# Patient Record
Sex: FEMALE | Race: BLACK OR AFRICAN AMERICAN | Employment: OTHER | URBAN - METROPOLITAN AREA
[De-identification: names, ages, dates, MRNs, and addresses within clinical notes are randomized per-mention and may not be internally consistent; named-entity substitution may affect disease eponyms.]

---

## 2018-04-17 ENCOUNTER — HOSPITAL ENCOUNTER (OUTPATIENT)
Dept: ULTRASOUND IMAGING | Age: 58
Discharge: HOME | End: 2018-04-17
Payer: MEDICAID

## 2018-04-17 DIAGNOSIS — R22.1 NECK MASS: ICD-10-CM

## 2018-04-17 PROCEDURE — 76536 US EXAM OF HEAD AND NECK: CPT

## 2018-07-25 ENCOUNTER — HOSPITAL ENCOUNTER (EMERGENCY)
Age: 58
Discharge: HOME | End: 2018-07-25
Attending: EMERGENCY MEDICINE
Payer: MEDICARE

## 2018-07-25 ENCOUNTER — APPOINTMENT (OUTPATIENT)
Dept: GENERAL RADIOLOGY | Age: 58
End: 2018-07-25
Payer: MEDICARE

## 2018-07-25 VITALS
SYSTOLIC BLOOD PRESSURE: 147 MMHG | BODY MASS INDEX: 25 KG/M2 | WEIGHT: 128 LBS | RESPIRATION RATE: 20 BRPM | DIASTOLIC BLOOD PRESSURE: 86 MMHG | TEMPERATURE: 98.4 F | OXYGEN SATURATION: 98 % | HEART RATE: 90 BPM

## 2018-07-25 DIAGNOSIS — M79.674 PAIN OF TOE OF RIGHT FOOT: Primary | ICD-10-CM

## 2018-07-25 PROCEDURE — 99283 EMERGENCY DEPT VISIT LOW MDM: CPT

## 2018-07-25 PROCEDURE — G0382 LEV 3 HOSP TYPE B ED VISIT: HCPCS

## 2018-07-25 PROCEDURE — 73630 X-RAY EXAM OF FOOT: CPT

## 2018-07-25 RX ORDER — CLOPIDOGREL BISULFATE 75 MG/1
75 TABLET ORAL DAILY
COMMUNITY

## 2018-07-25 ASSESSMENT — PAIN DESCRIPTION - PAIN TYPE: TYPE: ACUTE PAIN

## 2018-07-25 ASSESSMENT — PAIN SCALES - GENERAL: PAINLEVEL_OUTOF10: 5

## 2018-07-25 ASSESSMENT — PAIN DESCRIPTION - PROGRESSION: CLINICAL_PROGRESSION: NOT CHANGED

## 2018-07-25 ASSESSMENT — PAIN DESCRIPTION - ORIENTATION: ORIENTATION: RIGHT

## 2018-07-25 ASSESSMENT — PAIN DESCRIPTION - LOCATION: LOCATION: TOE (COMMENT WHICH ONE)

## 2018-07-25 ASSESSMENT — PAIN DESCRIPTION - DESCRIPTORS: DESCRIPTORS: ACHING

## 2018-07-25 ASSESSMENT — PAIN DESCRIPTION - ONSET: ONSET: GRADUAL

## 2018-07-25 ASSESSMENT — PAIN DESCRIPTION - FREQUENCY: FREQUENCY: CONTINUOUS

## 2018-07-25 NOTE — ED PROVIDER NOTES
kg)   SpO2 98%   BMI 25.00 kg/m²   Oxygen Saturation Interpretation: Normal      ---------------------------------------------------PHYSICAL EXAM--------------------------------------      Constitutional/General: Alert and oriented x3, well appearing, non toxic in NAD  Head: NC/AT  Eyes: PERRL, EOMI  Mouth: Oropharynx clear, handling secretions, no trismus  Neck: Supple, full ROM, no meningeal signs  Pulmonary: Lungs clear to auscultation bilaterally, no wheezes, rales, or rhonchi. Not in respiratory distress  Cardiovascular:  Regular rate and rhythm, no murmurs, gallops, or rubs. 2+ distal pulses  Abdomen: Soft, non tender, non distended,   Extremities: Moves all extremities x 4. Warm and well perfused  Skin: warm and dry without rash  Neurologic: GCS 15, cranial nerves II through 12 grossly intact  Psych: Normal Affect      ------------------------------ ED COURSE/MEDICAL DECISION MAKING----------------------  Medications - No data to display      Medical Decision Makin-year-old female who presents to the emergency department with a toe injury that she sustained 4 days ago. X-ray imaging of the toe shows no acute abnormalities. Patient will follow up with her primary care provider within the next 3-5 days. She declined pain medication. Patient was in agreement to this plan and she was discharged in stable condition. Counseling: The emergency provider has spoken with the patient and discussed todays results, in addition to providing specific details for the plan of care and counseling regarding the diagnosis and prognosis. Questions are answered at this time and they are agreeable with the plan.      --------------------------------- IMPRESSION AND DISPOSITION ---------------------------------    IMPRESSION  1.  Pain of toe of right foot        DISPOSITION  Disposition: Discharge to home  Patient condition is good                  Julee Dunbar DO  [id number]

## 2018-10-31 ENCOUNTER — HOSPITAL ENCOUNTER (EMERGENCY)
Age: 58
Discharge: HOME | End: 2018-10-31
Payer: MEDICARE

## 2018-10-31 ENCOUNTER — APPOINTMENT (OUTPATIENT)
Dept: GENERAL RADIOLOGY | Age: 58
End: 2018-10-31
Payer: MEDICARE

## 2018-10-31 VITALS
HEART RATE: 82 BPM | RESPIRATION RATE: 20 BRPM | WEIGHT: 130 LBS | DIASTOLIC BLOOD PRESSURE: 88 MMHG | SYSTOLIC BLOOD PRESSURE: 191 MMHG | HEIGHT: 60 IN | TEMPERATURE: 98.3 F | BODY MASS INDEX: 25.52 KG/M2 | OXYGEN SATURATION: 98 %
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
## 2018-10-31 DIAGNOSIS — S80.01XA CONTUSION OF RIGHT KNEE, INITIAL ENCOUNTER: ICD-10-CM

## 2018-10-31 PROCEDURE — 73562 X-RAY EXAM OF KNEE 3: CPT

## 2018-10-31 PROCEDURE — 99283 EMERGENCY DEPT VISIT LOW MDM: CPT

## 2018-10-31 PROCEDURE — 73590 X-RAY EXAM OF LOWER LEG: CPT

## 2018-10-31 ASSESSMENT — PAIN DESCRIPTION - PAIN TYPE: TYPE: ACUTE PAIN

## 2018-10-31 ASSESSMENT — PAIN SCALES - GENERAL: PAINLEVEL_OUTOF10: 5

## 2018-10-31 ASSESSMENT — PAIN DESCRIPTION - ORIENTATION: ORIENTATION: RIGHT;LEFT

## 2018-10-31 ASSESSMENT — PAIN DESCRIPTION - LOCATION: LOCATION: KNEE;LEG

## 2019-01-28 ENCOUNTER — HOSPITAL ENCOUNTER (OUTPATIENT)
Dept: GENERAL RADIOLOGY | Age: 59
Discharge: HOME | End: 2019-01-30
Payer: COMMERCIAL

## 2019-01-28 DIAGNOSIS — R13.14 DYSPHAGIA, PHARYNGOESOPHAGEAL: ICD-10-CM

## 2019-01-28 PROCEDURE — 2500000003 HC RX 250 WO HCPCS: Performed by: INTERNAL MEDICINE

## 2019-01-28 PROCEDURE — 74220 X-RAY XM ESOPHAGUS 1CNTRST: CPT

## 2019-01-28 RX ADMIN — BARIUM SULFATE 340 G: 980 POWDER, FOR SUSPENSION ORAL at 08:42

## 2019-03-13 ENCOUNTER — HOSPITAL ENCOUNTER (EMERGENCY)
Age: 59
Discharge: HOME | End: 2019-03-14
Attending: EMERGENCY MEDICINE
Payer: MEDICARE

## 2019-03-13 ENCOUNTER — APPOINTMENT (OUTPATIENT)
Dept: GENERAL RADIOLOGY | Age: 59
End: 2019-03-13
Payer: MEDICARE

## 2019-03-13 ENCOUNTER — APPOINTMENT (OUTPATIENT)
Dept: CT IMAGING | Age: 59
End: 2019-03-13
Payer: MEDICARE

## 2019-03-13 DIAGNOSIS — E16.2 HYPOGLYCEMIA: Primary | ICD-10-CM

## 2019-03-13 DIAGNOSIS — I10 UNCONTROLLED HYPERTENSION: ICD-10-CM

## 2019-03-13 LAB
ALBUMIN SERPL-MCNC: 3.7 G/DL (ref 3.5–5.2)
ALP BLD-CCNC: 106 U/L (ref 35–104)
ALT SERPL-CCNC: 8 U/L (ref 0–32)
AMMONIA: 27 UMOL/L (ref 11–51)
AMPHETAMINE SCREEN, URINE: NOT DETECTED
ANION GAP SERPL CALCULATED.3IONS-SCNC: 12 MMOL/L (ref 7–16)
APTT: 33 SEC (ref 24.5–35.1)
AST SERPL-CCNC: 13 U/L (ref 0–31)
BACTERIA: ABNORMAL /HPF
BARBITURATE SCREEN URINE: NOT DETECTED
BASOPHILS ABSOLUTE: 0.01 E9/L (ref 0–0.2)
BASOPHILS RELATIVE PERCENT: 0.2 % (ref 0–2)
BENZODIAZEPINE SCREEN, URINE: NOT DETECTED
BILIRUB SERPL-MCNC: <0.2 MG/DL (ref 0–1.2)
BILIRUBIN URINE: NEGATIVE
BLOOD, URINE: ABNORMAL
BUN BLDV-MCNC: 17 MG/DL (ref 8–23)
CALCIUM SERPL-MCNC: 10 MG/DL (ref 8.6–10.2)
CANNABINOID SCREEN URINE: NOT DETECTED
CHLORIDE BLD-SCNC: 100 MMOL/L (ref 98–107)
CHP ED QC CHECK: YES
CHP ED QC CHECK: YES
CLARITY: CLEAR
CO2: 27 MMOL/L (ref 22–29)
COCAINE METABOLITE SCREEN URINE: NOT DETECTED
COLOR: YELLOW
CREAT SERPL-MCNC: 1 MG/DL (ref 0.5–1)
EOSINOPHILS ABSOLUTE: 0.01 E9/L (ref 0.05–0.5)
EOSINOPHILS RELATIVE PERCENT: 0.2 % (ref 0–6)
GFR AFRICAN AMERICAN: >60
GFR NON-AFRICAN AMERICAN: >60 ML/MIN/1.73
GLUCOSE BLD-MCNC: 130 MG/DL
GLUCOSE BLD-MCNC: 35 MG/DL (ref 74–99)
GLUCOSE BLD-MCNC: NORMAL MG/DL
GLUCOSE URINE: 100 MG/DL
HCT VFR BLD CALC: 38.6 % (ref 34–48)
HEMOGLOBIN: 12.3 G/DL (ref 11.5–15.5)
IMMATURE GRANULOCYTES #: 0.01 E9/L
IMMATURE GRANULOCYTES %: 0.2 % (ref 0–5)
INR BLD: 1
KETONES, URINE: NEGATIVE MG/DL
LEUKOCYTE ESTERASE, URINE: NEGATIVE
LYMPHOCYTES ABSOLUTE: 1.07 E9/L (ref 1.5–4)
LYMPHOCYTES RELATIVE PERCENT: 20.6 % (ref 20–42)
MCH RBC QN AUTO: 26.6 PG (ref 26–35)
MCHC RBC AUTO-ENTMCNC: 31.9 % (ref 32–34.5)
MCV RBC AUTO: 83.4 FL (ref 80–99.9)
METER GLUCOSE: 130 MG/DL (ref 74–99)
METER GLUCOSE: <40 MG/DL (ref 74–99)
METHADONE SCREEN, URINE: NOT DETECTED
MONOCYTES ABSOLUTE: 0.43 E9/L (ref 0.1–0.95)
MONOCYTES RELATIVE PERCENT: 8.3 % (ref 2–12)
NEUTROPHILS ABSOLUTE: 3.66 E9/L (ref 1.8–7.3)
NEUTROPHILS RELATIVE PERCENT: 70.5 % (ref 43–80)
NITRITE, URINE: NEGATIVE
OPIATE SCREEN URINE: NOT DETECTED
PDW BLD-RTO: 12.8 FL (ref 11.5–15)
PH UA: 7 (ref 5–9)
PHENCYCLIDINE SCREEN URINE: NOT DETECTED
PLATELET # BLD: 348 E9/L (ref 130–450)
PMV BLD AUTO: 8.8 FL (ref 7–12)
POTASSIUM SERPL-SCNC: 3.3 MMOL/L (ref 3.5–5)
PROPOXYPHENE SCREEN: NOT DETECTED
PROTEIN UA: 100 MG/DL
PROTHROMBIN TIME: 11 SEC (ref 9.3–12.4)
RBC # BLD: 4.63 E12/L (ref 3.5–5.5)
RBC UA: ABNORMAL /HPF (ref 0–2)
SODIUM BLD-SCNC: 139 MMOL/L (ref 132–146)
SPECIFIC GRAVITY UA: 1.01 (ref 1–1.03)
TOTAL PROTEIN: 7.5 G/DL (ref 6.4–8.3)
TROPONIN: <0.01 NG/ML (ref 0–0.03)
TSH SERPL DL<=0.05 MIU/L-ACNC: 1.07 UIU/ML (ref 0.27–4.2)
UROBILINOGEN, URINE: 0.2 E.U./DL
WBC # BLD: 5.2 E9/L (ref 4.5–11.5)
WBC UA: ABNORMAL /HPF (ref 0–5)

## 2019-03-13 PROCEDURE — 85730 THROMBOPLASTIN TIME PARTIAL: CPT

## 2019-03-13 PROCEDURE — 99285 EMERGENCY DEPT VISIT HI MDM: CPT

## 2019-03-13 PROCEDURE — 96374 THER/PROPH/DIAG INJ IV PUSH: CPT

## 2019-03-13 PROCEDURE — 80307 DRUG TEST PRSMV CHEM ANLYZR: CPT

## 2019-03-13 PROCEDURE — 70450 CT HEAD/BRAIN W/O DYE: CPT

## 2019-03-13 PROCEDURE — 80053 COMPREHEN METABOLIC PANEL: CPT

## 2019-03-13 PROCEDURE — 85025 COMPLETE CBC W/AUTO DIFF WBC: CPT

## 2019-03-13 PROCEDURE — 82140 ASSAY OF AMMONIA: CPT

## 2019-03-13 PROCEDURE — G0480 DRUG TEST DEF 1-7 CLASSES: HCPCS

## 2019-03-13 PROCEDURE — 84443 ASSAY THYROID STIM HORMONE: CPT

## 2019-03-13 PROCEDURE — 81001 URINALYSIS AUTO W/SCOPE: CPT

## 2019-03-13 PROCEDURE — 85610 PROTHROMBIN TIME: CPT

## 2019-03-13 PROCEDURE — 36415 COLL VENOUS BLD VENIPUNCTURE: CPT

## 2019-03-13 PROCEDURE — 2580000003 HC RX 258: Performed by: EMERGENCY MEDICINE

## 2019-03-13 PROCEDURE — 71045 X-RAY EXAM CHEST 1 VIEW: CPT

## 2019-03-13 PROCEDURE — 84484 ASSAY OF TROPONIN QUANT: CPT

## 2019-03-13 PROCEDURE — 93005 ELECTROCARDIOGRAM TRACING: CPT | Performed by: EMERGENCY MEDICINE

## 2019-03-13 PROCEDURE — 82962 GLUCOSE BLOOD TEST: CPT

## 2019-03-13 RX ORDER — LABETALOL HYDROCHLORIDE 5 MG/ML
10 INJECTION, SOLUTION INTRAVENOUS ONCE
Status: COMPLETED | OUTPATIENT
Start: 2019-03-13 | End: 2019-03-14

## 2019-03-13 RX ORDER — DEXTROMETHORPHAN HYDROBROMIDE AND PROMETHAZINE HYDROCHLORIDE 15; 6.25 MG/5ML; MG/5ML
5 SYRUP ORAL 4 TIMES DAILY PRN
COMMUNITY
End: 2019-04-18

## 2019-03-13 RX ORDER — DEXTROSE MONOHYDRATE 25 G/50ML
25 INJECTION, SOLUTION INTRAVENOUS ONCE
Status: COMPLETED | OUTPATIENT
Start: 2019-03-13 | End: 2019-03-13

## 2019-03-13 RX ORDER — GABAPENTIN 300 MG/1
300 CAPSULE ORAL NIGHTLY
COMMUNITY

## 2019-03-13 RX ADMIN — DEXTROSE MONOHYDRATE 25 G: 25 INJECTION, SOLUTION INTRAVENOUS at 21:23

## 2019-03-14 VITALS
HEART RATE: 73 BPM | OXYGEN SATURATION: 97 % | SYSTOLIC BLOOD PRESSURE: 179 MMHG | TEMPERATURE: 98.4 F | BODY MASS INDEX: 26.9 KG/M2 | HEIGHT: 60 IN | WEIGHT: 137 LBS | DIASTOLIC BLOOD PRESSURE: 83 MMHG | RESPIRATION RATE: 16 BRPM

## 2019-03-14 LAB
ACETAMINOPHEN LEVEL: <5 MCG/ML (ref 10–30)
CHP ED QC CHECK: NORMAL
EKG ATRIAL RATE: 86 BPM
EKG P-R INTERVAL: 220 MS
EKG Q-T INTERVAL: 366 MS
EKG QRS DURATION: 92 MS
EKG QTC CALCULATION (BAZETT): 437 MS
EKG R AXIS: -4 DEGREES
EKG T AXIS: 154 DEGREES
EKG VENTRICULAR RATE: 86 BPM
ETHANOL: <10 MG/DL (ref 0–0.08)
GLUCOSE BLD-MCNC: 208 MG/DL
METER GLUCOSE: 208 MG/DL (ref 74–99)
SALICYLATE, SERUM: <0.3 MG/DL (ref 0–30)
TRICYCLIC ANTIDEPRESSANTS SCREEN SERUM: NEGATIVE NG/ML

## 2019-03-14 PROCEDURE — 96375 TX/PRO/DX INJ NEW DRUG ADDON: CPT

## 2019-03-14 PROCEDURE — 2500000003 HC RX 250 WO HCPCS: Performed by: EMERGENCY MEDICINE

## 2019-03-14 PROCEDURE — 82962 GLUCOSE BLOOD TEST: CPT

## 2019-03-14 RX ADMIN — LABETALOL 20 MG/4 ML (5 MG/ML) INTRAVENOUS SYRINGE 10 MG: at 00:37

## 2019-04-18 ENCOUNTER — HOSPITAL ENCOUNTER (EMERGENCY)
Age: 59
Discharge: HOME | End: 2019-04-18
Attending: EMERGENCY MEDICINE
Payer: MEDICARE

## 2019-04-18 ENCOUNTER — APPOINTMENT (OUTPATIENT)
Dept: GENERAL RADIOLOGY | Age: 59
End: 2019-04-18
Payer: MEDICARE

## 2019-04-18 VITALS
WEIGHT: 135 LBS | TEMPERATURE: 98.1 F | SYSTOLIC BLOOD PRESSURE: 172 MMHG | OXYGEN SATURATION: 97 % | HEART RATE: 78 BPM | RESPIRATION RATE: 16 BRPM | DIASTOLIC BLOOD PRESSURE: 100 MMHG | BODY MASS INDEX: 26.37 KG/M2

## 2019-04-18 DIAGNOSIS — S90.121A CONTUSION OF RIGHT FOOT INCLUDING TOES, INITIAL ENCOUNTER: Primary | ICD-10-CM

## 2019-04-18 DIAGNOSIS — S90.31XA CONTUSION OF RIGHT FOOT INCLUDING TOES, INITIAL ENCOUNTER: Primary | ICD-10-CM

## 2019-04-18 PROCEDURE — 99283 EMERGENCY DEPT VISIT LOW MDM: CPT

## 2019-04-18 PROCEDURE — G0382 LEV 3 HOSP TYPE B ED VISIT: HCPCS

## 2019-04-18 PROCEDURE — 73630 X-RAY EXAM OF FOOT: CPT

## 2019-04-18 ASSESSMENT — PAIN DESCRIPTION - PROGRESSION
CLINICAL_PROGRESSION: NOT CHANGED
CLINICAL_PROGRESSION: NOT CHANGED

## 2019-04-18 ASSESSMENT — PAIN DESCRIPTION - PAIN TYPE: TYPE: ACUTE PAIN

## 2019-04-18 ASSESSMENT — PAIN DESCRIPTION - FREQUENCY: FREQUENCY: CONTINUOUS

## 2019-04-18 ASSESSMENT — PAIN DESCRIPTION - ORIENTATION: ORIENTATION: RIGHT

## 2019-04-18 ASSESSMENT — PAIN DESCRIPTION - LOCATION: LOCATION: FOOT

## 2019-04-18 ASSESSMENT — PAIN DESCRIPTION - DESCRIPTORS: DESCRIPTORS: SORE

## 2019-04-18 ASSESSMENT — PAIN SCALES - GENERAL: PAINLEVEL_OUTOF10: 5
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
## 2019-07-26 ENCOUNTER — HOSPITAL ENCOUNTER (OUTPATIENT)
Dept: ULTRASOUND IMAGING | Age: 59
Discharge: HOME | End: 2019-07-26
Payer: MEDICARE

## 2019-07-26 DIAGNOSIS — R13.10 DYSPHAGIA, UNSPECIFIED TYPE: ICD-10-CM

## 2019-07-26 PROCEDURE — 76536 US EXAM OF HEAD AND NECK: CPT

## 2019-09-17 ENCOUNTER — HOSPITAL ENCOUNTER (OUTPATIENT)
Dept: ULTRASOUND IMAGING | Age: 59
Discharge: HOME | End: 2019-09-17
Payer: MEDICARE

## 2019-09-17 ENCOUNTER — HOSPITAL ENCOUNTER (OUTPATIENT)
Age: 59
Discharge: HOME | End: 2019-09-19
Payer: MEDICARE

## 2019-09-17 ENCOUNTER — HOSPITAL ENCOUNTER (OUTPATIENT)
Dept: GENERAL RADIOLOGY | Age: 59
Discharge: HOME | End: 2019-09-19
Payer: MEDICARE

## 2019-09-17 DIAGNOSIS — N18.2 CHRONIC KIDNEY DISEASE, STAGE II (MILD): ICD-10-CM

## 2019-09-17 DIAGNOSIS — G89.29 CHRONIC LEFT SHOULDER PAIN: ICD-10-CM

## 2019-09-17 DIAGNOSIS — M25.512 CHRONIC LEFT SHOULDER PAIN: ICD-10-CM

## 2019-09-17 PROCEDURE — 76775 US EXAM ABDO BACK WALL LIM: CPT

## 2019-09-17 PROCEDURE — 73030 X-RAY EXAM OF SHOULDER: CPT

## 2019-11-24 ENCOUNTER — HOSPITAL ENCOUNTER (EMERGENCY)
Age: 59
Discharge: HOME | End: 2019-11-24
Attending: EMERGENCY MEDICINE
Payer: MEDICARE

## 2019-11-24 VITALS
HEIGHT: 60 IN | RESPIRATION RATE: 16 BRPM | WEIGHT: 130 LBS | TEMPERATURE: 97.6 F | HEART RATE: 78 BPM | BODY MASS INDEX: 25.52 KG/M2 | OXYGEN SATURATION: 97 % | SYSTOLIC BLOOD PRESSURE: 155 MMHG | DIASTOLIC BLOOD PRESSURE: 84 MMHG

## 2019-11-24 DIAGNOSIS — E16.2 HYPOGLYCEMIA: Primary | ICD-10-CM

## 2019-11-24 LAB
ANION GAP SERPL CALCULATED.3IONS-SCNC: 12 MMOL/L (ref 7–16)
BASOPHILS ABSOLUTE: 0.02 E9/L (ref 0–0.2)
BASOPHILS RELATIVE PERCENT: 0.3 % (ref 0–2)
BUN BLDV-MCNC: 24 MG/DL (ref 8–23)
CALCIUM SERPL-MCNC: 9.9 MG/DL (ref 8.6–10.2)
CHLORIDE BLD-SCNC: 105 MMOL/L (ref 98–107)
CHP ED QC CHECK: NORMAL
CHP ED QC CHECK: NORMAL
CO2: 27 MMOL/L (ref 22–29)
CREAT SERPL-MCNC: 1.6 MG/DL (ref 0.5–1)
EOSINOPHILS ABSOLUTE: 0.11 E9/L (ref 0.05–0.5)
EOSINOPHILS RELATIVE PERCENT: 1.4 % (ref 0–6)
GFR AFRICAN AMERICAN: 40
GFR NON-AFRICAN AMERICAN: 40 ML/MIN/1.73
GLUCOSE BLD-MCNC: 162 MG/DL
GLUCOSE BLD-MCNC: 168 MG/DL (ref 74–99)
GLUCOSE BLD-MCNC: 83 MG/DL
HCT VFR BLD CALC: 35 % (ref 34–48)
HEMOGLOBIN: 10.9 G/DL (ref 11.5–15.5)
IMMATURE GRANULOCYTES #: 0.02 E9/L
IMMATURE GRANULOCYTES %: 0.3 % (ref 0–5)
LYMPHOCYTES ABSOLUTE: 2.46 E9/L (ref 1.5–4)
LYMPHOCYTES RELATIVE PERCENT: 32 % (ref 20–42)
MCH RBC QN AUTO: 26.3 PG (ref 26–35)
MCHC RBC AUTO-ENTMCNC: 31.1 % (ref 32–34.5)
MCV RBC AUTO: 84.5 FL (ref 80–99.9)
METER GLUCOSE: 162 MG/DL (ref 74–99)
METER GLUCOSE: 173 MG/DL (ref 74–99)
METER GLUCOSE: 83 MG/DL (ref 74–99)
METER GLUCOSE: 84 MG/DL (ref 74–99)
MONOCYTES ABSOLUTE: 0.38 E9/L (ref 0.1–0.95)
MONOCYTES RELATIVE PERCENT: 4.9 % (ref 2–12)
NEUTROPHILS ABSOLUTE: 4.69 E9/L (ref 1.8–7.3)
NEUTROPHILS RELATIVE PERCENT: 61.1 % (ref 43–80)
PDW BLD-RTO: 13.8 FL (ref 11.5–15)
PLATELET # BLD: 405 E9/L (ref 130–450)
PMV BLD AUTO: 8.7 FL (ref 7–12)
POTASSIUM SERPL-SCNC: 4.3 MMOL/L (ref 3.5–5)
RBC # BLD: 4.14 E12/L (ref 3.5–5.5)
SODIUM BLD-SCNC: 144 MMOL/L (ref 132–146)
TROPONIN: <0.01 NG/ML (ref 0–0.03)
WBC # BLD: 7.7 E9/L (ref 4.5–11.5)

## 2019-11-24 PROCEDURE — 80048 BASIC METABOLIC PNL TOTAL CA: CPT

## 2019-11-24 PROCEDURE — 82962 GLUCOSE BLOOD TEST: CPT

## 2019-11-24 PROCEDURE — 36415 COLL VENOUS BLD VENIPUNCTURE: CPT

## 2019-11-24 PROCEDURE — 99285 EMERGENCY DEPT VISIT HI MDM: CPT

## 2019-11-24 PROCEDURE — 84484 ASSAY OF TROPONIN QUANT: CPT

## 2019-11-24 PROCEDURE — 85025 COMPLETE CBC W/AUTO DIFF WBC: CPT

## 2019-11-24 PROCEDURE — 93005 ELECTROCARDIOGRAM TRACING: CPT | Performed by: EMERGENCY MEDICINE

## 2019-11-24 PROCEDURE — 96374 THER/PROPH/DIAG INJ IV PUSH: CPT

## 2019-11-24 PROCEDURE — 2500000003 HC RX 250 WO HCPCS: Performed by: EMERGENCY MEDICINE

## 2019-11-24 RX ORDER — LABETALOL 20 MG/4 ML (5 MG/ML) INTRAVENOUS SYRINGE
10 ONCE
Status: COMPLETED | OUTPATIENT
Start: 2019-11-24 | End: 2019-11-24

## 2019-11-24 RX ADMIN — LABETALOL 20 MG/4 ML (5 MG/ML) INTRAVENOUS SYRINGE 10 MG: at 21:01

## 2019-11-24 ASSESSMENT — ENCOUNTER SYMPTOMS
COUGH: 0
SORE THROAT: 0
ABDOMINAL DISTENTION: 0
ABDOMINAL PAIN: 0
SHORTNESS OF BREATH: 0
BACK PAIN: 0
WHEEZING: 0
CHEST TIGHTNESS: 0
DIARRHEA: 0
SINUS PRESSURE: 0
NAUSEA: 0
VOMITING: 0

## 2019-11-25 LAB
EKG ATRIAL RATE: 94 BPM
EKG P AXIS: 69 DEGREES
EKG P-R INTERVAL: 228 MS
EKG Q-T INTERVAL: 358 MS
EKG QRS DURATION: 92 MS
EKG QTC CALCULATION (BAZETT): 447 MS
EKG R AXIS: 47 DEGREES
EKG T AXIS: 83 DEGREES
EKG VENTRICULAR RATE: 94 BPM

## 2020-01-01 ENCOUNTER — VIRTUAL VISIT (OUTPATIENT)
Dept: ENDOCRINOLOGY | Age: 60
End: 2020-01-01
Payer: MEDICARE

## 2020-01-01 ENCOUNTER — TELEPHONE (OUTPATIENT)
Dept: ENDOCRINOLOGY | Age: 60
End: 2020-01-01

## 2020-01-01 ENCOUNTER — HOSPITAL ENCOUNTER (OUTPATIENT)
Dept: DIABETES SERVICES | Age: 60
Setting detail: THERAPIES SERIES
Discharge: HOME | End: 2020-05-26
Payer: MEDICARE

## 2020-01-01 ENCOUNTER — OFFICE VISIT (OUTPATIENT)
Dept: ENDOCRINOLOGY | Age: 60
End: 2020-01-01
Payer: MEDICARE

## 2020-01-01 ENCOUNTER — HOSPITAL ENCOUNTER (OUTPATIENT)
Dept: NON INVASIVE DIAGNOSTICS | Age: 60
Discharge: HOME | End: 2020-09-22
Payer: MEDICARE

## 2020-01-01 ENCOUNTER — FOLLOWUP TELEPHONE ENCOUNTER (OUTPATIENT)
Dept: DIABETES SERVICES | Age: 60
End: 2020-01-01

## 2020-01-01 ENCOUNTER — HOSPITAL ENCOUNTER (OUTPATIENT)
Age: 60
Discharge: HOME | End: 2020-08-07
Payer: MEDICARE

## 2020-01-01 VITALS — DIASTOLIC BLOOD PRESSURE: 70 MMHG | HEART RATE: 82 BPM | TEMPERATURE: 97.5 F | SYSTOLIC BLOOD PRESSURE: 128 MMHG

## 2020-01-01 VITALS — HEIGHT: 60 IN | BODY MASS INDEX: 25.52 KG/M2 | WEIGHT: 130 LBS

## 2020-01-01 LAB
ALBUMIN SERPL-MCNC: 3.8 G/DL (ref 3.5–5.2)
ANION GAP SERPL CALCULATED.3IONS-SCNC: 15 MMOL/L (ref 7–16)
BASOPHILS ABSOLUTE: 0.02 E9/L (ref 0–0.2)
BASOPHILS RELATIVE PERCENT: 0.3 % (ref 0–2)
BUN BLDV-MCNC: 31 MG/DL (ref 8–23)
CALCIUM SERPL-MCNC: 10 MG/DL (ref 8.6–10.2)
CHLORIDE BLD-SCNC: 106 MMOL/L (ref 98–107)
CHOLESTEROL, TOTAL: 168 MG/DL (ref 0–199)
CO2: 20 MMOL/L (ref 22–29)
CREAT SERPL-MCNC: 2.1 MG/DL (ref 0.5–1)
EOSINOPHILS ABSOLUTE: 0.16 E9/L (ref 0.05–0.5)
EOSINOPHILS RELATIVE PERCENT: 2.7 % (ref 0–6)
GFR AFRICAN AMERICAN: 29
GFR NON-AFRICAN AMERICAN: 29 ML/MIN/1.73
GLUCOSE BLD-MCNC: 259 MG/DL (ref 74–99)
HBA1C MFR BLD: 8.2 %
HCT VFR BLD CALC: 29.6 % (ref 34–48)
HDLC SERPL-MCNC: 45 MG/DL
HEMOGLOBIN: 9.3 G/DL (ref 11.5–15.5)
IMMATURE GRANULOCYTES #: 0.01 E9/L
IMMATURE GRANULOCYTES %: 0.2 % (ref 0–5)
LDL CHOLESTEROL CALCULATED: 87 MG/DL (ref 0–99)
LV EF: 75 %
LVEF MODALITY: NORMAL
LYMPHOCYTES ABSOLUTE: 2.31 E9/L (ref 1.5–4)
LYMPHOCYTES RELATIVE PERCENT: 38.7 % (ref 20–42)
MCH RBC QN AUTO: 27.3 PG (ref 26–35)
MCHC RBC AUTO-ENTMCNC: 31.4 % (ref 32–34.5)
MCV RBC AUTO: 86.8 FL (ref 80–99.9)
MONOCYTES ABSOLUTE: 0.43 E9/L (ref 0.1–0.95)
MONOCYTES RELATIVE PERCENT: 7.2 % (ref 2–12)
NEUTROPHILS ABSOLUTE: 3.04 E9/L (ref 1.8–7.3)
NEUTROPHILS RELATIVE PERCENT: 50.9 % (ref 43–80)
PARATHYROID HORMONE INTACT: 42 PG/ML (ref 15–65)
PDW BLD-RTO: 13.4 FL (ref 11.5–15)
PHOSPHORUS: 3.8 MG/DL (ref 2.5–4.5)
PLATELET # BLD: 355 E9/L (ref 130–450)
PMV BLD AUTO: 9.5 FL (ref 7–12)
POTASSIUM SERPL-SCNC: 4.6 MMOL/L (ref 3.5–5)
RBC # BLD: 3.41 E12/L (ref 3.5–5.5)
SODIUM BLD-SCNC: 141 MMOL/L (ref 132–146)
TRIGL SERPL-MCNC: 182 MG/DL (ref 0–149)
VITAMIN D 25-HYDROXY: 32 NG/ML (ref 30–100)
VLDLC SERPL CALC-MCNC: 36 MG/DL
WBC # BLD: 6 E9/L (ref 4.5–11.5)

## 2020-01-01 PROCEDURE — 99214 OFFICE O/P EST MOD 30 MIN: CPT | Performed by: INTERNAL MEDICINE

## 2020-01-01 PROCEDURE — 99205 OFFICE O/P NEW HI 60 MIN: CPT | Performed by: INTERNAL MEDICINE

## 2020-01-01 PROCEDURE — 83036 HEMOGLOBIN GLYCOSYLATED A1C: CPT | Performed by: INTERNAL MEDICINE

## 2020-01-01 PROCEDURE — G8419 CALC BMI OUT NRM PARAM NOF/U: HCPCS | Performed by: INTERNAL MEDICINE

## 2020-01-01 PROCEDURE — 1036F TOBACCO NON-USER: CPT | Performed by: INTERNAL MEDICINE

## 2020-01-01 PROCEDURE — 2022F DILAT RTA XM EVC RTNOPTHY: CPT | Performed by: INTERNAL MEDICINE

## 2020-01-01 PROCEDURE — 3052F HG A1C>EQUAL 8.0%<EQUAL 9.0%: CPT | Performed by: INTERNAL MEDICINE

## 2020-01-01 PROCEDURE — 80069 RENAL FUNCTION PANEL: CPT

## 2020-01-01 PROCEDURE — 85025 COMPLETE CBC W/AUTO DIFF WBC: CPT

## 2020-01-01 PROCEDURE — 3017F COLORECTAL CA SCREEN DOC REV: CPT | Performed by: INTERNAL MEDICINE

## 2020-01-01 PROCEDURE — G0108 DIAB MANAGE TRN  PER INDIV: HCPCS

## 2020-01-01 PROCEDURE — 93306 TTE W/DOPPLER COMPLETE: CPT

## 2020-01-01 PROCEDURE — G8484 FLU IMMUNIZE NO ADMIN: HCPCS | Performed by: INTERNAL MEDICINE

## 2020-01-01 PROCEDURE — 3046F HEMOGLOBIN A1C LEVEL >9.0%: CPT | Performed by: INTERNAL MEDICINE

## 2020-01-01 PROCEDURE — G8427 DOCREV CUR MEDS BY ELIG CLIN: HCPCS | Performed by: INTERNAL MEDICINE

## 2020-01-01 PROCEDURE — 80061 LIPID PANEL: CPT

## 2020-01-01 PROCEDURE — 82306 VITAMIN D 25 HYDROXY: CPT

## 2020-01-01 PROCEDURE — 83970 ASSAY OF PARATHORMONE: CPT

## 2020-01-01 PROCEDURE — 36415 COLL VENOUS BLD VENIPUNCTURE: CPT

## 2020-01-01 RX ORDER — INSULIN GLARGINE 100 [IU]/ML
18 INJECTION, SOLUTION SUBCUTANEOUS EVERY MORNING
Qty: 5 PEN | Refills: 3
Start: 2020-01-01

## 2020-01-01 RX ORDER — INSULIN LISPRO 100 [IU]/ML
5 INJECTION, SOLUTION SUBCUTANEOUS 3 TIMES DAILY
COMMUNITY
End: 2020-01-01

## 2020-01-01 RX ORDER — INDAPAMIDE 2.5 MG/1
2.5 TABLET, FILM COATED ORAL EVERY MORNING
Status: ON HOLD | COMMUNITY
End: 2021-01-01 | Stop reason: HOSPADM

## 2020-01-01 RX ORDER — BLOOD-GLUCOSE TRANSMITTER
EACH MISCELLANEOUS
Qty: 1 EACH | Refills: 3 | Status: SHIPPED | OUTPATIENT
Start: 2020-01-01 | End: 2020-01-01 | Stop reason: SDUPTHER

## 2020-01-01 RX ORDER — LEVOTHYROXINE SODIUM 0.05 MG/1
50 TABLET ORAL DAILY
Qty: 90 TABLET | Refills: 2 | Status: SHIPPED
Start: 2020-01-01 | End: 2021-01-01 | Stop reason: DRUGHIGH

## 2020-01-01 RX ORDER — INSULIN LISPRO 100 [IU]/ML
6 INJECTION, SOLUTION INTRAVENOUS; SUBCUTANEOUS
Qty: 5 PEN | Refills: 5
Start: 2020-01-01 | End: 2021-01-01

## 2020-01-01 RX ORDER — INSULIN LISPRO 100 [IU]/ML
6 INJECTION, SOLUTION INTRAVENOUS; SUBCUTANEOUS
Qty: 5 PEN | Refills: 5 | Status: SHIPPED
Start: 2020-01-01 | End: 2020-01-01 | Stop reason: SDUPTHER

## 2020-01-01 RX ORDER — BLOOD-GLUCOSE SENSOR
EACH MISCELLANEOUS
Qty: 9 EACH | Refills: 3 | Status: SHIPPED | OUTPATIENT
Start: 2020-01-01 | End: 2020-01-01 | Stop reason: SDUPTHER

## 2020-01-01 RX ORDER — INSULIN GLARGINE 100 [IU]/ML
20 INJECTION, SOLUTION SUBCUTANEOUS EVERY MORNING
COMMUNITY
End: 2020-01-01 | Stop reason: SDUPTHER

## 2020-01-01 RX ORDER — HYDRALAZINE HYDROCHLORIDE 50 MG/1
50 TABLET, FILM COATED ORAL 2 TIMES DAILY
Status: ON HOLD | COMMUNITY
End: 2021-01-01 | Stop reason: HOSPADM

## 2020-01-01 RX ORDER — BLOOD SUGAR DIAGNOSTIC
1 STRIP MISCELLANEOUS
Qty: 150 EACH | Refills: 5 | Status: ON HOLD
Start: 2020-01-01 | End: 2021-01-01 | Stop reason: HOSPADM

## 2020-01-01 RX ORDER — BLOOD-GLUCOSE SENSOR
EACH MISCELLANEOUS
Qty: 9 EACH | Refills: 3 | Status: ON HOLD
Start: 2020-01-01 | End: 2021-01-01 | Stop reason: HOSPADM

## 2020-01-01 RX ORDER — RANITIDINE 150 MG/1
150 TABLET ORAL DAILY
COMMUNITY
End: 2021-01-01 | Stop reason: ALTCHOICE

## 2020-01-01 RX ORDER — BLOOD-GLUCOSE TRANSMITTER
EACH MISCELLANEOUS
Qty: 1 EACH | Refills: 3 | Status: ON HOLD
Start: 2020-01-01 | End: 2021-01-01 | Stop reason: HOSPADM

## 2020-01-01 RX ORDER — METFORMIN HYDROCHLORIDE 500 MG/1
500 TABLET, EXTENDED RELEASE ORAL
Qty: 90 TABLET | Refills: 2 | Status: SHIPPED
Start: 2020-01-01 | End: 2020-01-01

## 2020-01-01 RX ORDER — ATORVASTATIN CALCIUM 80 MG/1
80 TABLET, FILM COATED ORAL DAILY
COMMUNITY

## 2020-01-01 ASSESSMENT — PATIENT HEALTH QUESTIONNAIRE - PHQ9
SUM OF ALL RESPONSES TO PHQ QUESTIONS 1-9: 0
SUM OF ALL RESPONSES TO PHQ QUESTIONS 1-9: 0

## 2020-05-13 NOTE — PATIENT INSTRUCTIONS
Recommendations for today's visit  · Continue Lantus 20 units daily in the morning    · Change Metfomrin to extended release 500 mg daily   Start Trulicity 2.24 mg subcutaneous once every 7 days. May experience, nausea, should get better over the weeks. if you vomit several times, you should stop the drug and call our office. If you develop acute abdominal pain and vomiting, stop the medication completely and contact our office   · Check Blood sugar 3 times/day before meals and at bedtime and send us sugar log in a week or two. You can fax, mail or email your sugar log     These are your blood sugar, blood pressure, cholesterol and A1c goals:  · Blood sugar fastin mg/dl to 130 mg/dl  · Blood sugar before meals: <150 mg/dl  · Peak blood sugar lower than 180 mg/dl  · Bad cholesterol (LDL cholesterol): less than 100 mg/dl  · Blood pressure: less than 140/80 mmHg\  · A1c: between 6.5 - 7.5%    I you have any questions please call Dr. Sarah Wood office       Orjolie Perez MD  Endocrinologist, [facility])   [address] [place] [address], [address] [place] [id number]   Phone: [phone]  Fax: [phone]  Email: [email or web address]

## 2020-05-13 NOTE — PROGRESS NOTES
Component Value Date/Time    WBC 7.7 11/24/2019 08:50 PM    RBC 4.14 11/24/2019 08:50 PM    HGB 10.9 (L) 11/24/2019 08:50 PM    HCT 35.0 11/24/2019 08:50 PM    MCV 84.5 11/24/2019 08:50 PM    MCH 26.3 11/24/2019 08:50 PM    MCHC 31.1 (L) 11/24/2019 08:50 PM    RDW 13.8 11/24/2019 08:50 PM     11/24/2019 08:50 PM    MPV 8.7 11/24/2019 08:50 PM      Lab Results   Component Value Date/Time     11/24/2019 08:50 PM    K 4.3 11/24/2019 08:50 PM    CO2 27 11/24/2019 08:50 PM    BUN 24 (H) 11/24/2019 08:50 PM    CREATININE 1.6 (H) 11/24/2019 08:50 PM    CALCIUM 9.9 11/24/2019 08:50 PM    LABGLOM 40 11/24/2019 08:50 PM    GFRAA 40 11/24/2019 08:50 PM      Lab Results   Component Value Date/Time    TSH 1.070 03/13/2019 09:26 PM    T4FREE 1.22 05/15/2013 09:15 AM    F3PMGLE 7.8 12/23/2012 03:05 PM     Lab Results   Component Value Date    LABA1C 17.1 04/04/2015    GLUCOSE 162 11/24/2019    GLUCOSE 419 03/21/2011    MALBCR - 04/04/2015    LABMICR <12.0 04/04/2015    LABCREA 48 04/04/2015     Lab Results   Component Value Date    LABA1C 17.1 04/04/2015    LABA1C 12.6 06/20/2013    LABA1C 14.7 02/27/2013     Lab Results   Component Value Date    TRIG 182 04/04/2015    HDL 57 04/04/2015    LDLCALC 168 04/04/2015    CHOL 261 04/04/2015     No results found for: VITD25    Medical Records/Labs/Images review:   I personally reviewed and summarized previous records   All labs and imaging studies were independently reviewed     [facility], a 63 y.o.-old female seen in for the following issues     Diabetes Mellitus Type     · Patient's diabetes is uncontrol   · Will change DM regimen to lantus 20 units daily in the morning, Metformin er 500 mg daily  · Start Trulicity 0.95 mg weekly  · I discussed side effect profile of GLP-1 agonists with patient.  Patient denies history of pancreatitis, medullary thyroid cancer or family history of MEN2  · The patient was advised to check blood sugars 4

## 2020-05-13 NOTE — LETTER
[address] [place] [address] Department of Endocrinology Diabetes and Metabolism   [address] [facility] [address]   Phone: [phone]  Fax: [phone]      Provider: Katelyn Reyes MD  Primary Care Physician: Goran Martin MD   Referring Provider: INDRA Joshi*    Patient: Susanne Chowdhury  YOB: 1960  Date of Visit: 5/13/2020      Dear Dr. Goran Martin MD   I had the pleasure of seeing your patient Susanne Chowdhury today at endocrine clinic for consultation visit and I enclosed a copy of the office visit completed today. Thank you very much for asking us to participate in the care of this very pleasant patient. Please don't hesitate to call if there are any further questions or concerns. Sincerely   Katelyn Reyes MD  Endocrinologist, [address] [address]   Phone: [phone]  Fax: [phone]      [address] [place] [address] Department of Endocrinology Diabetes and Metabolism   [address] [place] [address]   Phone: [phone]  Fax: [phone]    Date of Service: 5/13/2020    Primary Care Physician: Goran Martin MD  Referring physician: INDRA Joshi*  Provider: Katelyn Reyes MD     Reason for the visit:  Poorly controlled DM     History of Present Illness: The history is provided by the patient. No  was used. Accuracy of the patient data is excellent. Susanne Chowdhury is a very pleasant 63 y.o. female seen today for diabetes management     Susanne Chowdhury was diagnosed with diabetes > 15 years ago   The patient is currently on Lantus 20 units daily morning, Metformin  500 mg daily   Off novolog stopped because of hypoglycemia   The patient has been checking blood sugar 3 times a day .   Am usually 200, pm usually 200   A1c consistently >10%  Patient has had no hypoglycemic episodes   The patient hasn't been mindful of what has been eating and wasn't following diabetes diet as encouraged   I reviewed current medications and the patient has no issues with diabetes medications  Jo Redd is up to date with eye exam and denied any history of diabetic retinopathy   The patient seeing podiatrist every few months   Microvascular complications:  No Retinopathy, + Nephropathy no Neuropathy   Macrovascular complications: no CAD, PVD, or Stroke  The patient receives Flushot every year and up to date with the Pneumonia vaccine     Hypothyroidism   Dx long time ago. Currently on levothyroxine 50 mcg daily. Patient takes levothyroxine in the morning at empty stomach, wait one hour before eating , avoid multivitamins containing calcium  or iron with it. Lab Results   Component Value Date/Time    TSH 1.070 03/13/2019 09:26 PM    T4FREE 1.22 05/15/2013 09:15 AM    O9SNFWD 7.8 12/23/2012 03:05 PM     Pt reported feeling good on current dose of levothyroxine    Thyroid US  7/26/2019, I have reviewed images myself  --> no nodules     PAST MEDICAL HISTORY   Past Medical History:   Diagnosis Date    Acid reflux     Hyperlipidemia     Hypertension     Hypothyroidism     S/P appendectomy     Type II or unspecified type diabetes mellitus without mention of complication, not stated as uncontrolled        PAST SURGICAL HISTORY   Past Surgical History:   Procedure Laterality Date    APPENDECTOMY      HYSTERECTOMY         SOCIAL HISTORY   Tobacco:   reports that she has never smoked. She has never used smokeless tobacco.  Alcohol:   reports no history of alcohol use. Drugs:   reports no history of drug use.     FAMILY HISTORY   Family History   Problem Relation Age of Onset    Diabetes Mother     High Blood Pressure Mother     Diabetes Brother     Diabetes Sister        ALLERGIES AND DRUG REACTIONS   Allergies   Allergen Reactions    Demerol     Toradol [Ketorolac Tromethamine] Nausea And Vomiting       CURRENT MEDICATIONS   Current Outpatient Medications Medication Sig Dispense Refill    insulin glargine (LANTUS SOLOSTAR) 100 UNIT/ML injection pen Inject 20 Units into the skin every morning      atorvastatin (LIPITOR) 80 MG tablet Take 80 mg by mouth daily      hydrALAZINE (APRESOLINE) 50 MG tablet Take 50 mg by mouth 2 times daily      indapamide (LOZOL) 2.5 MG tablet Take 2.5 mg by mouth every morning      raNITIdine (ZANTAC) 150 MG tablet Take 150 mg by mouth daily      levothyroxine (SYNTHROID) 50 MCG tablet Take 1 tablet by mouth daily 90 tablet 2    metFORMIN (GLUCOPHAGE-XR) 500 MG extended release tablet Take 1 tablet by mouth daily (with breakfast) 90 tablet 2    gabapentin (NEURONTIN) 300 MG capsule Take 300 mg by mouth nightly.  clopidogrel (PLAVIX) 75 MG tablet Take 75 mg by mouth daily      aspirin 81 MG tablet Take 81 mg by mouth daily.  lisinopril-hydrochlorothiazide (PRINZIDE) 20-12.5 MG per tablet Take 1 tablet by mouth daily. 90 tablet 0    Lancets Misc. MISC by Does not apply route. 90 each 11     No current facility-administered medications for this visit. Review of Systems  Constitutional: No fever, no chills, no diaphoresis, no generalized weakness. HEENT: No blurred vision, No sore throat, no ear pain, no hair loss  Neck: denied any neck swelling, difficulty swallowing,   Cardio-pulmonary: No CP, SOB or palpitation, No orthopnea or PND. No cough or wheezing. GI: No N/V/D, no constipation, No abdominal pain, no melena or hematochezia   : Denied any dysuria, hematuria, flank pain, discharge, or incontinence. Skin: denied any rash, ulcer, Hirsute, or hyperpigmentation. MSK: denied any joint deformity, joint pain/swelling, muscle pain, or back pain. Neuro: no numbness, no tingling, no weakness, _    OBJECTIVE    There were no vitals taken for this visit.   BP Readings from Last 4 Encounters:   11/24/19 (!) 155/84   04/18/19 (!) 172/100   03/14/19 (!) 179/83   10/31/18 (!) 191/88

## 2020-06-08 NOTE — TELEPHONE ENCOUNTER
The pt started on Trulicity 9.77CA three weeks ago. She has been off of it for a week now. She had to stop it d/t extreme nausea and diarrhea. She is going to fax her blood sugars today or tomorrow. They did get better on Trulicity. She was wondering if you can switch her to something else.

## 2020-07-07 NOTE — PROGRESS NOTES
[address] Department of Endocrinology Diabetes and Metabolism   [address] [facility] [address]   Phone: [phone]  Fax: [phone]    Date of Service: 7/7/2020    Primary Care Physician: Kati Joseph MD  Provider: Americo Hay MD     Reason for the visit:  Poorly controlled DM     History of Present Illness: The history is provided by the patient. No  was used. Accuracy of the patient data is excellent. Ken Rivas is a very pleasant 63 y.o. female seen today for follow up visit     Ken Rivas was diagnosed with diabetes > 15 years ago   The patient is currently on Lantus 20 units daily morning, Humalog 5 units with meals Metformin  500 mg daily ,  wasn't able to tolerate Trulicity (nausea and vomiting)   The patient has been checking blood sugar 4 times a day with meals and at bedtime. Readings are better but still slightly above goal   Patient has had no hypoglycemic episodes   Since last visit pt started following DM diet   I reviewed current medications and the patient has no issues with diabetes medications  Ken Rivas is up to date with eye exam and denied any history of diabetic retinopathy   The patient seeing podiatrist every few months   Microvascular complications:  No Retinopathy, + Nephropathy no Neuropathy   Macrovascular complications: no CAD, PVD, or Stroke  The patient receives Flushot every year and up to date with the Pneumonia vaccine     Primary Hypothyroidism   Currently on levothyroxine 50 mcg daily. Patient takes levothyroxine in the morning at empty stomach, wait one hour before eating , avoid multivitamins containing calcium  or iron with it.   Due for TFT     Pt reported generally feeling good     Thyroid US  7/26/2019, I have reviewed images myself  --> no nodules     PAST MEDICAL HISTORY   Past Medical History:   Diagnosis Date    Acid reflux     Hyperlipidemia     Hypertension     Hypothyroidism     facility-administered medications for this visit. Review of Systems  Constitutional: No fever, no chills, no diaphoresis, no generalized weakness. HEENT: No blurred vision, No sore throat, no ear pain, no hair loss  Neck: denied any neck swelling, difficulty swallowing,   Cardio-pulmonary: No CP, SOB or palpitation, No orthopnea or PND. No cough or wheezing. GI: No N/V/D, no constipation, No abdominal pain, no melena or hematochezia   : Denied any dysuria, hematuria, flank pain, discharge, or incontinence. Skin: denied any rash, ulcer, Hirsute, or hyperpigmentation. MSK: denied any joint deformity, joint pain/swelling, muscle pain, or back pain. Neuro: no numbness, no tingling, no weakness, _    OBJECTIVE    There were no vitals taken for this visit. BP Readings from Last 4 Encounters:   11/24/19 (!) 155/84   04/18/19 (!) 172/100   03/14/19 (!) 179/83   10/31/18 (!) 191/88     Wt Readings from Last 6 Encounters:   05/26/20 130 lb (59 kg)   11/24/19 130 lb (59 kg)   04/18/19 135 lb (61.2 kg)   03/13/19 137 lb (62.1 kg)   10/31/18 130 lb (59 kg)   07/25/18 128 lb (58.1 kg)     Physical examination:  Due to this being a TeleHealth encounter, evaluation of the following organ systems is limited: EENT/Resp/CV/GI//MS/Neuro/Skin/Heme-Lymph-Imm. General: awake alert, oriented x3, no abnormal position or movements.   Pulm: move with respiration   Skin: no rash  Psych: normal mood, and affect    Review of Laboratory Data:  I personally reviewed the following lab:  Lab Results   Component Value Date/Time    WBC 7.7 11/24/2019 08:50 PM    RBC 4.14 11/24/2019 08:50 PM    HGB 10.9 (L) 11/24/2019 08:50 PM    HCT 35.0 11/24/2019 08:50 PM    MCV 84.5 11/24/2019 08:50 PM    MCH 26.3 11/24/2019 08:50 PM    MCHC 31.1 (L) 11/24/2019 08:50 PM    RDW 13.8 11/24/2019 08:50 PM     11/24/2019 08:50 PM    MPV 8.7 11/24/2019 08:50 PM      Lab Results   Component Value Date/Time     11/24/2019 08:50 PM    K 4.3 11/24/2019 08:50 PM    CO2 27 11/24/2019 08:50 PM    BUN 24 (H) 11/24/2019 08:50 PM    CREATININE 1.6 (H) 11/24/2019 08:50 PM    CALCIUM 9.9 11/24/2019 08:50 PM    LABGLOM 40 11/24/2019 08:50 PM    GFRAA 40 11/24/2019 08:50 PM      Lab Results   Component Value Date/Time    TSH 1.070 03/13/2019 09:26 PM    T4FREE 1.22 05/15/2013 09:15 AM    E5EQVPA 7.8 12/23/2012 03:05 PM     Lab Results   Component Value Date    LABA1C 17.1 04/04/2015    GLUCOSE 162 11/24/2019    GLUCOSE 419 03/21/2011    MALBCR - 04/04/2015    LABMICR <12.0 04/04/2015    LABCREA 48 04/04/2015     Lab Results   Component Value Date    LABA1C 17.1 04/04/2015    LABA1C 12.6 06/20/2013    LABA1C 14.7 02/27/2013     Lab Results   Component Value Date    TRIG 182 04/04/2015    HDL 57 04/04/2015    LDLCALC 168 04/04/2015    CHOL 261 04/04/2015     No results found for: [address] [place] [address] [address] Records/Labs/Images review:   I personally reviewed and summarized previous records   All labs and imaging studies were independently reviewed     [facility], a 63 y.o.-old female seen in for the following issues     Diabetes Mellitus Type 2     · Patient's diabetes is uncontrol   · Will change DM regimen to lantus 20 units daily, Humalog 6 units with meals + ss 1:50>150, Metformin er 500 mg daily  · Wasn't able to tolerate GLP-1 agonist   · The patient was advised to continue check blood sugars 4 times a day before meals and at bedtime and send BS readings to our office in a week. · Discussed with patient A1c and blood sugar goals   · Patient will need routine diabetes maintenance and prevention  · Diabetes labs before next visit     Dietary noncompliance   Improving since last visit. Discussed with patient the importance of eating consistent carbohydrate meals, avoiding high glycemic index food.  Also, discussed with patient the risk and negative consequences of dietary noncompliance on blood glucose control, blood pressure and weight    Hypothyroidism   · Continue levothyroxine 50 mcg daily  · Thyroid US  7/26/2019, I have reviewed images myself  --> no nodules     HLD  · Continue Zocor 40 mg daily     Return in about 4 months (around 11/7/2020) for DM rtype 2 on insulin, hypothyroidism . The above issues were reviewed with the patient who understood and agreed with the plan. Thank you for allowing us to participate in the care of this patient. Please do not hesitate to contact us with any additional questions. Diagnosis Orders   1. IDDM (insulin dependent diabetes mellitus) (HCC)  Basic Metabolic Panel    Hemoglobin A1C    Microalbumin / Creatinine Urine Ratio    Lipid Panel   2. Vitamin D deficiency  Vitamin D 25 Hydroxy   3. Hypothyroidism, unspecified type  TSH without Reflex    T4, Free   4. Hyperlipidemia, unspecified hyperlipidemia type  Lipid Panel       Alethea Manuel MD  Endocrinologist, [facility] and Endocrinology   [address] [place] [address]   Phone: [phone]  Fax: [phone]  --------------------------------------------  An electronic signature was used to authenticate this note. Derrek Pinedo MD on 7/7/2020 at 12:54 PM  Services were provided through a video synchronous discussion virtually to substitute for in-person clinic visit. Pursuant to the emergency declaration under the [facility][note 3][facility], [facility] waiver authority and the [employer] Act, this Virtual  Visit was conducted, with patient's consent, to reduce the patient's risk of exposure to COVID-19 and provide continuity of care.

## 2020-08-27 NOTE — PROGRESS NOTES
Diabetes Self-Management Education Record    Participant Name: Rocky Dorsey  Referring Provider: Jose Alfredo Wright MD  Assessment/Evaluation Ratings:  1=Needs Instruction   4=Demonstrates Understanding/Competency  2=Needs Review   NC=Not Covered    3=Comprehends Key Points  N/A=Not Applicable  Topics/Learning Objectives Pre-session Assess Date:  5/26/2020 Neela Blanchard 852. Date Follow-up Date Post- session Eval Comments   Diabetes disease process & Treatment process:   -Define diabetes & prediabetes and ABCs of     diabetes   -Identify own type of diabetes  -Identify lifestyle changes/treatment options 2 5/26/2020 [address]  3 Type 2 diabetic since 2005. Pt has attended diabetic education classes over 10 years ago. Unsure where classes were held. Developing strategies for Healthy coping/psychosocial issues:    -Describe feelings about living with diabetes  -Identify coping strategies;   -Identify support needed & support network available 2          PHQ-2 Depression Screen Score:   PHQ-9 Score: 0  []Physician notified of suicidal ideations   Prevention, detection & treatment of Chronic complications:    -Identify the prevention, detection and treatment for complications including immunizations, preventive eye, foot, dental and renal exams as indicated per the participant's duration of diabetes and health status.  -Define the natural course of diabetes and the relationship of blood glucose levels to long term complications of diabetes.   3       Prevention, detection & treatment of acute complications:    -List symptoms of hyper & hypoglycemia, and prevention & treatment strategies.   -Describe sick day guidelines  DKA /indications for ketone testing &  when to call physician  3       Identify severe weather/situation crisis  & diabetes supplies management        Using medications safely:   -State effects of diabetes medicines on blood glucose levels;  -List diabetes medication taken, action & side effects;  3    Metformin consuming complex carbohydrates and the benefits of fiber on blood glucose control. Per dietary recall, pt portion sizes were larger than recommended for those carbohydrate foods therefore recommended pt to measure out carbohydrates for 1 week. Developing strategies for problem solving to promote health/change behavior. -Identify 7 self-care behaviors; Personal health risk factors; Benefits, challenges & strategies for behavioral change and set an individualized goal selection. 1 5/26/2020 PAVAN  3 Goal: Follow Meal Plan     Identified Barriers to learning/adherence to self management plan:    None  []  other    Instruction Method:  Lecture/Discussion and Handouts    Supporting Education Materials/Equipment Provided: Meal Plan and Nutritional Packet-email   []Bolivian materials       [] services     []Other:      Encounter Type Date Attended Start Time End Time Comments No Show Dates   Assessment 5/26/2020 [address] [facility] [address] [id number] telehealth    Session 1, 2, 3 5/26/2020 [address] [facility] [address] 1045 36  telehealth                     In person Follow-up         Gestational Diabetes         Individual MNT        Meter Instrx        Insulin Instrx           Additional Comments: Consent completed verbally for visit and copy mailed to patient. Visit provided via telehealth due to no group classes available for 2 months due to Covid-19 social distancing measures. Date:  8/27           DSMES Follow-up plan based on patients DSMES goal:  I will follow my meal plan and measure my carbohydrate foods. Goal met % of the time.     Notified by [x] EMR []Fax        []HgbA1C   []Weight   []Hypertension  []Follow-up with Physician  []Medication compliance   []Plate method/meal plan compliance   []Self-Foot Exam Frequency   []Monitoring Frequency   []Exercise Routine   []Goal Attainment       []Patient lost to follow-up   Notified by []EMR []Fax     Personal Support Plan:      [x] Keep all scheduled doctor appointments   [] Make and keep appointments with specialists (foot, eye, dentist) as recommended   [] Consult my pharmacist about all new medications or to ask any medication questions   [] Get tested for sleep apnea   [] Seek help for:   [] Make an appointment with:   [] Attend smoking cessation classes or call 1-800-QUIT-NOW  [] Attend Diabetes Support Group   [] Use diabetes magazines, books, or credible web-sites like the ADA for more information  [] Increase exercise at home or join an exercise program:   [] Other:

## 2020-08-27 NOTE — LETTER
[facility] Diabetes Education    DSMES FOLLOW-UP    Name: Maegan Arizmendi  :   1960    Follow-up plan/Date:   2020               Lizbeth Yanez     Dear Dr. Che Hathaway:     Thank you for referring  Maegan Arizmendi  to [facility] Diabetes Education Services. Maegan Arizmendi  has completed their personalized comprehensive education plan. The education plan included the following topics: Diabetes Disease Process, Nutrition, Exercise, Glucose Monitoring, Acute and Chronic Complication, Behavioral and Lifestyle Change, Healthy Coping and goal setting. We contact participants 3 months after attending our services to review their progress on their chosen goal.      The following area was chosen: [x] Healthy Eating   [] Being Active  [] Monitoring [] Problem Solving [] Taking Medication [] Healthy Coping  []Reducing Risks    Selected goal:  I will follow my meal plan and measure my carbohydrates foods. Outcome Post Education: Met _75-100__% of time. Thank you for referring this patient to our program. Please do not hesitate to call if you have any questions.   Yandel Tran [place] [id number] or [facility]: [facility] [address]: [phone],    [] Santos Goodrich RDN LD CDE  [] Aimee Cornelius MS RDN LD  [] Patrick Quiñones RN BSN CDE  [] Faustino Jacobo RDN LD CDE  [x] Pratima Davis RN BSN CDE  [] Ronnell Wylie RDN LD

## 2020-08-28 NOTE — TELEPHONE ENCOUNTER
Please ask pt to stop Metformin completely and send us sugar log a week or two after stopping metformin to adjust DM regimen

## 2020-08-28 NOTE — TELEPHONE ENCOUNTER
Pt called states \" my kidney Dr wants to know if it is possible to take me off the metformin because of my kidney's\" Please advise

## 2020-08-31 NOTE — TELEPHONE ENCOUNTER
Spoke with pt and read directions RE: metformin per Dr Que Goodwin. Pt agrees and understands and also requests a dexcom be ordered.  Please advise

## 2020-09-22 NOTE — TELEPHONE ENCOUNTER
Pharmacy called stating that they haven't been able to reach anyone stating that patient needs meds or she will need to go to ED. I told her that Humalog filled - she checked her fax and said that she does have it now.

## 2020-11-17 NOTE — PROGRESS NOTES
[address] [place] [address] Department of Endocrinology Diabetes and Metabolism   [address] [facility] [address]   Phone: [phone]  Fax: [phone]    Date of Service: 11/17/2020    Primary Care Physician: Johanna Basilio MD  Provider: Kati Najera MD     Reason for the visit:  Poorly controlled DM     History of Present Illness: The history is provided by the patient. No  was used. Accuracy of the patient data is excellent. Eusebia Crwes is a very pleasant 63 y.o. female seen today for follow up visit     Eusebia Crews was diagnosed with diabetes > 15 years ago   The patient is currently on Lantus 20 units daily morning, Humalog 6 units with meals + ss 1:50>150  wasn't able to tolerate Trulicity (nausea and vomiting)   Metformin was dc fir low renal function   The patient has been checking blood sugar 4 times a day with meals and at bedtime. Readings are better but still slightly above goal   Lab Results   Component Value Date    LABA1C 8.2 11/17/2020    LABA1C 17.1 04/04/2015    LABA1C 12.6 06/20/2013    LABA1C 14.7 02/27/2013     Patient has had no hypoglycemic episodes   I reviewed current medications and the patient has no issues with diabetes medications  Eusebia Crews is up to date with eye exam and denied any history of diabetic retinopathy   The patient seeing podiatrist every few months   Microvascular complications:  No Retinopathy, + Nephropathy no Neuropathy   Macrovascular complications: no CAD, PVD, or Stroke  The patient receives Flushot every year and up to date with the Pneumonia vaccine     Primary Hypothyroidism   Currently on levothyroxine 50 mcg daily. Patient takes levothyroxine in the morning at empty stomach, wait one hour before eating , avoid multivitamins containing calcium  or iron with it.   Due for TFT     Pt reported generally feeling good     Thyroid US  7/26/2019, I have reviewed images myself  --> no nodules     PAST MEDICAL HISTORY   Past Medical History:   Diagnosis Date    Acid reflux     Hyperlipidemia     Hypertension     Hypothyroidism     S/P appendectomy     Type II or unspecified type diabetes mellitus without mention of complication, not stated as uncontrolled        PAST SURGICAL HISTORY   Past Surgical History:   Procedure Laterality Date    APPENDECTOMY      HYSTERECTOMY         SOCIAL HISTORY   Tobacco:   reports that she has never smoked. She has never used smokeless tobacco.  Alcohol:   reports no history of alcohol use. Drugs:   reports no history of drug use. FAMILY HISTORY   Family History   Problem Relation Age of Onset    Diabetes Mother     High Blood Pressure Mother     Diabetes Brother     Diabetes Sister        ALLERGIES AND DRUG REACTIONS   Allergies   Allergen Reactions    Demerol     Toradol [Ketorolac Tromethamine] Nausea And Vomiting       CURRENT MEDICATIONS   Current Outpatient Medications   Medication Sig Dispense Refill    blood glucose test strips (ONETOUCH ULTRA) strip 1 each by In Vitro route 5 times daily As needed.  150 each 5    insulin lispro, 1 Unit Dial, (HUMALOG KWIKPEN) 100 UNIT/ML SOPN Inject 6 Units into the skin 3 times daily (before meals) Plus Sliding Scale, Max 50 units per day 5 pen 5    Continuous Blood Gluc Sensor (DEXCOM G6 SENSOR) MISC Change every 10 days 9 each 3    Continuous Blood Gluc Transmit (DEXCOM G6 TRANSMITTER) MISC To use with sensors 1 each 3    insulin glargine (LANTUS SOLOSTAR) 100 UNIT/ML injection pen Inject 20 Units into the skin every morning      atorvastatin (LIPITOR) 80 MG tablet Take 80 mg by mouth daily      hydrALAZINE (APRESOLINE) 50 MG tablet Take 50 mg by mouth 2 times daily      indapamide (LOZOL) 2.5 MG tablet Take 2.5 mg by mouth every morning      raNITIdine (ZANTAC) 150 MG tablet Take 150 mg by mouth daily      levothyroxine (SYNTHROID) 50 MCG tablet Take 1 tablet by mouth daily 90 tablet 2    gabapentin (NEURONTIN) 300 MG capsule Take 300 mg by mouth nightly.  clopidogrel (PLAVIX) 75 MG tablet Take 75 mg by mouth daily      aspirin 81 MG tablet Take 81 mg by mouth daily.  lisinopril-hydrochlorothiazide (PRINZIDE) 20-12.5 MG per tablet Take 1 tablet by mouth daily. 90 tablet 0    Lancets Misc. MISC by Does not apply route. 90 each 11     No current facility-administered medications for this visit. Review of Systems  Constitutional: No fever, no chills, no diaphoresis, no generalized weakness. HEENT: No blurred vision, No sore throat, no ear pain, no hair loss  Neck: denied any neck swelling, difficulty swallowing,   Cardio-pulmonary: No CP, SOB or palpitation, No orthopnea or PND. No cough or wheezing. GI: No N/V/D, no constipation, No abdominal pain, no melena or hematochezia   : Denied any dysuria, hematuria, flank pain, discharge, or incontinence. Skin: denied any rash, ulcer, Hirsute, or hyperpigmentation. MSK: denied any joint deformity, joint pain/swelling, muscle pain, or back pain. Neuro: no numbness, no tingling, no weakness, _    OBJECTIVE    /70 (Site: Left Upper Arm, Position: Sitting, Cuff Size: Medium Adult)   Pulse 82   Temp 97.5 °F (36.4 °C) (Temporal)   BP Readings from Last 4 Encounters:   11/17/20 128/70   11/24/19 (!) 155/84   04/18/19 (!) 172/100   03/14/19 (!) 179/83     Wt Readings from Last 6 Encounters:   05/26/20 130 lb (59 kg)   11/24/19 130 lb (59 kg)   04/18/19 135 lb (61.2 kg)   03/13/19 137 lb (62.1 kg)   10/31/18 130 lb (59 kg)   07/25/18 128 lb (58.1 kg)     Physical examination:  General: awake alert, oriented x3, no abnormal position or movements. HEENT: normocephalic non traumatic, no exophthalmos   Neck: supple, no LN enlargement, no thyromegaly, no thyroid tenderness, no JVD. Pulm: Clear equal air entry no added sounds, no wheezing or rhonchi    CVS: S1 + S2, no murmur, no heave.  Dorsalis pedis pulse palpable   Abd: soft lax, no tenderness, no organomegaly, audible bowel sounds. Skin: warm, no lesions, no rash.  no Ulcers, No acanthosis nigricans   Neuro: CN intact, sensation decreased bilateral , muscle power normal  Psych: normal mood, and affect    Review of Laboratory Data:  I personally reviewed the following lab:  Lab Results   Component Value Date/Time    WBC 6.0 08/05/2020 08:49 AM    RBC 3.41 (L) 08/05/2020 08:49 AM    HGB 9.3 (L) 08/05/2020 08:49 AM    HCT 29.6 (L) 08/05/2020 08:49 AM    MCV 86.8 08/05/2020 08:49 AM    MCH 27.3 08/05/2020 08:49 AM    MCHC 31.4 (L) 08/05/2020 08:49 AM    RDW 13.4 08/05/2020 08:49 AM     08/05/2020 08:49 AM    MPV 9.5 08/05/2020 08:49 AM      Lab Results   Component Value Date/Time     08/05/2020 08:49 AM    K 4.6 08/05/2020 08:49 AM    CO2 20 (L) 08/05/2020 08:49 AM    BUN 31 (H) 08/05/2020 08:49 AM    CREATININE 2.1 (H) 08/05/2020 08:49 AM    CALCIUM 10.0 08/05/2020 08:49 AM    LABGLOM 29 08/05/2020 08:49 AM    GFRAA 29 08/05/2020 08:49 AM      Lab Results   Component Value Date/Time    TSH 1.070 03/13/2019 09:26 PM    T4FREE 1.22 05/15/2013 09:15 AM    N5INFXT 7.8 12/23/2012 03:05 PM     Lab Results   Component Value Date    LABA1C 8.2 11/17/2020    GLUCOSE 259 08/05/2020    GLUCOSE 419 03/21/2011    MALBCR - 04/04/2015    LABMICR <12.0 04/04/2015    LABCREA 48 04/04/2015     Lab Results   Component Value Date    LABA1C 8.2 11/17/2020    LABA1C 17.1 04/04/2015    LABA1C 12.6 06/20/2013     Lab Results   Component Value Date    TRIG 182 08/05/2020    HDL 45 08/05/2020    LDLCALC 87 08/05/2020    CHOL 168 08/05/2020     Lab Results   Component Value Date    VITD25 32 08/05/2020       Medical Records/Labs/Images review:   I personally reviewed and summarized previous records   All labs and imaging studies were independently reviewed     [address] [facility], a 63 y.o.-old female seen in for the following issues     Diabetes Mellitus Type 2     · iproved control but still above goal. A1c 8.2%  · Will change DM regimen to lantus 18 units daily, Humalog 6/6/7 units with meals + ss 1:50>150  · Wasn't able to tolerate GLP-1 agonist   · The patient was advised to continue check blood sugars 4 times a day before meals and at bedtime and send BS readings to our office in a week. · Discussed with patient A1c and blood sugar goals   · Patient will need routine diabetes maintenance and prevention  · Diabetes labs before next visit     Dietary noncompliance   Improving since last visit. Discussed with patient the importance of eating consistent carbohydrate meals, avoiding high glycemic index food. Also, discussed with patient the risk and negative consequences of dietary noncompliance on blood glucose control, blood pressure and weight    Hypothyroidism   · Continue levothyroxine 50 mcg daily  · Thyroid US  7/26/2019, I have reviewed images myself  --> no nodules     HLD  · Continue lipitor 80 mg daily     Return in about 4 months (around 3/17/2021) for DM type 2 on insulin . The above issues were reviewed with the patient who understood and agreed with the plan. Thank you for allowing us to participate in the care of this patient. Please do not hesitate to contact us with any additional questions. Diagnosis Orders   1. Diabetes mellitus type 2, insulin dependent (HCC)  POCT glycosylated hemoglobin (Hb A1C)   2. Type 2 diabetes mellitus with other specified complication, with long-term current use of insulin (HCC)  insulin lispro, 1 Unit Dial, (HUMALOG KWIKPEN) 100 UNIT/ML MELITA Najera MD  Endocrinologist, [place] [address] [facility] Care and Endocrinology   [address] [place] [address], [address] [place] [address],[facility], [place] [address]   Phone: [phone]  Fax: [phone]  --------------------------------------------  An electronic signature was used to authenticate this note.  Fausto Jones MD on 11/17/2020 at 3:31 PM

## 2020-11-17 NOTE — PATIENT INSTRUCTIONS
Recommendations for today's visit  · Change Lantus 18 units daily   · Take Humalog 6/6/7 units + sliding scale   If blood sugars are 150-200 -add 1 units of Humalog   If blood sugars are 201-250 - add 2 units of Humalog   If blood sugars are 251-300 - add 3 units of Humalog   If blood sugars are 301-350 - add 4 units of Humalog   If blood sugars are above 350 - add 5 units of Humalog    · Continue using DEXCOM CGM     These are your blood sugar, blood pressure, cholesterol and A1c goals:  · Blood sugar fastin mg/dl to 130 mg/dl  · Blood sugar before meals: <150 mg/dl  · Peak blood sugar lower than 180 mg/dl  · A1c: between 6.5 - 7.5%    I you have any questions please call Dr. Lisbet Aquino office     Kati Najera MD  Endocrinologist, [facility])   [address] [facility], [address] [place] [address],[facility] [address]   Phone: [phone]  Fax: [phone]  Email: [email or web address]

## 2021-01-01 ENCOUNTER — APPOINTMENT (OUTPATIENT)
Dept: GENERAL RADIOLOGY | Age: 61
DRG: 177 | End: 2021-01-01
Payer: MEDICARE

## 2021-01-01 ENCOUNTER — APPOINTMENT (OUTPATIENT)
Dept: GENERAL RADIOLOGY | Age: 61
DRG: 207 | End: 2021-01-01
Payer: MEDICARE

## 2021-01-01 ENCOUNTER — APPOINTMENT (OUTPATIENT)
Dept: NEUROLOGY | Age: 61
DRG: 207 | End: 2021-01-01
Payer: MEDICARE

## 2021-01-01 ENCOUNTER — HOSPITAL ENCOUNTER (INPATIENT)
Age: 61
LOS: 18 days | DRG: 207 | End: 2021-04-08
Attending: EMERGENCY MEDICINE | Admitting: INTERNAL MEDICINE
Payer: MEDICARE

## 2021-01-01 ENCOUNTER — HOSPITAL ENCOUNTER (OUTPATIENT)
Age: 61
Discharge: HOME | DRG: 177 | End: 2021-03-06
Payer: MEDICARE

## 2021-01-01 ENCOUNTER — HOSPITAL ENCOUNTER (EMERGENCY)
Age: 61
Discharge: HOME | End: 2021-01-10
Attending: EMERGENCY MEDICINE
Payer: MEDICARE

## 2021-01-01 ENCOUNTER — APPOINTMENT (OUTPATIENT)
Dept: GENERAL RADIOLOGY | Age: 61
End: 2021-01-01
Payer: MEDICARE

## 2021-01-01 ENCOUNTER — APPOINTMENT (OUTPATIENT)
Dept: ULTRASOUND IMAGING | Age: 61
DRG: 177 | End: 2021-01-01
Payer: MEDICARE

## 2021-01-01 ENCOUNTER — CARE COORDINATION (OUTPATIENT)
Dept: CASE MANAGEMENT | Age: 61
End: 2021-01-01

## 2021-01-01 ENCOUNTER — APPOINTMENT (OUTPATIENT)
Dept: MRI IMAGING | Age: 61
DRG: 207 | End: 2021-01-01
Payer: MEDICARE

## 2021-01-01 ENCOUNTER — APPOINTMENT (OUTPATIENT)
Dept: NUCLEAR MEDICINE | Age: 61
DRG: 207 | End: 2021-01-01
Payer: MEDICARE

## 2021-01-01 ENCOUNTER — APPOINTMENT (OUTPATIENT)
Dept: CT IMAGING | Age: 61
DRG: 207 | End: 2021-01-01
Payer: MEDICARE

## 2021-01-01 ENCOUNTER — HOSPITAL ENCOUNTER (INPATIENT)
Age: 61
LOS: 11 days | Discharge: HOME HEALTH | DRG: 177 | End: 2021-03-19
Attending: EMERGENCY MEDICINE | Admitting: INTERNAL MEDICINE
Payer: MEDICARE

## 2021-01-01 ENCOUNTER — APPOINTMENT (OUTPATIENT)
Dept: ULTRASOUND IMAGING | Age: 61
DRG: 207 | End: 2021-01-01
Payer: MEDICARE

## 2021-01-01 VITALS
RESPIRATION RATE: 36 BRPM | BODY MASS INDEX: 28.83 KG/M2 | WEIGHT: 146.83 LBS | TEMPERATURE: 99.2 F | DIASTOLIC BLOOD PRESSURE: 58 MMHG | OXYGEN SATURATION: 91 % | HEIGHT: 60 IN | SYSTOLIC BLOOD PRESSURE: 158 MMHG

## 2021-01-01 VITALS
DIASTOLIC BLOOD PRESSURE: 85 MMHG | RESPIRATION RATE: 16 BRPM | WEIGHT: 130 LBS | HEART RATE: 81 BPM | HEIGHT: 60 IN | BODY MASS INDEX: 25.52 KG/M2 | TEMPERATURE: 97.1 F | OXYGEN SATURATION: 99 % | SYSTOLIC BLOOD PRESSURE: 159 MMHG

## 2021-01-01 VITALS
HEART RATE: 68 BPM | WEIGHT: 117 LBS | DIASTOLIC BLOOD PRESSURE: 66 MMHG | BODY MASS INDEX: 22.97 KG/M2 | RESPIRATION RATE: 18 BRPM | TEMPERATURE: 98.4 F | SYSTOLIC BLOOD PRESSURE: 131 MMHG | HEIGHT: 60 IN | OXYGEN SATURATION: 91 %

## 2021-01-01 DIAGNOSIS — N18.9 CHRONIC KIDNEY DISEASE, UNSPECIFIED CKD STAGE: ICD-10-CM

## 2021-01-01 DIAGNOSIS — J12.82 PNEUMONIA DUE TO COVID-19 VIRUS: ICD-10-CM

## 2021-01-01 DIAGNOSIS — S90.32XA CONTUSION OF LEFT FOOT, INITIAL ENCOUNTER: Primary | ICD-10-CM

## 2021-01-01 DIAGNOSIS — U07.1 PNEUMONIA DUE TO COVID-19 VIRUS: ICD-10-CM

## 2021-01-01 DIAGNOSIS — I16.0 HYPERTENSIVE URGENCY: Primary | ICD-10-CM

## 2021-01-01 DIAGNOSIS — N28.9 ACUTE RENAL INSUFFICIENCY: ICD-10-CM

## 2021-01-01 DIAGNOSIS — J96.01 ACUTE RESPIRATORY FAILURE WITH HYPOXIA (HCC): Primary | ICD-10-CM

## 2021-01-01 DIAGNOSIS — Z79.4 TYPE 2 DIABETES MELLITUS WITH OTHER SPECIFIED COMPLICATION, WITH LONG-TERM CURRENT USE OF INSULIN (HCC): ICD-10-CM

## 2021-01-01 DIAGNOSIS — E11.69 TYPE 2 DIABETES MELLITUS WITH OTHER SPECIFIED COMPLICATION, WITH LONG-TERM CURRENT USE OF INSULIN (HCC): ICD-10-CM

## 2021-01-01 DIAGNOSIS — I21.4 NSTEMI (NON-ST ELEVATED MYOCARDIAL INFARCTION) (HCC): ICD-10-CM

## 2021-01-01 LAB
(1,3)-BETA-D-GLUCAN (FUNGITELL) INTERPRETATION: NEGATIVE
(1,3)-BETA-D-GLUCAN (FUNGITELL): <31 PG/ML
ABO/RH: NORMAL
ACANTHOCYTES: ABNORMAL
ADDENDUM ELECTROPHORESIS URINE RANDOM: NORMAL
ADENOVIRUS BY PCR: NOT DETECTED
ALBUMIN SERPL-MCNC: 2.2 G/DL (ref 3.5–5.2)
ALBUMIN SERPL-MCNC: 2.4 G/DL (ref 3.5–4.7)
ALBUMIN SERPL-MCNC: 2.4 G/DL (ref 3.5–5.2)
ALBUMIN SERPL-MCNC: 2.5 G/DL (ref 3.5–5.2)
ALBUMIN SERPL-MCNC: 2.9 G/DL (ref 3.5–5.2)
ALBUMIN SERPL-MCNC: 3.3 G/DL (ref 3.5–5.2)
ALP BLD-CCNC: 182 U/L (ref 35–104)
ALP BLD-CCNC: 66 U/L (ref 35–104)
ALP BLD-CCNC: 68 U/L (ref 35–104)
ALP BLD-CCNC: 76 U/L (ref 35–104)
ALP BLD-CCNC: 84 U/L (ref 35–104)
ALP BLD-CCNC: 89 U/L (ref 35–104)
ALPHA-1-GLOBULIN: 0.3 G/DL (ref 0.2–0.4)
ALPHA-2-GLOBULIN: 0.8 G/FL (ref 0.5–1)
ALT SERPL-CCNC: 12 U/L (ref 0–32)
ALT SERPL-CCNC: 24 U/L (ref 0–32)
ALT SERPL-CCNC: 29 U/L (ref 0–32)
ALT SERPL-CCNC: 30 U/L (ref 0–32)
ALT SERPL-CCNC: 32 U/L (ref 0–32)
ALT SERPL-CCNC: 71 U/L (ref 0–32)
AMORPHOUS: ABNORMAL
AMYLASE: 40 U/L (ref 20–100)
ANCA IFA: NORMAL
ANION GAP SERPL CALCULATED.3IONS-SCNC: 10 MMOL/L (ref 7–16)
ANION GAP SERPL CALCULATED.3IONS-SCNC: 11 MMOL/L (ref 7–16)
ANION GAP SERPL CALCULATED.3IONS-SCNC: 12 MMOL/L (ref 7–16)
ANION GAP SERPL CALCULATED.3IONS-SCNC: 13 MMOL/L (ref 7–16)
ANION GAP SERPL CALCULATED.3IONS-SCNC: 14 MMOL/L (ref 7–16)
ANION GAP SERPL CALCULATED.3IONS-SCNC: 15 MMOL/L (ref 7–16)
ANION GAP SERPL CALCULATED.3IONS-SCNC: 16 MMOL/L (ref 7–16)
ANION GAP SERPL CALCULATED.3IONS-SCNC: 17 MMOL/L (ref 7–16)
ANION GAP SERPL CALCULATED.3IONS-SCNC: 9 MMOL/L (ref 7–16)
ANTI-NUCLEAR ANTIBODY (ANA): NEGATIVE
ANTIBODY SCREEN: NORMAL
APTT: 22.9 SEC (ref 24.5–35.1)
APTT: 23.1 SEC (ref 24.5–35.1)
APTT: 24.3 SEC (ref 24.5–35.1)
APTT: 24.9 SEC (ref 24.5–35.1)
APTT: 25.3 SEC (ref 24.5–35.1)
APTT: 25.4 SEC (ref 24.5–35.1)
APTT: 26.1 SEC (ref 24.5–35.1)
APTT: 26.4 SEC (ref 24.5–35.1)
APTT: 26.6 SEC (ref 24.5–35.1)
APTT: 27 SEC (ref 24.5–35.1)
APTT: 27.3 SEC (ref 24.5–35.1)
APTT: 27.9 SEC (ref 24.5–35.1)
APTT: 30.6 SEC (ref 24.5–35.1)
APTT: 32.2 SEC (ref 24.5–35.1)
APTT: 41.7 SEC (ref 24.5–35.1)
APTT: <20 SEC (ref 24.5–35.1)
AST SERPL-CCNC: 18 U/L (ref 0–31)
AST SERPL-CCNC: 42 U/L (ref 0–31)
AST SERPL-CCNC: 47 U/L (ref 0–31)
AST SERPL-CCNC: 48 U/L (ref 0–31)
AST SERPL-CCNC: 50 U/L (ref 0–31)
AST SERPL-CCNC: 55 U/L (ref 0–31)
B.E.: -0.2 MMOL/L (ref -3–3)
B.E.: -0.3 MMOL/L (ref -3–3)
B.E.: -0.4 MMOL/L (ref -3–3)
B.E.: -0.5 MMOL/L (ref -3–3)
B.E.: -0.8 MMOL/L (ref -3–3)
B.E.: -1.8 MMOL/L (ref -3–3)
B.E.: -10.6 MMOL/L (ref -3–3)
B.E.: -2.4 MMOL/L (ref -3–3)
B.E.: -3.3 MMOL/L (ref -3–3)
B.E.: -3.6 MMOL/L (ref -3–3)
B.E.: -4.6 MMOL/L (ref -3–3)
B.E.: -4.7 MMOL/L (ref -3–3)
B.E.: -4.8 MMOL/L (ref -3–3)
B.E.: -5.4 MMOL/L (ref -3–3)
B.E.: -8.7 MMOL/L (ref -3–3)
B.E.: 0 MMOL/L (ref -3–3)
B.E.: 0.2 MMOL/L (ref -3–3)
B.E.: 1.5 MMOL/L (ref -3–3)
B.E.: 2.6 MMOL/L (ref -3–3)
B.E.: 3.1 MMOL/L (ref -3–3)
B.E.: 3.4 MMOL/L (ref -3–3)
BACTERIA: ABNORMAL /HPF
BACTERIA: ABNORMAL /HPF
BASOPHILS ABSOLUTE: 0 E9/L (ref 0–0.2)
BASOPHILS ABSOLUTE: 0.01 E9/L (ref 0–0.2)
BASOPHILS ABSOLUTE: 0.02 E9/L (ref 0–0.2)
BASOPHILS RELATIVE PERCENT: 0 % (ref 0–2)
BASOPHILS RELATIVE PERCENT: 0 % (ref 0–2)
BASOPHILS RELATIVE PERCENT: 0.1 % (ref 0–2)
BASOPHILS RELATIVE PERCENT: 0.3 % (ref 0–2)
BASOPHILS RELATIVE PERCENT: 0.4 % (ref 0–2)
BETA GLOBULIN: 0.8 G/DL (ref 0.8–1.3)
BILIRUB SERPL-MCNC: 0.2 MG/DL (ref 0–1.2)
BILIRUB SERPL-MCNC: 0.3 MG/DL (ref 0–1.2)
BILIRUB SERPL-MCNC: <0.2 MG/DL (ref 0–1.2)
BILIRUBIN URINE: NEGATIVE
BILIRUBIN URINE: NEGATIVE
BLOOD BANK DISPENSE STATUS: NORMAL
BLOOD BANK PRODUCT CODE: NORMAL
BLOOD CULTURE, ROUTINE: NORMAL
BLOOD CULTURE, ROUTINE: NORMAL
BLOOD, URINE: ABNORMAL
BLOOD, URINE: ABNORMAL
BORDETELLA PARAPERTUSSIS BY PCR: NOT DETECTED
BORDETELLA PERTUSSIS BY PCR: NOT DETECTED
BPU ID: NORMAL
BUN BLDV-MCNC: 34 MG/DL (ref 8–23)
BUN BLDV-MCNC: 40 MG/DL (ref 8–23)
BUN BLDV-MCNC: 41 MG/DL (ref 8–23)
BUN BLDV-MCNC: 42 MG/DL (ref 8–23)
BUN BLDV-MCNC: 44 MG/DL (ref 8–23)
BUN BLDV-MCNC: 45 MG/DL (ref 8–23)
BUN BLDV-MCNC: 46 MG/DL (ref 8–23)
BUN BLDV-MCNC: 47 MG/DL (ref 8–23)
BUN BLDV-MCNC: 49 MG/DL (ref 8–23)
BUN BLDV-MCNC: 50 MG/DL (ref 8–23)
BUN BLDV-MCNC: 50 MG/DL (ref 8–23)
BUN BLDV-MCNC: 52 MG/DL (ref 8–23)
BUN BLDV-MCNC: 58 MG/DL (ref 8–23)
BUN BLDV-MCNC: 60 MG/DL (ref 8–23)
BUN BLDV-MCNC: 63 MG/DL (ref 8–23)
BUN BLDV-MCNC: 64 MG/DL (ref 8–23)
BUN BLDV-MCNC: 71 MG/DL (ref 8–23)
BUN BLDV-MCNC: 74 MG/DL (ref 8–23)
BUN BLDV-MCNC: 78 MG/DL (ref 8–23)
BUN BLDV-MCNC: 80 MG/DL (ref 8–23)
BUN BLDV-MCNC: 82 MG/DL (ref 8–23)
BUN BLDV-MCNC: 85 MG/DL (ref 8–23)
BUN BLDV-MCNC: 86 MG/DL (ref 8–23)
BUN BLDV-MCNC: 86 MG/DL (ref 8–23)
BUN BLDV-MCNC: 88 MG/DL (ref 8–23)
BUN BLDV-MCNC: 90 MG/DL (ref 8–23)
BUN BLDV-MCNC: 91 MG/DL (ref 8–23)
BUN BLDV-MCNC: 94 MG/DL (ref 8–23)
BUN BLDV-MCNC: 94 MG/DL (ref 8–23)
BUN BLDV-MCNC: 95 MG/DL (ref 8–23)
BURR CELLS: ABNORMAL
C-REACTIVE PROTEIN: 0.9 MG/DL (ref 0–0.4)
C-REACTIVE PROTEIN: 10.8 MG/DL (ref 0–0.4)
CALCIUM SERPL-MCNC: 6.9 MG/DL (ref 8.6–10.2)
CALCIUM SERPL-MCNC: 7.2 MG/DL (ref 8.6–10.2)
CALCIUM SERPL-MCNC: 7.4 MG/DL (ref 8.6–10.2)
CALCIUM SERPL-MCNC: 7.5 MG/DL (ref 8.6–10.2)
CALCIUM SERPL-MCNC: 7.6 MG/DL (ref 8.6–10.2)
CALCIUM SERPL-MCNC: 7.7 MG/DL (ref 8.6–10.2)
CALCIUM SERPL-MCNC: 7.8 MG/DL (ref 8.6–10.2)
CALCIUM SERPL-MCNC: 7.9 MG/DL (ref 8.6–10.2)
CALCIUM SERPL-MCNC: 8 MG/DL (ref 8.6–10.2)
CALCIUM SERPL-MCNC: 8.1 MG/DL (ref 8.6–10.2)
CALCIUM SERPL-MCNC: 8.2 MG/DL (ref 8.6–10.2)
CALCIUM SERPL-MCNC: 8.3 MG/DL (ref 8.6–10.2)
CALCIUM SERPL-MCNC: 8.4 MG/DL (ref 8.6–10.2)
CALCIUM SERPL-MCNC: 8.6 MG/DL (ref 8.6–10.2)
CALCIUM SERPL-MCNC: 8.8 MG/DL (ref 8.6–10.2)
CALCIUM SERPL-MCNC: 9.1 MG/DL (ref 8.6–10.2)
CALCIUM SERPL-MCNC: 9.2 MG/DL (ref 8.6–10.2)
CHLAMYDOPHILIA PNEUMONIAE BY PCR: NOT DETECTED
CHLORIDE BLD-SCNC: 101 MMOL/L (ref 98–107)
CHLORIDE BLD-SCNC: 101 MMOL/L (ref 98–107)
CHLORIDE BLD-SCNC: 102 MMOL/L (ref 98–107)
CHLORIDE BLD-SCNC: 104 MMOL/L (ref 98–107)
CHLORIDE BLD-SCNC: 105 MMOL/L (ref 98–107)
CHLORIDE BLD-SCNC: 105 MMOL/L (ref 98–107)
CHLORIDE BLD-SCNC: 106 MMOL/L (ref 98–107)
CHLORIDE BLD-SCNC: 107 MMOL/L (ref 98–107)
CHLORIDE BLD-SCNC: 107 MMOL/L (ref 98–107)
CHLORIDE BLD-SCNC: 109 MMOL/L (ref 98–107)
CHLORIDE BLD-SCNC: 110 MMOL/L (ref 98–107)
CHLORIDE BLD-SCNC: 111 MMOL/L (ref 98–107)
CHLORIDE BLD-SCNC: 112 MMOL/L (ref 98–107)
CHLORIDE BLD-SCNC: 116 MMOL/L (ref 98–107)
CHLORIDE BLD-SCNC: 96 MMOL/L (ref 98–107)
CHLORIDE BLD-SCNC: 97 MMOL/L (ref 98–107)
CHLORIDE BLD-SCNC: 98 MMOL/L (ref 98–107)
CHLORIDE BLD-SCNC: 98 MMOL/L (ref 98–107)
CHLORIDE BLD-SCNC: 99 MMOL/L (ref 98–107)
CHLORIDE URINE RANDOM: <20 MMOL/L
CLARITY: ABNORMAL
CLARITY: CLEAR
CO2: 15 MMOL/L (ref 22–29)
CO2: 16 MMOL/L (ref 22–29)
CO2: 17 MMOL/L (ref 22–29)
CO2: 18 MMOL/L (ref 22–29)
CO2: 18 MMOL/L (ref 22–29)
CO2: 19 MMOL/L (ref 22–29)
CO2: 20 MMOL/L (ref 22–29)
CO2: 21 MMOL/L (ref 22–29)
CO2: 21 MMOL/L (ref 22–29)
CO2: 22 MMOL/L (ref 22–29)
CO2: 23 MMOL/L (ref 22–29)
CO2: 24 MMOL/L (ref 22–29)
CO2: 25 MMOL/L (ref 22–29)
CO2: 26 MMOL/L (ref 22–29)
CO2: 26 MMOL/L (ref 22–29)
CO2: 27 MMOL/L (ref 22–29)
CO2: 28 MMOL/L (ref 22–29)
CO2: 29 MMOL/L (ref 22–29)
CO2: 29 MMOL/L (ref 22–29)
COHB: 0 % (ref 0–1.5)
COHB: 0.2 % (ref 0–1.5)
COHB: 0.3 % (ref 0–1.5)
COLOR: ABNORMAL
COLOR: YELLOW
COMMENT: ABNORMAL
CORONAVIRUS 229E BY PCR: NOT DETECTED
CORONAVIRUS HKU1 BY PCR: NOT DETECTED
CORONAVIRUS NL63 BY PCR: NOT DETECTED
CORONAVIRUS OC43 BY PCR: NOT DETECTED
CREAT SERPL-MCNC: 1.6 MG/DL (ref 0.5–1)
CREAT SERPL-MCNC: 2 MG/DL (ref 0.5–1)
CREAT SERPL-MCNC: 2 MG/DL (ref 0.5–1)
CREAT SERPL-MCNC: 2.2 MG/DL (ref 0.5–1)
CREAT SERPL-MCNC: 2.3 MG/DL (ref 0.5–1)
CREAT SERPL-MCNC: 2.4 MG/DL (ref 0.5–1)
CREAT SERPL-MCNC: 2.7 MG/DL (ref 0.5–1)
CREAT SERPL-MCNC: 2.7 MG/DL (ref 0.5–1)
CREAT SERPL-MCNC: 2.8 MG/DL (ref 0.5–1)
CREAT SERPL-MCNC: 3.1 MG/DL (ref 0.5–1)
CREAT SERPL-MCNC: 3.2 MG/DL (ref 0.5–1)
CREAT SERPL-MCNC: 3.4 MG/DL (ref 0.5–1)
CREAT SERPL-MCNC: 3.5 MG/DL (ref 0.5–1)
CREAT SERPL-MCNC: 3.5 MG/DL (ref 0.5–1)
CREAT SERPL-MCNC: 3.6 MG/DL (ref 0.5–1)
CREAT SERPL-MCNC: 3.6 MG/DL (ref 0.5–1)
CREAT SERPL-MCNC: 3.7 MG/DL (ref 0.5–1)
CREAT SERPL-MCNC: 3.8 MG/DL (ref 0.5–1)
CREAT SERPL-MCNC: 3.9 MG/DL (ref 0.5–1)
CREAT SERPL-MCNC: 3.9 MG/DL (ref 0.5–1)
CREAT SERPL-MCNC: 4.2 MG/DL (ref 0.5–1)
CREAT SERPL-MCNC: 4.3 MG/DL (ref 0.5–1)
CREAT SERPL-MCNC: 4.4 MG/DL (ref 0.5–1)
CREAT SERPL-MCNC: 4.4 MG/DL (ref 0.5–1)
CREAT SERPL-MCNC: 4.5 MG/DL (ref 0.5–1)
CREAT SERPL-MCNC: 4.5 MG/DL (ref 0.5–1)
CREAT SERPL-MCNC: 4.6 MG/DL (ref 0.5–1)
CREATININE URINE: 78 MG/DL (ref 29–226)
CREATININE URINE: 97 MG/DL (ref 29–226)
CRITICAL: ABNORMAL
CULTURE, BLOOD 2: NORMAL
CULTURE, BLOOD 2: NORMAL
D DIMER: 1849 NG/ML DDU
D DIMER: 2336 NG/ML DDU
D DIMER: 571 NG/ML DDU
D DIMER: 669 NG/ML DDU
D DIMER: 729 NG/ML DDU
D DIMER: 850 NG/ML DDU
DATE ANALYZED: ABNORMAL
DATE OF COLLECTION: ABNORMAL
DELIVERY SYSTEMS: ABNORMAL
DESCRIPTION BLOOD BANK: NORMAL
DEVICE: ABNORMAL
EKG ATRIAL RATE: 81 BPM
EKG ATRIAL RATE: 97 BPM
EKG P AXIS: 68 DEGREES
EKG P AXIS: 74 DEGREES
EKG P-R INTERVAL: 214 MS
EKG P-R INTERVAL: 216 MS
EKG Q-T INTERVAL: 366 MS
EKG Q-T INTERVAL: 368 MS
EKG QRS DURATION: 86 MS
EKG QRS DURATION: 88 MS
EKG QTC CALCULATION (BAZETT): 427 MS
EKG QTC CALCULATION (BAZETT): 464 MS
EKG R AXIS: 14 DEGREES
EKG R AXIS: 35 DEGREES
EKG T AXIS: 64 DEGREES
EKG T AXIS: 67 DEGREES
EKG VENTRICULAR RATE: 81 BPM
EKG VENTRICULAR RATE: 97 BPM
ELECTROPHORESIS: ABNORMAL
EOSINOPHILS ABSOLUTE: 0 E9/L (ref 0.05–0.5)
EOSINOPHILS ABSOLUTE: 0.06 E9/L (ref 0.05–0.5)
EOSINOPHILS ABSOLUTE: 0.16 E9/L (ref 0.05–0.5)
EOSINOPHILS RELATIVE PERCENT: 0 % (ref 0–6)
EOSINOPHILS RELATIVE PERCENT: 1.2 % (ref 0–6)
EOSINOPHILS RELATIVE PERCENT: 1.3 % (ref 0–6)
EPITHELIAL CELLS, UA: ABNORMAL /HPF
FERRITIN: 613 NG/ML
FERRITIN: 646 NG/ML
FIBRINOGEN: 349 MG/DL (ref 225–540)
FIBRINOGEN: 569 MG/DL (ref 225–540)
FIO2 ARTERIAL: 100
FIO2: 100 %
FIO2: 65 %
FIO2: 95 %
GAMMA GLOBULIN: 1 G/DL (ref 0.7–1.6)
GFR AFRICAN AMERICAN: 12
GFR AFRICAN AMERICAN: 13
GFR AFRICAN AMERICAN: 14
GFR AFRICAN AMERICAN: 14
GFR AFRICAN AMERICAN: 15
GFR AFRICAN AMERICAN: 16
GFR AFRICAN AMERICAN: 16
GFR AFRICAN AMERICAN: 17
GFR AFRICAN AMERICAN: 18
GFR AFRICAN AMERICAN: 21
GFR AFRICAN AMERICAN: 22
GFR AFRICAN AMERICAN: 22
GFR AFRICAN AMERICAN: 25
GFR AFRICAN AMERICAN: 26
GFR AFRICAN AMERICAN: 27
GFR AFRICAN AMERICAN: 31
GFR AFRICAN AMERICAN: 31
GFR AFRICAN AMERICAN: 39
GFR NON-AFRICAN AMERICAN: 12 ML/MIN/1.73
GFR NON-AFRICAN AMERICAN: 13 ML/MIN/1.73
GFR NON-AFRICAN AMERICAN: 14 ML/MIN/1.73
GFR NON-AFRICAN AMERICAN: 14 ML/MIN/1.73
GFR NON-AFRICAN AMERICAN: 15 ML/MIN/1.73
GFR NON-AFRICAN AMERICAN: 16 ML/MIN/1.73
GFR NON-AFRICAN AMERICAN: 16 ML/MIN/1.73
GFR NON-AFRICAN AMERICAN: 17 ML/MIN/1.73
GFR NON-AFRICAN AMERICAN: 18 ML/MIN/1.73
GFR NON-AFRICAN AMERICAN: 21 ML/MIN/1.73
GFR NON-AFRICAN AMERICAN: 22 ML/MIN/1.73
GFR NON-AFRICAN AMERICAN: 22 ML/MIN/1.73
GFR NON-AFRICAN AMERICAN: 25 ML/MIN/1.73
GFR NON-AFRICAN AMERICAN: 26 ML/MIN/1.73
GFR NON-AFRICAN AMERICAN: 27 ML/MIN/1.73
GFR NON-AFRICAN AMERICAN: 31 ML/MIN/1.73
GFR NON-AFRICAN AMERICAN: 31 ML/MIN/1.73
GFR NON-AFRICAN AMERICAN: 39 ML/MIN/1.73
GLUCOSE BLD-MCNC: 106 MG/DL (ref 74–99)
GLUCOSE BLD-MCNC: 111 MG/DL (ref 74–99)
GLUCOSE BLD-MCNC: 116 MG/DL (ref 74–99)
GLUCOSE BLD-MCNC: 120 MG/DL (ref 74–99)
GLUCOSE BLD-MCNC: 122 MG/DL (ref 74–99)
GLUCOSE BLD-MCNC: 130 MG/DL (ref 74–99)
GLUCOSE BLD-MCNC: 144 MG/DL (ref 74–99)
GLUCOSE BLD-MCNC: 157 MG/DL (ref 74–99)
GLUCOSE BLD-MCNC: 157 MG/DL (ref 74–99)
GLUCOSE BLD-MCNC: 163 MG/DL (ref 74–99)
GLUCOSE BLD-MCNC: 166 MG/DL (ref 74–99)
GLUCOSE BLD-MCNC: 170 MG/DL (ref 74–99)
GLUCOSE BLD-MCNC: 175 MG/DL (ref 74–99)
GLUCOSE BLD-MCNC: 181 MG/DL (ref 74–99)
GLUCOSE BLD-MCNC: 183 MG/DL (ref 74–99)
GLUCOSE BLD-MCNC: 186 MG/DL (ref 74–99)
GLUCOSE BLD-MCNC: 200 MG/DL (ref 74–99)
GLUCOSE BLD-MCNC: 207 MG/DL (ref 74–99)
GLUCOSE BLD-MCNC: 216 MG/DL (ref 74–99)
GLUCOSE BLD-MCNC: 219 MG/DL (ref 74–99)
GLUCOSE BLD-MCNC: 219 MG/DL (ref 74–99)
GLUCOSE BLD-MCNC: 220 MG/DL (ref 74–99)
GLUCOSE BLD-MCNC: 226 MG/DL (ref 74–99)
GLUCOSE BLD-MCNC: 230 MG/DL (ref 74–99)
GLUCOSE BLD-MCNC: 238 MG/DL (ref 74–99)
GLUCOSE BLD-MCNC: 239 MG/DL (ref 74–99)
GLUCOSE BLD-MCNC: 252 MG/DL (ref 74–99)
GLUCOSE BLD-MCNC: 258 MG/DL (ref 74–99)
GLUCOSE BLD-MCNC: 274 MG/DL (ref 74–99)
GLUCOSE BLD-MCNC: 363 MG/DL (ref 74–99)
GLUCOSE BLD-MCNC: 55 MG/DL (ref 74–99)
GLUCOSE BLD-MCNC: 77 MG/DL (ref 74–99)
GLUCOSE BLD-MCNC: 94 MG/DL (ref 74–99)
GLUCOSE URINE: 250 MG/DL
GLUCOSE URINE: NEGATIVE MG/DL
HAV IGM SER IA-ACNC: NORMAL
HBA1C MFR BLD: 8.7 % (ref 4–5.6)
HBV SURFACE AB TITR SER: NORMAL {TITER}
HCO3 ARTERIAL: 22.6 MMOL/L (ref 22–26)
HCO3: 16.2 MMOL/L (ref 22–26)
HCO3: 18.3 MMOL/L (ref 22–26)
HCO3: 18.3 MMOL/L (ref 22–26)
HCO3: 21.5 MMOL/L (ref 22–26)
HCO3: 21.8 MMOL/L (ref 22–26)
HCO3: 21.9 MMOL/L (ref 22–26)
HCO3: 22.4 MMOL/L (ref 22–26)
HCO3: 23.2 MMOL/L (ref 22–26)
HCO3: 23.4 MMOL/L (ref 22–26)
HCO3: 24.1 MMOL/L (ref 22–26)
HCO3: 24.9 MMOL/L (ref 22–26)
HCO3: 25.3 MMOL/L (ref 22–26)
HCO3: 27 MMOL/L (ref 22–26)
HCO3: 27.1 MMOL/L (ref 22–26)
HCO3: 27.2 MMOL/L (ref 22–26)
HCO3: 27.4 MMOL/L (ref 22–26)
HCO3: 27.6 MMOL/L (ref 22–26)
HCO3: 28 MMOL/L (ref 22–26)
HCO3: 28 MMOL/L (ref 22–26)
HCO3: 28.2 MMOL/L (ref 22–26)
HCT VFR BLD CALC: 19.3 % (ref 34–48)
HCT VFR BLD CALC: 20.8 % (ref 34–48)
HCT VFR BLD CALC: 21.9 % (ref 34–48)
HCT VFR BLD CALC: 22.4 % (ref 34–48)
HCT VFR BLD CALC: 22.5 % (ref 34–48)
HCT VFR BLD CALC: 22.5 % (ref 34–48)
HCT VFR BLD CALC: 23.6 % (ref 34–48)
HCT VFR BLD CALC: 24.4 % (ref 34–48)
HCT VFR BLD CALC: 24.8 % (ref 34–48)
HCT VFR BLD CALC: 25.1 % (ref 34–48)
HCT VFR BLD CALC: 25.4 % (ref 34–48)
HCT VFR BLD CALC: 25.5 % (ref 34–48)
HCT VFR BLD CALC: 26.1 % (ref 34–48)
HCT VFR BLD CALC: 26.2 % (ref 34–48)
HCT VFR BLD CALC: 26.2 % (ref 34–48)
HCT VFR BLD CALC: 27 % (ref 34–48)
HCT VFR BLD CALC: 27.6 % (ref 34–48)
HCT VFR BLD CALC: 28.7 % (ref 34–48)
HCT VFR BLD CALC: 29 % (ref 34–48)
HCT VFR BLD CALC: 29.2 % (ref 34–48)
HCT VFR BLD CALC: 29.3 % (ref 34–48)
HCT VFR BLD CALC: 29.4 % (ref 34–48)
HCT VFR BLD CALC: 30.3 % (ref 34–48)
HCT VFR BLD CALC: 30.9 % (ref 34–48)
HEMOGLOBIN: 10 G/DL (ref 11.5–15.5)
HEMOGLOBIN: 10.1 G/DL (ref 11.5–15.5)
HEMOGLOBIN: 6.4 G/DL (ref 11.5–15.5)
HEMOGLOBIN: 6.8 G/DL (ref 11.5–15.5)
HEMOGLOBIN: 7.1 G/DL (ref 11.5–15.5)
HEMOGLOBIN: 7.3 G/DL (ref 11.5–15.5)
HEMOGLOBIN: 7.4 G/DL (ref 11.5–15.5)
HEMOGLOBIN: 7.4 G/DL (ref 11.5–15.5)
HEMOGLOBIN: 7.6 G/DL (ref 11.5–15.5)
HEMOGLOBIN: 7.7 G/DL (ref 11.5–15.5)
HEMOGLOBIN: 8 G/DL (ref 11.5–15.5)
HEMOGLOBIN: 8.1 G/DL (ref 11.5–15.5)
HEMOGLOBIN: 8.2 G/DL (ref 11.5–15.5)
HEMOGLOBIN: 8.3 G/DL (ref 11.5–15.5)
HEMOGLOBIN: 8.3 G/DL (ref 11.5–15.5)
HEMOGLOBIN: 8.4 G/DL (ref 11.5–15.5)
HEMOGLOBIN: 8.4 G/DL (ref 11.5–15.5)
HEMOGLOBIN: 8.7 G/DL (ref 11.5–15.5)
HEMOGLOBIN: 8.8 G/DL (ref 11.5–15.5)
HEMOGLOBIN: 8.9 G/DL (ref 11.5–15.5)
HEMOGLOBIN: 9.2 G/DL (ref 11.5–15.5)
HEMOGLOBIN: 9.2 G/DL (ref 11.5–15.5)
HEMOGLOBIN: 9.3 G/DL (ref 11.5–15.5)
HEMOGLOBIN: 9.5 G/DL (ref 11.5–15.5)
HEMOGLOBIN: 9.8 G/DL (ref 11.5–15.5)
HEMOGLOBIN: 9.9 G/DL (ref 11.5–15.5)
HEPATITIS B CORE IGM ANTIBODY: NORMAL
HEPATITIS B SURFACE ANTIGEN INTERPRETATION: NORMAL
HEPATITIS B SURFACE ANTIGEN INTERPRETATION: NORMAL
HEPATITIS C ANTIBODY INTERPRETATION: NORMAL
HEPATITIS C ANTIBODY INTERPRETATION: NORMAL
HHB: 10 % (ref 0–5)
HHB: 10.5 % (ref 0–5)
HHB: 11.2 % (ref 0–5)
HHB: 12.2 % (ref 0–5)
HHB: 12.5 % (ref 0–5)
HHB: 14.1 % (ref 0–5)
HHB: 17.4 % (ref 0–5)
HHB: 2.4 % (ref 0–5)
HHB: 2.6 % (ref 0–5)
HHB: 23 % (ref 0–5)
HHB: 3.1 % (ref 0–5)
HHB: 4.1 % (ref 0–5)
HHB: 5.2 % (ref 0–5)
HHB: 5.5 % (ref 0–5)
HHB: 5.6 % (ref 0–5)
HHB: 5.9 % (ref 0–5)
HHB: 6.7 % (ref 0–5)
HHB: 7.9 % (ref 0–5)
HHB: 9.6 % (ref 0–5)
HHB: 9.8 % (ref 0–5)
HIV-1 AND HIV-2 ANTIBODIES: NORMAL
HUMAN METAPNEUMOVIRUS BY PCR: NOT DETECTED
HUMAN RHINOVIRUS/ENTEROVIRUS BY PCR: NOT DETECTED
IMMATURE GRANULOCYTES #: 0.01 E9/L
IMMATURE GRANULOCYTES #: 0.01 E9/L
IMMATURE GRANULOCYTES #: 0.05 E9/L
IMMATURE GRANULOCYTES #: 0.16 E9/L
IMMATURE GRANULOCYTES #: 0.46 E9/L
IMMATURE GRANULOCYTES #: 0.46 E9/L
IMMATURE GRANULOCYTES %: 0.2 % (ref 0–5)
IMMATURE GRANULOCYTES %: 0.3 % (ref 0–5)
IMMATURE GRANULOCYTES %: 0.5 % (ref 0–5)
IMMATURE GRANULOCYTES %: 1.2 % (ref 0–5)
IMMATURE GRANULOCYTES %: 2.9 % (ref 0–5)
IMMATURE GRANULOCYTES %: 3.2 % (ref 0–5)
IMMUNOFIXATION RESULT, SERUM: NORMAL
IMMUNOFIXATION URINE: NORMAL
INFLUENZA A BY PCR: NOT DETECTED
INFLUENZA B BY PCR: NOT DETECTED
INR BLD: 0.9
INR BLD: 1
IRON SATURATION: 34 % (ref 15–50)
IRON: 32 MCG/DL (ref 37–145)
KETONES, URINE: NEGATIVE MG/DL
KETONES, URINE: NEGATIVE MG/DL
L. PNEUMOPHILA SEROGP 1 UR AG: NORMAL
LAB: ABNORMAL
LACTATE DEHYDROGENASE: 447 U/L (ref 135–214)
LACTATE DEHYDROGENASE: 530 U/L (ref 135–214)
LACTIC ACID: 0.9 MMOL/L (ref 0.5–2.2)
LACTIC ACID: 1.1 MMOL/L (ref 0.5–2.2)
LACTIC ACID: 1.2 MMOL/L (ref 0.5–2.2)
LACTIC ACID: 1.4 MMOL/L (ref 0.5–2.2)
LEUKOCYTE ESTERASE, URINE: NEGATIVE
LEUKOCYTE ESTERASE, URINE: NEGATIVE
LIPASE: 11 U/L (ref 13–60)
LV EF: 77 %
LVEF MODALITY: NORMAL
LYMPHOCYTES ABSOLUTE: 0.19 E9/L (ref 1.5–4)
LYMPHOCYTES ABSOLUTE: 0.36 E9/L (ref 1.5–4)
LYMPHOCYTES ABSOLUTE: 0.56 E9/L (ref 1.5–4)
LYMPHOCYTES ABSOLUTE: 0.81 E9/L (ref 1.5–4)
LYMPHOCYTES ABSOLUTE: 0.85 E9/L (ref 1.5–4)
LYMPHOCYTES ABSOLUTE: 0.9 E9/L (ref 1.5–4)
LYMPHOCYTES ABSOLUTE: 1.19 E9/L (ref 1.5–4)
LYMPHOCYTES ABSOLUTE: 1.2 E9/L (ref 1.5–4)
LYMPHOCYTES ABSOLUTE: 1.21 E9/L (ref 1.5–4)
LYMPHOCYTES ABSOLUTE: 1.38 E9/L (ref 1.5–4)
LYMPHOCYTES RELATIVE PERCENT: 1 % (ref 20–42)
LYMPHOCYTES RELATIVE PERCENT: 15.1 % (ref 20–42)
LYMPHOCYTES RELATIVE PERCENT: 2 % (ref 20–42)
LYMPHOCYTES RELATIVE PERCENT: 24 % (ref 20–42)
LYMPHOCYTES RELATIVE PERCENT: 25.7 % (ref 20–42)
LYMPHOCYTES RELATIVE PERCENT: 4.3 % (ref 20–42)
LYMPHOCYTES RELATIVE PERCENT: 5.1 % (ref 20–42)
LYMPHOCYTES RELATIVE PERCENT: 6.2 % (ref 20–42)
LYMPHOCYTES RELATIVE PERCENT: 7.8 % (ref 20–42)
LYMPHOCYTES RELATIVE PERCENT: 8.6 % (ref 20–42)
Lab: ABNORMAL
Lab: NORMAL
Lab: NORMAL
MAGNESIUM: 1.8 MG/DL (ref 1.6–2.6)
MAGNESIUM: 2.2 MG/DL (ref 1.6–2.6)
MAGNESIUM: 2.8 MG/DL (ref 1.6–2.6)
MCH RBC QN AUTO: 27 PG (ref 26–35)
MCH RBC QN AUTO: 27.1 PG (ref 26–35)
MCH RBC QN AUTO: 27.1 PG (ref 26–35)
MCH RBC QN AUTO: 27.3 PG (ref 26–35)
MCH RBC QN AUTO: 27.4 PG (ref 26–35)
MCH RBC QN AUTO: 27.4 PG (ref 26–35)
MCH RBC QN AUTO: 27.6 PG (ref 26–35)
MCH RBC QN AUTO: 27.7 PG (ref 26–35)
MCH RBC QN AUTO: 27.8 PG (ref 26–35)
MCH RBC QN AUTO: 28 PG (ref 26–35)
MCH RBC QN AUTO: 28 PG (ref 26–35)
MCH RBC QN AUTO: 28.1 PG (ref 26–35)
MCH RBC QN AUTO: 28.1 PG (ref 26–35)
MCH RBC QN AUTO: 28.3 PG (ref 26–35)
MCH RBC QN AUTO: 28.4 PG (ref 26–35)
MCH RBC QN AUTO: 28.5 PG (ref 26–35)
MCH RBC QN AUTO: 28.6 PG (ref 26–35)
MCH RBC QN AUTO: 28.7 PG (ref 26–35)
MCH RBC QN AUTO: 28.7 PG (ref 26–35)
MCH RBC QN AUTO: 28.8 PG (ref 26–35)
MCH RBC QN AUTO: 29 PG (ref 26–35)
MCHC RBC AUTO-ENTMCNC: 31.3 % (ref 32–34.5)
MCHC RBC AUTO-ENTMCNC: 31.4 % (ref 32–34.5)
MCHC RBC AUTO-ENTMCNC: 31.5 % (ref 32–34.5)
MCHC RBC AUTO-ENTMCNC: 31.5 % (ref 32–34.5)
MCHC RBC AUTO-ENTMCNC: 31.6 % (ref 32–34.5)
MCHC RBC AUTO-ENTMCNC: 31.6 % (ref 32–34.5)
MCHC RBC AUTO-ENTMCNC: 31.7 % (ref 32–34.5)
MCHC RBC AUTO-ENTMCNC: 31.7 % (ref 32–34.5)
MCHC RBC AUTO-ENTMCNC: 31.9 % (ref 32–34.5)
MCHC RBC AUTO-ENTMCNC: 32.1 % (ref 32–34.5)
MCHC RBC AUTO-ENTMCNC: 32.2 % (ref 32–34.5)
MCHC RBC AUTO-ENTMCNC: 32.3 % (ref 32–34.5)
MCHC RBC AUTO-ENTMCNC: 32.3 % (ref 32–34.5)
MCHC RBC AUTO-ENTMCNC: 32.5 % (ref 32–34.5)
MCHC RBC AUTO-ENTMCNC: 32.7 % (ref 32–34.5)
MCHC RBC AUTO-ENTMCNC: 32.9 % (ref 32–34.5)
MCHC RBC AUTO-ENTMCNC: 33 % (ref 32–34.5)
MCHC RBC AUTO-ENTMCNC: 33.1 % (ref 32–34.5)
MCHC RBC AUTO-ENTMCNC: 33.1 % (ref 32–34.5)
MCHC RBC AUTO-ENTMCNC: 33.2 % (ref 32–34.5)
MCHC RBC AUTO-ENTMCNC: 33.3 % (ref 32–34.5)
MCHC RBC AUTO-ENTMCNC: 33.7 % (ref 32–34.5)
MCHC RBC AUTO-ENTMCNC: 34.1 % (ref 32–34.5)
MCV RBC AUTO: 83.3 FL (ref 80–99.9)
MCV RBC AUTO: 83.6 FL (ref 80–99.9)
MCV RBC AUTO: 83.9 FL (ref 80–99.9)
MCV RBC AUTO: 84.5 FL (ref 80–99.9)
MCV RBC AUTO: 84.6 FL (ref 80–99.9)
MCV RBC AUTO: 84.7 FL (ref 80–99.9)
MCV RBC AUTO: 84.9 FL (ref 80–99.9)
MCV RBC AUTO: 85.8 FL (ref 80–99.9)
MCV RBC AUTO: 85.9 FL (ref 80–99.9)
MCV RBC AUTO: 86.1 FL (ref 80–99.9)
MCV RBC AUTO: 86.2 FL (ref 80–99.9)
MCV RBC AUTO: 86.7 FL (ref 80–99.9)
MCV RBC AUTO: 86.9 FL (ref 80–99.9)
MCV RBC AUTO: 87 FL (ref 80–99.9)
MCV RBC AUTO: 87 FL (ref 80–99.9)
MCV RBC AUTO: 87.1 FL (ref 80–99.9)
MCV RBC AUTO: 87.3 FL (ref 80–99.9)
MCV RBC AUTO: 87.5 FL (ref 80–99.9)
MCV RBC AUTO: 87.6 FL (ref 80–99.9)
MCV RBC AUTO: 87.9 FL (ref 80–99.9)
MCV RBC AUTO: 87.9 FL (ref 80–99.9)
MCV RBC AUTO: 88.1 FL (ref 80–99.9)
MCV RBC AUTO: 88.2 FL (ref 80–99.9)
MCV RBC AUTO: 88.5 FL (ref 80–99.9)
MCV RBC AUTO: 88.8 FL (ref 80–99.9)
MCV RBC AUTO: 90 FL (ref 80–99.9)
MCV RBC AUTO: 90.7 FL (ref 80–99.9)
METAMYELOCYTES RELATIVE PERCENT: 12 % (ref 0–1)
METER GLUCOSE: 100 MG/DL (ref 74–99)
METER GLUCOSE: 101 MG/DL (ref 74–99)
METER GLUCOSE: 109 MG/DL (ref 74–99)
METER GLUCOSE: 111 MG/DL (ref 74–99)
METER GLUCOSE: 111 MG/DL (ref 74–99)
METER GLUCOSE: 114 MG/DL (ref 74–99)
METER GLUCOSE: 115 MG/DL (ref 74–99)
METER GLUCOSE: 116 MG/DL (ref 74–99)
METER GLUCOSE: 117 MG/DL (ref 74–99)
METER GLUCOSE: 117 MG/DL (ref 74–99)
METER GLUCOSE: 120 MG/DL (ref 74–99)
METER GLUCOSE: 122 MG/DL (ref 74–99)
METER GLUCOSE: 123 MG/DL (ref 74–99)
METER GLUCOSE: 125 MG/DL (ref 74–99)
METER GLUCOSE: 129 MG/DL (ref 74–99)
METER GLUCOSE: 130 MG/DL (ref 74–99)
METER GLUCOSE: 133 MG/DL (ref 74–99)
METER GLUCOSE: 133 MG/DL (ref 74–99)
METER GLUCOSE: 135 MG/DL (ref 74–99)
METER GLUCOSE: 136 MG/DL (ref 74–99)
METER GLUCOSE: 138 MG/DL (ref 74–99)
METER GLUCOSE: 139 MG/DL (ref 74–99)
METER GLUCOSE: 139 MG/DL (ref 74–99)
METER GLUCOSE: 142 MG/DL (ref 74–99)
METER GLUCOSE: 143 MG/DL (ref 74–99)
METER GLUCOSE: 143 MG/DL (ref 74–99)
METER GLUCOSE: 148 MG/DL (ref 74–99)
METER GLUCOSE: 148 MG/DL (ref 74–99)
METER GLUCOSE: 151 MG/DL (ref 74–99)
METER GLUCOSE: 151 MG/DL (ref 74–99)
METER GLUCOSE: 153 MG/DL (ref 74–99)
METER GLUCOSE: 155 MG/DL (ref 74–99)
METER GLUCOSE: 157 MG/DL (ref 74–99)
METER GLUCOSE: 158 MG/DL (ref 74–99)
METER GLUCOSE: 160 MG/DL (ref 74–99)
METER GLUCOSE: 161 MG/DL (ref 74–99)
METER GLUCOSE: 164 MG/DL (ref 74–99)
METER GLUCOSE: 164 MG/DL (ref 74–99)
METER GLUCOSE: 166 MG/DL (ref 74–99)
METER GLUCOSE: 167 MG/DL (ref 74–99)
METER GLUCOSE: 169 MG/DL (ref 74–99)
METER GLUCOSE: 170 MG/DL (ref 74–99)
METER GLUCOSE: 171 MG/DL (ref 74–99)
METER GLUCOSE: 171 MG/DL (ref 74–99)
METER GLUCOSE: 174 MG/DL (ref 74–99)
METER GLUCOSE: 175 MG/DL (ref 74–99)
METER GLUCOSE: 176 MG/DL (ref 74–99)
METER GLUCOSE: 177 MG/DL (ref 74–99)
METER GLUCOSE: 177 MG/DL (ref 74–99)
METER GLUCOSE: 180 MG/DL (ref 74–99)
METER GLUCOSE: 180 MG/DL (ref 74–99)
METER GLUCOSE: 182 MG/DL (ref 74–99)
METER GLUCOSE: 190 MG/DL (ref 74–99)
METER GLUCOSE: 194 MG/DL (ref 74–99)
METER GLUCOSE: 202 MG/DL (ref 74–99)
METER GLUCOSE: 203 MG/DL (ref 74–99)
METER GLUCOSE: 203 MG/DL (ref 74–99)
METER GLUCOSE: 208 MG/DL (ref 74–99)
METER GLUCOSE: 215 MG/DL (ref 74–99)
METER GLUCOSE: 218 MG/DL (ref 74–99)
METER GLUCOSE: 220 MG/DL (ref 74–99)
METER GLUCOSE: 221 MG/DL (ref 74–99)
METER GLUCOSE: 222 MG/DL (ref 74–99)
METER GLUCOSE: 223 MG/DL (ref 74–99)
METER GLUCOSE: 223 MG/DL (ref 74–99)
METER GLUCOSE: 224 MG/DL (ref 74–99)
METER GLUCOSE: 225 MG/DL (ref 74–99)
METER GLUCOSE: 227 MG/DL (ref 74–99)
METER GLUCOSE: 230 MG/DL (ref 74–99)
METER GLUCOSE: 233 MG/DL (ref 74–99)
METER GLUCOSE: 236 MG/DL (ref 74–99)
METER GLUCOSE: 237 MG/DL (ref 74–99)
METER GLUCOSE: 238 MG/DL (ref 74–99)
METER GLUCOSE: 246 MG/DL (ref 74–99)
METER GLUCOSE: 247 MG/DL (ref 74–99)
METER GLUCOSE: 248 MG/DL (ref 74–99)
METER GLUCOSE: 252 MG/DL (ref 74–99)
METER GLUCOSE: 253 MG/DL (ref 74–99)
METER GLUCOSE: 254 MG/DL (ref 74–99)
METER GLUCOSE: 254 MG/DL (ref 74–99)
METER GLUCOSE: 259 MG/DL (ref 74–99)
METER GLUCOSE: 262 MG/DL (ref 74–99)
METER GLUCOSE: 268 MG/DL (ref 74–99)
METER GLUCOSE: 268 MG/DL (ref 74–99)
METER GLUCOSE: 270 MG/DL (ref 74–99)
METER GLUCOSE: 276 MG/DL (ref 74–99)
METER GLUCOSE: 279 MG/DL (ref 74–99)
METER GLUCOSE: 283 MG/DL (ref 74–99)
METER GLUCOSE: 284 MG/DL (ref 74–99)
METER GLUCOSE: 286 MG/DL (ref 74–99)
METER GLUCOSE: 313 MG/DL (ref 74–99)
METER GLUCOSE: 326 MG/DL (ref 74–99)
METER GLUCOSE: 327 MG/DL (ref 74–99)
METER GLUCOSE: 332 MG/DL (ref 74–99)
METER GLUCOSE: 333 MG/DL (ref 74–99)
METER GLUCOSE: 400 MG/DL (ref 74–99)
METER GLUCOSE: 450 MG/DL (ref 74–99)
METER GLUCOSE: 48 MG/DL (ref 74–99)
METER GLUCOSE: 51 MG/DL (ref 74–99)
METER GLUCOSE: 53 MG/DL (ref 74–99)
METER GLUCOSE: 60 MG/DL (ref 74–99)
METER GLUCOSE: 63 MG/DL (ref 74–99)
METER GLUCOSE: 69 MG/DL (ref 74–99)
METER GLUCOSE: 70 MG/DL (ref 74–99)
METER GLUCOSE: 76 MG/DL (ref 74–99)
METER GLUCOSE: 77 MG/DL (ref 74–99)
METER GLUCOSE: 80 MG/DL (ref 74–99)
METER GLUCOSE: 80 MG/DL (ref 74–99)
METER GLUCOSE: 81 MG/DL (ref 74–99)
METER GLUCOSE: 81 MG/DL (ref 74–99)
METER GLUCOSE: 83 MG/DL (ref 74–99)
METER GLUCOSE: 88 MG/DL (ref 74–99)
METER GLUCOSE: 90 MG/DL (ref 74–99)
METER GLUCOSE: 91 MG/DL (ref 74–99)
METER GLUCOSE: 98 MG/DL (ref 74–99)
METER GLUCOSE: 98 MG/DL (ref 74–99)
METHB: 0.3 % (ref 0–1.5)
METHB: 0.3 % (ref 0–1.5)
METHB: 0.4 % (ref 0–1.5)
METHB: 0.5 % (ref 0–1.5)
METHB: 0.6 % (ref 0–1.5)
METHB: 0.7 % (ref 0–1.5)
METHB: 0.7 % (ref 0–1.5)
METHB: 0.8 % (ref 0–1.5)
MICROALBUMIN UR-MCNC: >4400 MG/L
MICROALBUMIN/CREAT UR-RTO: 5641 (ref 0–30)
MODE: ABNORMAL
MODE: AC
MONOCYTES ABSOLUTE: 0 E9/L (ref 0.1–0.95)
MONOCYTES ABSOLUTE: 0.18 E9/L (ref 0.1–0.95)
MONOCYTES ABSOLUTE: 0.37 E9/L (ref 0.1–0.95)
MONOCYTES ABSOLUTE: 0.45 E9/L (ref 0.1–0.95)
MONOCYTES ABSOLUTE: 0.52 E9/L (ref 0.1–0.95)
MONOCYTES ABSOLUTE: 0.56 E9/L (ref 0.1–0.95)
MONOCYTES ABSOLUTE: 0.67 E9/L (ref 0.1–0.95)
MONOCYTES ABSOLUTE: 0.75 E9/L (ref 0.1–0.95)
MONOCYTES ABSOLUTE: 0.78 E9/L (ref 0.1–0.95)
MONOCYTES ABSOLUTE: 0.79 E9/L (ref 0.1–0.95)
MONOCYTES RELATIVE PERCENT: 1 % (ref 2–12)
MONOCYTES RELATIVE PERCENT: 1.4 % (ref 2–12)
MONOCYTES RELATIVE PERCENT: 14.3 % (ref 2–12)
MONOCYTES RELATIVE PERCENT: 14.7 % (ref 2–12)
MONOCYTES RELATIVE PERCENT: 2.6 % (ref 2–12)
MONOCYTES RELATIVE PERCENT: 3 % (ref 2–12)
MONOCYTES RELATIVE PERCENT: 4 % (ref 2–12)
MONOCYTES RELATIVE PERCENT: 4.9 % (ref 2–12)
MONOCYTES RELATIVE PERCENT: 5.2 % (ref 2–12)
MONOCYTES RELATIVE PERCENT: 5.7 % (ref 2–12)
MYCOPLASMA PNEUMONIAE BY PCR: NOT DETECTED
MYCOPLASMA PNEUMONIAE IGM: NORMAL
MYCOPLASMA PNEUMONIAE IGM: NORMAL
MYELOCYTE PERCENT: 2 % (ref 0–0)
NEUTROPHILS ABSOLUTE: 11.58 E9/L (ref 1.8–7.3)
NEUTROPHILS ABSOLUTE: 12.43 E9/L (ref 1.8–7.3)
NEUTROPHILS ABSOLUTE: 13.41 E9/L (ref 1.8–7.3)
NEUTROPHILS ABSOLUTE: 13.44 E9/L (ref 1.8–7.3)
NEUTROPHILS ABSOLUTE: 13.83 E9/L (ref 1.8–7.3)
NEUTROPHILS ABSOLUTE: 17.36 E9/L (ref 1.8–7.3)
NEUTROPHILS ABSOLUTE: 17.76 E9/L (ref 1.8–7.3)
NEUTROPHILS ABSOLUTE: 2.15 E9/L (ref 1.8–7.3)
NEUTROPHILS ABSOLUTE: 2.73 E9/L (ref 1.8–7.3)
NEUTROPHILS ABSOLUTE: 7.34 E9/L (ref 1.8–7.3)
NEUTROPHILS RELATIVE PERCENT: 58.1 % (ref 43–80)
NEUTROPHILS RELATIVE PERCENT: 60.7 % (ref 43–80)
NEUTROPHILS RELATIVE PERCENT: 80.3 % (ref 43–80)
NEUTROPHILS RELATIVE PERCENT: 83 % (ref 43–80)
NEUTROPHILS RELATIVE PERCENT: 83.2 % (ref 43–80)
NEUTROPHILS RELATIVE PERCENT: 85.3 % (ref 43–80)
NEUTROPHILS RELATIVE PERCENT: 87 % (ref 43–80)
NEUTROPHILS RELATIVE PERCENT: 89.6 % (ref 43–80)
NEUTROPHILS RELATIVE PERCENT: 95.7 % (ref 43–80)
NEUTROPHILS RELATIVE PERCENT: 96 % (ref 43–80)
NITRITE, URINE: NEGATIVE
NITRITE, URINE: NEGATIVE
NUCLEATED RED BLOOD CELLS: 0.9 /100 WBC
O2 CONTENT: 10.5 ML/DL
O2 CONTENT: 10.7 ML/DL
O2 CONTENT: 10.8 ML/DL
O2 CONTENT: 11 ML/DL
O2 CONTENT: 11.3 ML/DL
O2 CONTENT: 11.5 ML/DL
O2 CONTENT: 11.9 ML/DL
O2 CONTENT: 12 ML/DL
O2 CONTENT: 12.1 ML/DL
O2 CONTENT: 12.2 ML/DL
O2 CONTENT: 12.3 ML/DL
O2 CONTENT: 12.4 ML/DL
O2 CONTENT: 12.6 ML/DL
O2 CONTENT: 12.6 ML/DL
O2 CONTENT: 12.8 ML/DL
O2 CONTENT: 13.1 ML/DL
O2 CONTENT: 13.3 ML/DL
O2 CONTENT: 13.3 ML/DL
O2 CONTENT: 14 ML/DL
O2 CONTENT: 16.2 ML/DL
O2 SATURATION: 76.9 % (ref 92–98.5)
O2 SATURATION: 82.5 % (ref 92–98.5)
O2 SATURATION: 85.8 % (ref 92–98.5)
O2 SATURATION: 87.4 % (ref 92–98.5)
O2 SATURATION: 87.7 % (ref 92–98.5)
O2 SATURATION: 88.7 % (ref 92–98.5)
O2 SATURATION: 89.4 % (ref 92–98.5)
O2 SATURATION: 89.9 % (ref 92–98.5)
O2 SATURATION: 90.1 % (ref 92–98.5)
O2 SATURATION: 90.3 % (ref 92–98.5)
O2 SATURATION: 92 % (ref 92–98.5)
O2 SATURATION: 92.8 % (ref 92–98.5)
O2 SATURATION: 93.2 % (ref 92–98.5)
O2 SATURATION: 94.1 % (ref 92–98.5)
O2 SATURATION: 94.3 % (ref 92–98.5)
O2 SATURATION: 94.4 % (ref 92–98.5)
O2 SATURATION: 94.8 % (ref 92–98.5)
O2 SATURATION: 95.9 % (ref 92–98.5)
O2 SATURATION: 96.9 % (ref 92–98.5)
O2 SATURATION: 97.4 % (ref 92–98.5)
O2 SATURATION: 97.6 % (ref 92–98.5)
O2HB: 76.5 % (ref 94–97)
O2HB: 81.8 % (ref 94–97)
O2HB: 85.1 % (ref 94–97)
O2HB: 86.8 % (ref 94–97)
O2HB: 86.8 % (ref 94–97)
O2HB: 88 % (ref 94–97)
O2HB: 88.8 % (ref 94–97)
O2HB: 89.4 % (ref 94–97)
O2HB: 89.5 % (ref 94–97)
O2HB: 89.5 % (ref 94–97)
O2HB: 91.3 % (ref 94–97)
O2HB: 92.4 % (ref 94–97)
O2HB: 93.5 % (ref 94–97)
O2HB: 93.5 % (ref 94–97)
O2HB: 93.6 % (ref 94–97)
O2HB: 94 % (ref 94–97)
O2HB: 94.8 % (ref 94–97)
O2HB: 96.1 % (ref 94–97)
O2HB: 96.7 % (ref 94–97)
O2HB: 96.8 % (ref 94–97)
OPERATOR ID: 274
OPERATOR ID: 3
OPERATOR ID: 30
OPERATOR ID: 40
OPERATOR ID: 4001
OPERATOR ID: 4001
OPERATOR ID: 514
OPERATOR ID: 797
OPERATOR ID: 9689
OPERATOR ID: ABNORMAL
OSMOLALITY URINE: 357 MOSM/KG (ref 300–900)
OVALOCYTES: ABNORMAL
PARAINFLUENZA VIRUS 1 BY PCR: NOT DETECTED
PARAINFLUENZA VIRUS 2 BY PCR: NOT DETECTED
PARAINFLUENZA VIRUS 3 BY PCR: NOT DETECTED
PARAINFLUENZA VIRUS 4 BY PCR: NOT DETECTED
PATIENT TEMP: 37
PATIENT TEMP: 37 C
PCO2 ARTERIAL: 43.8 MMHG (ref 35–45)
PCO2: 27.5 MMHG (ref 35–45)
PCO2: 33.4 MMHG (ref 35–45)
PCO2: 35.5 MMHG (ref 35–45)
PCO2: 38.8 MMHG (ref 35–45)
PCO2: 39 MMHG (ref 35–45)
PCO2: 41.4 MMHG (ref 35–45)
PCO2: 41.9 MMHG (ref 35–45)
PCO2: 44.7 MMHG (ref 35–45)
PCO2: 45.1 MMHG (ref 35–45)
PCO2: 46.9 MMHG (ref 35–45)
PCO2: 46.9 MMHG (ref 35–45)
PCO2: 47.7 MMHG (ref 35–45)
PCO2: 47.9 MMHG (ref 35–45)
PCO2: 48.4 MMHG (ref 35–45)
PCO2: 51.2 MMHG (ref 35–45)
PCO2: 54.4 MMHG (ref 35–45)
PCO2: 59.5 MMHG (ref 35–45)
PCO2: 59.7 MMHG (ref 35–45)
PCO2: 64.5 MMHG (ref 35–45)
PCO2: 68.5 MMHG (ref 35–45)
PDW BLD-RTO: 13.4 FL (ref 11.5–15)
PDW BLD-RTO: 13.7 FL (ref 11.5–15)
PDW BLD-RTO: 13.7 FL (ref 11.5–15)
PDW BLD-RTO: 13.9 FL (ref 11.5–15)
PDW BLD-RTO: 13.9 FL (ref 11.5–15)
PDW BLD-RTO: 14 FL (ref 11.5–15)
PDW BLD-RTO: 14.1 FL (ref 11.5–15)
PDW BLD-RTO: 14.1 FL (ref 11.5–15)
PDW BLD-RTO: 14.2 FL (ref 11.5–15)
PDW BLD-RTO: 14.5 FL (ref 11.5–15)
PDW BLD-RTO: 14.6 FL (ref 11.5–15)
PDW BLD-RTO: 14.6 FL (ref 11.5–15)
PDW BLD-RTO: 14.7 FL (ref 11.5–15)
PDW BLD-RTO: 14.8 FL (ref 11.5–15)
PDW BLD-RTO: 15 FL (ref 11.5–15)
PDW BLD-RTO: 15 FL (ref 11.5–15)
PDW BLD-RTO: 15.4 FL (ref 11.5–15)
PDW BLD-RTO: 15.8 FL (ref 11.5–15)
PDW BLD-RTO: 15.9 FL (ref 11.5–15)
PDW BLD-RTO: 16.3 FL (ref 11.5–15)
PDW BLD-RTO: 16.3 FL (ref 11.5–15)
PDW BLD-RTO: 16.4 FL (ref 11.5–15)
PDW BLD-RTO: 16.5 FL (ref 11.5–15)
PEEP/CPAP: 10 CMH2O
PEEP/CPAP: 12 CMH2O
PEEP/CPAP: 25 CMH2O
PEEP/CPAP: 5 CMH2O
PEEP/CPAP: 6 CMH2O
PEEP/CPAP: 7 CMH2O
PFO2: 0.52 MMHG/%
PFO2: 0.53 MMHG/%
PFO2: 0.54 MMHG/%
PFO2: 0.54 MMHG/%
PFO2: 0.65 MMHG/%
PFO2: 0.65 MMHG/%
PFO2: 0.68 MMHG/%
PFO2: 0.7 MMHG/%
PFO2: 0.71 MMHG/%
PFO2: 0.78 MMHG/%
PFO2: 0.78 MMHG/%
PFO2: 0.81 MMHG/%
PFO2: 0.89 MMHG/%
PFO2: 0.92 MMHG/%
PFO2: 0.93 MMHG/%
PFO2: 1.05 MMHG/%
PFO2: 1.16 MMHG/%
PFO2: 1.22 MMHG/%
PH BLOOD GAS: 7.21 (ref 7.35–7.45)
PH BLOOD GAS: 7.23 (ref 7.35–7.45)
PH BLOOD GAS: 7.23 (ref 7.35–7.45)
PH BLOOD GAS: 7.24 (ref 7.35–7.45)
PH BLOOD GAS: 7.25 (ref 7.35–7.45)
PH BLOOD GAS: 7.26 (ref 7.35–7.45)
PH BLOOD GAS: 7.28 (ref 7.35–7.45)
PH BLOOD GAS: 7.28 (ref 7.35–7.45)
PH BLOOD GAS: 7.31 (ref 7.35–7.45)
PH BLOOD GAS: 7.32 (ref 7.35–7.45)
PH BLOOD GAS: 7.32 (ref 7.35–7.45)
PH BLOOD GAS: 7.33 (ref 7.35–7.45)
PH BLOOD GAS: 7.35 (ref 7.35–7.45)
PH BLOOD GAS: 7.37 (ref 7.35–7.45)
PH BLOOD GAS: 7.39 (ref 7.35–7.45)
PH BLOOD GAS: 7.4 (ref 7.35–7.45)
PH BLOOD GAS: 7.43 (ref 7.35–7.45)
PH BLOOD GAS: 7.43 (ref 7.35–7.45)
PH BLOOD GAS: 7.44 (ref 7.35–7.45)
PH BLOOD GAS: 7.44 (ref 7.35–7.45)
PH BLOOD GAS: 7.47 (ref 7.35–7.45)
PH UA: 5 (ref 5–9)
PH UA: 5.5 (ref 5–9)
PHOSPHORUS: 4.5 MG/DL (ref 2.5–4.5)
PHOSPHORUS: 6.3 MG/DL (ref 2.5–4.5)
PHOSPHORUS: 6.7 MG/DL (ref 2.5–4.5)
PHOSPHORUS: 7.9 MG/DL (ref 2.5–4.5)
PIP: 12 CMH2O
PIP: 25 CMH2O
PIP: 30 CMH2O
PLATELET # BLD: 102 E9/L (ref 130–450)
PLATELET # BLD: 105 E9/L (ref 130–450)
PLATELET # BLD: 120 E9/L (ref 130–450)
PLATELET # BLD: 124 E9/L (ref 130–450)
PLATELET # BLD: 125 E9/L (ref 130–450)
PLATELET # BLD: 147 E9/L (ref 130–450)
PLATELET # BLD: 194 E9/L (ref 130–450)
PLATELET # BLD: 219 E9/L (ref 130–450)
PLATELET # BLD: 242 E9/L (ref 130–450)
PLATELET # BLD: 250 E9/L (ref 130–450)
PLATELET # BLD: 258 E9/L (ref 130–450)
PLATELET # BLD: 259 E9/L (ref 130–450)
PLATELET # BLD: 269 E9/L (ref 130–450)
PLATELET # BLD: 291 E9/L (ref 130–450)
PLATELET # BLD: 323 E9/L (ref 130–450)
PLATELET # BLD: 325 E9/L (ref 130–450)
PLATELET # BLD: 336 E9/L (ref 130–450)
PLATELET # BLD: 344 E9/L (ref 130–450)
PLATELET # BLD: 348 E9/L (ref 130–450)
PLATELET # BLD: 366 E9/L (ref 130–450)
PLATELET # BLD: 394 E9/L (ref 130–450)
PLATELET # BLD: 411 E9/L (ref 130–450)
PLATELET # BLD: 444 E9/L (ref 130–450)
PLATELET # BLD: 478 E9/L (ref 130–450)
PLATELET # BLD: 517 E9/L (ref 130–450)
PLATELET # BLD: 540 E9/L (ref 130–450)
PLATELET # BLD: 549 E9/L (ref 130–450)
PMV BLD AUTO: 10 FL (ref 7–12)
PMV BLD AUTO: 10.2 FL (ref 7–12)
PMV BLD AUTO: 10.2 FL (ref 7–12)
PMV BLD AUTO: 10.3 FL (ref 7–12)
PMV BLD AUTO: 10.4 FL (ref 7–12)
PMV BLD AUTO: 10.5 FL (ref 7–12)
PMV BLD AUTO: 10.8 FL (ref 7–12)
PMV BLD AUTO: 10.9 FL (ref 7–12)
PMV BLD AUTO: 10.9 FL (ref 7–12)
PMV BLD AUTO: 11 FL (ref 7–12)
PMV BLD AUTO: 11.4 FL (ref 7–12)
PMV BLD AUTO: 11.5 FL (ref 7–12)
PMV BLD AUTO: 11.7 FL (ref 7–12)
PMV BLD AUTO: 11.8 FL (ref 7–12)
PMV BLD AUTO: 11.9 FL (ref 7–12)
PMV BLD AUTO: 11.9 FL (ref 7–12)
PMV BLD AUTO: 9 FL (ref 7–12)
PMV BLD AUTO: 9.2 FL (ref 7–12)
PMV BLD AUTO: 9.4 FL (ref 7–12)
PMV BLD AUTO: 9.4 FL (ref 7–12)
PMV BLD AUTO: 9.5 FL (ref 7–12)
PMV BLD AUTO: 9.7 FL (ref 7–12)
PMV BLD AUTO: 9.8 FL (ref 7–12)
PO2 ARTERIAL: 71.9 MMHG (ref 60–80)
PO2: 116.4 MMHG (ref 75–100)
PO2: 122.2 MMHG (ref 75–100)
PO2: 44.9 MMHG (ref 75–100)
PO2: 52.1 MMHG (ref 75–100)
PO2: 53 MMHG (ref 75–100)
PO2: 53.5 MMHG (ref 75–100)
PO2: 53.8 MMHG (ref 75–100)
PO2: 53.9 MMHG (ref 75–100)
PO2: 60.5 MMHG (ref 75–100)
PO2: 64.6 MMHG (ref 75–100)
PO2: 65 MMHG (ref 75–100)
PO2: 67.9 MMHG (ref 75–100)
PO2: 69.7 MMHG (ref 75–100)
PO2: 70.8 MMHG (ref 75–100)
PO2: 77.7 MMHG (ref 75–100)
PO2: 78.4 MMHG (ref 75–100)
PO2: 80.8 MMHG (ref 75–100)
PO2: 89.5 MMHG (ref 75–100)
PO2: 92.3 MMHG (ref 75–100)
PO2: 99.5 MMHG (ref 75–100)
POIKILOCYTES: ABNORMAL
POLYCHROMASIA: ABNORMAL
POLYCHROMASIA: ABNORMAL
POSITIVE END EXP PRESS: 10 CMH2O
POTASSIUM REFLEX MAGNESIUM: 4.2 MMOL/L (ref 3.5–5)
POTASSIUM REFLEX MAGNESIUM: 4.2 MMOL/L (ref 3.5–5)
POTASSIUM SERPL-SCNC: 3.3 MMOL/L (ref 3.5–5)
POTASSIUM SERPL-SCNC: 3.4 MMOL/L (ref 3.5–5)
POTASSIUM SERPL-SCNC: 3.5 MMOL/L (ref 3.5–5)
POTASSIUM SERPL-SCNC: 3.5 MMOL/L (ref 3.5–5)
POTASSIUM SERPL-SCNC: 3.6 MMOL/L (ref 3.5–5)
POTASSIUM SERPL-SCNC: 3.6 MMOL/L (ref 3.5–5)
POTASSIUM SERPL-SCNC: 3.7 MMOL/L (ref 3.5–5)
POTASSIUM SERPL-SCNC: 3.8 MMOL/L (ref 3.5–5)
POTASSIUM SERPL-SCNC: 3.9 MMOL/L (ref 3.5–5)
POTASSIUM SERPL-SCNC: 4 MMOL/L (ref 3.5–5)
POTASSIUM SERPL-SCNC: 4.1 MMOL/L (ref 3.5–5)
POTASSIUM SERPL-SCNC: 4.2 MMOL/L (ref 3.5–5)
POTASSIUM SERPL-SCNC: 4.3 MMOL/L (ref 3.5–5)
POTASSIUM SERPL-SCNC: 4.4 MMOL/L (ref 3.5–5)
POTASSIUM SERPL-SCNC: 4.5 MMOL/L (ref 3.5–5)
POTASSIUM SERPL-SCNC: 4.5 MMOL/L (ref 3.5–5)
POTASSIUM SERPL-SCNC: 4.9 MMOL/L (ref 3.5–5)
PRO-BNP: 1968 PG/ML (ref 0–125)
PRO-BNP: 5318 PG/ML (ref 0–125)
PRO-BNP: 6498 PG/ML (ref 0–125)
PRO-BNP: 7404 PG/ML (ref 0–125)
PROCALCITONIN: 1.01 NG/ML (ref 0–0.08)
PROCALCITONIN: 1.05 NG/ML (ref 0–0.08)
PROCALCITONIN: 5.43 NG/ML (ref 0–0.08)
PROCALCITONIN: 50.1 NG/ML (ref 0–0.08)
PROCALCITONIN: 57.61 NG/ML (ref 0–0.08)
PROTEIN PROTEIN: >600 MG/DL (ref 0–12)
PROTEIN UA: >=300 MG/DL
PROTEIN UA: >=300 MG/DL
PROTEIN/CREAT RATIO: 7.7
PROTEIN/CREAT RATIO: 7.7 (ref 0–0.2)
PROTHROMBIN TIME: 10.6 SEC (ref 9.3–12.4)
PROTHROMBIN TIME: 11.9 SEC (ref 9.3–12.4)
PS: 30 CMH20
PS: 6 CMH20
RBC # BLD: 2.28 E12/L (ref 3.5–5.5)
RBC # BLD: 2.4 E12/L (ref 3.5–5.5)
RBC # BLD: 2.48 E12/L (ref 3.5–5.5)
RBC # BLD: 2.52 E12/L (ref 3.5–5.5)
RBC # BLD: 2.67 E12/L (ref 3.5–5.5)
RBC # BLD: 2.7 E12/L (ref 3.5–5.5)
RBC # BLD: 2.71 E12/L (ref 3.5–5.5)
RBC # BLD: 2.78 E12/L (ref 3.5–5.5)
RBC # BLD: 2.82 E12/L (ref 3.5–5.5)
RBC # BLD: 2.85 E12/L (ref 3.5–5.5)
RBC # BLD: 2.86 E12/L (ref 3.5–5.5)
RBC # BLD: 2.89 E12/L (ref 3.5–5.5)
RBC # BLD: 2.92 E12/L (ref 3.5–5.5)
RBC # BLD: 2.96 E12/L (ref 3.5–5.5)
RBC # BLD: 2.99 E12/L (ref 3.5–5.5)
RBC # BLD: 2.99 E12/L (ref 3.5–5.5)
RBC # BLD: 3.01 E12/L (ref 3.5–5.5)
RBC # BLD: 3.05 E12/L (ref 3.5–5.5)
RBC # BLD: 3.1 E12/L (ref 3.5–5.5)
RBC # BLD: 3.14 E12/L (ref 3.5–5.5)
RBC # BLD: 3.23 E12/L (ref 3.5–5.5)
RBC # BLD: 3.34 E12/L (ref 3.5–5.5)
RBC # BLD: 3.4 E12/L (ref 3.5–5.5)
RBC # BLD: 3.43 E12/L (ref 3.5–5.5)
RBC # BLD: 3.47 E12/L (ref 3.5–5.5)
RBC # BLD: 3.52 E12/L (ref 3.5–5.5)
RBC # BLD: 3.53 E12/L (ref 3.5–5.5)
RBC UA: ABNORMAL /HPF (ref 0–2)
RBC UA: ABNORMAL /HPF (ref 0–2)
REASON FOR REJECTION: NORMAL
REASON FOR REJECTION: NORMAL
REJECTED TEST: NORMAL
REJECTED TEST: NORMAL
REPORT: NORMAL
REPORT: NORMAL
RESPIRATORY RATE: 16 B/MIN
RESPIRATORY SYNCYTIAL VIRUS BY PCR: NOT DETECTED
RI(T): 11.11
RI(T): 11.2
RI(T): 11.22
RI(T): 4.23
RI(T): 4.51
RI(T): 5.23
RI(T): 5.58
RI(T): 5.92
RI(T): 5.94
RI(T): 6.93
RI(T): 7.09
RI(T): 7.21
RI(T): 8.13
RI(T): 8.18
RI(T): 8.18
RI(T): 8.73
RI(T): 8.75
RR MECHANICAL: 14 B/MIN
RR MECHANICAL: 16 B/MIN
RR MECHANICAL: 16 B/MIN
RR MECHANICAL: 25 B/MIN
RR MECHANICAL: 28 B/MIN
RR MECHANICAL: 33 B/MIN
RR MECHANICAL: 35 B/MIN
SARS-COV-2 ANTIBODY, TOTAL: NORMAL
SARS-COV-2, NAAT: DETECTED
SARS-COV-2, PCR: DETECTED
SCHISTOCYTES: ABNORMAL
SCHISTOCYTES: ABNORMAL
SEDIMENTATION RATE, ERYTHROCYTE: 40 MM/HR (ref 0–20)
SEDIMENTATION RATE, ERYTHROCYTE: 44 MM/HR (ref 0–20)
SODIUM BLD-SCNC: 133 MMOL/L (ref 132–146)
SODIUM BLD-SCNC: 135 MMOL/L (ref 132–146)
SODIUM BLD-SCNC: 135 MMOL/L (ref 132–146)
SODIUM BLD-SCNC: 137 MMOL/L (ref 132–146)
SODIUM BLD-SCNC: 138 MMOL/L (ref 132–146)
SODIUM BLD-SCNC: 139 MMOL/L (ref 132–146)
SODIUM BLD-SCNC: 140 MMOL/L (ref 132–146)
SODIUM BLD-SCNC: 141 MMOL/L (ref 132–146)
SODIUM BLD-SCNC: 142 MMOL/L (ref 132–146)
SODIUM BLD-SCNC: 142 MMOL/L (ref 132–146)
SODIUM BLD-SCNC: 143 MMOL/L (ref 132–146)
SODIUM BLD-SCNC: 143 MMOL/L (ref 132–146)
SODIUM URINE: 23 MMOL/L
SOURCE, BLOOD GAS: ABNORMAL
SPECIFIC GRAVITY UA: 1.02 (ref 1–1.03)
SPECIFIC GRAVITY UA: 1.02 (ref 1–1.03)
STREP PNEUMONIAE ANTIGEN, URINE: NORMAL
T4 TOTAL: 3.1 MCG/DL (ref 4.5–11.7)
T4 TOTAL: 6 MCG/DL (ref 4.5–11.7)
THB: 10 G/DL (ref 11.5–16.5)
THB: 10 G/DL (ref 11.5–16.5)
THB: 10.1 G/DL (ref 11.5–16.5)
THB: 10.3 G/DL (ref 11.5–16.5)
THB: 10.5 G/DL (ref 11.5–16.5)
THB: 10.9 G/DL (ref 11.5–16.5)
THB: 11.7 G/DL (ref 11.5–16.5)
THB: 12.9 G/DL (ref 11.5–16.5)
THB: 8.1 G/DL (ref 11.5–16.5)
THB: 8.1 G/DL (ref 11.5–16.5)
THB: 8.4 G/DL (ref 11.5–16.5)
THB: 8.5 G/DL (ref 11.5–16.5)
THB: 8.7 G/DL (ref 11.5–16.5)
THB: 8.9 G/DL (ref 11.5–16.5)
THB: 9 G/DL (ref 11.5–16.5)
THB: 9 G/DL (ref 11.5–16.5)
THB: 9.3 G/DL (ref 11.5–16.5)
THB: 9.5 G/DL (ref 11.5–16.5)
THB: 9.8 G/DL (ref 11.5–16.5)
THB: 9.8 G/DL (ref 11.5–16.5)
THIS TEST SENT TO: NORMAL
THIS TEST SENT TO: NORMAL
TIDAL VOLUME: 400 ML
TIME ANALYZED: 1002
TIME ANALYZED: 1005
TIME ANALYZED: 1059
TIME ANALYZED: 108
TIME ANALYZED: 1138
TIME ANALYZED: 1230
TIME ANALYZED: 1326
TIME ANALYZED: 1446
TIME ANALYZED: 1508
TIME ANALYZED: 1601
TIME ANALYZED: 1615
TIME ANALYZED: 1658
TIME ANALYZED: 1828
TIME ANALYZED: 2041
TIME ANALYZED: 449
TIME ANALYZED: 507
TIME ANALYZED: 512
TIME ANALYZED: 536
TIME ANALYZED: 9
TIME ANALYZED: 930
TOTAL CK: 1329 U/L (ref 20–180)
TOTAL CK: 296 U/L (ref 20–180)
TOTAL CK: 379 U/L (ref 20–180)
TOTAL IRON BINDING CAPACITY: 95 MCG/DL (ref 250–450)
TOTAL PROTEIN: 4.8 G/DL (ref 6.4–8.3)
TOTAL PROTEIN: 4.9 G/DL (ref 6.4–8.3)
TOTAL PROTEIN: 5 G/DL (ref 6.4–8.3)
TOTAL PROTEIN: 5.1 G/DL (ref 6.4–8.3)
TOTAL PROTEIN: 5.3 G/DL (ref 6.4–8.3)
TOTAL PROTEIN: 5.3 G/DL (ref 6.4–8.3)
TOTAL PROTEIN: 5.7 G/DL (ref 6.4–8.3)
TRIGL SERPL-MCNC: 220 MG/DL (ref 0–149)
TROPONIN: 0.05 NG/ML (ref 0–0.03)
TROPONIN: 0.1 NG/ML (ref 0–0.03)
TROPONIN: 0.11 NG/ML (ref 0–0.03)
TROPONIN: 0.12 NG/ML (ref 0–0.03)
TROPONIN: 0.12 NG/ML (ref 0–0.03)
TSH SERPL DL<=0.05 MIU/L-ACNC: 1.06 UIU/ML (ref 0.27–4.2)
TSH SERPL DL<=0.05 MIU/L-ACNC: 11.49 UIU/ML (ref 0.27–4.2)
URINE CULTURE, ROUTINE: NORMAL
UROBILINOGEN, URINE: 0.2 E.U./DL
UROBILINOGEN, URINE: 0.2 E.U./DL
VANCOMYCIN RANDOM: 11.5 MCG/ML (ref 5–40)
VANCOMYCIN RANDOM: 13.5 MCG/ML (ref 5–40)
VANCOMYCIN RANDOM: 15.3 MCG/ML (ref 5–40)
VANCOMYCIN RANDOM: 15.3 MCG/ML (ref 5–40)
VANCOMYCIN RANDOM: 16.9 MCG/ML (ref 5–40)
VANCOMYCIN RANDOM: 17.1 MCG/ML (ref 5–40)
VANCOMYCIN RANDOM: 17.8 MCG/ML (ref 5–40)
VANCOMYCIN RANDOM: 18.4 MCG/ML (ref 5–40)
VANCOMYCIN RANDOM: 18.4 MCG/ML (ref 5–40)
VANCOMYCIN RANDOM: 19.6 MCG/ML (ref 5–40)
VANCOMYCIN RANDOM: 19.7 MCG/ML (ref 5–40)
VANCOMYCIN RANDOM: 19.8 MCG/ML (ref 5–40)
VANCOMYCIN RANDOM: 22.1 MCG/ML (ref 5–40)
VITAMIN D 25-HYDROXY: 27 NG/ML (ref 30–100)
VT MECHANICAL: 380 ML
VT MECHANICAL: 400 ML
VT MECHANICAL: 450 ML
WBC # BLD: 11.8 E9/L (ref 4.5–11.5)
WBC # BLD: 12.3 E9/L (ref 4.5–11.5)
WBC # BLD: 12.4 E9/L (ref 4.5–11.5)
WBC # BLD: 13.1 E9/L (ref 4.5–11.5)
WBC # BLD: 13.2 E9/L (ref 4.5–11.5)
WBC # BLD: 13.9 E9/L (ref 4.5–11.5)
WBC # BLD: 14 E9/L (ref 4.5–11.5)
WBC # BLD: 14.6 E9/L (ref 4.5–11.5)
WBC # BLD: 14.9 E9/L (ref 4.5–11.5)
WBC # BLD: 15.4 E9/L (ref 4.5–11.5)
WBC # BLD: 15.8 E9/L (ref 4.5–11.5)
WBC # BLD: 15.9 E9/L (ref 4.5–11.5)
WBC # BLD: 17.5 E9/L (ref 4.5–11.5)
WBC # BLD: 17.9 E9/L (ref 4.5–11.5)
WBC # BLD: 18.5 E9/L (ref 4.5–11.5)
WBC # BLD: 2.2 E9/L (ref 4.5–11.5)
WBC # BLD: 3.5 E9/L (ref 4.5–11.5)
WBC # BLD: 4.7 E9/L (ref 4.5–11.5)
WBC # BLD: 5.2 E9/L (ref 4.5–11.5)
WBC # BLD: 6 E9/L (ref 4.5–11.5)
WBC # BLD: 6.1 E9/L (ref 4.5–11.5)
WBC # BLD: 6.9 E9/L (ref 4.5–11.5)
WBC # BLD: 7.3 E9/L (ref 4.5–11.5)
WBC # BLD: 8.6 E9/L (ref 4.5–11.5)
WBC # BLD: 8.8 E9/L (ref 4.5–11.5)
WBC # BLD: 9.2 E9/L (ref 4.5–11.5)
WBC # BLD: 9.5 E9/L (ref 4.5–11.5)
WBC UA: ABNORMAL /HPF (ref 0–5)
WBC UA: ABNORMAL /HPF (ref 0–5)

## 2021-01-01 PROCEDURE — 6370000000 HC RX 637 (ALT 250 FOR IP): Performed by: INTERNAL MEDICINE

## 2021-01-01 PROCEDURE — 6360000002 HC RX W HCPCS: Performed by: INTERNAL MEDICINE

## 2021-01-01 PROCEDURE — 87040 BLOOD CULTURE FOR BACTERIA: CPT

## 2021-01-01 PROCEDURE — 99285 EMERGENCY DEPT VISIT HI MDM: CPT

## 2021-01-01 PROCEDURE — 2580000003 HC RX 258: Performed by: INTERNAL MEDICINE

## 2021-01-01 PROCEDURE — 71045 X-RAY EXAM CHEST 1 VIEW: CPT

## 2021-01-01 PROCEDURE — 99232 SBSQ HOSP IP/OBS MODERATE 35: CPT | Performed by: INTERNAL MEDICINE

## 2021-01-01 PROCEDURE — 80053 COMPREHEN METABOLIC PANEL: CPT

## 2021-01-01 PROCEDURE — 36415 COLL VENOUS BLD VENIPUNCTURE: CPT

## 2021-01-01 PROCEDURE — 78580 LUNG PERFUSION IMAGING: CPT

## 2021-01-01 PROCEDURE — 85027 COMPLETE CBC AUTOMATED: CPT

## 2021-01-01 PROCEDURE — 80074 ACUTE HEPATITIS PANEL: CPT

## 2021-01-01 PROCEDURE — 94640 AIRWAY INHALATION TREATMENT: CPT

## 2021-01-01 PROCEDURE — P9047 ALBUMIN (HUMAN), 25%, 50ML: HCPCS

## 2021-01-01 PROCEDURE — 82962 GLUCOSE BLOOD TEST: CPT

## 2021-01-01 PROCEDURE — 2700000000 HC OXYGEN THERAPY PER DAY

## 2021-01-01 PROCEDURE — 80202 ASSAY OF VANCOMYCIN: CPT

## 2021-01-01 PROCEDURE — 51702 INSERT TEMP BLADDER CATH: CPT

## 2021-01-01 PROCEDURE — 6360000002 HC RX W HCPCS: Performed by: SPECIALIST

## 2021-01-01 PROCEDURE — 94003 VENT MGMT INPAT SUBQ DAY: CPT

## 2021-01-01 PROCEDURE — 36592 COLLECT BLOOD FROM PICC: CPT

## 2021-01-01 PROCEDURE — 93010 ELECTROCARDIOGRAM REPORT: CPT | Performed by: INTERNAL MEDICINE

## 2021-01-01 PROCEDURE — 82805 BLOOD GASES W/O2 SATURATION: CPT

## 2021-01-01 PROCEDURE — 80048 BASIC METABOLIC PNL TOTAL CA: CPT

## 2021-01-01 PROCEDURE — 84166 PROTEIN E-PHORESIS/URINE/CSF: CPT

## 2021-01-01 PROCEDURE — 36430 TRANSFUSION BLD/BLD COMPNT: CPT

## 2021-01-01 PROCEDURE — 83690 ASSAY OF LIPASE: CPT

## 2021-01-01 PROCEDURE — P9016 RBC LEUKOCYTES REDUCED: HCPCS

## 2021-01-01 PROCEDURE — 83605 ASSAY OF LACTIC ACID: CPT

## 2021-01-01 PROCEDURE — 84145 PROCALCITONIN (PCT): CPT

## 2021-01-01 PROCEDURE — 86901 BLOOD TYPING SEROLOGIC RH(D): CPT

## 2021-01-01 PROCEDURE — 86850 RBC ANTIBODY SCREEN: CPT

## 2021-01-01 PROCEDURE — 70470 CT HEAD/BRAIN W/O & W/DYE: CPT

## 2021-01-01 PROCEDURE — 83735 ASSAY OF MAGNESIUM: CPT

## 2021-01-01 PROCEDURE — 70544 MR ANGIOGRAPHY HEAD W/O DYE: CPT

## 2021-01-01 PROCEDURE — 90935 HEMODIALYSIS ONE EVALUATION: CPT

## 2021-01-01 PROCEDURE — 6360000002 HC RX W HCPCS

## 2021-01-01 PROCEDURE — 90935 HEMODIALYSIS ONE EVALUATION: CPT | Performed by: INTERNAL MEDICINE

## 2021-01-01 PROCEDURE — 85384 FIBRINOGEN ACTIVITY: CPT

## 2021-01-01 PROCEDURE — 2000000000 HC ICU R&B

## 2021-01-01 PROCEDURE — 83615 LACTATE (LD) (LDH) ENZYME: CPT

## 2021-01-01 PROCEDURE — 86900 BLOOD TYPING SEROLOGIC ABO: CPT

## 2021-01-01 PROCEDURE — 85730 THROMBOPLASTIN TIME PARTIAL: CPT

## 2021-01-01 PROCEDURE — 86255 FLUORESCENT ANTIBODY SCREEN: CPT

## 2021-01-01 PROCEDURE — G0382 LEV 3 HOSP TYPE B ED VISIT: HCPCS

## 2021-01-01 PROCEDURE — 2500000003 HC RX 250 WO HCPCS: Performed by: INTERNAL MEDICINE

## 2021-01-01 PROCEDURE — 99238 HOSP IP/OBS DSCHRG MGMT 30/<: CPT | Performed by: INTERNAL MEDICINE

## 2021-01-01 PROCEDURE — 83880 ASSAY OF NATRIURETIC PEPTIDE: CPT

## 2021-01-01 PROCEDURE — 99221 1ST HOSP IP/OBS SF/LOW 40: CPT | Performed by: FAMILY MEDICINE

## 2021-01-01 PROCEDURE — 0202U NFCT DS 22 TRGT SARS-COV-2: CPT

## 2021-01-01 PROCEDURE — 2580000003 HC RX 258: Performed by: SPECIALIST

## 2021-01-01 PROCEDURE — 37799 UNLISTED PX VASCULAR SURGERY: CPT

## 2021-01-01 PROCEDURE — 86769 SARS-COV-2 COVID-19 ANTIBODY: CPT

## 2021-01-01 PROCEDURE — 84443 ASSAY THYROID STIM HORMONE: CPT

## 2021-01-01 PROCEDURE — 85378 FIBRIN DEGRADE SEMIQUANT: CPT

## 2021-01-01 PROCEDURE — 2060000000 HC ICU INTERMEDIATE R&B

## 2021-01-01 PROCEDURE — 86803 HEPATITIS C AB TEST: CPT

## 2021-01-01 PROCEDURE — 94660 CPAP INITIATION&MGMT: CPT

## 2021-01-01 PROCEDURE — 86738 MYCOPLASMA ANTIBODY: CPT

## 2021-01-01 PROCEDURE — 93308 TTE F-UP OR LMTD: CPT

## 2021-01-01 PROCEDURE — 97165 OT EVAL LOW COMPLEX 30 MIN: CPT

## 2021-01-01 PROCEDURE — 93970 EXTREMITY STUDY: CPT

## 2021-01-01 PROCEDURE — 84436 ASSAY OF TOTAL THYROXINE: CPT

## 2021-01-01 PROCEDURE — 85651 RBC SED RATE NONAUTOMATED: CPT

## 2021-01-01 PROCEDURE — P9047 ALBUMIN (HUMAN), 25%, 50ML: HCPCS | Performed by: INTERNAL MEDICINE

## 2021-01-01 PROCEDURE — 82550 ASSAY OF CK (CPK): CPT

## 2021-01-01 PROCEDURE — 90945 DIALYSIS ONE EVALUATION: CPT

## 2021-01-01 PROCEDURE — 86923 COMPATIBILITY TEST ELECTRIC: CPT

## 2021-01-01 PROCEDURE — 83036 HEMOGLOBIN GLYCOSYLATED A1C: CPT

## 2021-01-01 PROCEDURE — 83935 ASSAY OF URINE OSMOLALITY: CPT

## 2021-01-01 PROCEDURE — 82570 ASSAY OF URINE CREATININE: CPT

## 2021-01-01 PROCEDURE — 86703 HIV-1/HIV-2 1 RESULT ANTBDY: CPT

## 2021-01-01 PROCEDURE — 51798 US URINE CAPACITY MEASURE: CPT

## 2021-01-01 PROCEDURE — 84100 ASSAY OF PHOSPHORUS: CPT

## 2021-01-01 PROCEDURE — 02HV33Z INSERTION OF INFUSION DEVICE INTO SUPERIOR VENA CAVA, PERCUTANEOUS APPROACH: ICD-10-PCS | Performed by: INTERNAL MEDICINE

## 2021-01-01 PROCEDURE — 80069 RENAL FUNCTION PANEL: CPT

## 2021-01-01 PROCEDURE — 99223 1ST HOSP IP/OBS HIGH 75: CPT | Performed by: INTERNAL MEDICINE

## 2021-01-01 PROCEDURE — 2580000003 HC RX 258

## 2021-01-01 PROCEDURE — 97530 THERAPEUTIC ACTIVITIES: CPT | Performed by: PHYSICAL THERAPIST

## 2021-01-01 PROCEDURE — 83550 IRON BINDING TEST: CPT

## 2021-01-01 PROCEDURE — 5A1D70Z PERFORMANCE OF URINARY FILTRATION, INTERMITTENT, LESS THAN 6 HOURS PER DAY: ICD-10-PCS | Performed by: INTERNAL MEDICINE

## 2021-01-01 PROCEDURE — 86140 C-REACTIVE PROTEIN: CPT

## 2021-01-01 PROCEDURE — 99291 CRITICAL CARE FIRST HOUR: CPT | Performed by: INTERNAL MEDICINE

## 2021-01-01 PROCEDURE — 85025 COMPLETE CBC W/AUTO DIFF WBC: CPT

## 2021-01-01 PROCEDURE — 70450 CT HEAD/BRAIN W/O DYE: CPT

## 2021-01-01 PROCEDURE — 81001 URINALYSIS AUTO W/SCOPE: CPT

## 2021-01-01 PROCEDURE — 97161 PT EVAL LOW COMPLEX 20 MIN: CPT | Performed by: PHYSICAL THERAPIST

## 2021-01-01 PROCEDURE — 84478 ASSAY OF TRIGLYCERIDES: CPT

## 2021-01-01 PROCEDURE — 85018 HEMOGLOBIN: CPT

## 2021-01-01 PROCEDURE — 97530 THERAPEUTIC ACTIVITIES: CPT

## 2021-01-01 PROCEDURE — 84165 PROTEIN E-PHORESIS SERUM: CPT

## 2021-01-01 PROCEDURE — 86038 ANTINUCLEAR ANTIBODIES: CPT

## 2021-01-01 PROCEDURE — 5A1955Z RESPIRATORY VENTILATION, GREATER THAN 96 CONSECUTIVE HOURS: ICD-10-PCS | Performed by: INTERNAL MEDICINE

## 2021-01-01 PROCEDURE — 3430000000 HC RX DIAGNOSTIC RADIOPHARMACEUTICAL: Performed by: RADIOLOGY

## 2021-01-01 PROCEDURE — 83520 IMMUNOASSAY QUANT NOS NONAB: CPT

## 2021-01-01 PROCEDURE — 85610 PROTHROMBIN TIME: CPT

## 2021-01-01 PROCEDURE — 2500000003 HC RX 250 WO HCPCS

## 2021-01-01 PROCEDURE — 99231 SBSQ HOSP IP/OBS SF/LOW 25: CPT | Performed by: FAMILY MEDICINE

## 2021-01-01 PROCEDURE — 85014 HEMATOCRIT: CPT

## 2021-01-01 PROCEDURE — 84300 ASSAY OF URINE SODIUM: CPT

## 2021-01-01 PROCEDURE — 82306 VITAMIN D 25 HYDROXY: CPT

## 2021-01-01 PROCEDURE — 99239 HOSP IP/OBS DSCHRG MGMT >30: CPT | Performed by: INTERNAL MEDICINE

## 2021-01-01 PROCEDURE — 84156 ASSAY OF PROTEIN URINE: CPT

## 2021-01-01 PROCEDURE — 93005 ELECTROCARDIOGRAM TRACING: CPT | Performed by: EMERGENCY MEDICINE

## 2021-01-01 PROCEDURE — 87449 NOS EACH ORGANISM AG IA: CPT

## 2021-01-01 PROCEDURE — XW033E5 INTRODUCTION OF REMDESIVIR ANTI-INFECTIVE INTO PERIPHERAL VEIN, PERCUTANEOUS APPROACH, NEW TECHNOLOGY GROUP 5: ICD-10-PCS | Performed by: INTERNAL MEDICINE

## 2021-01-01 PROCEDURE — U0003 INFECTIOUS AGENT DETECTION BY NUCLEIC ACID (DNA OR RNA); SEVERE ACUTE RESPIRATORY SYNDROME CORONAVIRUS 2 (SARS-COV-2) (CORONAVIRUS DISEASE [COVID-19]), AMPLIFIED PROBE TECHNIQUE, MAKING USE OF HIGH THROUGHPUT TECHNOLOGIES AS DESCRIBED BY CMS-2020-01-R: HCPCS

## 2021-01-01 PROCEDURE — 36600 WITHDRAWAL OF ARTERIAL BLOOD: CPT

## 2021-01-01 PROCEDURE — 86334 IMMUNOFIX E-PHORESIS SERUM: CPT

## 2021-01-01 PROCEDURE — 82150 ASSAY OF AMYLASE: CPT

## 2021-01-01 PROCEDURE — 71250 CT THORAX DX C-: CPT

## 2021-01-01 PROCEDURE — 82803 BLOOD GASES ANY COMBINATION: CPT

## 2021-01-01 PROCEDURE — 83540 ASSAY OF IRON: CPT

## 2021-01-01 PROCEDURE — 6360000002 HC RX W HCPCS: Performed by: EMERGENCY MEDICINE

## 2021-01-01 PROCEDURE — 94664 DEMO&/EVAL PT USE INHALER: CPT

## 2021-01-01 PROCEDURE — 76775 US EXAM ABDO BACK WALL LIM: CPT

## 2021-01-01 PROCEDURE — 0BH17EZ INSERTION OF ENDOTRACHEAL AIRWAY INTO TRACHEA, VIA NATURAL OR ARTIFICIAL OPENING: ICD-10-PCS | Performed by: INTERNAL MEDICINE

## 2021-01-01 PROCEDURE — 6370000000 HC RX 637 (ALT 250 FOR IP): Performed by: SPECIALIST

## 2021-01-01 PROCEDURE — 87635 SARS-COV-2 COVID-19 AMP PRB: CPT

## 2021-01-01 PROCEDURE — 1200000000 HC SEMI PRIVATE

## 2021-01-01 PROCEDURE — 95822 EEG COMA OR SLEEP ONLY: CPT

## 2021-01-01 PROCEDURE — 84484 ASSAY OF TROPONIN QUANT: CPT

## 2021-01-01 PROCEDURE — 82728 ASSAY OF FERRITIN: CPT

## 2021-01-01 PROCEDURE — 31500 INSERT EMERGENCY AIRWAY: CPT | Performed by: INTERNAL MEDICINE

## 2021-01-01 PROCEDURE — 97110 THERAPEUTIC EXERCISES: CPT

## 2021-01-01 PROCEDURE — A9540 TC99M MAA: HCPCS | Performed by: RADIOLOGY

## 2021-01-01 PROCEDURE — 2580000003 HC RX 258: Performed by: EMERGENCY MEDICINE

## 2021-01-01 PROCEDURE — 94002 VENT MGMT INPAT INIT DAY: CPT

## 2021-01-01 PROCEDURE — 87088 URINE BACTERIA CULTURE: CPT

## 2021-01-01 PROCEDURE — 96374 THER/PROPH/DIAG INJ IV PUSH: CPT

## 2021-01-01 PROCEDURE — 97535 SELF CARE MNGMENT TRAINING: CPT

## 2021-01-01 PROCEDURE — 82044 UR ALBUMIN SEMIQUANTITATIVE: CPT

## 2021-01-01 PROCEDURE — 73630 X-RAY EXAM OF FOOT: CPT

## 2021-01-01 PROCEDURE — 6360000004 HC RX CONTRAST MEDICATION: Performed by: RADIOLOGY

## 2021-01-01 PROCEDURE — 99222 1ST HOSP IP/OBS MODERATE 55: CPT | Performed by: SURGERY

## 2021-01-01 PROCEDURE — 87340 HEPATITIS B SURFACE AG IA: CPT

## 2021-01-01 PROCEDURE — 86706 HEP B SURFACE ANTIBODY: CPT

## 2021-01-01 PROCEDURE — XW13325 TRANSFUSION OF CONVALESCENT PLASMA (NONAUTOLOGOUS) INTO PERIPHERAL VEIN, PERCUTANEOUS APPROACH, NEW TECHNOLOGY GROUP 5: ICD-10-PCS | Performed by: SPECIALIST

## 2021-01-01 PROCEDURE — 6370000000 HC RX 637 (ALT 250 FOR IP): Performed by: EMERGENCY MEDICINE

## 2021-01-01 PROCEDURE — 70551 MRI BRAIN STEM W/O DYE: CPT

## 2021-01-01 PROCEDURE — 31500 INSERT EMERGENCY AIRWAY: CPT

## 2021-01-01 PROCEDURE — 99232 SBSQ HOSP IP/OBS MODERATE 35: CPT | Performed by: FAMILY MEDICINE

## 2021-01-01 PROCEDURE — 82436 ASSAY OF URINE CHLORIDE: CPT

## 2021-01-01 RX ORDER — AMLODIPINE BESYLATE 5 MG/1
5 TABLET ORAL DAILY
Status: DISCONTINUED | OUTPATIENT
Start: 2021-01-01 | End: 2021-01-01

## 2021-01-01 RX ORDER — 0.9 % SODIUM CHLORIDE 0.9 %
1000 INTRAVENOUS SOLUTION INTRAVENOUS ONCE
Status: COMPLETED | OUTPATIENT
Start: 2021-01-01 | End: 2021-01-01

## 2021-01-01 RX ORDER — HEPARIN SODIUM 5000 [USP'U]/ML
5000 INJECTION, SOLUTION INTRAVENOUS; SUBCUTANEOUS EVERY 8 HOURS SCHEDULED
Status: DISCONTINUED | OUTPATIENT
Start: 2021-01-01 | End: 2021-01-01

## 2021-01-01 RX ORDER — ACETAMINOPHEN 325 MG/1
650 TABLET ORAL EVERY 6 HOURS PRN
Status: DISCONTINUED | OUTPATIENT
Start: 2021-01-01 | End: 2021-01-01

## 2021-01-01 RX ORDER — DEXAMETHASONE SODIUM PHOSPHATE 10 MG/ML
6 INJECTION, SOLUTION INTRAMUSCULAR; INTRAVENOUS EVERY 24 HOURS
Status: DISCONTINUED | OUTPATIENT
Start: 2021-01-01 | End: 2021-01-01

## 2021-01-01 RX ORDER — POLYETHYLENE GLYCOL 3350 17 G/17G
17 POWDER, FOR SOLUTION ORAL DAILY PRN
Status: DISCONTINUED | OUTPATIENT
Start: 2021-01-01 | End: 2021-01-01 | Stop reason: HOSPADM

## 2021-01-01 RX ORDER — CLOPIDOGREL BISULFATE 75 MG/1
75 TABLET ORAL DAILY
Status: DISCONTINUED | OUTPATIENT
Start: 2021-01-01 | End: 2021-01-01 | Stop reason: HOSPADM

## 2021-01-01 RX ORDER — SODIUM CHLORIDE 9 MG/ML
250 INJECTION, SOLUTION INTRAVENOUS PRN
Status: DISCONTINUED | OUTPATIENT
Start: 2021-01-01 | End: 2021-01-01

## 2021-01-01 RX ORDER — CARVEDILOL 3.12 MG/1
3.12 TABLET ORAL 2 TIMES DAILY WITH MEALS
Status: DISCONTINUED | OUTPATIENT
Start: 2021-01-01 | End: 2021-01-01 | Stop reason: HOSPADM

## 2021-01-01 RX ORDER — ALBUMIN (HUMAN) 12.5 G/50ML
SOLUTION INTRAVENOUS
Status: DISPENSED
Start: 2021-01-01 | End: 2021-01-01

## 2021-01-01 RX ORDER — FAMOTIDINE 20 MG/1
10 TABLET, FILM COATED ORAL DAILY
Status: DISCONTINUED | OUTPATIENT
Start: 2021-01-01 | End: 2021-01-01 | Stop reason: HOSPADM

## 2021-01-01 RX ORDER — ACETAMINOPHEN 500 MG
500 TABLET ORAL 4 TIMES DAILY PRN
Qty: 40 TABLET | Refills: 0 | Status: SHIPPED | OUTPATIENT
Start: 2021-01-01

## 2021-01-01 RX ORDER — SODIUM CHLORIDE 9 MG/ML
INJECTION, SOLUTION INTRAVENOUS CONTINUOUS
Status: DISCONTINUED | OUTPATIENT
Start: 2021-01-01 | End: 2021-01-01 | Stop reason: HOSPADM

## 2021-01-01 RX ORDER — IPRATROPIUM BROMIDE AND ALBUTEROL SULFATE 2.5; .5 MG/3ML; MG/3ML
1 SOLUTION RESPIRATORY (INHALATION)
Status: DISCONTINUED | OUTPATIENT
Start: 2021-01-01 | End: 2021-01-01

## 2021-01-01 RX ORDER — SODIUM CHLORIDE 0.9 % (FLUSH) 0.9 %
10 SYRINGE (ML) INJECTION PRN
Status: DISCONTINUED | OUTPATIENT
Start: 2021-01-01 | End: 2021-01-01 | Stop reason: HOSPADM

## 2021-01-01 RX ORDER — FAMOTIDINE 20 MG/1
20 TABLET, FILM COATED ORAL 2 TIMES DAILY
COMMUNITY

## 2021-01-01 RX ORDER — HEPARIN SODIUM 10000 [USP'U]/100ML
5-30 INJECTION, SOLUTION INTRAVENOUS CONTINUOUS
Status: DISCONTINUED | OUTPATIENT
Start: 2021-01-01 | End: 2021-01-01 | Stop reason: SDUPTHER

## 2021-01-01 RX ORDER — CARVEDILOL 25 MG/1
25 TABLET ORAL 2 TIMES DAILY WITH MEALS
Status: DISCONTINUED | OUTPATIENT
Start: 2021-01-01 | End: 2021-01-01 | Stop reason: CLARIF

## 2021-01-01 RX ORDER — INSULIN GLARGINE 100 [IU]/ML
18 INJECTION, SOLUTION SUBCUTANEOUS EVERY MORNING
Status: DISCONTINUED | OUTPATIENT
Start: 2021-01-01 | End: 2021-01-01

## 2021-01-01 RX ORDER — LEVOTHYROXINE SODIUM 0.05 MG/1
50 TABLET ORAL DAILY
Status: DISCONTINUED | OUTPATIENT
Start: 2021-01-01 | End: 2021-01-01 | Stop reason: HOSPADM

## 2021-01-01 RX ORDER — INSULIN LISPRO 100 [IU]/ML
INJECTION, SOLUTION INTRAVENOUS; SUBCUTANEOUS
Qty: 5 PEN | Refills: 5 | Status: SHIPPED
Start: 2021-01-01 | End: 2021-01-01 | Stop reason: DRUGHIGH

## 2021-01-01 RX ORDER — ATROPINE SULFATE 0.4 MG/ML
0.4 AMPUL (ML) INJECTION ONCE
Status: DISCONTINUED | OUTPATIENT
Start: 2021-01-01 | End: 2021-01-01

## 2021-01-01 RX ORDER — DEXAMETHASONE 0.5 MG/1
4 TABLET ORAL
Qty: 2 TABLET | Refills: 0 | Status: SHIPPED | OUTPATIENT
Start: 2021-01-01 | End: 2021-01-01 | Stop reason: HOSPADM

## 2021-01-01 RX ORDER — DEXTROSE MONOHYDRATE 50 MG/ML
100 INJECTION, SOLUTION INTRAVENOUS PRN
Status: DISCONTINUED | OUTPATIENT
Start: 2021-01-01 | End: 2021-01-01 | Stop reason: HOSPADM

## 2021-01-01 RX ORDER — SODIUM CHLORIDE 9 MG/ML
INJECTION, SOLUTION INTRAVENOUS CONTINUOUS
Status: DISCONTINUED | OUTPATIENT
Start: 2021-01-01 | End: 2021-01-01

## 2021-01-01 RX ORDER — HYDRALAZINE HYDROCHLORIDE 20 MG/ML
10 INJECTION INTRAMUSCULAR; INTRAVENOUS EVERY 6 HOURS PRN
Status: DISCONTINUED | OUTPATIENT
Start: 2021-01-01 | End: 2021-01-01 | Stop reason: HOSPADM

## 2021-01-01 RX ORDER — HYDRALAZINE HYDROCHLORIDE 20 MG/ML
10 INJECTION INTRAMUSCULAR; INTRAVENOUS ONCE
Status: COMPLETED | OUTPATIENT
Start: 2021-01-01 | End: 2021-01-01

## 2021-01-01 RX ORDER — CARVEDILOL 25 MG/1
25 TABLET ORAL 2 TIMES DAILY WITH MEALS
Status: DISCONTINUED | OUTPATIENT
Start: 2021-01-01 | End: 2021-01-01

## 2021-01-01 RX ORDER — DEXAMETHASONE SODIUM PHOSPHATE 4 MG/ML
4 INJECTION, SOLUTION INTRA-ARTICULAR; INTRALESIONAL; INTRAMUSCULAR; INTRAVENOUS; SOFT TISSUE EVERY 8 HOURS
Status: DISCONTINUED | OUTPATIENT
Start: 2021-01-01 | End: 2021-01-01 | Stop reason: HOSPADM

## 2021-01-01 RX ORDER — AMLODIPINE BESYLATE 5 MG/1
5 TABLET ORAL 2 TIMES DAILY
Qty: 30 TABLET | Refills: 3 | Status: SHIPPED | OUTPATIENT
Start: 2021-01-01

## 2021-01-01 RX ORDER — IPRATROPIUM BROMIDE AND ALBUTEROL SULFATE 2.5; .5 MG/3ML; MG/3ML
1 SOLUTION RESPIRATORY (INHALATION) 4 TIMES DAILY
Status: DISCONTINUED | OUTPATIENT
Start: 2021-01-01 | End: 2021-01-01 | Stop reason: HOSPADM

## 2021-01-01 RX ORDER — FLUCONAZOLE 2 MG/ML
200 INJECTION, SOLUTION INTRAVENOUS EVERY 24 HOURS
Status: DISCONTINUED | OUTPATIENT
Start: 2021-01-01 | End: 2021-01-01

## 2021-01-01 RX ORDER — ALBUMIN (HUMAN) 12.5 G/50ML
25 SOLUTION INTRAVENOUS PRN
Status: DISCONTINUED | OUTPATIENT
Start: 2021-01-01 | End: 2021-01-01 | Stop reason: HOSPADM

## 2021-01-01 RX ORDER — PROMETHAZINE HYDROCHLORIDE 25 MG/1
12.5 TABLET ORAL EVERY 6 HOURS PRN
Status: DISCONTINUED | OUTPATIENT
Start: 2021-01-01 | End: 2021-01-01 | Stop reason: HOSPADM

## 2021-01-01 RX ORDER — DIPHENHYDRAMINE HYDROCHLORIDE 50 MG/ML
25 INJECTION INTRAMUSCULAR; INTRAVENOUS ONCE
Status: COMPLETED | OUTPATIENT
Start: 2021-01-01 | End: 2021-01-01

## 2021-01-01 RX ORDER — ACETAMINOPHEN 500 MG
1000 TABLET ORAL ONCE
Status: COMPLETED | OUTPATIENT
Start: 2021-01-01 | End: 2021-01-01

## 2021-01-01 RX ORDER — EPINEPHRINE 0.1 MG/ML
1 SYRINGE (ML) INJECTION ONCE
Status: COMPLETED | OUTPATIENT
Start: 2021-01-01 | End: 2021-01-01

## 2021-01-01 RX ORDER — ONDANSETRON 2 MG/ML
4 INJECTION INTRAMUSCULAR; INTRAVENOUS EVERY 6 HOURS PRN
Status: DISCONTINUED | OUTPATIENT
Start: 2021-01-01 | End: 2021-01-01 | Stop reason: HOSPADM

## 2021-01-01 RX ORDER — DEXTROSE MONOHYDRATE 25 G/50ML
12.5 INJECTION, SOLUTION INTRAVENOUS PRN
Status: DISCONTINUED | OUTPATIENT
Start: 2021-01-01 | End: 2021-01-01 | Stop reason: HOSPADM

## 2021-01-01 RX ORDER — ACETAMINOPHEN 325 MG/1
650 TABLET ORAL EVERY 6 HOURS PRN
Status: DISCONTINUED | OUTPATIENT
Start: 2021-01-01 | End: 2021-01-01 | Stop reason: SDUPTHER

## 2021-01-01 RX ORDER — GABAPENTIN 250 MG/5ML
300 SOLUTION ORAL NIGHTLY
Status: DISCONTINUED | OUTPATIENT
Start: 2021-01-01 | End: 2021-01-01

## 2021-01-01 RX ORDER — NICOTINE POLACRILEX 4 MG
15 LOZENGE BUCCAL PRN
Status: DISCONTINUED | OUTPATIENT
Start: 2021-01-01 | End: 2021-01-01 | Stop reason: HOSPADM

## 2021-01-01 RX ORDER — SODIUM CHLORIDE 9 MG/ML
INJECTION, SOLUTION INTRAVENOUS PRN
Status: COMPLETED | OUTPATIENT
Start: 2021-01-01 | End: 2021-01-01

## 2021-01-01 RX ORDER — VECURONIUM BROMIDE 20 MG/20ML
10 INJECTION, POWDER, LYOPHILIZED, FOR SOLUTION INTRAVENOUS ONCE
Status: COMPLETED | OUTPATIENT
Start: 2021-01-01 | End: 2021-01-01

## 2021-01-01 RX ORDER — SODIUM CHLORIDE 0.9 % (FLUSH) 0.9 %
10 SYRINGE (ML) INJECTION EVERY 12 HOURS SCHEDULED
Status: DISCONTINUED | OUTPATIENT
Start: 2021-01-01 | End: 2021-01-01 | Stop reason: HOSPADM

## 2021-01-01 RX ORDER — SODIUM BICARBONATE 650 MG/1
1300 TABLET ORAL 2 TIMES DAILY
Status: DISCONTINUED | OUTPATIENT
Start: 2021-01-01 | End: 2021-01-01

## 2021-01-01 RX ORDER — PROPOFOL 10 MG/ML
5-50 INJECTION, EMULSION INTRAVENOUS
Status: DISCONTINUED | OUTPATIENT
Start: 2021-01-01 | End: 2021-01-01

## 2021-01-01 RX ORDER — ATROPINE SULFATE 0.1 MG/ML
INJECTION INTRAVENOUS
Status: COMPLETED
Start: 2021-01-01 | End: 2021-01-01

## 2021-01-01 RX ORDER — ACETAMINOPHEN 500 MG
1000 TABLET ORAL EVERY 6 HOURS PRN
Status: DISCONTINUED | OUTPATIENT
Start: 2021-01-01 | End: 2021-01-01 | Stop reason: HOSPADM

## 2021-01-01 RX ORDER — CARVEDILOL 25 MG/1
25 TABLET ORAL 2 TIMES DAILY WITH MEALS
Status: DISCONTINUED | OUTPATIENT
Start: 2021-01-01 | End: 2021-01-01 | Stop reason: HOSPADM

## 2021-01-01 RX ORDER — ACETAMINOPHEN 325 MG/1
650 TABLET ORAL ONCE
Status: COMPLETED | OUTPATIENT
Start: 2021-01-01 | End: 2021-01-01

## 2021-01-01 RX ORDER — ACETAMINOPHEN 500 MG
500 TABLET ORAL 4 TIMES DAILY PRN
Status: DISCONTINUED | OUTPATIENT
Start: 2021-01-01 | End: 2021-01-01 | Stop reason: HOSPADM

## 2021-01-01 RX ORDER — HYDROXYZINE PAMOATE 25 MG/1
25 CAPSULE ORAL 4 TIMES DAILY PRN
Status: DISCONTINUED | OUTPATIENT
Start: 2021-01-01 | End: 2021-01-01 | Stop reason: HOSPADM

## 2021-01-01 RX ORDER — DEXAMETHASONE SODIUM PHOSPHATE 4 MG/ML
4 INJECTION, SOLUTION INTRA-ARTICULAR; INTRALESIONAL; INTRAMUSCULAR; INTRAVENOUS; SOFT TISSUE EVERY 8 HOURS
Status: DISCONTINUED | OUTPATIENT
Start: 2021-01-01 | End: 2021-01-01

## 2021-01-01 RX ORDER — LEVOTHYROXINE SODIUM 88 UG/1
88 TABLET ORAL DAILY
COMMUNITY

## 2021-01-01 RX ORDER — DOCUSATE SODIUM 100 MG/1
100 CAPSULE, LIQUID FILLED ORAL NIGHTLY
COMMUNITY

## 2021-01-01 RX ORDER — MIDODRINE HYDROCHLORIDE 2.5 MG/1
5 TABLET ORAL 3 TIMES DAILY
Status: DISCONTINUED | OUTPATIENT
Start: 2021-01-01 | End: 2021-01-01

## 2021-01-01 RX ORDER — ALBUMIN (HUMAN) 12.5 G/50ML
SOLUTION INTRAVENOUS
Status: COMPLETED
Start: 2021-01-01 | End: 2021-01-01

## 2021-01-01 RX ORDER — HYDROXYZINE PAMOATE 25 MG/1
25 CAPSULE ORAL 3 TIMES DAILY PRN
Status: DISCONTINUED | OUTPATIENT
Start: 2021-01-01 | End: 2021-01-01

## 2021-01-01 RX ORDER — HEPARIN SODIUM (PORCINE) LOCK FLUSH IV SOLN 100 UNIT/ML 100 UNIT/ML
SOLUTION INTRAVENOUS
Status: DISPENSED
Start: 2021-01-01 | End: 2021-01-01

## 2021-01-01 RX ORDER — ATORVASTATIN CALCIUM 40 MG/1
80 TABLET, FILM COATED ORAL DAILY
Status: DISCONTINUED | OUTPATIENT
Start: 2021-01-01 | End: 2021-01-01 | Stop reason: HOSPADM

## 2021-01-01 RX ORDER — ACETAMINOPHEN 650 MG/1
650 SUPPOSITORY RECTAL EVERY 6 HOURS PRN
Status: DISCONTINUED | OUTPATIENT
Start: 2021-01-01 | End: 2021-01-01 | Stop reason: HOSPADM

## 2021-01-01 RX ORDER — DOXYCYCLINE HYCLATE 100 MG/1
100 CAPSULE ORAL EVERY 12 HOURS SCHEDULED
Status: DISCONTINUED | OUTPATIENT
Start: 2021-01-01 | End: 2021-01-01

## 2021-01-01 RX ORDER — POLYVINYL ALCOHOL 14 MG/ML
1 SOLUTION/ DROPS OPHTHALMIC EVERY 4 HOURS
Status: DISCONTINUED | OUTPATIENT
Start: 2021-01-01 | End: 2021-01-01 | Stop reason: HOSPADM

## 2021-01-01 RX ORDER — DEXTROSE MONOHYDRATE 100 MG/ML
INJECTION, SOLUTION INTRAVENOUS CONTINUOUS
Status: DISCONTINUED | OUTPATIENT
Start: 2021-01-01 | End: 2021-01-01

## 2021-01-01 RX ORDER — ATROPINE SULFATE 0.1 MG/ML
INJECTION INTRAVENOUS
Status: DISCONTINUED
Start: 2021-01-01 | End: 2021-01-01

## 2021-01-01 RX ORDER — HEPARIN SODIUM 1000 [USP'U]/ML
1000 INJECTION, SOLUTION INTRAVENOUS; SUBCUTANEOUS ONCE
Status: CANCELLED | OUTPATIENT
Start: 2021-01-01 | End: 2021-01-01

## 2021-01-01 RX ORDER — METHYLPREDNISOLONE SODIUM SUCCINATE 125 MG/2ML
125 INJECTION, POWDER, LYOPHILIZED, FOR SOLUTION INTRAMUSCULAR; INTRAVENOUS
Status: ACTIVE | OUTPATIENT
Start: 2021-01-01 | End: 2021-01-01

## 2021-01-01 RX ORDER — FAMOTIDINE 20 MG/1
20 TABLET, FILM COATED ORAL DAILY
Status: DISCONTINUED | OUTPATIENT
Start: 2021-01-01 | End: 2021-01-01 | Stop reason: HOSPADM

## 2021-01-01 RX ORDER — HEPARIN SODIUM 1000 [USP'U]/ML
80 INJECTION, SOLUTION INTRAVENOUS; SUBCUTANEOUS ONCE
Status: COMPLETED | OUTPATIENT
Start: 2021-01-01 | End: 2021-01-01

## 2021-01-01 RX ORDER — SODIUM CHLORIDE 9 MG/ML
INJECTION, SOLUTION INTRAVENOUS EVERY 12 HOURS
Status: DISCONTINUED | OUTPATIENT
Start: 2021-01-01 | End: 2021-01-01

## 2021-01-01 RX ORDER — PENTOXIFYLLINE 400 MG/1
400 TABLET, EXTENDED RELEASE ORAL 2 TIMES DAILY
Status: DISCONTINUED | OUTPATIENT
Start: 2021-01-01 | End: 2021-01-01 | Stop reason: HOSPADM

## 2021-01-01 RX ORDER — ACETAMINOPHEN 500 MG
500 TABLET ORAL 4 TIMES DAILY PRN
Status: DISCONTINUED | OUTPATIENT
Start: 2021-01-01 | End: 2021-01-01 | Stop reason: SDUPTHER

## 2021-01-01 RX ORDER — CARVEDILOL 6.25 MG/1
12.5 TABLET ORAL 2 TIMES DAILY WITH MEALS
Status: DISCONTINUED | OUTPATIENT
Start: 2021-01-01 | End: 2021-01-01

## 2021-01-01 RX ORDER — HEPARIN SODIUM 1000 [USP'U]/ML
INJECTION, SOLUTION INTRAVENOUS; SUBCUTANEOUS
Status: DISPENSED
Start: 2021-01-01 | End: 2021-01-01

## 2021-01-01 RX ORDER — LEVOFLOXACIN 5 MG/ML
250 INJECTION, SOLUTION INTRAVENOUS EVERY 24 HOURS
Status: DISCONTINUED | OUTPATIENT
Start: 2021-01-01 | End: 2021-01-01 | Stop reason: DRUGHIGH

## 2021-01-01 RX ORDER — FLUCONAZOLE 2 MG/ML
400 INJECTION, SOLUTION INTRAVENOUS EVERY 24 HOURS
Status: DISCONTINUED | OUTPATIENT
Start: 2021-01-01 | End: 2021-01-01

## 2021-01-01 RX ORDER — AMLODIPINE BESYLATE 5 MG/1
5 TABLET ORAL 2 TIMES DAILY
Status: DISCONTINUED | OUTPATIENT
Start: 2021-01-01 | End: 2021-01-01 | Stop reason: HOSPADM

## 2021-01-01 RX ORDER — LEVOFLOXACIN 5 MG/ML
250 INJECTION, SOLUTION INTRAVENOUS
Status: DISCONTINUED | OUTPATIENT
Start: 2021-01-01 | End: 2021-01-01

## 2021-01-01 RX ORDER — SODIUM BICARBONATE 650 MG/1
1300 TABLET ORAL 3 TIMES DAILY
Status: DISCONTINUED | OUTPATIENT
Start: 2021-01-01 | End: 2021-01-01

## 2021-01-01 RX ORDER — MIDODRINE HYDROCHLORIDE 5 MG/1
10 TABLET ORAL 3 TIMES DAILY
Status: DISCONTINUED | OUTPATIENT
Start: 2021-01-01 | End: 2021-01-01

## 2021-01-01 RX ORDER — MIDODRINE HYDROCHLORIDE 5 MG/1
10 TABLET ORAL 3 TIMES DAILY PRN
Status: DISCONTINUED | OUTPATIENT
Start: 2021-01-01 | End: 2021-01-01

## 2021-01-01 RX ORDER — GABAPENTIN 300 MG/1
300 CAPSULE ORAL NIGHTLY
Status: DISCONTINUED | OUTPATIENT
Start: 2021-01-01 | End: 2021-01-01 | Stop reason: CLARIF

## 2021-01-01 RX ORDER — ASPIRIN 81 MG/1
81 TABLET, CHEWABLE ORAL DAILY
Status: DISCONTINUED | OUTPATIENT
Start: 2021-01-01 | End: 2021-01-01 | Stop reason: HOSPADM

## 2021-01-01 RX ORDER — DOPAMINE HYDROCHLORIDE 320 MG/100ML
2-20 INJECTION, SOLUTION INTRAVENOUS CONTINUOUS
Status: DISCONTINUED | OUTPATIENT
Start: 2021-01-01 | End: 2021-01-01

## 2021-01-01 RX ORDER — INSULIN GLARGINE 100 [IU]/ML
20 INJECTION, SOLUTION SUBCUTANEOUS EVERY MORNING
Status: DISCONTINUED | OUTPATIENT
Start: 2021-01-01 | End: 2021-01-01 | Stop reason: HOSPADM

## 2021-01-01 RX ORDER — INSULIN GLARGINE 100 [IU]/ML
9 INJECTION, SOLUTION SUBCUTANEOUS EVERY MORNING
Status: DISCONTINUED | OUTPATIENT
Start: 2021-01-01 | End: 2021-01-01 | Stop reason: HOSPADM

## 2021-01-01 RX ORDER — HEPARIN SODIUM 5000 [USP'U]/ML
5000 INJECTION, SOLUTION INTRAVENOUS; SUBCUTANEOUS EVERY 8 HOURS SCHEDULED
Status: DISCONTINUED | OUTPATIENT
Start: 2021-01-01 | End: 2021-01-01 | Stop reason: HOSPADM

## 2021-01-01 RX ORDER — FUROSEMIDE 10 MG/ML
40 INJECTION INTRAMUSCULAR; INTRAVENOUS ONCE
Status: COMPLETED | OUTPATIENT
Start: 2021-01-01 | End: 2021-01-01

## 2021-01-01 RX ORDER — LEVOTHYROXINE SODIUM 88 UG/1
88 TABLET ORAL DAILY
Status: DISCONTINUED | OUTPATIENT
Start: 2021-01-01 | End: 2021-01-01 | Stop reason: HOSPADM

## 2021-01-01 RX ORDER — HYDRALAZINE HYDROCHLORIDE 20 MG/ML
10 INJECTION INTRAMUSCULAR; INTRAVENOUS EVERY 4 HOURS PRN
Status: DISCONTINUED | OUTPATIENT
Start: 2021-01-01 | End: 2021-01-01

## 2021-01-01 RX ORDER — CARVEDILOL 25 MG/1
25 TABLET ORAL 2 TIMES DAILY WITH MEALS
Qty: 60 TABLET | Refills: 3 | Status: SHIPPED | OUTPATIENT
Start: 2021-01-01

## 2021-01-01 RX ORDER — INSULIN GLARGINE 100 [IU]/ML
10 INJECTION, SOLUTION SUBCUTANEOUS NIGHTLY
Status: COMPLETED | OUTPATIENT
Start: 2021-01-01 | End: 2021-01-01

## 2021-01-01 RX ORDER — ACETAMINOPHEN 650 MG/1
650 SUPPOSITORY RECTAL EVERY 6 HOURS PRN
Status: DISCONTINUED | OUTPATIENT
Start: 2021-01-01 | End: 2021-01-01

## 2021-01-01 RX ORDER — ALBUMIN (HUMAN) 12.5 G/50ML
25 SOLUTION INTRAVENOUS ONCE
Status: COMPLETED | OUTPATIENT
Start: 2021-01-01 | End: 2021-01-01

## 2021-01-01 RX ORDER — ALBUTEROL SULFATE 90 UG/1
2 AEROSOL, METERED RESPIRATORY (INHALATION) EVERY 6 HOURS PRN
Status: DISCONTINUED | OUTPATIENT
Start: 2021-01-01 | End: 2021-01-01

## 2021-01-01 RX ORDER — FUROSEMIDE 10 MG/ML
60 INJECTION INTRAMUSCULAR; INTRAVENOUS ONCE
Status: COMPLETED | OUTPATIENT
Start: 2021-01-01 | End: 2021-01-01

## 2021-01-01 RX ORDER — CALCITRIOL 0.25 UG/1
0.25 CAPSULE, LIQUID FILLED ORAL DAILY
Status: DISCONTINUED | OUTPATIENT
Start: 2021-01-01 | End: 2021-01-01 | Stop reason: HOSPADM

## 2021-01-01 RX ORDER — MIDODRINE HYDROCHLORIDE 5 MG/1
2.5 TABLET ORAL 3 TIMES DAILY
Status: DISCONTINUED | OUTPATIENT
Start: 2021-01-01 | End: 2021-01-01

## 2021-01-01 RX ORDER — LABETALOL HYDROCHLORIDE 5 MG/ML
20 INJECTION, SOLUTION INTRAVENOUS EVERY 6 HOURS PRN
Status: DISCONTINUED | OUTPATIENT
Start: 2021-01-01 | End: 2021-01-01 | Stop reason: HOSPADM

## 2021-01-01 RX ORDER — EPINEPHRINE 1 MG/ML
0.3 INJECTION, SOLUTION, CONCENTRATE INTRAVENOUS
Status: ACTIVE | OUTPATIENT
Start: 2021-01-01 | End: 2021-01-01

## 2021-01-01 RX ORDER — HEPARIN SODIUM 1000 [USP'U]/ML
80 INJECTION, SOLUTION INTRAVENOUS; SUBCUTANEOUS PRN
Status: DISCONTINUED | OUTPATIENT
Start: 2021-01-01 | End: 2021-01-01 | Stop reason: SDUPTHER

## 2021-01-01 RX ORDER — HEPARIN SODIUM 1000 [USP'U]/ML
40 INJECTION, SOLUTION INTRAVENOUS; SUBCUTANEOUS PRN
Status: DISCONTINUED | OUTPATIENT
Start: 2021-01-01 | End: 2021-01-01 | Stop reason: SDUPTHER

## 2021-01-01 RX ORDER — DEXAMETHASONE SODIUM PHOSPHATE 4 MG/ML
4 INJECTION, SOLUTION INTRA-ARTICULAR; INTRALESIONAL; INTRAMUSCULAR; INTRAVENOUS; SOFT TISSUE EVERY 12 HOURS
Status: DISCONTINUED | OUTPATIENT
Start: 2021-01-01 | End: 2021-01-01 | Stop reason: HOSPADM

## 2021-01-01 RX ORDER — ACETYLCYSTEINE 200 MG/ML
400 SOLUTION ORAL; RESPIRATORY (INHALATION) 3 TIMES DAILY
Status: DISPENSED | OUTPATIENT
Start: 2021-01-01 | End: 2021-01-01

## 2021-01-01 RX ORDER — FAMOTIDINE 20 MG/1
20 TABLET, FILM COATED ORAL 2 TIMES DAILY
Status: DISCONTINUED | OUTPATIENT
Start: 2021-01-01 | End: 2021-01-01

## 2021-01-01 RX ORDER — ACETAMINOPHEN 325 MG/1
325 TABLET ORAL ONCE
Status: COMPLETED | OUTPATIENT
Start: 2021-01-01 | End: 2021-01-01

## 2021-01-01 RX ORDER — GABAPENTIN 300 MG/1
300 CAPSULE ORAL NIGHTLY
Status: DISCONTINUED | OUTPATIENT
Start: 2021-01-01 | End: 2021-01-01 | Stop reason: HOSPADM

## 2021-01-01 RX ORDER — EPINEPHRINE 0.1 MG/ML
SYRINGE (ML) INJECTION
Status: COMPLETED
Start: 2021-01-01 | End: 2021-01-01

## 2021-01-01 RX ORDER — ATORVASTATIN CALCIUM 20 MG/1
80 TABLET, FILM COATED ORAL DAILY
Status: DISCONTINUED | OUTPATIENT
Start: 2021-01-01 | End: 2021-01-01 | Stop reason: HOSPADM

## 2021-01-01 RX ORDER — 0.9 % SODIUM CHLORIDE 0.9 %
500 INTRAVENOUS SOLUTION INTRAVENOUS ONCE
Status: COMPLETED | OUTPATIENT
Start: 2021-01-01 | End: 2021-01-01

## 2021-01-01 RX ORDER — LABETALOL HYDROCHLORIDE 5 MG/ML
10 INJECTION, SOLUTION INTRAVENOUS EVERY 6 HOURS PRN
Status: DISCONTINUED | OUTPATIENT
Start: 2021-01-01 | End: 2021-01-01

## 2021-01-01 RX ORDER — DOPAMINE HYDROCHLORIDE 320 MG/100ML
INJECTION, SOLUTION INTRAVENOUS
Status: DISCONTINUED
Start: 2021-01-01 | End: 2021-01-01

## 2021-01-01 RX ORDER — CARVEDILOL 6.25 MG/1
6.25 TABLET ORAL 2 TIMES DAILY WITH MEALS
Status: DISCONTINUED | OUTPATIENT
Start: 2021-01-01 | End: 2021-01-01

## 2021-01-01 RX ORDER — EPINEPHRINE 0.1 MG/ML
SYRINGE (ML) INJECTION
Status: DISCONTINUED
Start: 2021-01-01 | End: 2021-01-01

## 2021-01-01 RX ADMIN — CARVEDILOL 25 MG: 25 TABLET, FILM COATED ORAL at 09:09

## 2021-01-01 RX ADMIN — SODIUM CHLORIDE: 9 INJECTION, SOLUTION INTRAVENOUS at 22:52

## 2021-01-01 RX ADMIN — FAMOTIDINE 10 MG: 20 TABLET, FILM COATED ORAL at 08:36

## 2021-01-01 RX ADMIN — NYSTATIN 500000 UNITS: 100000 SUSPENSION ORAL at 21:02

## 2021-01-01 RX ADMIN — Medication 125 MCG/HR: at 05:25

## 2021-01-01 RX ADMIN — IPRATROPIUM BROMIDE AND ALBUTEROL SULFATE 1 AMPULE: .5; 3 SOLUTION RESPIRATORY (INHALATION) at 17:38

## 2021-01-01 RX ADMIN — PIPERACILLIN SODIUM AND TAZOBACTAM SODIUM 3375 MG: 3; .375 INJECTION, POWDER, LYOPHILIZED, FOR SOLUTION INTRAVENOUS at 17:59

## 2021-01-01 RX ADMIN — SODIUM BICARBONATE 1300 MG: 650 TABLET ORAL at 13:37

## 2021-01-01 RX ADMIN — DEXAMETHASONE SODIUM PHOSPHATE 6 MG: 10 INJECTION, SOLUTION INTRAMUSCULAR; INTRAVENOUS at 16:00

## 2021-01-01 RX ADMIN — PROPOFOL 25 MCG/KG/MIN: 10 INJECTION, EMULSION INTRAVENOUS at 01:34

## 2021-01-01 RX ADMIN — HEPARIN SODIUM 5000 UNITS: 5000 INJECTION INTRAVENOUS; SUBCUTANEOUS at 05:17

## 2021-01-01 RX ADMIN — HEPARIN SODIUM 5000 UNITS: 5000 INJECTION INTRAVENOUS; SUBCUTANEOUS at 22:59

## 2021-01-01 RX ADMIN — POLYVINYL ALCOHOL 1 DROP: 14 SOLUTION/ DROPS OPHTHALMIC at 23:15

## 2021-01-01 RX ADMIN — ASPIRIN 81 MG CHEWABLE TABLET 81 MG: 81 TABLET CHEWABLE at 18:25

## 2021-01-01 RX ADMIN — PROPOFOL 50 MCG/KG/MIN: 10 INJECTION, EMULSION INTRAVENOUS at 23:08

## 2021-01-01 RX ADMIN — POLYVINYL ALCOHOL 1 DROP: 14 SOLUTION/ DROPS OPHTHALMIC at 11:30

## 2021-01-01 RX ADMIN — IPRATROPIUM BROMIDE AND ALBUTEROL SULFATE 1 AMPULE: .5; 3 SOLUTION RESPIRATORY (INHALATION) at 09:10

## 2021-01-01 RX ADMIN — POLYVINYL ALCOHOL 1 DROP: 14 SOLUTION/ DROPS OPHTHALMIC at 04:00

## 2021-01-01 RX ADMIN — PROPOFOL 45 MCG/KG/MIN: 10 INJECTION, EMULSION INTRAVENOUS at 05:49

## 2021-01-01 RX ADMIN — VANCOMYCIN HYDROCHLORIDE 500 MG: 500 INJECTION, POWDER, LYOPHILIZED, FOR SOLUTION INTRAVENOUS at 13:54

## 2021-01-01 RX ADMIN — NYSTATIN 500000 UNITS: 100000 SUSPENSION ORAL at 20:51

## 2021-01-01 RX ADMIN — AMLODIPINE BESYLATE 5 MG: 5 TABLET ORAL at 08:24

## 2021-01-01 RX ADMIN — DEXAMETHASONE SODIUM PHOSPHATE 4 MG: 4 INJECTION, SOLUTION INTRAMUSCULAR; INTRAVENOUS at 05:09

## 2021-01-01 RX ADMIN — Medication 10 ML: at 20:33

## 2021-01-01 RX ADMIN — POLYVINYL ALCOHOL 1 DROP: 14 SOLUTION/ DROPS OPHTHALMIC at 19:03

## 2021-01-01 RX ADMIN — GABAPENTIN 300 MG: 250 SOLUTION ORAL at 21:35

## 2021-01-01 RX ADMIN — SODIUM BICARBONATE 1300 MG: 650 TABLET ORAL at 09:51

## 2021-01-01 RX ADMIN — Medication 100 MCG/HR: at 11:06

## 2021-01-01 RX ADMIN — ASPIRIN 81 MG CHEWABLE TABLET 81 MG: 81 TABLET CHEWABLE at 08:40

## 2021-01-01 RX ADMIN — HEPARIN SODIUM 5000 UNITS: 5000 INJECTION INTRAVENOUS; SUBCUTANEOUS at 21:10

## 2021-01-01 RX ADMIN — Medication 125 MCG/HR: at 15:42

## 2021-01-01 RX ADMIN — AMLODIPINE BESYLATE 5 MG: 5 TABLET ORAL at 20:44

## 2021-01-01 RX ADMIN — NYSTATIN 500000 UNITS: 100000 SUSPENSION ORAL at 16:42

## 2021-01-01 RX ADMIN — GABAPENTIN 300 MG: 300 CAPSULE ORAL at 21:43

## 2021-01-01 RX ADMIN — FLUCONAZOLE IN SODIUM CHLORIDE 400 MG: 2 INJECTION, SOLUTION INTRAVENOUS at 16:19

## 2021-01-01 RX ADMIN — PIPERACILLIN SODIUM AND TAZOBACTAM SODIUM 3375 MG: 3; .375 INJECTION, POWDER, LYOPHILIZED, FOR SOLUTION INTRAVENOUS at 06:17

## 2021-01-01 RX ADMIN — FLUCONAZOLE IN SODIUM CHLORIDE 400 MG: 2 INJECTION, SOLUTION INTRAVENOUS at 16:50

## 2021-01-01 RX ADMIN — INSULIN LISPRO 4 UNITS: 100 INJECTION, SOLUTION INTRAVENOUS; SUBCUTANEOUS at 09:13

## 2021-01-01 RX ADMIN — ACETAMINOPHEN 1000 MG: 500 TABLET ORAL at 02:32

## 2021-01-01 RX ADMIN — Medication 10 ML: at 20:47

## 2021-01-01 RX ADMIN — DOXYCYCLINE 100 MG: 100 INJECTION, POWDER, LYOPHILIZED, FOR SOLUTION INTRAVENOUS at 11:28

## 2021-01-01 RX ADMIN — VASOPRESSIN 0.04 UNITS/MIN: 20 INJECTION INTRAVENOUS at 09:23

## 2021-01-01 RX ADMIN — INSULIN LISPRO 4 UNITS: 100 INJECTION, SOLUTION INTRAVENOUS; SUBCUTANEOUS at 12:13

## 2021-01-01 RX ADMIN — PENTOXIFYLLINE 400 MG: 400 TABLET, FILM COATED, EXTENDED RELEASE ORAL at 18:06

## 2021-01-01 RX ADMIN — PIPERACILLIN SODIUM AND TAZOBACTAM SODIUM 3375 MG: 3; .375 INJECTION, POWDER, LYOPHILIZED, FOR SOLUTION INTRAVENOUS at 17:24

## 2021-01-01 RX ADMIN — VANCOMYCIN HYDROCHLORIDE 1000 MG: 1 INJECTION, POWDER, LYOPHILIZED, FOR SOLUTION INTRAVENOUS at 18:24

## 2021-01-01 RX ADMIN — Medication 125 MCG/HR: at 23:08

## 2021-01-01 RX ADMIN — INSULIN GLARGINE 20 UNITS: 100 INJECTION, SOLUTION SUBCUTANEOUS at 09:02

## 2021-01-01 RX ADMIN — HEPARIN SODIUM 5000 UNITS: 5000 INJECTION INTRAVENOUS; SUBCUTANEOUS at 13:05

## 2021-01-01 RX ADMIN — AMLODIPINE BESYLATE 5 MG: 5 TABLET ORAL at 20:15

## 2021-01-01 RX ADMIN — SODIUM CHLORIDE: 9 INJECTION, SOLUTION INTRAVENOUS at 10:35

## 2021-01-01 RX ADMIN — IPRATROPIUM BROMIDE AND ALBUTEROL SULFATE 1 AMPULE: .5; 3 SOLUTION RESPIRATORY (INHALATION) at 05:52

## 2021-01-01 RX ADMIN — PENTOXIFYLLINE 400 MG: 400 TABLET, FILM COATED, EXTENDED RELEASE ORAL at 16:44

## 2021-01-01 RX ADMIN — Medication 10 ML: at 22:15

## 2021-01-01 RX ADMIN — ATROPINE SULFATE 1 MG: 0.1 INJECTION PARENTERAL at 21:47

## 2021-01-01 RX ADMIN — INSULIN GLARGINE 20 UNITS: 100 INJECTION, SOLUTION SUBCUTANEOUS at 09:00

## 2021-01-01 RX ADMIN — ONDANSETRON 4 MG: 2 INJECTION INTRAMUSCULAR; INTRAVENOUS at 08:29

## 2021-01-01 RX ADMIN — IPRATROPIUM BROMIDE AND ALBUTEROL SULFATE 1 AMPULE: .5; 3 SOLUTION RESPIRATORY (INHALATION) at 16:22

## 2021-01-01 RX ADMIN — NYSTATIN 500000 UNITS: 100000 SUSPENSION ORAL at 16:18

## 2021-01-01 RX ADMIN — SODIUM CHLORIDE, PRESERVATIVE FREE 10 ML: 5 INJECTION INTRAVENOUS at 16:37

## 2021-01-01 RX ADMIN — NYSTATIN 500000 UNITS: 100000 SUSPENSION ORAL at 16:58

## 2021-01-01 RX ADMIN — ONDANSETRON 4 MG: 2 INJECTION INTRAMUSCULAR; INTRAVENOUS at 09:43

## 2021-01-01 RX ADMIN — SODIUM BICARBONATE 1300 MG: 650 TABLET ORAL at 20:46

## 2021-01-01 RX ADMIN — CARVEDILOL 12.5 MG: 6.25 TABLET, FILM COATED ORAL at 09:20

## 2021-01-01 RX ADMIN — SODIUM BICARBONATE 1300 MG: 650 TABLET ORAL at 20:41

## 2021-01-01 RX ADMIN — ACETAMINOPHEN 650 MG: 325 TABLET, FILM COATED ORAL at 21:33

## 2021-01-01 RX ADMIN — FUROSEMIDE 40 MG: 10 INJECTION, SOLUTION INTRAMUSCULAR; INTRAVENOUS at 13:23

## 2021-01-01 RX ADMIN — PIPERACILLIN SODIUM AND TAZOBACTAM SODIUM 3375 MG: 3; .375 INJECTION, POWDER, LYOPHILIZED, FOR SOLUTION INTRAVENOUS at 05:42

## 2021-01-01 RX ADMIN — IPRATROPIUM BROMIDE AND ALBUTEROL 1 PUFF: 20; 100 SPRAY, METERED RESPIRATORY (INHALATION) at 22:42

## 2021-01-01 RX ADMIN — CALCITRIOL 0.25 MCG: 0.25 CAPSULE ORAL at 09:41

## 2021-01-01 RX ADMIN — HEPARIN SODIUM 5000 UNITS: 5000 INJECTION INTRAVENOUS; SUBCUTANEOUS at 22:42

## 2021-01-01 RX ADMIN — NYSTATIN 500000 UNITS: 100000 SUSPENSION ORAL at 08:55

## 2021-01-01 RX ADMIN — SODIUM BICARBONATE 1300 MG: 650 TABLET ORAL at 14:26

## 2021-01-01 RX ADMIN — IPRATROPIUM BROMIDE AND ALBUTEROL SULFATE 1 AMPULE: .5; 3 SOLUTION RESPIRATORY (INHALATION) at 20:20

## 2021-01-01 RX ADMIN — INSULIN GLARGINE 18 UNITS: 100 INJECTION, SOLUTION SUBCUTANEOUS at 09:44

## 2021-01-01 RX ADMIN — IPRATROPIUM BROMIDE AND ALBUTEROL SULFATE 1 AMPULE: .5; 3 SOLUTION RESPIRATORY (INHALATION) at 19:00

## 2021-01-01 RX ADMIN — PIPERACILLIN SODIUM AND TAZOBACTAM SODIUM 3375 MG: 3; .375 INJECTION, POWDER, LYOPHILIZED, FOR SOLUTION INTRAVENOUS at 06:36

## 2021-01-01 RX ADMIN — HEPARIN SODIUM 5000 UNITS: 5000 INJECTION INTRAVENOUS; SUBCUTANEOUS at 22:39

## 2021-01-01 RX ADMIN — IPRATROPIUM BROMIDE AND ALBUTEROL 1 PUFF: 20; 100 SPRAY, METERED RESPIRATORY (INHALATION) at 05:42

## 2021-01-01 RX ADMIN — IPRATROPIUM BROMIDE AND ALBUTEROL SULFATE 1 AMPULE: .5; 3 SOLUTION RESPIRATORY (INHALATION) at 09:06

## 2021-01-01 RX ADMIN — NYSTATIN 500000 UNITS: 100000 SUSPENSION ORAL at 17:25

## 2021-01-01 RX ADMIN — INSULIN LISPRO 1 UNITS: 100 INJECTION, SOLUTION INTRAVENOUS; SUBCUTANEOUS at 13:11

## 2021-01-01 RX ADMIN — DOXYCYCLINE 100 MG: 100 INJECTION, POWDER, LYOPHILIZED, FOR SOLUTION INTRAVENOUS at 23:36

## 2021-01-01 RX ADMIN — DOXYCYCLINE 100 MG: 100 INJECTION, POWDER, LYOPHILIZED, FOR SOLUTION INTRAVENOUS at 23:15

## 2021-01-01 RX ADMIN — DEXAMETHASONE SODIUM PHOSPHATE 6 MG: 10 INJECTION INTRAMUSCULAR; INTRAVENOUS at 14:29

## 2021-01-01 RX ADMIN — CARVEDILOL 25 MG: 25 TABLET, FILM COATED ORAL at 17:55

## 2021-01-01 RX ADMIN — ATORVASTATIN CALCIUM 80 MG: 20 TABLET, FILM COATED ORAL at 08:13

## 2021-01-01 RX ADMIN — DEXAMETHASONE SODIUM PHOSPHATE 6 MG: 10 INJECTION, SOLUTION INTRAMUSCULAR; INTRAVENOUS at 16:20

## 2021-01-01 RX ADMIN — MIDODRINE HYDROCHLORIDE 10 MG: 2.5 TABLET ORAL at 20:14

## 2021-01-01 RX ADMIN — IPRATROPIUM BROMIDE AND ALBUTEROL SULFATE 1 AMPULE: .5; 3 SOLUTION RESPIRATORY (INHALATION) at 16:24

## 2021-01-01 RX ADMIN — CALCITRIOL 0.25 MCG: 0.25 CAPSULE ORAL at 08:56

## 2021-01-01 RX ADMIN — Medication 10 ML: at 10:18

## 2021-01-01 RX ADMIN — HEPARIN SODIUM 5000 UNITS: 5000 INJECTION INTRAVENOUS; SUBCUTANEOUS at 14:37

## 2021-01-01 RX ADMIN — CARVEDILOL 25 MG: 25 TABLET, FILM COATED ORAL at 18:04

## 2021-01-01 RX ADMIN — POLYVINYL ALCOHOL 1 DROP: 14 SOLUTION/ DROPS OPHTHALMIC at 15:35

## 2021-01-01 RX ADMIN — NYSTATIN 500000 UNITS: 100000 SUSPENSION ORAL at 12:38

## 2021-01-01 RX ADMIN — CARVEDILOL 12.5 MG: 6.25 TABLET, FILM COATED ORAL at 17:19

## 2021-01-01 RX ADMIN — Medication 10 ML: at 08:32

## 2021-01-01 RX ADMIN — POLYVINYL ALCOHOL 1 DROP: 14 SOLUTION/ DROPS OPHTHALMIC at 23:48

## 2021-01-01 RX ADMIN — ASPIRIN 81 MG: 81 TABLET, CHEWABLE ORAL at 08:59

## 2021-01-01 RX ADMIN — HYDRALAZINE HYDROCHLORIDE 10 MG: 20 INJECTION INTRAMUSCULAR; INTRAVENOUS at 10:17

## 2021-01-01 RX ADMIN — ATROPINE SULFATE: 0.1 INJECTION PARENTERAL at 12:00

## 2021-01-01 RX ADMIN — NYSTATIN 500000 UNITS: 100000 SUSPENSION ORAL at 16:20

## 2021-01-01 RX ADMIN — GABAPENTIN 300 MG: 300 CAPSULE ORAL at 21:19

## 2021-01-01 RX ADMIN — GABAPENTIN 300 MG: 250 SOLUTION ORAL at 22:02

## 2021-01-01 RX ADMIN — ATORVASTATIN CALCIUM 80 MG: 20 TABLET, FILM COATED ORAL at 08:29

## 2021-01-01 RX ADMIN — POLYETHYLENE GLYCOL 3350 17 G: 17 POWDER, FOR SOLUTION ORAL at 09:10

## 2021-01-01 RX ADMIN — NYSTATIN 500000 UNITS: 100000 SUSPENSION ORAL at 13:46

## 2021-01-01 RX ADMIN — CLOPIDOGREL BISULFATE 75 MG: 75 TABLET ORAL at 08:24

## 2021-01-01 RX ADMIN — HEPARIN SODIUM 5000 UNITS: 5000 INJECTION INTRAVENOUS; SUBCUTANEOUS at 16:11

## 2021-01-01 RX ADMIN — CLOPIDOGREL BISULFATE 75 MG: 75 TABLET ORAL at 09:12

## 2021-01-01 RX ADMIN — DEXAMETHASONE SODIUM PHOSPHATE 4 MG: 4 INJECTION, SOLUTION INTRA-ARTICULAR; INTRALESIONAL; INTRAMUSCULAR; INTRAVENOUS; SOFT TISSUE at 21:45

## 2021-01-01 RX ADMIN — PENTOXIFYLLINE 400 MG: 400 TABLET, FILM COATED, EXTENDED RELEASE ORAL at 09:40

## 2021-01-01 RX ADMIN — HEPARIN SODIUM 5000 UNITS: 5000 INJECTION INTRAVENOUS; SUBCUTANEOUS at 21:43

## 2021-01-01 RX ADMIN — INSULIN GLARGINE 9 UNITS: 100 INJECTION, SOLUTION SUBCUTANEOUS at 09:48

## 2021-01-01 RX ADMIN — HYDRALAZINE HYDROCHLORIDE 10 MG: 20 INJECTION INTRAMUSCULAR; INTRAVENOUS at 04:15

## 2021-01-01 RX ADMIN — SODIUM BICARBONATE 1300 MG: 650 TABLET ORAL at 21:00

## 2021-01-01 RX ADMIN — DEXAMETHASONE SODIUM PHOSPHATE 4 MG: 4 INJECTION, SOLUTION INTRA-ARTICULAR; INTRALESIONAL; INTRAMUSCULAR; INTRAVENOUS; SOFT TISSUE at 05:43

## 2021-01-01 RX ADMIN — HEPARIN SODIUM 5000 UNITS: 5000 INJECTION INTRAVENOUS; SUBCUTANEOUS at 14:53

## 2021-01-01 RX ADMIN — LEVOTHYROXINE SODIUM 50 MCG: 50 TABLET ORAL at 05:18

## 2021-01-01 RX ADMIN — SODIUM BICARBONATE: 84 INJECTION, SOLUTION INTRAVENOUS at 12:41

## 2021-01-01 RX ADMIN — POLYVINYL ALCOHOL 1 DROP: 14 SOLUTION/ DROPS OPHTHALMIC at 23:39

## 2021-01-01 RX ADMIN — HEPARIN SODIUM 5000 UNITS: 5000 INJECTION INTRAVENOUS; SUBCUTANEOUS at 21:47

## 2021-01-01 RX ADMIN — NYSTATIN 500000 UNITS: 100000 SUSPENSION ORAL at 09:30

## 2021-01-01 RX ADMIN — HYDROXYZINE PAMOATE 25 MG: 25 CAPSULE ORAL at 19:59

## 2021-01-01 RX ADMIN — HEPARIN SODIUM 5000 UNITS: 5000 INJECTION INTRAVENOUS; SUBCUTANEOUS at 21:58

## 2021-01-01 RX ADMIN — NYSTATIN 500000 UNITS: 100000 SUSPENSION ORAL at 17:21

## 2021-01-01 RX ADMIN — PROPOFOL 50 MCG/KG/MIN: 10 INJECTION, EMULSION INTRAVENOUS at 00:34

## 2021-01-01 RX ADMIN — IPRATROPIUM BROMIDE AND ALBUTEROL SULFATE 1 AMPULE: .5; 3 SOLUTION RESPIRATORY (INHALATION) at 07:15

## 2021-01-01 RX ADMIN — CARVEDILOL 25 MG: 25 TABLET, FILM COATED ORAL at 17:17

## 2021-01-01 RX ADMIN — PROPOFOL 40 MCG/KG/MIN: 10 INJECTION, EMULSION INTRAVENOUS at 11:46

## 2021-01-01 RX ADMIN — POLYVINYL ALCOHOL 1 DROP: 14 SOLUTION/ DROPS OPHTHALMIC at 23:45

## 2021-01-01 RX ADMIN — CARVEDILOL 25 MG: 25 TABLET, FILM COATED ORAL at 16:44

## 2021-01-01 RX ADMIN — HEPARIN SODIUM 5000 UNITS: 5000 INJECTION INTRAVENOUS; SUBCUTANEOUS at 14:33

## 2021-01-01 RX ADMIN — ASPIRIN 81 MG: 81 TABLET, CHEWABLE ORAL at 09:20

## 2021-01-01 RX ADMIN — LABETALOL HYDROCHLORIDE 20 MG: 5 INJECTION INTRAVENOUS at 11:27

## 2021-01-01 RX ADMIN — DOXYCYCLINE HYCLATE 100 MG: 100 CAPSULE ORAL at 09:41

## 2021-01-01 RX ADMIN — PROPOFOL 30 MCG/KG/MIN: 10 INJECTION, EMULSION INTRAVENOUS at 01:29

## 2021-01-01 RX ADMIN — IPRATROPIUM BROMIDE AND ALBUTEROL SULFATE 1 AMPULE: .5; 3 SOLUTION RESPIRATORY (INHALATION) at 18:59

## 2021-01-01 RX ADMIN — VANCOMYCIN HYDROCHLORIDE 500 MG: 500 INJECTION, POWDER, LYOPHILIZED, FOR SOLUTION INTRAVENOUS at 16:59

## 2021-01-01 RX ADMIN — GABAPENTIN 300 MG: 250 SOLUTION ORAL at 20:38

## 2021-01-01 RX ADMIN — IPRATROPIUM BROMIDE AND ALBUTEROL SULFATE 1 AMPULE: .5; 3 SOLUTION RESPIRATORY (INHALATION) at 13:10

## 2021-01-01 RX ADMIN — HEPARIN SODIUM 5000 UNITS: 5000 INJECTION INTRAVENOUS; SUBCUTANEOUS at 13:03

## 2021-01-01 RX ADMIN — GABAPENTIN 300 MG: 300 CAPSULE ORAL at 21:45

## 2021-01-01 RX ADMIN — SODIUM BICARBONATE 1300 MG: 650 TABLET ORAL at 13:24

## 2021-01-01 RX ADMIN — INSULIN LISPRO 2 UNITS: 100 INJECTION, SOLUTION INTRAVENOUS; SUBCUTANEOUS at 21:36

## 2021-01-01 RX ADMIN — PROPOFOL 30 MCG/KG/MIN: 10 INJECTION, EMULSION INTRAVENOUS at 01:20

## 2021-01-01 RX ADMIN — CEFTRIAXONE SODIUM 1000 MG: 1 INJECTION, POWDER, FOR SOLUTION INTRAMUSCULAR; INTRAVENOUS at 12:00

## 2021-01-01 RX ADMIN — INSULIN GLARGINE 18 UNITS: 100 INJECTION, SOLUTION SUBCUTANEOUS at 09:20

## 2021-01-01 RX ADMIN — INSULIN GLARGINE 9 UNITS: 100 INJECTION, SOLUTION SUBCUTANEOUS at 08:42

## 2021-01-01 RX ADMIN — DEXAMETHASONE SODIUM PHOSPHATE 4 MG: 4 INJECTION, SOLUTION INTRAMUSCULAR; INTRAVENOUS at 06:10

## 2021-01-01 RX ADMIN — DEXAMETHASONE SODIUM PHOSPHATE 4 MG: 4 INJECTION, SOLUTION INTRA-ARTICULAR; INTRALESIONAL; INTRAMUSCULAR; INTRAVENOUS; SOFT TISSUE at 14:47

## 2021-01-01 RX ADMIN — ATORVASTATIN CALCIUM 80 MG: 20 TABLET, FILM COATED ORAL at 08:56

## 2021-01-01 RX ADMIN — ATORVASTATIN CALCIUM 80 MG: 40 TABLET, FILM COATED ORAL at 09:00

## 2021-01-01 RX ADMIN — FAMOTIDINE 20 MG: 20 TABLET, FILM COATED ORAL at 08:59

## 2021-01-01 RX ADMIN — GABAPENTIN 300 MG: 300 CAPSULE ORAL at 20:15

## 2021-01-01 RX ADMIN — IPRATROPIUM BROMIDE AND ALBUTEROL 1 PUFF: 20; 100 SPRAY, METERED RESPIRATORY (INHALATION) at 10:55

## 2021-01-01 RX ADMIN — CLOPIDOGREL BISULFATE 75 MG: 75 TABLET ORAL at 09:46

## 2021-01-01 RX ADMIN — ASPIRIN 81 MG CHEWABLE TABLET 81 MG: 81 TABLET CHEWABLE at 09:30

## 2021-01-01 RX ADMIN — DEXTROSE MONOHYDRATE 12.5 G: 500 INJECTION PARENTERAL at 21:58

## 2021-01-01 RX ADMIN — CLOPIDOGREL BISULFATE 75 MG: 75 TABLET ORAL at 10:11

## 2021-01-01 RX ADMIN — POLYVINYL ALCOHOL 1 DROP: 14 SOLUTION/ DROPS OPHTHALMIC at 10:50

## 2021-01-01 RX ADMIN — DIPHENHYDRAMINE HYDROCHLORIDE 25 MG: 50 INJECTION, SOLUTION INTRAMUSCULAR; INTRAVENOUS at 18:04

## 2021-01-01 RX ADMIN — HEPARIN SODIUM 5000 UNITS: 5000 INJECTION INTRAVENOUS; SUBCUTANEOUS at 21:12

## 2021-01-01 RX ADMIN — SODIUM BICARBONATE 1300 MG: 650 TABLET ORAL at 14:33

## 2021-01-01 RX ADMIN — IPRATROPIUM BROMIDE AND ALBUTEROL SULFATE 1 AMPULE: .5; 3 SOLUTION RESPIRATORY (INHALATION) at 05:26

## 2021-01-01 RX ADMIN — HYDROXYZINE PAMOATE 25 MG: 25 CAPSULE ORAL at 09:17

## 2021-01-01 RX ADMIN — NYSTATIN 500000 UNITS: 100000 SUSPENSION ORAL at 21:19

## 2021-01-01 RX ADMIN — Medication 6 MILLICURIE: at 12:42

## 2021-01-01 RX ADMIN — FAMOTIDINE 10 MG: 20 TABLET, FILM COATED ORAL at 09:46

## 2021-01-01 RX ADMIN — Medication 1 MG: at 12:10

## 2021-01-01 RX ADMIN — FAMOTIDINE 10 MG: 20 TABLET, FILM COATED ORAL at 10:07

## 2021-01-01 RX ADMIN — HEPARIN SODIUM 5000 UNITS: 5000 INJECTION INTRAVENOUS; SUBCUTANEOUS at 06:55

## 2021-01-01 RX ADMIN — AMLODIPINE BESYLATE 5 MG: 5 TABLET ORAL at 20:00

## 2021-01-01 RX ADMIN — INSULIN GLARGINE 9 UNITS: 100 INJECTION, SOLUTION SUBCUTANEOUS at 10:08

## 2021-01-01 RX ADMIN — HYDRALAZINE HYDROCHLORIDE 10 MG: 20 INJECTION INTRAMUSCULAR; INTRAVENOUS at 13:49

## 2021-01-01 RX ADMIN — ONDANSETRON 4 MG: 2 INJECTION INTRAMUSCULAR; INTRAVENOUS at 18:21

## 2021-01-01 RX ADMIN — INSULIN GLARGINE 20 UNITS: 100 INJECTION, SOLUTION SUBCUTANEOUS at 09:37

## 2021-01-01 RX ADMIN — Medication 10 ML: at 21:33

## 2021-01-01 RX ADMIN — INSULIN LISPRO 4 UNITS: 100 INJECTION, SOLUTION INTRAVENOUS; SUBCUTANEOUS at 11:30

## 2021-01-01 RX ADMIN — DEXAMETHASONE SODIUM PHOSPHATE 4 MG: 4 INJECTION, SOLUTION INTRA-ARTICULAR; INTRALESIONAL; INTRAMUSCULAR; INTRAVENOUS; SOFT TISSUE at 22:42

## 2021-01-01 RX ADMIN — SODIUM BICARBONATE 1300 MG: 650 TABLET ORAL at 20:50

## 2021-01-01 RX ADMIN — FAMOTIDINE 20 MG: 20 TABLET, FILM COATED ORAL at 11:06

## 2021-01-01 RX ADMIN — ENOXAPARIN SODIUM 60 MG: 60 INJECTION, SOLUTION INTRAVENOUS; SUBCUTANEOUS at 09:44

## 2021-01-01 RX ADMIN — HEPARIN SODIUM 5000 UNITS: 5000 INJECTION INTRAVENOUS; SUBCUTANEOUS at 22:00

## 2021-01-01 RX ADMIN — AMLODIPINE BESYLATE 5 MG: 5 TABLET ORAL at 08:56

## 2021-01-01 RX ADMIN — ALBUMIN (HUMAN) 25 G: 0.25 INJECTION, SOLUTION INTRAVENOUS at 11:50

## 2021-01-01 RX ADMIN — HEPARIN SODIUM 5000 UNITS: 5000 INJECTION INTRAVENOUS; SUBCUTANEOUS at 06:41

## 2021-01-01 RX ADMIN — NYSTATIN 500000 UNITS: 100000 SUSPENSION ORAL at 16:19

## 2021-01-01 RX ADMIN — INSULIN LISPRO 1 UNITS: 100 INJECTION, SOLUTION INTRAVENOUS; SUBCUTANEOUS at 12:18

## 2021-01-01 RX ADMIN — NYSTATIN 500000 UNITS: 100000 SUSPENSION ORAL at 20:39

## 2021-01-01 RX ADMIN — CLOPIDOGREL BISULFATE 75 MG: 75 TABLET ORAL at 09:20

## 2021-01-01 RX ADMIN — IPRATROPIUM BROMIDE AND ALBUTEROL SULFATE 1 AMPULE: .5; 3 SOLUTION RESPIRATORY (INHALATION) at 14:52

## 2021-01-01 RX ADMIN — INSULIN LISPRO 3 UNITS: 100 INJECTION, SOLUTION INTRAVENOUS; SUBCUTANEOUS at 16:21

## 2021-01-01 RX ADMIN — DEXAMETHASONE SODIUM PHOSPHATE 4 MG: 4 INJECTION, SOLUTION INTRA-ARTICULAR; INTRALESIONAL; INTRAMUSCULAR; INTRAVENOUS; SOFT TISSUE at 15:41

## 2021-01-01 RX ADMIN — FAMOTIDINE 20 MG: 20 TABLET, FILM COATED ORAL at 09:12

## 2021-01-01 RX ADMIN — PENTOXIFYLLINE 400 MG: 400 TABLET, FILM COATED, EXTENDED RELEASE ORAL at 09:41

## 2021-01-01 RX ADMIN — ASPIRIN 81 MG CHEWABLE TABLET 81 MG: 81 TABLET CHEWABLE at 08:12

## 2021-01-01 RX ADMIN — CARVEDILOL 25 MG: 25 TABLET, FILM COATED ORAL at 09:39

## 2021-01-01 RX ADMIN — ENOXAPARIN SODIUM 30 MG: 30 INJECTION SUBCUTANEOUS at 11:06

## 2021-01-01 RX ADMIN — ATORVASTATIN CALCIUM 80 MG: 20 TABLET, FILM COATED ORAL at 08:36

## 2021-01-01 RX ADMIN — PROPOFOL 20 MCG/KG/MIN: 10 INJECTION, EMULSION INTRAVENOUS at 00:31

## 2021-01-01 RX ADMIN — DEXAMETHASONE SODIUM PHOSPHATE 4 MG: 4 INJECTION, SOLUTION INTRAMUSCULAR; INTRAVENOUS at 06:00

## 2021-01-01 RX ADMIN — NYSTATIN 500000 UNITS: 100000 SUSPENSION ORAL at 08:41

## 2021-01-01 RX ADMIN — SODIUM CHLORIDE: 9 INJECTION, SOLUTION INTRAVENOUS at 16:59

## 2021-01-01 RX ADMIN — Medication 10 ML: at 09:42

## 2021-01-01 RX ADMIN — SODIUM CHLORIDE: 9 INJECTION, SOLUTION INTRAVENOUS at 01:20

## 2021-01-01 RX ADMIN — ATORVASTATIN CALCIUM 80 MG: 20 TABLET, FILM COATED ORAL at 09:11

## 2021-01-01 RX ADMIN — DEXAMETHASONE SODIUM PHOSPHATE 6 MG: 10 INJECTION, SOLUTION INTRAMUSCULAR; INTRAVENOUS at 17:33

## 2021-01-01 RX ADMIN — ASPIRIN 81 MG: 81 TABLET, CHEWABLE ORAL at 08:56

## 2021-01-01 RX ADMIN — PROMETHAZINE HYDROCHLORIDE 12.5 MG: 25 TABLET ORAL at 12:44

## 2021-01-01 RX ADMIN — INSULIN LISPRO 3 UNITS: 100 INJECTION, SOLUTION INTRAVENOUS; SUBCUTANEOUS at 23:40

## 2021-01-01 RX ADMIN — INSULIN GLARGINE 20 UNITS: 100 INJECTION, SOLUTION SUBCUTANEOUS at 09:14

## 2021-01-01 RX ADMIN — LEVOTHYROXINE SODIUM 50 MCG: 50 TABLET ORAL at 05:48

## 2021-01-01 RX ADMIN — ATORVASTATIN CALCIUM 80 MG: 40 TABLET, FILM COATED ORAL at 09:20

## 2021-01-01 RX ADMIN — DEXAMETHASONE SODIUM PHOSPHATE 4 MG: 4 INJECTION, SOLUTION INTRAMUSCULAR; INTRAVENOUS at 06:38

## 2021-01-01 RX ADMIN — NYSTATIN 500000 UNITS: 100000 SUSPENSION ORAL at 12:22

## 2021-01-01 RX ADMIN — IPRATROPIUM BROMIDE AND ALBUTEROL SULFATE 1 AMPULE: .5; 3 SOLUTION RESPIRATORY (INHALATION) at 05:09

## 2021-01-01 RX ADMIN — NYSTATIN 500000 UNITS: 100000 SUSPENSION ORAL at 11:06

## 2021-01-01 RX ADMIN — INSULIN GLARGINE 9 UNITS: 100 INJECTION, SOLUTION SUBCUTANEOUS at 08:57

## 2021-01-01 RX ADMIN — LEVOFLOXACIN 250 MG: 5 INJECTION, SOLUTION INTRAVENOUS at 18:14

## 2021-01-01 RX ADMIN — POLYVINYL ALCOHOL 1 DROP: 14 SOLUTION/ DROPS OPHTHALMIC at 11:08

## 2021-01-01 RX ADMIN — SODIUM CHLORIDE: 9 INJECTION, SOLUTION INTRAVENOUS at 00:30

## 2021-01-01 RX ADMIN — LEVOTHYROXINE SODIUM 88 MCG: 0.09 TABLET ORAL at 05:38

## 2021-01-01 RX ADMIN — INSULIN LISPRO 6 UNITS: 100 INJECTION, SOLUTION INTRAVENOUS; SUBCUTANEOUS at 09:11

## 2021-01-01 RX ADMIN — INSULIN LISPRO 2 UNITS: 100 INJECTION, SOLUTION INTRAVENOUS; SUBCUTANEOUS at 05:00

## 2021-01-01 RX ADMIN — HEPARIN SODIUM 5000 UNITS: 5000 INJECTION INTRAVENOUS; SUBCUTANEOUS at 05:45

## 2021-01-01 RX ADMIN — DOXYCYCLINE 100 MG: 100 INJECTION, POWDER, LYOPHILIZED, FOR SOLUTION INTRAVENOUS at 12:29

## 2021-01-01 RX ADMIN — NYSTATIN 500000 UNITS: 100000 SUSPENSION ORAL at 20:12

## 2021-01-01 RX ADMIN — VANCOMYCIN HYDROCHLORIDE 500 MG: 500 INJECTION, POWDER, LYOPHILIZED, FOR SOLUTION INTRAVENOUS at 13:45

## 2021-01-01 RX ADMIN — AMLODIPINE BESYLATE 5 MG: 5 TABLET ORAL at 12:43

## 2021-01-01 RX ADMIN — ACETYLCYSTEINE 400 MG: 200 SOLUTION ORAL; RESPIRATORY (INHALATION) at 17:38

## 2021-01-01 RX ADMIN — IPRATROPIUM BROMIDE AND ALBUTEROL 1 PUFF: 20; 100 SPRAY, METERED RESPIRATORY (INHALATION) at 16:01

## 2021-01-01 RX ADMIN — Medication 10 ML: at 21:00

## 2021-01-01 RX ADMIN — INSULIN LISPRO 1 UNITS: 100 INJECTION, SOLUTION INTRAVENOUS; SUBCUTANEOUS at 22:57

## 2021-01-01 RX ADMIN — INSULIN LISPRO 4 UNITS: 100 INJECTION, SOLUTION INTRAVENOUS; SUBCUTANEOUS at 17:21

## 2021-01-01 RX ADMIN — PIPERACILLIN SODIUM AND TAZOBACTAM SODIUM 3375 MG: 3; .375 INJECTION, POWDER, LYOPHILIZED, FOR SOLUTION INTRAVENOUS at 18:02

## 2021-01-01 RX ADMIN — ALBUMIN (HUMAN) 25 G: 25 SOLUTION INTRAVENOUS at 10:27

## 2021-01-01 RX ADMIN — ACETYLCYSTEINE 400 MG: 200 SOLUTION ORAL; RESPIRATORY (INHALATION) at 19:00

## 2021-01-01 RX ADMIN — FAMOTIDINE 20 MG: 20 TABLET, FILM COATED ORAL at 09:37

## 2021-01-01 RX ADMIN — CLOPIDOGREL BISULFATE 75 MG: 75 TABLET ORAL at 09:52

## 2021-01-01 RX ADMIN — AMLODIPINE BESYLATE 5 MG: 5 TABLET ORAL at 09:12

## 2021-01-01 RX ADMIN — DEXAMETHASONE SODIUM PHOSPHATE 4 MG: 4 INJECTION, SOLUTION INTRA-ARTICULAR; INTRALESIONAL; INTRAMUSCULAR; INTRAVENOUS; SOFT TISSUE at 05:18

## 2021-01-01 RX ADMIN — DEXAMETHASONE SODIUM PHOSPHATE 4 MG: 4 INJECTION, SOLUTION INTRAMUSCULAR; INTRAVENOUS at 06:43

## 2021-01-01 RX ADMIN — Medication 10 ML: at 21:42

## 2021-01-01 RX ADMIN — GABAPENTIN 300 MG: 250 SOLUTION ORAL at 20:40

## 2021-01-01 RX ADMIN — AMLODIPINE BESYLATE 5 MG: 5 TABLET ORAL at 08:59

## 2021-01-01 RX ADMIN — POLYVINYL ALCOHOL 1 DROP: 14 SOLUTION/ DROPS OPHTHALMIC at 08:31

## 2021-01-01 RX ADMIN — POLYVINYL ALCOHOL 1 DROP: 14 SOLUTION/ DROPS OPHTHALMIC at 16:19

## 2021-01-01 RX ADMIN — NYSTATIN 500000 UNITS: 100000 SUSPENSION ORAL at 08:30

## 2021-01-01 RX ADMIN — PROPOFOL 25 MCG/KG/MIN: 10 INJECTION, EMULSION INTRAVENOUS at 15:51

## 2021-01-01 RX ADMIN — DEXAMETHASONE SODIUM PHOSPHATE 4 MG: 4 INJECTION, SOLUTION INTRAMUSCULAR; INTRAVENOUS at 18:23

## 2021-01-01 RX ADMIN — LEVOTHYROXINE SODIUM 88 MCG: 0.09 TABLET ORAL at 06:38

## 2021-01-01 RX ADMIN — ACETYLCYSTEINE 400 MG: 200 SOLUTION ORAL; RESPIRATORY (INHALATION) at 12:25

## 2021-01-01 RX ADMIN — DEXAMETHASONE SODIUM PHOSPHATE 4 MG: 4 INJECTION, SOLUTION INTRAMUSCULAR; INTRAVENOUS at 18:43

## 2021-01-01 RX ADMIN — Medication 10 ML: at 21:43

## 2021-01-01 RX ADMIN — HEPARIN SODIUM 5000 UNITS: 5000 INJECTION INTRAVENOUS; SUBCUTANEOUS at 13:37

## 2021-01-01 RX ADMIN — VANCOMYCIN HYDROCHLORIDE 1000 MG: 1 INJECTION, POWDER, LYOPHILIZED, FOR SOLUTION INTRAVENOUS at 17:58

## 2021-01-01 RX ADMIN — ATORVASTATIN CALCIUM 80 MG: 20 TABLET, FILM COATED ORAL at 11:06

## 2021-01-01 RX ADMIN — PENTOXIFYLLINE 400 MG: 400 TABLET, FILM COATED, EXTENDED RELEASE ORAL at 09:12

## 2021-01-01 RX ADMIN — HEPARIN SODIUM 5000 UNITS: 5000 INJECTION INTRAVENOUS; SUBCUTANEOUS at 06:24

## 2021-01-01 RX ADMIN — PIPERACILLIN SODIUM AND TAZOBACTAM SODIUM 3375 MG: 3; .375 INJECTION, POWDER, LYOPHILIZED, FOR SOLUTION INTRAVENOUS at 17:25

## 2021-01-01 RX ADMIN — LEVOTHYROXINE SODIUM 50 MCG: 50 TABLET ORAL at 05:59

## 2021-01-01 RX ADMIN — NYSTATIN 500000 UNITS: 100000 SUSPENSION ORAL at 10:11

## 2021-01-01 RX ADMIN — INSULIN LISPRO 1 UNITS: 100 INJECTION, SOLUTION INTRAVENOUS; SUBCUTANEOUS at 23:07

## 2021-01-01 RX ADMIN — CEFTRIAXONE SODIUM 1000 MG: 1 INJECTION, POWDER, FOR SOLUTION INTRAMUSCULAR; INTRAVENOUS at 09:38

## 2021-01-01 RX ADMIN — INSULIN LISPRO 1 UNITS: 100 INJECTION, SOLUTION INTRAVENOUS; SUBCUTANEOUS at 05:15

## 2021-01-01 RX ADMIN — Medication 125 MCG/HR: at 14:59

## 2021-01-01 RX ADMIN — PROPOFOL 50 MCG/KG/MIN: 10 INJECTION, EMULSION INTRAVENOUS at 14:45

## 2021-01-01 RX ADMIN — SODIUM CHLORIDE, PRESERVATIVE FREE 10 ML: 5 INJECTION INTRAVENOUS at 18:10

## 2021-01-01 RX ADMIN — LEVOTHYROXINE SODIUM 50 MCG: 50 TABLET ORAL at 05:43

## 2021-01-01 RX ADMIN — HEPARIN SODIUM 5000 UNITS: 5000 INJECTION INTRAVENOUS; SUBCUTANEOUS at 06:33

## 2021-01-01 RX ADMIN — INSULIN LISPRO 1 UNITS: 100 INJECTION, SOLUTION INTRAVENOUS; SUBCUTANEOUS at 23:48

## 2021-01-01 RX ADMIN — IPRATROPIUM BROMIDE AND ALBUTEROL 1 PUFF: 20; 100 SPRAY, METERED RESPIRATORY (INHALATION) at 19:04

## 2021-01-01 RX ADMIN — DEXAMETHASONE SODIUM PHOSPHATE 4 MG: 4 INJECTION, SOLUTION INTRA-ARTICULAR; INTRALESIONAL; INTRAMUSCULAR; INTRAVENOUS; SOFT TISSUE at 22:38

## 2021-01-01 RX ADMIN — SODIUM BICARBONATE 1300 MG: 650 TABLET ORAL at 21:49

## 2021-01-01 RX ADMIN — PROPOFOL 30 MCG/KG/MIN: 10 INJECTION, EMULSION INTRAVENOUS at 17:26

## 2021-01-01 RX ADMIN — PROPOFOL 25 MCG/KG/MIN: 10 INJECTION, EMULSION INTRAVENOUS at 17:00

## 2021-01-01 RX ADMIN — INSULIN LISPRO 6 UNITS: 100 INJECTION, SOLUTION INTRAVENOUS; SUBCUTANEOUS at 09:02

## 2021-01-01 RX ADMIN — Medication 125 MCG/HR: at 12:34

## 2021-01-01 RX ADMIN — IPRATROPIUM BROMIDE AND ALBUTEROL 1 PUFF: 20; 100 SPRAY, METERED RESPIRATORY (INHALATION) at 06:07

## 2021-01-01 RX ADMIN — INSULIN LISPRO 1 UNITS: 100 INJECTION, SOLUTION INTRAVENOUS; SUBCUTANEOUS at 17:41

## 2021-01-01 RX ADMIN — Medication 125 MCG/HR: at 21:30

## 2021-01-01 RX ADMIN — ENOXAPARIN SODIUM 30 MG: 30 INJECTION SUBCUTANEOUS at 08:24

## 2021-01-01 RX ADMIN — CLOPIDOGREL BISULFATE 75 MG: 75 TABLET ORAL at 08:36

## 2021-01-01 RX ADMIN — INSULIN LISPRO 1 UNITS: 100 INJECTION, SOLUTION INTRAVENOUS; SUBCUTANEOUS at 05:08

## 2021-01-01 RX ADMIN — IPRATROPIUM BROMIDE AND ALBUTEROL 1 PUFF: 20; 100 SPRAY, METERED RESPIRATORY (INHALATION) at 18:09

## 2021-01-01 RX ADMIN — HEPARIN SODIUM 5000 UNITS: 5000 INJECTION INTRAVENOUS; SUBCUTANEOUS at 05:19

## 2021-01-01 RX ADMIN — HEPARIN SODIUM 5000 UNITS: 5000 INJECTION INTRAVENOUS; SUBCUTANEOUS at 14:11

## 2021-01-01 RX ADMIN — SODIUM CHLORIDE: 9 INJECTION, SOLUTION INTRAVENOUS at 17:51

## 2021-01-01 RX ADMIN — GABAPENTIN 300 MG: 300 CAPSULE ORAL at 21:00

## 2021-01-01 RX ADMIN — PROPOFOL 50 MCG/KG/MIN: 10 INJECTION, EMULSION INTRAVENOUS at 04:25

## 2021-01-01 RX ADMIN — NYSTATIN 500000 UNITS: 100000 SUSPENSION ORAL at 14:29

## 2021-01-01 RX ADMIN — FAMOTIDINE 10 MG: 20 TABLET, FILM COATED ORAL at 10:48

## 2021-01-01 RX ADMIN — SODIUM BICARBONATE 1300 MG: 650 TABLET ORAL at 08:29

## 2021-01-01 RX ADMIN — Medication 125 MCG/HR: at 00:45

## 2021-01-01 RX ADMIN — Medication 10 ML: at 09:52

## 2021-01-01 RX ADMIN — IPRATROPIUM BROMIDE AND ALBUTEROL SULFATE 1 AMPULE: .5; 3 SOLUTION RESPIRATORY (INHALATION) at 06:15

## 2021-01-01 RX ADMIN — ASPIRIN 81 MG: 81 TABLET, CHEWABLE ORAL at 09:09

## 2021-01-01 RX ADMIN — POLYVINYL ALCOHOL 1 DROP: 14 SOLUTION/ DROPS OPHTHALMIC at 08:36

## 2021-01-01 RX ADMIN — LEVOTHYROXINE SODIUM 88 MCG: 0.09 TABLET ORAL at 05:18

## 2021-01-01 RX ADMIN — LEVOTHYROXINE SODIUM 88 MCG: 0.09 TABLET ORAL at 07:35

## 2021-01-01 RX ADMIN — SODIUM CHLORIDE: 9 INJECTION, SOLUTION INTRAVENOUS at 14:49

## 2021-01-01 RX ADMIN — FLUCONAZOLE IN SODIUM CHLORIDE 200 MG: 2 INJECTION, SOLUTION INTRAVENOUS at 16:02

## 2021-01-01 RX ADMIN — LABETALOL HYDROCHLORIDE 20 MG: 5 INJECTION INTRAVENOUS at 08:59

## 2021-01-01 RX ADMIN — SODIUM CHLORIDE: 9 INJECTION, SOLUTION INTRAVENOUS at 11:07

## 2021-01-01 RX ADMIN — SODIUM BICARBONATE 1300 MG: 650 TABLET ORAL at 09:46

## 2021-01-01 RX ADMIN — NYSTATIN 500000 UNITS: 100000 SUSPENSION ORAL at 12:53

## 2021-01-01 RX ADMIN — Medication 10 ML: at 08:59

## 2021-01-01 RX ADMIN — HEPARIN SODIUM 5000 UNITS: 5000 INJECTION INTRAVENOUS; SUBCUTANEOUS at 05:56

## 2021-01-01 RX ADMIN — INSULIN LISPRO 2 UNITS: 100 INJECTION, SOLUTION INTRAVENOUS; SUBCUTANEOUS at 09:14

## 2021-01-01 RX ADMIN — LEVOTHYROXINE SODIUM 50 MCG: 50 TABLET ORAL at 05:55

## 2021-01-01 RX ADMIN — NYSTATIN 500000 UNITS: 100000 SUSPENSION ORAL at 08:12

## 2021-01-01 RX ADMIN — LEVOTHYROXINE SODIUM 88 MCG: 0.09 TABLET ORAL at 05:10

## 2021-01-01 RX ADMIN — LEVOTHYROXINE SODIUM 50 MCG: 50 TABLET ORAL at 05:54

## 2021-01-01 RX ADMIN — POLYVINYL ALCOHOL 1 DROP: 14 SOLUTION/ DROPS OPHTHALMIC at 12:45

## 2021-01-01 RX ADMIN — FAMOTIDINE 20 MG: 20 TABLET, FILM COATED ORAL at 08:24

## 2021-01-01 RX ADMIN — POLYVINYL ALCOHOL 1 DROP: 14 SOLUTION/ DROPS OPHTHALMIC at 06:37

## 2021-01-01 RX ADMIN — PROPOFOL 25 MCG/KG/MIN: 10 INJECTION, EMULSION INTRAVENOUS at 13:27

## 2021-01-01 RX ADMIN — IPRATROPIUM BROMIDE AND ALBUTEROL 1 PUFF: 20; 100 SPRAY, METERED RESPIRATORY (INHALATION) at 12:55

## 2021-01-01 RX ADMIN — NYSTATIN 500000 UNITS: 100000 SUSPENSION ORAL at 12:41

## 2021-01-01 RX ADMIN — Medication 10 ML: at 11:07

## 2021-01-01 RX ADMIN — CARVEDILOL 25 MG: 25 TABLET, FILM COATED ORAL at 17:00

## 2021-01-01 RX ADMIN — SODIUM BICARBONATE 1300 MG: 650 TABLET ORAL at 14:27

## 2021-01-01 RX ADMIN — POLYVINYL ALCOHOL 1 DROP: 14 SOLUTION/ DROPS OPHTHALMIC at 17:27

## 2021-01-01 RX ADMIN — ASPIRIN 81 MG: 81 TABLET, CHEWABLE ORAL at 09:12

## 2021-01-01 RX ADMIN — NYSTATIN 500000 UNITS: 100000 SUSPENSION ORAL at 09:13

## 2021-01-01 RX ADMIN — PROMETHAZINE HYDROCHLORIDE 12.5 MG: 25 TABLET ORAL at 20:39

## 2021-01-01 RX ADMIN — INSULIN GLARGINE 20 UNITS: 100 INJECTION, SOLUTION SUBCUTANEOUS at 08:25

## 2021-01-01 RX ADMIN — CARVEDILOL 25 MG: 25 TABLET, FILM COATED ORAL at 09:00

## 2021-01-01 RX ADMIN — ENOXAPARIN SODIUM 60 MG: 60 INJECTION, SOLUTION INTRAVENOUS; SUBCUTANEOUS at 10:17

## 2021-01-01 RX ADMIN — ATORVASTATIN CALCIUM 80 MG: 20 TABLET, FILM COATED ORAL at 09:30

## 2021-01-01 RX ADMIN — IPRATROPIUM BROMIDE AND ALBUTEROL SULFATE 1 AMPULE: .5; 3 SOLUTION RESPIRATORY (INHALATION) at 09:27

## 2021-01-01 RX ADMIN — PROPOFOL 50 MCG/KG/MIN: 10 INJECTION, EMULSION INTRAVENOUS at 10:07

## 2021-01-01 RX ADMIN — Medication 10 ML: at 09:13

## 2021-01-01 RX ADMIN — SODIUM CHLORIDE: 9 INJECTION, SOLUTION INTRAVENOUS at 10:30

## 2021-01-01 RX ADMIN — PROPOFOL 30 MCG/KG/MIN: 10 INJECTION, EMULSION INTRAVENOUS at 17:20

## 2021-01-01 RX ADMIN — Medication 10 ML: at 09:47

## 2021-01-01 RX ADMIN — POLYVINYL ALCOHOL 1 DROP: 14 SOLUTION/ DROPS OPHTHALMIC at 03:30

## 2021-01-01 RX ADMIN — INSULIN LISPRO 1 UNITS: 100 INJECTION, SOLUTION INTRAVENOUS; SUBCUTANEOUS at 20:49

## 2021-01-01 RX ADMIN — CALCITRIOL 0.25 MCG: 0.25 CAPSULE ORAL at 09:12

## 2021-01-01 RX ADMIN — NYSTATIN 500000 UNITS: 100000 SUSPENSION ORAL at 21:49

## 2021-01-01 RX ADMIN — ACETYLCYSTEINE 400 MG: 200 SOLUTION ORAL; RESPIRATORY (INHALATION) at 09:40

## 2021-01-01 RX ADMIN — INSULIN LISPRO 3 UNITS: 100 INJECTION, SOLUTION INTRAVENOUS; SUBCUTANEOUS at 20:53

## 2021-01-01 RX ADMIN — PENTOXIFYLLINE 400 MG: 400 TABLET, FILM COATED, EXTENDED RELEASE ORAL at 17:55

## 2021-01-01 RX ADMIN — INSULIN GLARGINE 9 UNITS: 100 INJECTION, SOLUTION SUBCUTANEOUS at 09:00

## 2021-01-01 RX ADMIN — Medication 10 ML: at 09:18

## 2021-01-01 RX ADMIN — PENTOXIFYLLINE 400 MG: 400 TABLET, FILM COATED, EXTENDED RELEASE ORAL at 17:06

## 2021-01-01 RX ADMIN — DOXYCYCLINE 100 MG: 100 INJECTION, POWDER, LYOPHILIZED, FOR SOLUTION INTRAVENOUS at 11:32

## 2021-01-01 RX ADMIN — INSULIN LISPRO 1 UNITS: 100 INJECTION, SOLUTION INTRAVENOUS; SUBCUTANEOUS at 11:07

## 2021-01-01 RX ADMIN — INSULIN LISPRO 2 UNITS: 100 INJECTION, SOLUTION INTRAVENOUS; SUBCUTANEOUS at 20:51

## 2021-01-01 RX ADMIN — ACETAMINOPHEN 650 MG: 325 TABLET, FILM COATED ORAL at 05:04

## 2021-01-01 RX ADMIN — AMLODIPINE BESYLATE 5 MG: 5 TABLET ORAL at 21:41

## 2021-01-01 RX ADMIN — PROMETHAZINE HYDROCHLORIDE 12.5 MG: 25 TABLET ORAL at 10:26

## 2021-01-01 RX ADMIN — CLOPIDOGREL BISULFATE 75 MG: 75 TABLET ORAL at 09:38

## 2021-01-01 RX ADMIN — IPRATROPIUM BROMIDE AND ALBUTEROL SULFATE 1 AMPULE: .5; 3 SOLUTION RESPIRATORY (INHALATION) at 09:41

## 2021-01-01 RX ADMIN — POLYVINYL ALCOHOL 1 DROP: 14 SOLUTION/ DROPS OPHTHALMIC at 07:34

## 2021-01-01 RX ADMIN — FAMOTIDINE 20 MG: 20 TABLET, FILM COATED ORAL at 20:50

## 2021-01-01 RX ADMIN — ACETAMINOPHEN 325 MG: 325 TABLET, FILM COATED ORAL at 00:34

## 2021-01-01 RX ADMIN — PROPOFOL 50 MCG/KG/MIN: 10 INJECTION, EMULSION INTRAVENOUS at 21:50

## 2021-01-01 RX ADMIN — CLOPIDOGREL BISULFATE 75 MG: 75 TABLET ORAL at 09:41

## 2021-01-01 RX ADMIN — LEVOTHYROXINE SODIUM 88 MCG: 0.09 TABLET ORAL at 05:17

## 2021-01-01 RX ADMIN — IPRATROPIUM BROMIDE AND ALBUTEROL SULFATE 1 AMPULE: .5; 3 SOLUTION RESPIRATORY (INHALATION) at 16:58

## 2021-01-01 RX ADMIN — INSULIN LISPRO 1 UNITS: 100 INJECTION, SOLUTION INTRAVENOUS; SUBCUTANEOUS at 20:12

## 2021-01-01 RX ADMIN — ATORVASTATIN CALCIUM 80 MG: 20 TABLET, FILM COATED ORAL at 18:26

## 2021-01-01 RX ADMIN — INSULIN LISPRO 1 UNITS: 100 INJECTION, SOLUTION INTRAVENOUS; SUBCUTANEOUS at 10:46

## 2021-01-01 RX ADMIN — MIDODRINE HYDROCHLORIDE 5 MG: 2.5 TABLET ORAL at 08:56

## 2021-01-01 RX ADMIN — Medication 10 ML: at 10:25

## 2021-01-01 RX ADMIN — EPINEPHRINE 1 MG: 0.1 INJECTION, SOLUTION ENDOTRACHEAL; INTRACARDIAC; INTRAVENOUS at 12:10

## 2021-01-01 RX ADMIN — GABAPENTIN 300 MG: 300 CAPSULE ORAL at 21:33

## 2021-01-01 RX ADMIN — PIPERACILLIN SODIUM AND TAZOBACTAM SODIUM 3375 MG: 3; .375 INJECTION, POWDER, LYOPHILIZED, FOR SOLUTION INTRAVENOUS at 18:20

## 2021-01-01 RX ADMIN — CARVEDILOL 25 MG: 25 TABLET, FILM COATED ORAL at 09:41

## 2021-01-01 RX ADMIN — LABETALOL HYDROCHLORIDE 20 MG: 5 INJECTION INTRAVENOUS at 18:21

## 2021-01-01 RX ADMIN — HEPARIN SODIUM 5000 UNITS: 5000 INJECTION INTRAVENOUS; SUBCUTANEOUS at 21:29

## 2021-01-01 RX ADMIN — NYSTATIN 500000 UNITS: 100000 SUSPENSION ORAL at 17:33

## 2021-01-01 RX ADMIN — IPRATROPIUM BROMIDE AND ALBUTEROL SULFATE 1 AMPULE: .5; 3 SOLUTION RESPIRATORY (INHALATION) at 12:20

## 2021-01-01 RX ADMIN — VANCOMYCIN HYDROCHLORIDE 500 MG: 500 INJECTION, POWDER, LYOPHILIZED, FOR SOLUTION INTRAVENOUS at 18:40

## 2021-01-01 RX ADMIN — SODIUM CHLORIDE: 9 INJECTION, SOLUTION INTRAVENOUS at 22:53

## 2021-01-01 RX ADMIN — IPRATROPIUM BROMIDE AND ALBUTEROL SULFATE 1 AMPULE: .5; 3 SOLUTION RESPIRATORY (INHALATION) at 14:20

## 2021-01-01 RX ADMIN — INSULIN GLARGINE 9 UNITS: 100 INJECTION, SOLUTION SUBCUTANEOUS at 08:38

## 2021-01-01 RX ADMIN — NOREPINEPHRINE BITARTRATE 2 MCG/MIN: 1 INJECTION INTRAVENOUS at 21:43

## 2021-01-01 RX ADMIN — SODIUM BICARBONATE 1300 MG: 650 TABLET ORAL at 14:46

## 2021-01-01 RX ADMIN — IPRATROPIUM BROMIDE AND ALBUTEROL 1 PUFF: 20; 100 SPRAY, METERED RESPIRATORY (INHALATION) at 07:30

## 2021-01-01 RX ADMIN — PROPOFOL 10 MCG/KG/MIN: 10 INJECTION, EMULSION INTRAVENOUS at 14:35

## 2021-01-01 RX ADMIN — NYSTATIN 500000 UNITS: 100000 SUSPENSION ORAL at 21:35

## 2021-01-01 RX ADMIN — ACETAMINOPHEN 650 MG: 325 TABLET, FILM COATED ORAL at 18:03

## 2021-01-01 RX ADMIN — LEVOTHYROXINE SODIUM 88 MCG: 0.09 TABLET ORAL at 05:31

## 2021-01-01 RX ADMIN — IPRATROPIUM BROMIDE AND ALBUTEROL SULFATE 1 AMPULE: .5; 3 SOLUTION RESPIRATORY (INHALATION) at 13:46

## 2021-01-01 RX ADMIN — FLUCONAZOLE IN SODIUM CHLORIDE 400 MG: 2 INJECTION, SOLUTION INTRAVENOUS at 17:25

## 2021-01-01 RX ADMIN — IPRATROPIUM BROMIDE AND ALBUTEROL SULFATE 1 AMPULE: .5; 3 SOLUTION RESPIRATORY (INHALATION) at 18:12

## 2021-01-01 RX ADMIN — VANCOMYCIN HYDROCHLORIDE 500 MG: 500 INJECTION, POWDER, LYOPHILIZED, FOR SOLUTION INTRAVENOUS at 22:55

## 2021-01-01 RX ADMIN — NYSTATIN 500000 UNITS: 100000 SUSPENSION ORAL at 20:06

## 2021-01-01 RX ADMIN — AMLODIPINE BESYLATE 5 MG: 5 TABLET ORAL at 09:09

## 2021-01-01 RX ADMIN — FAMOTIDINE 20 MG: 20 TABLET, FILM COATED ORAL at 09:46

## 2021-01-01 RX ADMIN — NYSTATIN 500000 UNITS: 100000 SUSPENSION ORAL at 16:11

## 2021-01-01 RX ADMIN — SODIUM CHLORIDE: 9 INJECTION, SOLUTION INTRAVENOUS at 08:57

## 2021-01-01 RX ADMIN — HEPARIN SODIUM 5000 UNITS: 5000 INJECTION INTRAVENOUS; SUBCUTANEOUS at 14:50

## 2021-01-01 RX ADMIN — CLOPIDOGREL BISULFATE 75 MG: 75 TABLET ORAL at 08:39

## 2021-01-01 RX ADMIN — CLOPIDOGREL BISULFATE 75 MG: 75 TABLET ORAL at 18:25

## 2021-01-01 RX ADMIN — PROPOFOL 40 MCG/KG/MIN: 10 INJECTION, EMULSION INTRAVENOUS at 17:32

## 2021-01-01 RX ADMIN — SODIUM BICARBONATE 1300 MG: 650 TABLET ORAL at 11:06

## 2021-01-01 RX ADMIN — DEXAMETHASONE SODIUM PHOSPHATE 4 MG: 4 INJECTION, SOLUTION INTRAMUSCULAR; INTRAVENOUS at 14:54

## 2021-01-01 RX ADMIN — ATORVASTATIN CALCIUM 80 MG: 20 TABLET, FILM COATED ORAL at 09:52

## 2021-01-01 RX ADMIN — SODIUM BICARBONATE 1300 MG: 650 TABLET ORAL at 21:35

## 2021-01-01 RX ADMIN — SODIUM BICARBONATE 1300 MG: 650 TABLET ORAL at 08:40

## 2021-01-01 RX ADMIN — FAMOTIDINE 20 MG: 20 TABLET, FILM COATED ORAL at 09:20

## 2021-01-01 RX ADMIN — HYDRALAZINE HYDROCHLORIDE 10 MG: 20 INJECTION INTRAMUSCULAR; INTRAVENOUS at 10:09

## 2021-01-01 RX ADMIN — ATORVASTATIN CALCIUM 80 MG: 20 TABLET, FILM COATED ORAL at 10:10

## 2021-01-01 RX ADMIN — PROPOFOL 50 MCG/KG/MIN: 10 INJECTION, EMULSION INTRAVENOUS at 16:18

## 2021-01-01 RX ADMIN — POLYVINYL ALCOHOL 1 DROP: 14 SOLUTION/ DROPS OPHTHALMIC at 15:54

## 2021-01-01 RX ADMIN — INSULIN GLARGINE 20 UNITS: 100 INJECTION, SOLUTION SUBCUTANEOUS at 09:13

## 2021-01-01 RX ADMIN — IPRATROPIUM BROMIDE AND ALBUTEROL SULFATE 1 AMPULE: .5; 3 SOLUTION RESPIRATORY (INHALATION) at 21:08

## 2021-01-01 RX ADMIN — PIPERACILLIN SODIUM AND TAZOBACTAM SODIUM 3375 MG: 3; .375 INJECTION, POWDER, LYOPHILIZED, FOR SOLUTION INTRAVENOUS at 08:18

## 2021-01-01 RX ADMIN — POLYVINYL ALCOHOL 1 DROP: 14 SOLUTION/ DROPS OPHTHALMIC at 20:17

## 2021-01-01 RX ADMIN — ASPIRIN 81 MG CHEWABLE TABLET 81 MG: 81 TABLET CHEWABLE at 11:06

## 2021-01-01 RX ADMIN — IPRATROPIUM BROMIDE AND ALBUTEROL SULFATE 1 AMPULE: .5; 3 SOLUTION RESPIRATORY (INHALATION) at 09:43

## 2021-01-01 RX ADMIN — DEXAMETHASONE SODIUM PHOSPHATE 4 MG: 4 INJECTION, SOLUTION INTRAMUSCULAR; INTRAVENOUS at 20:31

## 2021-01-01 RX ADMIN — LEVOTHYROXINE SODIUM 88 MCG: 0.09 TABLET ORAL at 06:00

## 2021-01-01 RX ADMIN — PIPERACILLIN SODIUM AND TAZOBACTAM SODIUM 3375 MG: 3; .375 INJECTION, POWDER, LYOPHILIZED, FOR SOLUTION INTRAVENOUS at 06:23

## 2021-01-01 RX ADMIN — PENTOXIFYLLINE 400 MG: 400 TABLET, EXTENDED RELEASE ORAL at 20:00

## 2021-01-01 RX ADMIN — POLYVINYL ALCOHOL 1 DROP: 14 SOLUTION/ DROPS OPHTHALMIC at 00:14

## 2021-01-01 RX ADMIN — ALBUMIN (HUMAN) 25 G: 0.25 INJECTION, SOLUTION INTRAVENOUS at 10:27

## 2021-01-01 RX ADMIN — CALCITRIOL 0.25 MCG: 0.25 CAPSULE ORAL at 08:24

## 2021-01-01 RX ADMIN — CLOPIDOGREL BISULFATE 75 MG: 75 TABLET ORAL at 11:07

## 2021-01-01 RX ADMIN — HEPARIN SODIUM 5000 UNITS: 5000 INJECTION INTRAVENOUS; SUBCUTANEOUS at 14:52

## 2021-01-01 RX ADMIN — DEXAMETHASONE SODIUM PHOSPHATE 4 MG: 4 INJECTION, SOLUTION INTRA-ARTICULAR; INTRALESIONAL; INTRAMUSCULAR; INTRAVENOUS; SOFT TISSUE at 14:37

## 2021-01-01 RX ADMIN — INSULIN GLARGINE 18 UNITS: 100 INJECTION, SOLUTION SUBCUTANEOUS at 08:43

## 2021-01-01 RX ADMIN — PIPERACILLIN SODIUM AND TAZOBACTAM SODIUM 3375 MG: 3; .375 INJECTION, POWDER, LYOPHILIZED, FOR SOLUTION INTRAVENOUS at 18:23

## 2021-01-01 RX ADMIN — GABAPENTIN 300 MG: 250 SOLUTION ORAL at 20:58

## 2021-01-01 RX ADMIN — CARVEDILOL 25 MG: 25 TABLET, FILM COATED ORAL at 09:12

## 2021-01-01 RX ADMIN — INSULIN LISPRO 2 UNITS: 100 INJECTION, SOLUTION INTRAVENOUS; SUBCUTANEOUS at 00:09

## 2021-01-01 RX ADMIN — FAMOTIDINE 20 MG: 20 TABLET, FILM COATED ORAL at 08:56

## 2021-01-01 RX ADMIN — ASPIRIN 81 MG CHEWABLE TABLET 81 MG: 81 TABLET CHEWABLE at 09:46

## 2021-01-01 RX ADMIN — SODIUM BICARBONATE 1300 MG: 650 TABLET ORAL at 20:06

## 2021-01-01 RX ADMIN — HYDRALAZINE HYDROCHLORIDE 10 MG: 20 INJECTION INTRAMUSCULAR; INTRAVENOUS at 23:01

## 2021-01-01 RX ADMIN — INSULIN GLARGINE 10 UNITS: 100 INJECTION, SOLUTION SUBCUTANEOUS at 20:52

## 2021-01-01 RX ADMIN — CALCITRIOL 0.25 MCG: 0.25 CAPSULE ORAL at 09:09

## 2021-01-01 RX ADMIN — SODIUM CHLORIDE: 9 INJECTION, SOLUTION INTRAVENOUS at 23:22

## 2021-01-01 RX ADMIN — SODIUM BICARBONATE 1300 MG: 650 TABLET ORAL at 20:14

## 2021-01-01 RX ADMIN — INSULIN LISPRO 8 UNITS: 100 INJECTION, SOLUTION INTRAVENOUS; SUBCUTANEOUS at 12:57

## 2021-01-01 RX ADMIN — SODIUM BICARBONATE 1300 MG: 650 TABLET ORAL at 10:08

## 2021-01-01 RX ADMIN — HEPARIN SODIUM 5000 UNITS: 5000 INJECTION INTRAVENOUS; SUBCUTANEOUS at 13:45

## 2021-01-01 RX ADMIN — INSULIN LISPRO 4 UNITS: 100 INJECTION, SOLUTION INTRAVENOUS; SUBCUTANEOUS at 11:56

## 2021-01-01 RX ADMIN — IPRATROPIUM BROMIDE AND ALBUTEROL SULFATE 1 AMPULE: .5; 3 SOLUTION RESPIRATORY (INHALATION) at 12:58

## 2021-01-01 RX ADMIN — PENTOXIFYLLINE 400 MG: 400 TABLET, FILM COATED, EXTENDED RELEASE ORAL at 16:59

## 2021-01-01 RX ADMIN — DEXAMETHASONE SODIUM PHOSPHATE 4 MG: 4 INJECTION, SOLUTION INTRAMUSCULAR; INTRAVENOUS at 06:55

## 2021-01-01 RX ADMIN — PROPOFOL 50 MCG/KG/MIN: 10 INJECTION, EMULSION INTRAVENOUS at 23:38

## 2021-01-01 RX ADMIN — GABAPENTIN 300 MG: 300 CAPSULE ORAL at 21:46

## 2021-01-01 RX ADMIN — DEXAMETHASONE SODIUM PHOSPHATE 4 MG: 4 INJECTION, SOLUTION INTRA-ARTICULAR; INTRALESIONAL; INTRAMUSCULAR; INTRAVENOUS; SOFT TISSUE at 05:54

## 2021-01-01 RX ADMIN — FAMOTIDINE 10 MG: 20 TABLET, FILM COATED ORAL at 09:29

## 2021-01-01 RX ADMIN — DEXAMETHASONE SODIUM PHOSPHATE 6 MG: 10 INJECTION, SOLUTION INTRAMUSCULAR; INTRAVENOUS at 18:22

## 2021-01-01 RX ADMIN — DEXAMETHASONE SODIUM PHOSPHATE 4 MG: 4 INJECTION, SOLUTION INTRAMUSCULAR; INTRAVENOUS at 17:24

## 2021-01-01 RX ADMIN — Medication 125 MCG/HR: at 08:38

## 2021-01-01 RX ADMIN — Medication 10 ML: at 08:36

## 2021-01-01 RX ADMIN — POLYVINYL ALCOHOL 1 DROP: 14 SOLUTION/ DROPS OPHTHALMIC at 20:00

## 2021-01-01 RX ADMIN — NYSTATIN 500000 UNITS: 100000 SUSPENSION ORAL at 08:56

## 2021-01-01 RX ADMIN — PIPERACILLIN SODIUM AND TAZOBACTAM SODIUM 3375 MG: 3; .375 INJECTION, POWDER, LYOPHILIZED, FOR SOLUTION INTRAVENOUS at 06:05

## 2021-01-01 RX ADMIN — NYSTATIN 500000 UNITS: 100000 SUSPENSION ORAL at 21:12

## 2021-01-01 RX ADMIN — PROPOFOL 50 MCG/KG/MIN: 10 INJECTION, EMULSION INTRAVENOUS at 23:12

## 2021-01-01 RX ADMIN — LABETALOL HYDROCHLORIDE 20 MG: 5 INJECTION INTRAVENOUS at 05:48

## 2021-01-01 RX ADMIN — AMLODIPINE BESYLATE 5 MG: 5 TABLET ORAL at 21:43

## 2021-01-01 RX ADMIN — PIPERACILLIN SODIUM AND TAZOBACTAM SODIUM 3375 MG: 3; .375 INJECTION, POWDER, LYOPHILIZED, FOR SOLUTION INTRAVENOUS at 06:14

## 2021-01-01 RX ADMIN — INSULIN LISPRO 1 UNITS: 100 INJECTION, SOLUTION INTRAVENOUS; SUBCUTANEOUS at 06:56

## 2021-01-01 RX ADMIN — Medication 75 MCG/HR: at 19:05

## 2021-01-01 RX ADMIN — LEVOTHYROXINE SODIUM 50 MCG: 50 TABLET ORAL at 06:32

## 2021-01-01 RX ADMIN — CARVEDILOL 25 MG: 25 TABLET, FILM COATED ORAL at 16:59

## 2021-01-01 RX ADMIN — HEPARIN SODIUM 5000 UNITS: 5000 INJECTION INTRAVENOUS; SUBCUTANEOUS at 13:22

## 2021-01-01 RX ADMIN — PENTOXIFYLLINE 400 MG: 400 TABLET, FILM COATED, EXTENDED RELEASE ORAL at 08:59

## 2021-01-01 RX ADMIN — SODIUM BICARBONATE 1300 MG: 650 TABLET ORAL at 21:02

## 2021-01-01 RX ADMIN — PROPOFOL 49.97 MCG/KG/MIN: 10 INJECTION, EMULSION INTRAVENOUS at 20:39

## 2021-01-01 RX ADMIN — FAMOTIDINE 10 MG: 20 TABLET, FILM COATED ORAL at 08:55

## 2021-01-01 RX ADMIN — NYSTATIN 500000 UNITS: 100000 SUSPENSION ORAL at 20:38

## 2021-01-01 RX ADMIN — SODIUM CHLORIDE: 9 INJECTION, SOLUTION INTRAVENOUS at 21:30

## 2021-01-01 RX ADMIN — HEPARIN SODIUM 5000 UNITS: 5000 INJECTION INTRAVENOUS; SUBCUTANEOUS at 21:30

## 2021-01-01 RX ADMIN — CLOPIDOGREL BISULFATE 75 MG: 75 TABLET ORAL at 09:01

## 2021-01-01 RX ADMIN — CISATRACURIUM BESYLATE 4 MCG/KG/MIN: 10 INJECTION, SOLUTION INTRAVENOUS at 02:41

## 2021-01-01 RX ADMIN — DOXYCYCLINE 100 MG: 100 INJECTION, POWDER, LYOPHILIZED, FOR SOLUTION INTRAVENOUS at 23:03

## 2021-01-01 RX ADMIN — HEPARIN SODIUM 5000 UNITS: 5000 INJECTION INTRAVENOUS; SUBCUTANEOUS at 22:12

## 2021-01-01 RX ADMIN — PROPOFOL 30 MCG/KG/MIN: 10 INJECTION, EMULSION INTRAVENOUS at 14:54

## 2021-01-01 RX ADMIN — SODIUM BICARBONATE 1300 MG: 650 TABLET ORAL at 14:11

## 2021-01-01 RX ADMIN — IPRATROPIUM BROMIDE AND ALBUTEROL SULFATE 1 AMPULE: .5; 3 SOLUTION RESPIRATORY (INHALATION) at 06:20

## 2021-01-01 RX ADMIN — PROPOFOL 25 MCG/KG/MIN: 10 INJECTION, EMULSION INTRAVENOUS at 10:57

## 2021-01-01 RX ADMIN — SODIUM BICARBONATE 1300 MG: 650 TABLET ORAL at 08:55

## 2021-01-01 RX ADMIN — PENTOXIFYLLINE 400 MG: 400 TABLET, FILM COATED, EXTENDED RELEASE ORAL at 18:13

## 2021-01-01 RX ADMIN — IPRATROPIUM BROMIDE AND ALBUTEROL SULFATE 1 AMPULE: .5; 3 SOLUTION RESPIRATORY (INHALATION) at 06:07

## 2021-01-01 RX ADMIN — INSULIN LISPRO 4 UNITS: 100 INJECTION, SOLUTION INTRAVENOUS; SUBCUTANEOUS at 12:49

## 2021-01-01 RX ADMIN — IPRATROPIUM BROMIDE AND ALBUTEROL 1 PUFF: 20; 100 SPRAY, METERED RESPIRATORY (INHALATION) at 08:42

## 2021-01-01 RX ADMIN — CARVEDILOL 25 MG: 25 TABLET, FILM COATED ORAL at 17:06

## 2021-01-01 RX ADMIN — VANCOMYCIN HYDROCHLORIDE 500 MG: 500 INJECTION, POWDER, LYOPHILIZED, FOR SOLUTION INTRAVENOUS at 21:00

## 2021-01-01 RX ADMIN — INSULIN LISPRO 2 UNITS: 100 INJECTION, SOLUTION INTRAVENOUS; SUBCUTANEOUS at 21:23

## 2021-01-01 RX ADMIN — DOXYCYCLINE 100 MG: 100 INJECTION, POWDER, LYOPHILIZED, FOR SOLUTION INTRAVENOUS at 11:56

## 2021-01-01 RX ADMIN — PENTOXIFYLLINE 400 MG: 400 TABLET, FILM COATED, EXTENDED RELEASE ORAL at 17:17

## 2021-01-01 RX ADMIN — PROPOFOL 50 MCG/KG/MIN: 10 INJECTION, EMULSION INTRAVENOUS at 14:47

## 2021-01-01 RX ADMIN — PIPERACILLIN SODIUM AND TAZOBACTAM SODIUM 3375 MG: 3; .375 INJECTION, POWDER, LYOPHILIZED, FOR SOLUTION INTRAVENOUS at 18:43

## 2021-01-01 RX ADMIN — IPRATROPIUM BROMIDE AND ALBUTEROL SULFATE 1 AMPULE: .5; 3 SOLUTION RESPIRATORY (INHALATION) at 06:49

## 2021-01-01 RX ADMIN — IPRATROPIUM BROMIDE AND ALBUTEROL SULFATE 1 AMPULE: .5; 3 SOLUTION RESPIRATORY (INHALATION) at 13:15

## 2021-01-01 RX ADMIN — INSULIN LISPRO 1 UNITS: 100 INJECTION, SOLUTION INTRAVENOUS; SUBCUTANEOUS at 05:22

## 2021-01-01 RX ADMIN — PIPERACILLIN SODIUM AND TAZOBACTAM SODIUM 3375 MG: 3; .375 INJECTION, POWDER, LYOPHILIZED, FOR SOLUTION INTRAVENOUS at 06:38

## 2021-01-01 RX ADMIN — IPRATROPIUM BROMIDE AND ALBUTEROL SULFATE 1 AMPULE: .5; 3 SOLUTION RESPIRATORY (INHALATION) at 09:51

## 2021-01-01 RX ADMIN — INSULIN LISPRO 6 UNITS: 100 INJECTION, SOLUTION INTRAVENOUS; SUBCUTANEOUS at 13:00

## 2021-01-01 RX ADMIN — IPRATROPIUM BROMIDE AND ALBUTEROL 1 PUFF: 20; 100 SPRAY, METERED RESPIRATORY (INHALATION) at 12:28

## 2021-01-01 RX ADMIN — DEXAMETHASONE SODIUM PHOSPHATE 4 MG: 4 INJECTION, SOLUTION INTRA-ARTICULAR; INTRALESIONAL; INTRAMUSCULAR; INTRAVENOUS; SOFT TISSUE at 21:10

## 2021-01-01 RX ADMIN — TOCILIZUMAB 420 MG: 20 INJECTION, SOLUTION, CONCENTRATE INTRAVENOUS at 18:41

## 2021-01-01 RX ADMIN — AMLODIPINE BESYLATE 5 MG: 5 TABLET ORAL at 21:45

## 2021-01-01 RX ADMIN — ASPIRIN 81 MG CHEWABLE TABLET 81 MG: 81 TABLET CHEWABLE at 08:36

## 2021-01-01 RX ADMIN — Medication 10 ML: at 21:50

## 2021-01-01 RX ADMIN — VANCOMYCIN HYDROCHLORIDE 500 MG: 500 INJECTION, POWDER, LYOPHILIZED, FOR SOLUTION INTRAVENOUS at 15:59

## 2021-01-01 RX ADMIN — IPRATROPIUM BROMIDE AND ALBUTEROL SULFATE 1 AMPULE: .5; 3 SOLUTION RESPIRATORY (INHALATION) at 06:14

## 2021-01-01 RX ADMIN — HEPARIN SODIUM 5000 UNITS: 5000 INJECTION INTRAVENOUS; SUBCUTANEOUS at 06:36

## 2021-01-01 RX ADMIN — IPRATROPIUM BROMIDE AND ALBUTEROL 1 PUFF: 20; 100 SPRAY, METERED RESPIRATORY (INHALATION) at 22:55

## 2021-01-01 RX ADMIN — ATORVASTATIN CALCIUM 80 MG: 40 TABLET, FILM COATED ORAL at 08:25

## 2021-01-01 RX ADMIN — GABAPENTIN 300 MG: 250 SOLUTION ORAL at 20:13

## 2021-01-01 RX ADMIN — AMLODIPINE BESYLATE 5 MG: 5 TABLET ORAL at 21:27

## 2021-01-01 RX ADMIN — INSULIN GLARGINE 9 UNITS: 100 INJECTION, SOLUTION SUBCUTANEOUS at 08:13

## 2021-01-01 RX ADMIN — CLOPIDOGREL BISULFATE 75 MG: 75 TABLET ORAL at 08:40

## 2021-01-01 RX ADMIN — INSULIN LISPRO 3 UNITS: 100 INJECTION, SOLUTION INTRAVENOUS; SUBCUTANEOUS at 16:45

## 2021-01-01 RX ADMIN — DEXAMETHASONE SODIUM PHOSPHATE 6 MG: 10 INJECTION, SOLUTION INTRAMUSCULAR; INTRAVENOUS at 16:12

## 2021-01-01 RX ADMIN — NYSTATIN 500000 UNITS: 100000 SUSPENSION ORAL at 13:35

## 2021-01-01 RX ADMIN — SODIUM BICARBONATE 1300 MG: 650 TABLET ORAL at 14:29

## 2021-01-01 RX ADMIN — PIPERACILLIN SODIUM AND TAZOBACTAM SODIUM 3375 MG: 3; .375 INJECTION, POWDER, LYOPHILIZED, FOR SOLUTION INTRAVENOUS at 18:24

## 2021-01-01 RX ADMIN — DEXTROSE MONOHYDRATE 12.5 G: 500 INJECTION PARENTERAL at 07:34

## 2021-01-01 RX ADMIN — SODIUM BICARBONATE 1300 MG: 650 TABLET ORAL at 20:37

## 2021-01-01 RX ADMIN — MIDODRINE HYDROCHLORIDE 5 MG: 2.5 TABLET ORAL at 00:00

## 2021-01-01 RX ADMIN — PROPOFOL 15 MCG/KG/MIN: 10 INJECTION, EMULSION INTRAVENOUS at 20:43

## 2021-01-01 RX ADMIN — POLYVINYL ALCOHOL 1 DROP: 14 SOLUTION/ DROPS OPHTHALMIC at 03:06

## 2021-01-01 RX ADMIN — POLYVINYL ALCOHOL 1 DROP: 14 SOLUTION/ DROPS OPHTHALMIC at 03:00

## 2021-01-01 RX ADMIN — ATORVASTATIN CALCIUM 80 MG: 20 TABLET, FILM COATED ORAL at 08:12

## 2021-01-01 RX ADMIN — DEXTROSE MONOHYDRATE: 100 INJECTION, SOLUTION INTRAVENOUS at 16:36

## 2021-01-01 RX ADMIN — FAMOTIDINE 10 MG: 20 TABLET, FILM COATED ORAL at 08:56

## 2021-01-01 RX ADMIN — SODIUM CHLORIDE: 9 INJECTION, SOLUTION INTRAVENOUS at 22:02

## 2021-01-01 RX ADMIN — DOXYCYCLINE 100 MG: 100 INJECTION, POWDER, LYOPHILIZED, FOR SOLUTION INTRAVENOUS at 14:27

## 2021-01-01 RX ADMIN — ENOXAPARIN SODIUM 60 MG: 60 INJECTION, SOLUTION INTRAVENOUS; SUBCUTANEOUS at 05:03

## 2021-01-01 RX ADMIN — HEPARIN SODIUM 5000 UNITS: 5000 INJECTION INTRAVENOUS; SUBCUTANEOUS at 05:12

## 2021-01-01 RX ADMIN — LEVOTHYROXINE SODIUM 88 MCG: 0.09 TABLET ORAL at 06:10

## 2021-01-01 RX ADMIN — PROPOFOL 50 MCG/KG/MIN: 10 INJECTION, EMULSION INTRAVENOUS at 03:06

## 2021-01-01 RX ADMIN — INSULIN LISPRO 2 UNITS: 100 INJECTION, SOLUTION INTRAVENOUS; SUBCUTANEOUS at 09:43

## 2021-01-01 RX ADMIN — PROPOFOL 50 MCG/KG/MIN: 10 INJECTION, EMULSION INTRAVENOUS at 16:16

## 2021-01-01 RX ADMIN — IPRATROPIUM BROMIDE AND ALBUTEROL SULFATE 1 AMPULE: .5; 3 SOLUTION RESPIRATORY (INHALATION) at 04:41

## 2021-01-01 RX ADMIN — IPRATROPIUM BROMIDE AND ALBUTEROL SULFATE 1 AMPULE: .5; 3 SOLUTION RESPIRATORY (INHALATION) at 10:30

## 2021-01-01 RX ADMIN — IPRATROPIUM BROMIDE AND ALBUTEROL 1 PUFF: 20; 100 SPRAY, METERED RESPIRATORY (INHALATION) at 18:48

## 2021-01-01 RX ADMIN — Medication 50 MCG/HR: at 03:26

## 2021-01-01 RX ADMIN — ASPIRIN 81 MG CHEWABLE TABLET 81 MG: 81 TABLET CHEWABLE at 09:12

## 2021-01-01 RX ADMIN — IPRATROPIUM BROMIDE AND ALBUTEROL SULFATE 1 AMPULE: .5; 3 SOLUTION RESPIRATORY (INHALATION) at 05:41

## 2021-01-01 RX ADMIN — CARVEDILOL 25 MG: 25 TABLET, FILM COATED ORAL at 11:07

## 2021-01-01 RX ADMIN — HEPARIN SODIUM 5000 UNITS: 5000 INJECTION INTRAVENOUS; SUBCUTANEOUS at 21:26

## 2021-01-01 RX ADMIN — GABAPENTIN 300 MG: 300 CAPSULE ORAL at 21:10

## 2021-01-01 RX ADMIN — SODIUM CHLORIDE: 9 INJECTION, SOLUTION INTRAVENOUS at 05:05

## 2021-01-01 RX ADMIN — HEPARIN SODIUM 5000 UNITS: 5000 INJECTION INTRAVENOUS; SUBCUTANEOUS at 05:50

## 2021-01-01 RX ADMIN — CALCITRIOL 0.25 MCG: 0.25 CAPSULE ORAL at 09:40

## 2021-01-01 RX ADMIN — POLYVINYL ALCOHOL 1 DROP: 14 SOLUTION/ DROPS OPHTHALMIC at 23:09

## 2021-01-01 RX ADMIN — Medication 125 MCG/HR: at 05:29

## 2021-01-01 RX ADMIN — NYSTATIN 500000 UNITS: 100000 SUSPENSION ORAL at 16:38

## 2021-01-01 RX ADMIN — ASPIRIN 81 MG: 81 TABLET, CHEWABLE ORAL at 09:41

## 2021-01-01 RX ADMIN — IPRATROPIUM BROMIDE AND ALBUTEROL SULFATE 1 AMPULE: .5; 3 SOLUTION RESPIRATORY (INHALATION) at 13:00

## 2021-01-01 RX ADMIN — Medication 10 ML: at 20:15

## 2021-01-01 RX ADMIN — GABAPENTIN 300 MG: 250 SOLUTION ORAL at 20:18

## 2021-01-01 RX ADMIN — POLYVINYL ALCOHOL 1 DROP: 14 SOLUTION/ DROPS OPHTHALMIC at 07:00

## 2021-01-01 RX ADMIN — CLOPIDOGREL BISULFATE 75 MG: 75 TABLET ORAL at 13:01

## 2021-01-01 RX ADMIN — GABAPENTIN 300 MG: 250 SOLUTION ORAL at 20:06

## 2021-01-01 RX ADMIN — Medication 10 ML: at 08:13

## 2021-01-01 RX ADMIN — SODIUM BICARBONATE 1300 MG: 650 TABLET ORAL at 08:41

## 2021-01-01 RX ADMIN — PROPOFOL 35 MCG/KG/MIN: 10 INJECTION, EMULSION INTRAVENOUS at 06:03

## 2021-01-01 RX ADMIN — DEXAMETHASONE SODIUM PHOSPHATE 4 MG: 4 INJECTION, SOLUTION INTRA-ARTICULAR; INTRALESIONAL; INTRAMUSCULAR; INTRAVENOUS; SOFT TISSUE at 13:22

## 2021-01-01 RX ADMIN — DOXYCYCLINE 100 MG: 100 INJECTION, POWDER, LYOPHILIZED, FOR SOLUTION INTRAVENOUS at 11:50

## 2021-01-01 RX ADMIN — ACETAMINOPHEN 1000 MG: 500 TABLET ORAL at 10:27

## 2021-01-01 RX ADMIN — ATORVASTATIN CALCIUM 80 MG: 40 TABLET, FILM COATED ORAL at 09:41

## 2021-01-01 RX ADMIN — NYSTATIN 500000 UNITS: 100000 SUSPENSION ORAL at 09:46

## 2021-01-01 RX ADMIN — GABAPENTIN 300 MG: 300 CAPSULE ORAL at 21:27

## 2021-01-01 RX ADMIN — IPRATROPIUM BROMIDE AND ALBUTEROL SULFATE 1 AMPULE: .5; 3 SOLUTION RESPIRATORY (INHALATION) at 18:42

## 2021-01-01 RX ADMIN — MIDODRINE HYDROCHLORIDE 10 MG: 2.5 TABLET ORAL at 14:29

## 2021-01-01 RX ADMIN — INSULIN LISPRO 2 UNITS: 100 INJECTION, SOLUTION INTRAVENOUS; SUBCUTANEOUS at 05:21

## 2021-01-01 RX ADMIN — INSULIN LISPRO 6 UNITS: 100 INJECTION, SOLUTION INTRAVENOUS; SUBCUTANEOUS at 17:33

## 2021-01-01 RX ADMIN — SODIUM BICARBONATE 1300 MG: 650 TABLET ORAL at 08:23

## 2021-01-01 RX ADMIN — AMLODIPINE BESYLATE 5 MG: 5 TABLET ORAL at 09:17

## 2021-01-01 RX ADMIN — DEXAMETHASONE SODIUM PHOSPHATE 6 MG: 10 INJECTION, SOLUTION INTRAMUSCULAR; INTRAVENOUS at 17:59

## 2021-01-01 RX ADMIN — AMLODIPINE BESYLATE 5 MG: 5 TABLET ORAL at 09:38

## 2021-01-01 RX ADMIN — NYSTATIN 500000 UNITS: 100000 SUSPENSION ORAL at 14:53

## 2021-01-01 RX ADMIN — INSULIN LISPRO 2 UNITS: 100 INJECTION, SOLUTION INTRAVENOUS; SUBCUTANEOUS at 22:55

## 2021-01-01 RX ADMIN — Medication 10 ML: at 20:06

## 2021-01-01 RX ADMIN — ONDANSETRON 4 MG: 2 INJECTION INTRAMUSCULAR; INTRAVENOUS at 09:00

## 2021-01-01 RX ADMIN — PIPERACILLIN SODIUM AND TAZOBACTAM SODIUM 3375 MG: 3; .375 INJECTION, POWDER, LYOPHILIZED, FOR SOLUTION INTRAVENOUS at 20:13

## 2021-01-01 RX ADMIN — LEVOTHYROXINE SODIUM 88 MCG: 0.09 TABLET ORAL at 05:05

## 2021-01-01 RX ADMIN — CLOPIDOGREL BISULFATE 75 MG: 75 TABLET ORAL at 09:40

## 2021-01-01 RX ADMIN — IPRATROPIUM BROMIDE AND ALBUTEROL SULFATE 1 AMPULE: .5; 3 SOLUTION RESPIRATORY (INHALATION) at 06:16

## 2021-01-01 RX ADMIN — IPRATROPIUM BROMIDE AND ALBUTEROL SULFATE 1 AMPULE: .5; 3 SOLUTION RESPIRATORY (INHALATION) at 18:07

## 2021-01-01 RX ADMIN — ACETAMINOPHEN 650 MG: 325 TABLET, FILM COATED ORAL at 11:26

## 2021-01-01 RX ADMIN — Medication 50 MCG/HR: at 22:45

## 2021-01-01 RX ADMIN — FLUCONAZOLE IN SODIUM CHLORIDE 400 MG: 2 INJECTION, SOLUTION INTRAVENOUS at 20:12

## 2021-01-01 RX ADMIN — SODIUM BICARBONATE 1300 MG: 650 TABLET ORAL at 21:24

## 2021-01-01 RX ADMIN — NYSTATIN 500000 UNITS: 100000 SUSPENSION ORAL at 20:59

## 2021-01-01 RX ADMIN — SODIUM CHLORIDE: 9 INJECTION, SOLUTION INTRAVENOUS at 10:03

## 2021-01-01 RX ADMIN — CARVEDILOL 25 MG: 25 TABLET, FILM COATED ORAL at 09:17

## 2021-01-01 RX ADMIN — FAMOTIDINE 10 MG: 20 TABLET, FILM COATED ORAL at 08:13

## 2021-01-01 RX ADMIN — PENTOXIFYLLINE 400 MG: 400 TABLET, EXTENDED RELEASE ORAL at 20:50

## 2021-01-01 RX ADMIN — Medication 10 ML: at 21:03

## 2021-01-01 RX ADMIN — HEPARIN SODIUM 5000 UNITS: 5000 INJECTION INTRAVENOUS; SUBCUTANEOUS at 05:26

## 2021-01-01 RX ADMIN — Medication 10 ML: at 08:18

## 2021-01-01 RX ADMIN — Medication 10 ML: at 21:28

## 2021-01-01 RX ADMIN — POLYVINYL ALCOHOL 1 DROP: 14 SOLUTION/ DROPS OPHTHALMIC at 20:12

## 2021-01-01 RX ADMIN — GABAPENTIN 300 MG: 300 CAPSULE ORAL at 21:49

## 2021-01-01 RX ADMIN — IPRATROPIUM BROMIDE AND ALBUTEROL SULFATE 1 AMPULE: .5; 3 SOLUTION RESPIRATORY (INHALATION) at 18:05

## 2021-01-01 RX ADMIN — NYSTATIN 500000 UNITS: 100000 SUSPENSION ORAL at 20:15

## 2021-01-01 RX ADMIN — PENTOXIFYLLINE 400 MG: 400 TABLET, FILM COATED, EXTENDED RELEASE ORAL at 08:24

## 2021-01-01 RX ADMIN — DEXAMETHASONE SODIUM PHOSPHATE 4 MG: 4 INJECTION, SOLUTION INTRAMUSCULAR; INTRAVENOUS at 20:14

## 2021-01-01 RX ADMIN — DEXAMETHASONE SODIUM PHOSPHATE 4 MG: 4 INJECTION, SOLUTION INTRAMUSCULAR; INTRAVENOUS at 06:01

## 2021-01-01 RX ADMIN — DOXYCYCLINE 100 MG: 100 INJECTION, POWDER, LYOPHILIZED, FOR SOLUTION INTRAVENOUS at 00:05

## 2021-01-01 RX ADMIN — ACETYLCYSTEINE 400 MG: 200 SOLUTION ORAL; RESPIRATORY (INHALATION) at 06:17

## 2021-01-01 RX ADMIN — Medication 150 MCG/HR: at 02:30

## 2021-01-01 RX ADMIN — IPRATROPIUM BROMIDE AND ALBUTEROL 1 PUFF: 20; 100 SPRAY, METERED RESPIRATORY (INHALATION) at 09:11

## 2021-01-01 RX ADMIN — HEPARIN SODIUM 5000 UNITS: 5000 INJECTION INTRAVENOUS; SUBCUTANEOUS at 22:40

## 2021-01-01 RX ADMIN — INSULIN LISPRO 1 UNITS: 100 INJECTION, SOLUTION INTRAVENOUS; SUBCUTANEOUS at 21:42

## 2021-01-01 RX ADMIN — LEVOTHYROXINE SODIUM 50 MCG: 50 TABLET ORAL at 06:24

## 2021-01-01 RX ADMIN — ATORVASTATIN CALCIUM 80 MG: 20 TABLET, FILM COATED ORAL at 08:41

## 2021-01-01 RX ADMIN — DEXAMETHASONE SODIUM PHOSPHATE 4 MG: 4 INJECTION, SOLUTION INTRA-ARTICULAR; INTRALESIONAL; INTRAMUSCULAR; INTRAVENOUS; SOFT TISSUE at 21:27

## 2021-01-01 RX ADMIN — GABAPENTIN 300 MG: 300 CAPSULE ORAL at 20:50

## 2021-01-01 RX ADMIN — SODIUM BICARBONATE 1300 MG: 650 TABLET ORAL at 14:53

## 2021-01-01 RX ADMIN — SODIUM BICARBONATE 1300 MG: 650 TABLET ORAL at 13:46

## 2021-01-01 RX ADMIN — HEPARIN SODIUM 18 UNITS/KG/HR: 10000 INJECTION, SOLUTION INTRAVENOUS at 20:01

## 2021-01-01 RX ADMIN — DEXAMETHASONE SODIUM PHOSPHATE 4 MG: 4 INJECTION, SOLUTION INTRA-ARTICULAR; INTRALESIONAL; INTRAMUSCULAR; INTRAVENOUS; SOFT TISSUE at 21:44

## 2021-01-01 RX ADMIN — Medication 10 ML: at 20:18

## 2021-01-01 RX ADMIN — IPRATROPIUM BROMIDE AND ALBUTEROL SULFATE 1 AMPULE: .5; 3 SOLUTION RESPIRATORY (INHALATION) at 09:05

## 2021-01-01 RX ADMIN — ACETAMINOPHEN 1000 MG: 500 TABLET ORAL at 20:40

## 2021-01-01 RX ADMIN — SODIUM CHLORIDE: 9 INJECTION, SOLUTION INTRAVENOUS at 10:50

## 2021-01-01 RX ADMIN — SODIUM CHLORIDE: 9 INJECTION, SOLUTION INTRAVENOUS at 22:34

## 2021-01-01 RX ADMIN — FAMOTIDINE 10 MG: 20 TABLET, FILM COATED ORAL at 08:29

## 2021-01-01 RX ADMIN — SODIUM BICARBONATE 1300 MG: 650 TABLET ORAL at 21:33

## 2021-01-01 RX ADMIN — LEVOTHYROXINE SODIUM 88 MCG: 0.09 TABLET ORAL at 06:11

## 2021-01-01 RX ADMIN — PIPERACILLIN SODIUM AND TAZOBACTAM SODIUM 3375 MG: 3; .375 INJECTION, POWDER, LYOPHILIZED, FOR SOLUTION INTRAVENOUS at 18:25

## 2021-01-01 RX ADMIN — INSULIN LISPRO 3 UNITS: 100 INJECTION, SOLUTION INTRAVENOUS; SUBCUTANEOUS at 04:31

## 2021-01-01 RX ADMIN — PROPOFOL 30 MCG/KG/MIN: 10 INJECTION, EMULSION INTRAVENOUS at 01:40

## 2021-01-01 RX ADMIN — ATORVASTATIN CALCIUM 80 MG: 40 TABLET, FILM COATED ORAL at 09:09

## 2021-01-01 RX ADMIN — POLYVINYL ALCOHOL 1 DROP: 14 SOLUTION/ DROPS OPHTHALMIC at 12:21

## 2021-01-01 RX ADMIN — Medication 10 ML: at 20:51

## 2021-01-01 RX ADMIN — AMLODIPINE BESYLATE 5 MG: 5 TABLET ORAL at 22:07

## 2021-01-01 RX ADMIN — NOREPINEPHRINE BITARTRATE 5 MCG/MIN: 1 INJECTION, SOLUTION, CONCENTRATE INTRAVENOUS at 13:53

## 2021-01-01 RX ADMIN — DOPAMINE HYDROCHLORIDE 2.5 MCG/KG/MIN: 320 INJECTION, SOLUTION INTRAVENOUS at 16:05

## 2021-01-01 RX ADMIN — INSULIN LISPRO 2 UNITS: 100 INJECTION, SOLUTION INTRAVENOUS; SUBCUTANEOUS at 23:45

## 2021-01-01 RX ADMIN — PIPERACILLIN SODIUM AND TAZOBACTAM SODIUM 3375 MG: 3; .375 INJECTION, POWDER, LYOPHILIZED, FOR SOLUTION INTRAVENOUS at 17:32

## 2021-01-01 RX ADMIN — ASPIRIN 81 MG CHEWABLE TABLET 81 MG: 81 TABLET CHEWABLE at 08:56

## 2021-01-01 RX ADMIN — FAMOTIDINE 10 MG: 20 TABLET, FILM COATED ORAL at 08:41

## 2021-01-01 RX ADMIN — AMLODIPINE BESYLATE 5 MG: 5 TABLET ORAL at 20:50

## 2021-01-01 RX ADMIN — LEVOFLOXACIN 250 MG: 5 INJECTION, SOLUTION INTRAVENOUS at 16:44

## 2021-01-01 RX ADMIN — ATORVASTATIN CALCIUM 80 MG: 40 TABLET, FILM COATED ORAL at 09:12

## 2021-01-01 RX ADMIN — DOPAMINE HYDROCHLORIDE 10 MCG/KG/MIN: 320 INJECTION, SOLUTION INTRAVENOUS at 05:28

## 2021-01-01 RX ADMIN — ALTEPLASE 1 MG: 2.2 INJECTION, POWDER, LYOPHILIZED, FOR SOLUTION INTRAVENOUS at 12:39

## 2021-01-01 RX ADMIN — MIDODRINE HYDROCHLORIDE 10 MG: 2.5 TABLET ORAL at 14:47

## 2021-01-01 RX ADMIN — INSULIN LISPRO 1 UNITS: 100 INJECTION, SOLUTION INTRAVENOUS; SUBCUTANEOUS at 23:12

## 2021-01-01 RX ADMIN — PIPERACILLIN SODIUM AND TAZOBACTAM SODIUM 3375 MG: 3; .375 INJECTION, POWDER, LYOPHILIZED, FOR SOLUTION INTRAVENOUS at 06:09

## 2021-01-01 RX ADMIN — CLOPIDOGREL BISULFATE 75 MG: 75 TABLET ORAL at 08:29

## 2021-01-01 RX ADMIN — LABETALOL HYDROCHLORIDE 20 MG: 5 INJECTION INTRAVENOUS at 08:57

## 2021-01-01 RX ADMIN — LEVOFLOXACIN 250 MG: 5 INJECTION, SOLUTION INTRAVENOUS at 17:56

## 2021-01-01 RX ADMIN — SODIUM CHLORIDE 1000 ML: 9 INJECTION, SOLUTION INTRAVENOUS at 11:50

## 2021-01-01 RX ADMIN — SODIUM BICARBONATE 1300 MG: 650 TABLET ORAL at 08:13

## 2021-01-01 RX ADMIN — PENTOXIFYLLINE 400 MG: 400 TABLET, FILM COATED, EXTENDED RELEASE ORAL at 17:50

## 2021-01-01 RX ADMIN — LEVOTHYROXINE SODIUM 88 MCG: 0.09 TABLET ORAL at 06:16

## 2021-01-01 RX ADMIN — FAMOTIDINE 20 MG: 20 TABLET, FILM COATED ORAL at 20:00

## 2021-01-01 RX ADMIN — PROPOFOL 30 MCG/KG/MIN: 10 INJECTION, EMULSION INTRAVENOUS at 08:24

## 2021-01-01 RX ADMIN — CLOPIDOGREL BISULFATE 75 MG: 75 TABLET ORAL at 08:56

## 2021-01-01 RX ADMIN — HEPARIN SODIUM 5000 UNITS: 5000 INJECTION INTRAVENOUS; SUBCUTANEOUS at 06:02

## 2021-01-01 RX ADMIN — LABETALOL HYDROCHLORIDE 20 MG: 5 INJECTION INTRAVENOUS at 23:25

## 2021-01-01 RX ADMIN — ASPIRIN 81 MG CHEWABLE TABLET 81 MG: 81 TABLET CHEWABLE at 08:30

## 2021-01-01 RX ADMIN — IOPAMIDOL 75 ML: 755 INJECTION, SOLUTION INTRAVENOUS at 09:21

## 2021-01-01 RX ADMIN — ASPIRIN 81 MG CHEWABLE TABLET 81 MG: 81 TABLET CHEWABLE at 09:51

## 2021-01-01 RX ADMIN — FAMOTIDINE 10 MG: 20 TABLET, FILM COATED ORAL at 09:11

## 2021-01-01 RX ADMIN — POLYVINYL ALCOHOL 1 DROP: 14 SOLUTION/ DROPS OPHTHALMIC at 03:39

## 2021-01-01 RX ADMIN — NYSTATIN 500000 UNITS: 100000 SUSPENSION ORAL at 08:40

## 2021-01-01 RX ADMIN — ONDANSETRON 4 MG: 2 INJECTION INTRAMUSCULAR; INTRAVENOUS at 01:06

## 2021-01-01 RX ADMIN — INSULIN LISPRO 2 UNITS: 100 INJECTION, SOLUTION INTRAVENOUS; SUBCUTANEOUS at 21:48

## 2021-01-01 RX ADMIN — SODIUM CHLORIDE: 9 INJECTION, SOLUTION INTRAVENOUS at 15:11

## 2021-01-01 RX ADMIN — HEPARIN SODIUM 5000 UNITS: 5000 INJECTION INTRAVENOUS; SUBCUTANEOUS at 12:57

## 2021-01-01 RX ADMIN — NYSTATIN 500000 UNITS: 100000 SUSPENSION ORAL at 12:29

## 2021-01-01 RX ADMIN — NYSTATIN 500000 UNITS: 100000 SUSPENSION ORAL at 20:16

## 2021-01-01 RX ADMIN — Medication 10 ML: at 09:39

## 2021-01-01 RX ADMIN — SODIUM CHLORIDE: 9 INJECTION, SOLUTION INTRAVENOUS at 10:12

## 2021-01-01 RX ADMIN — LEVOTHYROXINE SODIUM 88 MCG: 0.09 TABLET ORAL at 06:14

## 2021-01-01 RX ADMIN — GABAPENTIN 300 MG: 300 CAPSULE ORAL at 20:39

## 2021-01-01 RX ADMIN — DOXYCYCLINE 100 MG: 100 INJECTION, POWDER, LYOPHILIZED, FOR SOLUTION INTRAVENOUS at 21:49

## 2021-01-01 RX ADMIN — INSULIN LISPRO 2 UNITS: 100 INJECTION, SOLUTION INTRAVENOUS; SUBCUTANEOUS at 05:06

## 2021-01-01 RX ADMIN — ATORVASTATIN CALCIUM 80 MG: 40 TABLET, FILM COATED ORAL at 08:56

## 2021-01-01 RX ADMIN — DEXAMETHASONE SODIUM PHOSPHATE 4 MG: 4 INJECTION, SOLUTION INTRA-ARTICULAR; INTRALESIONAL; INTRAMUSCULAR; INTRAVENOUS; SOFT TISSUE at 20:45

## 2021-01-01 RX ADMIN — GABAPENTIN 300 MG: 300 CAPSULE ORAL at 21:02

## 2021-01-01 RX ADMIN — DEXTROSE MONOHYDRATE: 100 INJECTION, SOLUTION INTRAVENOUS at 08:55

## 2021-01-01 RX ADMIN — SODIUM BICARBONATE 1300 MG: 650 TABLET ORAL at 14:35

## 2021-01-01 RX ADMIN — IPRATROPIUM BROMIDE AND ALBUTEROL 1 PUFF: 20; 100 SPRAY, METERED RESPIRATORY (INHALATION) at 17:34

## 2021-01-01 RX ADMIN — IPRATROPIUM BROMIDE AND ALBUTEROL SULFATE 1 AMPULE: .5; 3 SOLUTION RESPIRATORY (INHALATION) at 19:48

## 2021-01-01 RX ADMIN — HEPARIN SODIUM 5000 UNITS: 5000 INJECTION INTRAVENOUS; SUBCUTANEOUS at 14:43

## 2021-01-01 RX ADMIN — Medication 125 MCG/HR: at 01:36

## 2021-01-01 RX ADMIN — CARVEDILOL 25 MG: 25 TABLET, FILM COATED ORAL at 18:13

## 2021-01-01 RX ADMIN — PROPOFOL 50 MCG/KG/MIN: 10 INJECTION, EMULSION INTRAVENOUS at 04:00

## 2021-01-01 RX ADMIN — INSULIN LISPRO 4 UNITS: 100 INJECTION, SOLUTION INTRAVENOUS; SUBCUTANEOUS at 17:59

## 2021-01-01 RX ADMIN — MIDODRINE HYDROCHLORIDE 10 MG: 2.5 TABLET ORAL at 14:53

## 2021-01-01 RX ADMIN — ATORVASTATIN CALCIUM 80 MG: 20 TABLET, FILM COATED ORAL at 09:46

## 2021-01-01 RX ADMIN — IPRATROPIUM BROMIDE AND ALBUTEROL 1 PUFF: 20; 100 SPRAY, METERED RESPIRATORY (INHALATION) at 06:15

## 2021-01-01 RX ADMIN — CARVEDILOL 25 MG: 25 TABLET, FILM COATED ORAL at 17:50

## 2021-01-01 RX ADMIN — PENTOXIFYLLINE 400 MG: 400 TABLET, EXTENDED RELEASE ORAL at 08:24

## 2021-01-01 RX ADMIN — DEXTROSE MONOHYDRATE 100 ML/HR: 50 INJECTION, SOLUTION INTRAVENOUS at 22:03

## 2021-01-01 RX ADMIN — ACETAMINOPHEN 650 MG: 325 TABLET, FILM COATED ORAL at 18:21

## 2021-01-01 RX ADMIN — HEPARIN SODIUM 5000 UNITS: 5000 INJECTION INTRAVENOUS; SUBCUTANEOUS at 05:30

## 2021-01-01 RX ADMIN — HEPARIN SODIUM 5000 UNITS: 5000 INJECTION INTRAVENOUS; SUBCUTANEOUS at 05:54

## 2021-01-01 RX ADMIN — INSULIN LISPRO 1 UNITS: 100 INJECTION, SOLUTION INTRAVENOUS; SUBCUTANEOUS at 10:09

## 2021-01-01 RX ADMIN — IPRATROPIUM BROMIDE AND ALBUTEROL SULFATE 1 AMPULE: .5; 3 SOLUTION RESPIRATORY (INHALATION) at 15:27

## 2021-01-01 RX ADMIN — CLOPIDOGREL BISULFATE 75 MG: 75 TABLET ORAL at 09:30

## 2021-01-01 RX ADMIN — FAMOTIDINE 10 MG: 20 TABLET, FILM COATED ORAL at 08:40

## 2021-01-01 RX ADMIN — PIPERACILLIN SODIUM AND TAZOBACTAM SODIUM 3375 MG: 3; .375 INJECTION, POWDER, LYOPHILIZED, FOR SOLUTION INTRAVENOUS at 06:03

## 2021-01-01 RX ADMIN — HEPARIN SODIUM 5000 UNITS: 5000 INJECTION INTRAVENOUS; SUBCUTANEOUS at 14:31

## 2021-01-01 RX ADMIN — PIPERACILLIN SODIUM AND TAZOBACTAM SODIUM 3375 MG: 3; .375 INJECTION, POWDER, LYOPHILIZED, FOR SOLUTION INTRAVENOUS at 06:11

## 2021-01-01 RX ADMIN — IPRATROPIUM BROMIDE AND ALBUTEROL SULFATE 1 AMPULE: .5; 3 SOLUTION RESPIRATORY (INHALATION) at 18:54

## 2021-01-01 RX ADMIN — Medication 10 ML: at 08:45

## 2021-01-01 RX ADMIN — HEPARIN SODIUM 5000 UNITS: 5000 INJECTION INTRAVENOUS; SUBCUTANEOUS at 05:09

## 2021-01-01 RX ADMIN — INSULIN LISPRO 4 UNITS: 100 INJECTION, SOLUTION INTRAVENOUS; SUBCUTANEOUS at 09:44

## 2021-01-01 RX ADMIN — HEPARIN SODIUM 5000 UNITS: 5000 INJECTION INTRAVENOUS; SUBCUTANEOUS at 13:46

## 2021-01-01 RX ADMIN — LEVOTHYROXINE SODIUM 50 MCG: 50 TABLET ORAL at 05:45

## 2021-01-01 RX ADMIN — POLYVINYL ALCOHOL 1 DROP: 14 SOLUTION/ DROPS OPHTHALMIC at 14:30

## 2021-01-01 RX ADMIN — DEXAMETHASONE SODIUM PHOSPHATE 4 MG: 4 INJECTION, SOLUTION INTRA-ARTICULAR; INTRALESIONAL; INTRAMUSCULAR; INTRAVENOUS; SOFT TISSUE at 21:38

## 2021-01-01 RX ADMIN — POLYVINYL ALCOHOL 1 DROP: 14 SOLUTION/ DROPS OPHTHALMIC at 23:30

## 2021-01-01 RX ADMIN — Medication 10 ML: at 21:24

## 2021-01-01 RX ADMIN — Medication 10 ML: at 20:12

## 2021-01-01 RX ADMIN — HEPARIN SODIUM 5000 UNITS: 5000 INJECTION INTRAVENOUS; SUBCUTANEOUS at 21:23

## 2021-01-01 RX ADMIN — NYSTATIN 500000 UNITS: 100000 SUSPENSION ORAL at 17:20

## 2021-01-01 RX ADMIN — VANCOMYCIN HYDROCHLORIDE 1000 MG: 1 INJECTION, POWDER, LYOPHILIZED, FOR SOLUTION INTRAVENOUS at 20:15

## 2021-01-01 RX ADMIN — CLOPIDOGREL BISULFATE 75 MG: 75 TABLET ORAL at 08:12

## 2021-01-01 RX ADMIN — CALCITRIOL 0.25 MCG: 0.25 CAPSULE ORAL at 08:59

## 2021-01-01 RX ADMIN — IPRATROPIUM BROMIDE AND ALBUTEROL 1 PUFF: 20; 100 SPRAY, METERED RESPIRATORY (INHALATION) at 18:56

## 2021-01-01 RX ADMIN — ATORVASTATIN CALCIUM 80 MG: 40 TABLET, FILM COATED ORAL at 09:38

## 2021-01-01 RX ADMIN — PROPOFOL 30 MCG/KG/MIN: 10 INJECTION, EMULSION INTRAVENOUS at 10:20

## 2021-01-01 RX ADMIN — ATORVASTATIN CALCIUM 80 MG: 20 TABLET, FILM COATED ORAL at 11:07

## 2021-01-01 RX ADMIN — INSULIN GLARGINE 9 UNITS: 100 INJECTION, SOLUTION SUBCUTANEOUS at 10:09

## 2021-01-01 RX ADMIN — GABAPENTIN 300 MG: 300 CAPSULE ORAL at 20:44

## 2021-01-01 RX ADMIN — HYDROXYZINE PAMOATE 25 MG: 25 CAPSULE ORAL at 21:34

## 2021-01-01 RX ADMIN — SODIUM CHLORIDE 500 ML: 9 INJECTION, SOLUTION INTRAVENOUS at 23:15

## 2021-01-01 RX ADMIN — DEXAMETHASONE SODIUM PHOSPHATE 4 MG: 4 INJECTION, SOLUTION INTRA-ARTICULAR; INTRALESIONAL; INTRAMUSCULAR; INTRAVENOUS; SOFT TISSUE at 12:57

## 2021-01-01 RX ADMIN — HEPARIN SODIUM 4540 UNITS: 1000 INJECTION, SOLUTION INTRAVENOUS; SUBCUTANEOUS at 20:00

## 2021-01-01 RX ADMIN — GABAPENTIN 300 MG: 250 SOLUTION ORAL at 21:24

## 2021-01-01 RX ADMIN — DEXAMETHASONE SODIUM PHOSPHATE 4 MG: 4 INJECTION, SOLUTION INTRA-ARTICULAR; INTRALESIONAL; INTRAMUSCULAR; INTRAVENOUS; SOFT TISSUE at 05:55

## 2021-01-01 RX ADMIN — Medication 10 ML: at 18:26

## 2021-01-01 RX ADMIN — FUROSEMIDE 60 MG: 10 INJECTION, SOLUTION INTRAMUSCULAR; INTRAVENOUS at 18:26

## 2021-01-01 RX ADMIN — IPRATROPIUM BROMIDE AND ALBUTEROL 1 PUFF: 20; 100 SPRAY, METERED RESPIRATORY (INHALATION) at 22:59

## 2021-01-01 RX ADMIN — DEXAMETHASONE SODIUM PHOSPHATE 4 MG: 4 INJECTION, SOLUTION INTRA-ARTICULAR; INTRALESIONAL; INTRAMUSCULAR; INTRAVENOUS; SOFT TISSUE at 14:51

## 2021-01-01 RX ADMIN — Medication 10 ML: at 21:23

## 2021-01-01 RX ADMIN — SODIUM BICARBONATE 1300 MG: 650 TABLET ORAL at 09:30

## 2021-01-01 RX ADMIN — SODIUM CHLORIDE: 9 INJECTION, SOLUTION INTRAVENOUS at 22:30

## 2021-01-01 RX ADMIN — SODIUM BICARBONATE 1300 MG: 650 TABLET ORAL at 08:36

## 2021-01-01 RX ADMIN — POLYVINYL ALCOHOL 1 DROP: 14 SOLUTION/ DROPS OPHTHALMIC at 09:09

## 2021-01-01 RX ADMIN — NYSTATIN 500000 UNITS: 100000 SUSPENSION ORAL at 20:47

## 2021-01-01 RX ADMIN — MIDODRINE HYDROCHLORIDE 10 MG: 2.5 TABLET ORAL at 08:55

## 2021-01-01 RX ADMIN — ACETYLCYSTEINE 400 MG: 200 SOLUTION ORAL; RESPIRATORY (INHALATION) at 09:37

## 2021-01-01 RX ADMIN — INSULIN GLARGINE 9 UNITS: 100 INJECTION, SOLUTION SUBCUTANEOUS at 09:41

## 2021-01-01 RX ADMIN — GABAPENTIN 300 MG: 300 CAPSULE ORAL at 19:59

## 2021-01-01 RX ADMIN — ATORVASTATIN CALCIUM 80 MG: 40 TABLET, FILM COATED ORAL at 09:40

## 2021-01-01 RX ADMIN — DEXAMETHASONE SODIUM PHOSPHATE 4 MG: 4 INJECTION, SOLUTION INTRA-ARTICULAR; INTRALESIONAL; INTRAMUSCULAR; INTRAVENOUS; SOFT TISSUE at 15:00

## 2021-01-01 RX ADMIN — ATORVASTATIN CALCIUM 80 MG: 20 TABLET, FILM COATED ORAL at 10:08

## 2021-01-01 RX ADMIN — Medication 10 ML: at 21:45

## 2021-01-01 RX ADMIN — HEPARIN SODIUM 5000 UNITS: 5000 INJECTION INTRAVENOUS; SUBCUTANEOUS at 20:19

## 2021-01-01 RX ADMIN — FAMOTIDINE 20 MG: 20 TABLET, FILM COATED ORAL at 21:19

## 2021-01-01 RX ADMIN — IPRATROPIUM BROMIDE AND ALBUTEROL 1 PUFF: 20; 100 SPRAY, METERED RESPIRATORY (INHALATION) at 10:44

## 2021-01-01 RX ADMIN — IPRATROPIUM BROMIDE AND ALBUTEROL 1 PUFF: 20; 100 SPRAY, METERED RESPIRATORY (INHALATION) at 23:40

## 2021-01-01 RX ADMIN — PIPERACILLIN SODIUM AND TAZOBACTAM SODIUM 3375 MG: 3; .375 INJECTION, POWDER, LYOPHILIZED, FOR SOLUTION INTRAVENOUS at 21:48

## 2021-01-01 RX ADMIN — DEXAMETHASONE SODIUM PHOSPHATE 4 MG: 4 INJECTION, SOLUTION INTRAMUSCULAR; INTRAVENOUS at 06:18

## 2021-01-01 RX ADMIN — IPRATROPIUM BROMIDE AND ALBUTEROL SULFATE 1 AMPULE: .5; 3 SOLUTION RESPIRATORY (INHALATION) at 05:36

## 2021-01-01 RX ADMIN — INSULIN GLARGINE 18 UNITS: 100 INJECTION, SOLUTION SUBCUTANEOUS at 08:38

## 2021-01-01 RX ADMIN — ASPIRIN 81 MG: 81 TABLET, CHEWABLE ORAL at 09:37

## 2021-01-01 RX ADMIN — FAMOTIDINE 20 MG: 20 TABLET, FILM COATED ORAL at 21:33

## 2021-01-01 RX ADMIN — IPRATROPIUM BROMIDE AND ALBUTEROL SULFATE 1 AMPULE: .5; 3 SOLUTION RESPIRATORY (INHALATION) at 12:25

## 2021-01-01 RX ADMIN — IPRATROPIUM BROMIDE AND ALBUTEROL 1 PUFF: 20; 100 SPRAY, METERED RESPIRATORY (INHALATION) at 18:58

## 2021-01-01 RX ADMIN — INSULIN LISPRO 4 UNITS: 100 INJECTION, SOLUTION INTRAVENOUS; SUBCUTANEOUS at 17:08

## 2021-01-01 RX ADMIN — DEXAMETHASONE SODIUM PHOSPHATE 4 MG: 4 INJECTION, SOLUTION INTRA-ARTICULAR; INTRALESIONAL; INTRAMUSCULAR; INTRAVENOUS; SOFT TISSUE at 05:49

## 2021-01-01 RX ADMIN — INSULIN LISPRO 2 UNITS: 100 INJECTION, SOLUTION INTRAVENOUS; SUBCUTANEOUS at 17:39

## 2021-01-01 RX ADMIN — CARVEDILOL 25 MG: 25 TABLET, FILM COATED ORAL at 09:38

## 2021-01-01 RX ADMIN — IPRATROPIUM BROMIDE AND ALBUTEROL SULFATE 1 AMPULE: .5; 3 SOLUTION RESPIRATORY (INHALATION) at 08:19

## 2021-01-01 RX ADMIN — INSULIN LISPRO 2 UNITS: 100 INJECTION, SOLUTION INTRAVENOUS; SUBCUTANEOUS at 12:14

## 2021-01-01 RX ADMIN — HEPARIN SODIUM 5000 UNITS: 5000 INJECTION INTRAVENOUS; SUBCUTANEOUS at 05:31

## 2021-01-01 RX ADMIN — Medication 10 ML: at 20:41

## 2021-01-01 RX ADMIN — LEVOTHYROXINE SODIUM 88 MCG: 0.09 TABLET ORAL at 06:45

## 2021-01-01 RX ADMIN — PROPOFOL 20 MCG/KG/MIN: 10 INJECTION, EMULSION INTRAVENOUS at 06:26

## 2021-01-01 RX ADMIN — LEVOFLOXACIN 250 MG: 5 INJECTION, SOLUTION INTRAVENOUS at 16:59

## 2021-01-01 RX ADMIN — NYSTATIN 500000 UNITS: 100000 SUSPENSION ORAL at 21:24

## 2021-01-01 RX ADMIN — NYSTATIN 500000 UNITS: 100000 SUSPENSION ORAL at 09:51

## 2021-01-01 RX ADMIN — HEPARIN SODIUM 5000 UNITS: 5000 INJECTION INTRAVENOUS; SUBCUTANEOUS at 14:47

## 2021-01-01 RX ADMIN — HEPARIN SODIUM 5000 UNITS: 5000 INJECTION INTRAVENOUS; SUBCUTANEOUS at 13:54

## 2021-01-01 RX ADMIN — CARVEDILOL 25 MG: 25 TABLET, FILM COATED ORAL at 09:46

## 2021-01-01 RX ADMIN — INSULIN LISPRO 6 UNITS: 100 INJECTION, SOLUTION INTRAVENOUS; SUBCUTANEOUS at 08:26

## 2021-01-01 RX ADMIN — MIDODRINE HYDROCHLORIDE 10 MG: 2.5 TABLET ORAL at 20:16

## 2021-01-01 RX ADMIN — IPRATROPIUM BROMIDE AND ALBUTEROL SULFATE 1 AMPULE: .5; 3 SOLUTION RESPIRATORY (INHALATION) at 08:48

## 2021-01-01 RX ADMIN — SODIUM CHLORIDE: 9 INJECTION, SOLUTION INTRAVENOUS at 10:36

## 2021-01-01 RX ADMIN — MAGESIUM CITRATE 296 ML: 1.75 LIQUID ORAL at 13:05

## 2021-01-01 RX ADMIN — IPRATROPIUM BROMIDE AND ALBUTEROL SULFATE 1 AMPULE: .5; 3 SOLUTION RESPIRATORY (INHALATION) at 15:07

## 2021-01-01 RX ADMIN — HYDRALAZINE HYDROCHLORIDE 10 MG: 20 INJECTION INTRAMUSCULAR; INTRAVENOUS at 01:15

## 2021-01-01 RX ADMIN — ACETYLCYSTEINE 400 MG: 200 SOLUTION ORAL; RESPIRATORY (INHALATION) at 06:58

## 2021-01-01 RX ADMIN — DEXTROSE MONOHYDRATE 12.5 G: 500 INJECTION PARENTERAL at 04:33

## 2021-01-01 RX ADMIN — SODIUM CHLORIDE: 9 INJECTION, SOLUTION INTRAVENOUS at 23:00

## 2021-01-01 RX ADMIN — Medication 10 ML: at 21:38

## 2021-01-01 RX ADMIN — DEXAMETHASONE SODIUM PHOSPHATE 4 MG: 4 INJECTION, SOLUTION INTRA-ARTICULAR; INTRALESIONAL; INTRAMUSCULAR; INTRAVENOUS; SOFT TISSUE at 05:59

## 2021-01-01 RX ADMIN — IPRATROPIUM BROMIDE AND ALBUTEROL SULFATE 1 AMPULE: .5; 3 SOLUTION RESPIRATORY (INHALATION) at 09:37

## 2021-01-01 RX ADMIN — PROPOFOL 30 MCG/KG/MIN: 10 INJECTION, EMULSION INTRAVENOUS at 11:06

## 2021-01-01 RX ADMIN — GABAPENTIN 300 MG: 300 CAPSULE ORAL at 21:41

## 2021-01-01 RX ADMIN — SODIUM CHLORIDE: 9 INJECTION, SOLUTION INTRAVENOUS at 22:42

## 2021-01-01 RX ADMIN — Medication 10 ML: at 08:57

## 2021-01-01 RX ADMIN — Medication 15 G: at 15:40

## 2021-01-01 RX ADMIN — ONDANSETRON 4 MG: 2 INJECTION INTRAMUSCULAR; INTRAVENOUS at 17:07

## 2021-01-01 RX ADMIN — ACETYLCYSTEINE 400 MG: 200 SOLUTION ORAL; RESPIRATORY (INHALATION) at 18:54

## 2021-01-01 RX ADMIN — SODIUM CHLORIDE: 9 INJECTION, SOLUTION INTRAVENOUS at 10:00

## 2021-01-01 RX ADMIN — PIPERACILLIN SODIUM AND TAZOBACTAM SODIUM 3375 MG: 3; .375 INJECTION, POWDER, LYOPHILIZED, FOR SOLUTION INTRAVENOUS at 06:26

## 2021-01-01 RX ADMIN — HEPARIN SODIUM 5000 UNITS: 5000 INJECTION INTRAVENOUS; SUBCUTANEOUS at 06:00

## 2021-01-01 RX ADMIN — POLYVINYL ALCOHOL 1 DROP: 14 SOLUTION/ DROPS OPHTHALMIC at 20:30

## 2021-01-01 RX ADMIN — INSULIN LISPRO 4 UNITS: 100 INJECTION, SOLUTION INTRAVENOUS; SUBCUTANEOUS at 18:27

## 2021-01-01 RX ADMIN — LABETALOL HYDROCHLORIDE 20 MG: 5 INJECTION INTRAVENOUS at 16:15

## 2021-01-01 RX ADMIN — CALCITRIOL 0.25 MCG: 0.25 CAPSULE ORAL at 09:37

## 2021-01-01 RX ADMIN — FAMOTIDINE 20 MG: 20 TABLET, FILM COATED ORAL at 09:40

## 2021-01-01 RX ADMIN — ASPIRIN 81 MG CHEWABLE TABLET 81 MG: 81 TABLET CHEWABLE at 10:08

## 2021-01-01 RX ADMIN — SODIUM CHLORIDE, PRESERVATIVE FREE 10 ML: 5 INJECTION INTRAVENOUS at 17:01

## 2021-01-01 RX ADMIN — CARVEDILOL 25 MG: 25 TABLET, FILM COATED ORAL at 08:56

## 2021-01-01 RX ADMIN — Medication 125 MCG/HR: at 17:47

## 2021-01-01 RX ADMIN — DEXAMETHASONE SODIUM PHOSPHATE 4 MG: 4 INJECTION, SOLUTION INTRAMUSCULAR; INTRAVENOUS at 21:00

## 2021-01-01 RX ADMIN — DEXAMETHASONE SODIUM PHOSPHATE 4 MG: 4 INJECTION, SOLUTION INTRA-ARTICULAR; INTRALESIONAL; INTRAMUSCULAR; INTRAVENOUS; SOFT TISSUE at 13:54

## 2021-01-01 RX ADMIN — LEVOTHYROXINE SODIUM 88 MCG: 0.09 TABLET ORAL at 05:14

## 2021-01-01 RX ADMIN — SODIUM CHLORIDE 1000 ML: 9 INJECTION, SOLUTION INTRAVENOUS at 16:01

## 2021-01-01 RX ADMIN — DEXAMETHASONE SODIUM PHOSPHATE 4 MG: 4 INJECTION, SOLUTION INTRAMUSCULAR; INTRAVENOUS at 12:29

## 2021-01-01 RX ADMIN — POLYVINYL ALCOHOL 1 DROP: 14 SOLUTION/ DROPS OPHTHALMIC at 06:57

## 2021-01-01 RX ADMIN — INSULIN LISPRO 3 UNITS: 100 INJECTION, SOLUTION INTRAVENOUS; SUBCUTANEOUS at 21:46

## 2021-01-01 RX ADMIN — NYSTATIN 500000 UNITS: 100000 SUSPENSION ORAL at 17:45

## 2021-01-01 RX ADMIN — IPRATROPIUM BROMIDE AND ALBUTEROL SULFATE 1 AMPULE: .5; 3 SOLUTION RESPIRATORY (INHALATION) at 06:58

## 2021-01-01 RX ADMIN — ASPIRIN 81 MG: 81 TABLET, CHEWABLE ORAL at 09:40

## 2021-01-01 RX ADMIN — IPRATROPIUM BROMIDE AND ALBUTEROL 1 PUFF: 20; 100 SPRAY, METERED RESPIRATORY (INHALATION) at 00:42

## 2021-01-01 RX ADMIN — HEPARIN SODIUM 5000 UNITS: 5000 INJECTION INTRAVENOUS; SUBCUTANEOUS at 20:15

## 2021-01-01 RX ADMIN — POLYVINYL ALCOHOL 1 DROP: 14 SOLUTION/ DROPS OPHTHALMIC at 15:30

## 2021-01-01 RX ADMIN — ASPIRIN 81 MG CHEWABLE TABLET 81 MG: 81 TABLET CHEWABLE at 08:24

## 2021-01-01 RX ADMIN — CARVEDILOL 25 MG: 25 TABLET, FILM COATED ORAL at 08:24

## 2021-01-01 RX ADMIN — INSULIN GLARGINE 9 UNITS: 100 INJECTION, SOLUTION SUBCUTANEOUS at 09:14

## 2021-01-01 RX ADMIN — SODIUM BICARBONATE 1300 MG: 650 TABLET ORAL at 20:10

## 2021-01-01 RX ADMIN — Medication 10 ML: at 09:51

## 2021-01-01 RX ADMIN — Medication 125 MCG/HR: at 09:53

## 2021-01-01 RX ADMIN — SODIUM BICARBONATE 1300 MG: 650 TABLET ORAL at 20:00

## 2021-01-01 RX ADMIN — HEPARIN SODIUM 5000 UNITS: 5000 INJECTION INTRAVENOUS; SUBCUTANEOUS at 22:30

## 2021-01-01 RX ADMIN — DEXAMETHASONE SODIUM PHOSPHATE 4 MG: 4 INJECTION, SOLUTION INTRA-ARTICULAR; INTRALESIONAL; INTRAMUSCULAR; INTRAVENOUS; SOFT TISSUE at 06:33

## 2021-01-01 RX ADMIN — INSULIN LISPRO 4 UNITS: 100 INJECTION, SOLUTION INTRAVENOUS; SUBCUTANEOUS at 09:01

## 2021-01-01 RX ADMIN — LEVOTHYROXINE SODIUM 88 MCG: 0.09 TABLET ORAL at 06:25

## 2021-01-01 RX ADMIN — IPRATROPIUM BROMIDE AND ALBUTEROL 1 PUFF: 20; 100 SPRAY, METERED RESPIRATORY (INHALATION) at 07:56

## 2021-01-01 RX ADMIN — POLYVINYL ALCOHOL 1 DROP: 14 SOLUTION/ DROPS OPHTHALMIC at 23:12

## 2021-01-01 RX ADMIN — DEXAMETHASONE SODIUM PHOSPHATE 6 MG: 10 INJECTION, SOLUTION INTRAMUSCULAR; INTRAVENOUS at 16:36

## 2021-01-01 RX ADMIN — IPRATROPIUM BROMIDE AND ALBUTEROL SULFATE 1 AMPULE: .5; 3 SOLUTION RESPIRATORY (INHALATION) at 14:55

## 2021-01-01 RX ADMIN — CISATRACURIUM BESYLATE 2 MCG/KG/MIN: 10 INJECTION, SOLUTION INTRAVENOUS at 09:15

## 2021-01-01 RX ADMIN — CARVEDILOL 25 MG: 25 TABLET, FILM COATED ORAL at 18:25

## 2021-01-01 RX ADMIN — Medication 125 MCG/HR: at 05:06

## 2021-01-01 RX ADMIN — IPRATROPIUM BROMIDE AND ALBUTEROL 1 PUFF: 20; 100 SPRAY, METERED RESPIRATORY (INHALATION) at 11:48

## 2021-01-01 RX ADMIN — DEXAMETHASONE SODIUM PHOSPHATE 4 MG: 4 INJECTION, SOLUTION INTRA-ARTICULAR; INTRALESIONAL; INTRAMUSCULAR; INTRAVENOUS; SOFT TISSUE at 05:45

## 2021-01-01 RX ADMIN — DOPAMINE HYDROCHLORIDE 3 MCG/KG/MIN: 320 INJECTION, SOLUTION INTRAVENOUS at 16:37

## 2021-01-01 RX ADMIN — SODIUM CHLORIDE: 9 INJECTION, SOLUTION INTRAVENOUS at 12:32

## 2021-01-01 RX ADMIN — ASPIRIN 81 MG: 81 TABLET, CHEWABLE ORAL at 08:24

## 2021-01-01 RX ADMIN — SODIUM BICARBONATE 1300 MG: 650 TABLET ORAL at 12:35

## 2021-01-01 RX ADMIN — IPRATROPIUM BROMIDE AND ALBUTEROL 1 PUFF: 20; 100 SPRAY, METERED RESPIRATORY (INHALATION) at 17:36

## 2021-01-01 RX ADMIN — SODIUM CHLORIDE: 9 INJECTION, SOLUTION INTRAVENOUS at 21:37

## 2021-01-01 RX ADMIN — Medication 10 ML: at 08:42

## 2021-01-01 RX ADMIN — ENOXAPARIN SODIUM 50 MG: 60 INJECTION SUBCUTANEOUS at 14:27

## 2021-01-01 RX ADMIN — POLYVINYL ALCOHOL 1 DROP: 14 SOLUTION/ DROPS OPHTHALMIC at 22:54

## 2021-01-01 RX ADMIN — SODIUM CHLORIDE: 9 INJECTION, SOLUTION INTRAVENOUS at 06:24

## 2021-01-01 RX ADMIN — Medication 125 MCG/HR: at 07:51

## 2021-01-01 RX ADMIN — ENOXAPARIN SODIUM 30 MG: 30 INJECTION SUBCUTANEOUS at 09:18

## 2021-01-01 RX ADMIN — ONDANSETRON 4 MG: 2 INJECTION INTRAMUSCULAR; INTRAVENOUS at 18:05

## 2021-01-01 RX ADMIN — LEVOTHYROXINE SODIUM 50 MCG: 50 TABLET ORAL at 05:07

## 2021-01-01 RX ADMIN — SODIUM CHLORIDE: 9 INJECTION, SOLUTION INTRAVENOUS at 21:50

## 2021-01-01 RX ADMIN — PENTOXIFYLLINE 400 MG: 400 TABLET, EXTENDED RELEASE ORAL at 11:07

## 2021-01-01 RX ADMIN — FLUCONAZOLE IN SODIUM CHLORIDE 400 MG: 2 INJECTION, SOLUTION INTRAVENOUS at 17:08

## 2021-01-01 RX ADMIN — FAMOTIDINE 20 MG: 20 TABLET, FILM COATED ORAL at 09:09

## 2021-01-01 RX ADMIN — NYSTATIN 500000 UNITS: 100000 SUSPENSION ORAL at 08:36

## 2021-01-01 RX ADMIN — FAMOTIDINE 10 MG: 20 TABLET, FILM COATED ORAL at 09:51

## 2021-01-01 RX ADMIN — HEPARIN SODIUM 5000 UNITS: 5000 INJECTION INTRAVENOUS; SUBCUTANEOUS at 05:46

## 2021-01-01 RX ADMIN — PIPERACILLIN SODIUM AND TAZOBACTAM SODIUM 3375 MG: 3; .375 INJECTION, POWDER, LYOPHILIZED, FOR SOLUTION INTRAVENOUS at 06:42

## 2021-01-01 RX ADMIN — DEXAMETHASONE SODIUM PHOSPHATE 4 MG: 4 INJECTION, SOLUTION INTRA-ARTICULAR; INTRALESIONAL; INTRAMUSCULAR; INTRAVENOUS; SOFT TISSUE at 06:20

## 2021-01-01 RX ADMIN — IPRATROPIUM BROMIDE AND ALBUTEROL SULFATE 1 AMPULE: .5; 3 SOLUTION RESPIRATORY (INHALATION) at 09:30

## 2021-01-01 RX ADMIN — ACETAMINOPHEN 1000 MG: 500 TABLET ORAL at 18:03

## 2021-01-01 RX ADMIN — AMLODIPINE BESYLATE 5 MG: 5 TABLET ORAL at 09:40

## 2021-01-01 RX ADMIN — PROPOFOL 50 MCG/KG/MIN: 10 INJECTION, EMULSION INTRAVENOUS at 04:28

## 2021-01-01 RX ADMIN — DOXYCYCLINE 100 MG: 100 INJECTION, POWDER, LYOPHILIZED, FOR SOLUTION INTRAVENOUS at 23:25

## 2021-01-01 RX ADMIN — POLYVINYL ALCOHOL 1 DROP: 14 SOLUTION/ DROPS OPHTHALMIC at 21:12

## 2021-01-01 RX ADMIN — IPRATROPIUM BROMIDE AND ALBUTEROL SULFATE 1 AMPULE: .5; 3 SOLUTION RESPIRATORY (INHALATION) at 10:26

## 2021-01-01 RX ADMIN — HEPARIN SODIUM 5000 UNITS: 5000 INJECTION INTRAVENOUS; SUBCUTANEOUS at 14:46

## 2021-01-01 RX ADMIN — SODIUM BICARBONATE 1300 MG: 650 TABLET ORAL at 21:19

## 2021-01-01 RX ADMIN — IPRATROPIUM BROMIDE AND ALBUTEROL SULFATE 1 AMPULE: .5; 3 SOLUTION RESPIRATORY (INHALATION) at 10:24

## 2021-01-01 RX ADMIN — DEXAMETHASONE SODIUM PHOSPHATE 4 MG: 4 INJECTION, SOLUTION INTRAMUSCULAR; INTRAVENOUS at 18:22

## 2021-01-01 RX ADMIN — VECURONIUM BROMIDE 10 MG: 1 INJECTION, POWDER, LYOPHILIZED, FOR SOLUTION INTRAVENOUS at 13:04

## 2021-01-01 RX ADMIN — PIPERACILLIN SODIUM AND TAZOBACTAM SODIUM 3375 MG: 3; .375 INJECTION, POWDER, LYOPHILIZED, FOR SOLUTION INTRAVENOUS at 06:01

## 2021-01-01 RX ADMIN — HEPARIN SODIUM 5000 UNITS: 5000 INJECTION INTRAVENOUS; SUBCUTANEOUS at 14:26

## 2021-01-01 RX ADMIN — INSULIN LISPRO 2 UNITS: 100 INJECTION, SOLUTION INTRAVENOUS; SUBCUTANEOUS at 23:00

## 2021-01-01 RX ADMIN — PIPERACILLIN SODIUM AND TAZOBACTAM SODIUM 3375 MG: 3; .375 INJECTION, POWDER, LYOPHILIZED, FOR SOLUTION INTRAVENOUS at 16:13

## 2021-01-01 RX ADMIN — INSULIN LISPRO 6 UNITS: 100 INJECTION, SOLUTION INTRAVENOUS; SUBCUTANEOUS at 13:35

## 2021-01-01 RX ADMIN — PENTOXIFYLLINE 400 MG: 400 TABLET, FILM COATED, EXTENDED RELEASE ORAL at 09:38

## 2021-01-01 RX ADMIN — SODIUM BICARBONATE 1300 MG: 650 TABLET ORAL at 08:12

## 2021-01-01 RX ADMIN — PENTOXIFYLLINE 400 MG: 400 TABLET, EXTENDED RELEASE ORAL at 21:19

## 2021-01-01 RX ADMIN — PROPOFOL 20 MCG/KG/MIN: 10 INJECTION, EMULSION INTRAVENOUS at 23:52

## 2021-01-01 RX ADMIN — POLYVINYL ALCOHOL 1 DROP: 14 SOLUTION/ DROPS OPHTHALMIC at 16:11

## 2021-01-01 RX ADMIN — IPRATROPIUM BROMIDE AND ALBUTEROL SULFATE 1 AMPULE: .5; 3 SOLUTION RESPIRATORY (INHALATION) at 17:14

## 2021-01-01 RX ADMIN — HEPARIN SODIUM 5000 UNITS: 5000 INJECTION INTRAVENOUS; SUBCUTANEOUS at 20:50

## 2021-01-01 RX ADMIN — PENTOXIFYLLINE 400 MG: 400 TABLET, FILM COATED, EXTENDED RELEASE ORAL at 09:10

## 2021-01-01 RX ADMIN — IPRATROPIUM BROMIDE AND ALBUTEROL 1 PUFF: 20; 100 SPRAY, METERED RESPIRATORY (INHALATION) at 09:47

## 2021-01-01 RX ADMIN — LEVOTHYROXINE SODIUM 88 MCG: 0.09 TABLET ORAL at 06:06

## 2021-01-01 RX ADMIN — AMLODIPINE BESYLATE 5 MG: 5 TABLET ORAL at 09:51

## 2021-01-01 RX ADMIN — IPRATROPIUM BROMIDE AND ALBUTEROL SULFATE 1 AMPULE: .5; 3 SOLUTION RESPIRATORY (INHALATION) at 09:20

## 2021-01-01 RX ADMIN — IPRATROPIUM BROMIDE AND ALBUTEROL 1 PUFF: 20; 100 SPRAY, METERED RESPIRATORY (INHALATION) at 05:51

## 2021-01-01 RX ADMIN — AMLODIPINE BESYLATE 5 MG: 5 TABLET ORAL at 21:10

## 2021-01-01 RX ADMIN — AMLODIPINE BESYLATE 5 MG: 5 TABLET ORAL at 11:06

## 2021-01-01 RX ADMIN — INSULIN GLARGINE 18 UNITS: 100 INJECTION, SOLUTION SUBCUTANEOUS at 09:31

## 2021-01-01 RX ADMIN — IPRATROPIUM BROMIDE AND ALBUTEROL SULFATE 1 AMPULE: .5; 3 SOLUTION RESPIRATORY (INHALATION) at 09:25

## 2021-01-01 RX ADMIN — Medication 125 MCG/HR: at 18:29

## 2021-01-01 RX ADMIN — IPRATROPIUM BROMIDE AND ALBUTEROL 1 PUFF: 20; 100 SPRAY, METERED RESPIRATORY (INHALATION) at 06:14

## 2021-01-01 RX ADMIN — NYSTATIN 500000 UNITS: 100000 SUSPENSION ORAL at 13:23

## 2021-01-01 RX ADMIN — Medication 125 MCG/HR: at 11:58

## 2021-01-01 RX ADMIN — DOXYCYCLINE 100 MG: 100 INJECTION, POWDER, LYOPHILIZED, FOR SOLUTION INTRAVENOUS at 11:27

## 2021-01-01 RX ADMIN — SODIUM CHLORIDE: 9 INJECTION, SOLUTION INTRAVENOUS at 23:30

## 2021-01-01 RX ADMIN — SODIUM BICARBONATE 1300 MG: 650 TABLET ORAL at 14:47

## 2021-01-01 RX ADMIN — SODIUM BICARBONATE 1300 MG: 650 TABLET ORAL at 21:13

## 2021-01-01 RX ADMIN — LEVOTHYROXINE SODIUM 50 MCG: 50 TABLET ORAL at 06:20

## 2021-01-01 RX ADMIN — SODIUM BICARBONATE 1300 MG: 650 TABLET ORAL at 08:56

## 2021-01-01 RX ADMIN — CLOPIDOGREL BISULFATE 75 MG: 75 TABLET ORAL at 09:13

## 2021-01-01 RX ADMIN — INSULIN GLARGINE 20 UNITS: 100 INJECTION, SOLUTION SUBCUTANEOUS at 09:10

## 2021-01-01 RX ADMIN — CARVEDILOL 6.25 MG: 6.25 TABLET, FILM COATED ORAL at 20:15

## 2021-01-01 RX ADMIN — PIPERACILLIN SODIUM AND TAZOBACTAM SODIUM 3375 MG: 3; .375 INJECTION, POWDER, LYOPHILIZED, FOR SOLUTION INTRAVENOUS at 18:40

## 2021-01-01 RX ADMIN — DEXAMETHASONE SODIUM PHOSPHATE 4 MG: 4 INJECTION, SOLUTION INTRA-ARTICULAR; INTRALESIONAL; INTRAMUSCULAR; INTRAVENOUS; SOFT TISSUE at 22:07

## 2021-01-01 RX ADMIN — INSULIN LISPRO 4 UNITS: 100 INJECTION, SOLUTION INTRAVENOUS; SUBCUTANEOUS at 12:21

## 2021-01-01 RX ADMIN — SODIUM BICARBONATE 1300 MG: 650 TABLET ORAL at 09:12

## 2021-01-01 RX ADMIN — POLYVINYL ALCOHOL 1 DROP: 14 SOLUTION/ DROPS OPHTHALMIC at 16:51

## 2021-01-01 RX ADMIN — Medication 10 ML: at 22:10

## 2021-01-01 RX ADMIN — Medication 10 ML: at 09:31

## 2021-01-01 RX ADMIN — DEXAMETHASONE SODIUM PHOSPHATE 4 MG: 4 INJECTION, SOLUTION INTRAMUSCULAR; INTRAVENOUS at 18:10

## 2021-01-01 RX ADMIN — SODIUM BICARBONATE 1300 MG: 650 TABLET ORAL at 20:17

## 2021-01-01 RX ADMIN — POLYVINYL ALCOHOL 1 DROP: 14 SOLUTION/ DROPS OPHTHALMIC at 20:10

## 2021-01-01 RX ADMIN — POLYVINYL ALCOHOL 1 DROP: 14 SOLUTION/ DROPS OPHTHALMIC at 12:29

## 2021-01-01 RX ADMIN — IPRATROPIUM BROMIDE AND ALBUTEROL SULFATE 1 AMPULE: .5; 3 SOLUTION RESPIRATORY (INHALATION) at 09:00

## 2021-01-01 RX ADMIN — IPRATROPIUM BROMIDE AND ALBUTEROL SULFATE 1 AMPULE: .5; 3 SOLUTION RESPIRATORY (INHALATION) at 12:57

## 2021-01-01 RX ADMIN — IPRATROPIUM BROMIDE AND ALBUTEROL SULFATE 1 AMPULE: .5; 3 SOLUTION RESPIRATORY (INHALATION) at 05:28

## 2021-01-01 RX ADMIN — FAMOTIDINE 20 MG: 20 TABLET, FILM COATED ORAL at 09:41

## 2021-01-01 RX ADMIN — HEPARIN SODIUM 5000 UNITS: 5000 INJECTION INTRAVENOUS; SUBCUTANEOUS at 06:09

## 2021-01-01 RX ADMIN — HEPARIN SODIUM 5000 UNITS: 5000 INJECTION INTRAVENOUS; SUBCUTANEOUS at 20:49

## 2021-01-01 RX ADMIN — CISATRACURIUM BESYLATE 2 MCG/KG/MIN: 10 INJECTION, SOLUTION INTRAVENOUS at 16:30

## 2021-01-01 RX ADMIN — INSULIN LISPRO 4 UNITS: 100 INJECTION, SOLUTION INTRAVENOUS; SUBCUTANEOUS at 09:26

## 2021-01-01 RX ADMIN — DEXAMETHASONE SODIUM PHOSPHATE 4 MG: 4 INJECTION, SOLUTION INTRAMUSCULAR; INTRAVENOUS at 18:02

## 2021-01-01 RX ADMIN — NYSTATIN 500000 UNITS: 100000 SUSPENSION ORAL at 12:40

## 2021-01-01 RX ADMIN — INSULIN LISPRO 1 UNITS: 100 INJECTION, SOLUTION INTRAVENOUS; SUBCUTANEOUS at 16:51

## 2021-01-01 RX ADMIN — ATORVASTATIN CALCIUM 80 MG: 20 TABLET, FILM COATED ORAL at 08:24

## 2021-01-01 RX ADMIN — HEPARIN SODIUM 5000 UNITS: 5000 INJECTION INTRAVENOUS; SUBCUTANEOUS at 21:44

## 2021-01-01 RX ADMIN — DEXAMETHASONE SODIUM PHOSPHATE 4 MG: 4 INJECTION, SOLUTION INTRA-ARTICULAR; INTRALESIONAL; INTRAMUSCULAR; INTRAVENOUS; SOFT TISSUE at 14:50

## 2021-01-01 RX ADMIN — PROPOFOL 50 MCG/KG/MIN: 10 INJECTION, EMULSION INTRAVENOUS at 09:56

## 2021-01-01 RX ADMIN — MIDODRINE HYDROCHLORIDE 10 MG: 2.5 TABLET ORAL at 08:12

## 2021-01-01 RX ADMIN — INSULIN LISPRO 3 UNITS: 100 INJECTION, SOLUTION INTRAVENOUS; SUBCUTANEOUS at 12:27

## 2021-01-01 ASSESSMENT — PAIN SCALES - WONG BAKER
WONGBAKER_NUMERICALRESPONSE: 0

## 2021-01-01 ASSESSMENT — PULMONARY FUNCTION TESTS
PIF_VALUE: 44
PIF_VALUE: 45
PIF_VALUE: 43
PIF_VALUE: 44
PIF_VALUE: 50
PIF_VALUE: 48
PIF_VALUE: 51
PIF_VALUE: 46
PIF_VALUE: 45
PIF_VALUE: 46
PIF_VALUE: 49
PIF_VALUE: 44
PIF_VALUE: 38
PIF_VALUE: 44
PIF_VALUE: 44
PIF_VALUE: 37
PIF_VALUE: 45
PIF_VALUE: 47
PIF_VALUE: 45
PIF_VALUE: 44
PIF_VALUE: 54
PIF_VALUE: 59
PIF_VALUE: 37
PIF_VALUE: 37
PIF_VALUE: 44
PIF_VALUE: 37
PIF_VALUE: 48
PIF_VALUE: 44
PIF_VALUE: 31
PIF_VALUE: 37
PIF_VALUE: 49
PIF_VALUE: 37
PIF_VALUE: 31
PIF_VALUE: 62
PIF_VALUE: 44
PIF_VALUE: 36
PIF_VALUE: 49
PIF_VALUE: 37
PIF_VALUE: 43
PIF_VALUE: 37
PIF_VALUE: 37
PIF_VALUE: 65
PIF_VALUE: 48
PIF_VALUE: 44
PIF_VALUE: 44
PIF_VALUE: 37
PIF_VALUE: 37
PIF_VALUE: 44
PIF_VALUE: 37
PIF_VALUE: 41
PIF_VALUE: 37
PIF_VALUE: 44
PIF_VALUE: 52
PIF_VALUE: 36
PIF_VALUE: 44
PIF_VALUE: 44
PIF_VALUE: 51
PIF_VALUE: 44
PIF_VALUE: 27
PIF_VALUE: 54
PIF_VALUE: 37
PIF_VALUE: 44
PIF_VALUE: 64
PIF_VALUE: 43
PIF_VALUE: 37
PIF_VALUE: 36
PIF_VALUE: 44
PIF_VALUE: 47
PIF_VALUE: 45
PIF_VALUE: 37
PIF_VALUE: 37
PIF_VALUE: 45
PIF_VALUE: 46
PIF_VALUE: 44
PIF_VALUE: 44
PIF_VALUE: 37
PIF_VALUE: 44
PIF_VALUE: 45
PIF_VALUE: 44
PIF_VALUE: 37
PIF_VALUE: 39
PIF_VALUE: 36
PIF_VALUE: 37
PIF_VALUE: 44
PIF_VALUE: 28
PIF_VALUE: 37
PIF_VALUE: 44
PIF_VALUE: 43
PIF_VALUE: 37
PIF_VALUE: 43
PIF_VALUE: 37
PIF_VALUE: 36
PIF_VALUE: 37
PIF_VALUE: 43
PIF_VALUE: 36
PIF_VALUE: 37
PIF_VALUE: 28
PIF_VALUE: 37
PIF_VALUE: 39
PIF_VALUE: 37
PIF_VALUE: 53
PIF_VALUE: 44
PIF_VALUE: 44
PIF_VALUE: 37
PIF_VALUE: 43
PIF_VALUE: 36
PIF_VALUE: 33
PIF_VALUE: 44
PIF_VALUE: 37
PIF_VALUE: 44
PIF_VALUE: 37
PIF_VALUE: 46
PIF_VALUE: 38
PIF_VALUE: 37
PIF_VALUE: 46
PIF_VALUE: 26
PIF_VALUE: 52
PIF_VALUE: 36
PIF_VALUE: 49
PIF_VALUE: 37
PIF_VALUE: 42
PIF_VALUE: 37
PIF_VALUE: 44
PIF_VALUE: 46
PIF_VALUE: 36
PIF_VALUE: 44
PIF_VALUE: 44
PIF_VALUE: 48
PIF_VALUE: 37
PIF_VALUE: 50
PIF_VALUE: 34
PIF_VALUE: 44
PIF_VALUE: 38
PIF_VALUE: 37
PIF_VALUE: 44
PIF_VALUE: 37
PIF_VALUE: 26
PIF_VALUE: 56
PIF_VALUE: 36
PIF_VALUE: 28
PIF_VALUE: 38
PIF_VALUE: 44
PIF_VALUE: 34
PIF_VALUE: 37
PIF_VALUE: 44
PIF_VALUE: 39
PIF_VALUE: 0
PIF_VALUE: 44
PIF_VALUE: 53
PIF_VALUE: 37
PIF_VALUE: 37
PIF_VALUE: 43
PIF_VALUE: 44
PIF_VALUE: 37
PIF_VALUE: 43
PIF_VALUE: 36
PIF_VALUE: 44
PIF_VALUE: 48
PIF_VALUE: 37
PIF_VALUE: 37
PIF_VALUE: 43
PIF_VALUE: 37
PIF_VALUE: 37
PIF_VALUE: 47
PIF_VALUE: 25
PIF_VALUE: 43
PIF_VALUE: 50
PIF_VALUE: 37
PIF_VALUE: 46
PIF_VALUE: 37
PIF_VALUE: 50
PIF_VALUE: 37
PIF_VALUE: 37
PIF_VALUE: 44
PIF_VALUE: 35
PIF_VALUE: 37
PIF_VALUE: 44
PIF_VALUE: 33
PIF_VALUE: 35
PIF_VALUE: 50
PIF_VALUE: 37
PIF_VALUE: 49
PIF_VALUE: 44
PIF_VALUE: 44
PIF_VALUE: 37
PIF_VALUE: 40
PIF_VALUE: 44
PIF_VALUE: 36
PIF_VALUE: 65
PIF_VALUE: 50
PIF_VALUE: 37
PIF_VALUE: 48
PIF_VALUE: 44
PIF_VALUE: 47
PIF_VALUE: 37
PIF_VALUE: 37
PIF_VALUE: 33
PIF_VALUE: 37
PIF_VALUE: 43
PIF_VALUE: 43
PIF_VALUE: 44
PIF_VALUE: 45
PIF_VALUE: 37
PIF_VALUE: 44
PIF_VALUE: 44
PIF_VALUE: 39
PIF_VALUE: 37
PIF_VALUE: 44
PIF_VALUE: 47
PIF_VALUE: 37
PIF_VALUE: 36
PIF_VALUE: 41
PIF_VALUE: 37
PIF_VALUE: 43
PIF_VALUE: 38
PIF_VALUE: 43
PIF_VALUE: 54
PIF_VALUE: 44
PIF_VALUE: 53
PIF_VALUE: 43
PIF_VALUE: 47
PIF_VALUE: 44
PIF_VALUE: 43
PIF_VALUE: 44
PIF_VALUE: 42
PIF_VALUE: 44
PIF_VALUE: 43
PIF_VALUE: 44
PIF_VALUE: 44
PIF_VALUE: 39
PIF_VALUE: 46
PIF_VALUE: 37
PIF_VALUE: 45
PIF_VALUE: 50
PIF_VALUE: 37
PIF_VALUE: 43
PIF_VALUE: 48
PIF_VALUE: 37
PIF_VALUE: 44
PIF_VALUE: 48
PIF_VALUE: 53
PIF_VALUE: 42
PIF_VALUE: 37
PIF_VALUE: 44
PIF_VALUE: 44
PIF_VALUE: 37
PIF_VALUE: 44

## 2021-01-01 ASSESSMENT — PAIN SCALES - GENERAL
PAINLEVEL_OUTOF10: 0

## 2021-01-01 ASSESSMENT — ENCOUNTER SYMPTOMS
ALLERGIC/IMMUNOLOGIC NEGATIVE: 1
WHEEZING: 1
SHORTNESS OF BREATH: 0
ALLERGIC/IMMUNOLOGIC NEGATIVE: 1
BACK PAIN: 0
EYE PAIN: 0
BLURRED VISION: 0
BACK PAIN: 0
NAUSEA: 0
SHORTNESS OF BREATH: 0
GASTROINTESTINAL NEGATIVE: 1
NAUSEA: 1
SHORTNESS OF BREATH: 1
DIARRHEA: 0
EYE DISCHARGE: 0
VOMITING: 0
COUGH: 1
SHORTNESS OF BREATH: 1
SORE THROAT: 0
SHORTNESS OF BREATH: 1
EYE PAIN: 0
VOMITING: 0
TACHYPNEA: 1
COUGH: 1
EYES NEGATIVE: 1
SHORTNESS OF BREATH: 1
ABDOMINAL PAIN: 0
SORE THROAT: 0
NAUSEA: 0
EYE REDNESS: 0
EYES NEGATIVE: 1
DIARRHEA: 0
ALLERGIC/IMMUNOLOGIC NEGATIVE: 1
ABDOMINAL PAIN: 0
EYES NEGATIVE: 1
NAUSEA: 0
COUGH: 1
VOMITING: 0
ABDOMINAL DISTENTION: 0
WHEEZING: 0
SINUS PRESSURE: 0
COUGH: 0

## 2021-01-01 ASSESSMENT — PAIN DESCRIPTION - PROGRESSION
CLINICAL_PROGRESSION: OTHER (COMMENT)
CLINICAL_PROGRESSION: NOT CHANGED
CLINICAL_PROGRESSION: OTHER (COMMENT)

## 2021-01-01 ASSESSMENT — PAIN DESCRIPTION - LOCATION
LOCATION: TOE (COMMENT WHICH ONE)
LOCATION: HEAD
LOCATION: ABDOMEN

## 2021-01-01 ASSESSMENT — PAIN DESCRIPTION - PAIN TYPE: TYPE: ACUTE PAIN

## 2021-01-01 ASSESSMENT — PAIN DESCRIPTION - FREQUENCY: FREQUENCY: CONTINUOUS

## 2021-01-01 ASSESSMENT — PAIN DESCRIPTION - DESCRIPTORS: DESCRIPTORS: ACHING;THROBBING

## 2021-01-10 NOTE — ED PROVIDER NOTES
The history is provided by the patient. Foot Problem  Location:  Toe  Time since incident:  1 day  Injury: yes    Mechanism of injury: crush    Crush:     Mechanism: ran into a hover board. Toe location:  L little toe  Pain details:     Quality:  Aching    Radiates to:  Does not radiate    Severity:  Moderate  Associated symptoms: no back pain and no fever         Review of Systems   Constitutional: Negative for chills and fever. HENT: Negative for ear pain, sinus pressure and sore throat. Eyes: Negative for pain, discharge and redness. Respiratory: Negative for cough, shortness of breath and wheezing. Cardiovascular: Negative for chest pain. Gastrointestinal: Negative for abdominal distention, diarrhea, nausea and vomiting. Genitourinary: Negative for dysuria and frequency. Musculoskeletal: Negative for arthralgias and back pain. Skin: Negative for rash and wound. Neurological: Negative for weakness and headaches. Hematological: Negative for adenopathy. All other systems reviewed and are negative. Physical Exam  Vitals signs and nursing note reviewed. Constitutional:       Appearance: She is well-developed. HENT:      Head: Normocephalic and atraumatic. Right Ear: Hearing and external ear normal.      Left Ear: Hearing and external ear normal.      Nose: Nose normal.      Mouth/Throat:      Pharynx: Uvula midline. Eyes:      General: Lids are normal.      Conjunctiva/sclera: Conjunctivae normal.      Pupils: Pupils are equal, round, and reactive to light. Neck:      Musculoskeletal: Normal range of motion and neck supple. Cardiovascular:      Rate and Rhythm: Normal rate and regular rhythm. Heart sounds: Normal heart sounds. No murmur. Pulmonary:      Effort: Pulmonary effort is normal. No respiratory distress. Breath sounds: Normal breath sounds. No wheezing or rales.    Abdominal:      General: Bowel sounds are normal. Palpations: Abdomen is soft. Abdomen is not rigid. Tenderness: There is no abdominal tenderness. There is no guarding or rebound. Musculoskeletal:      Left foot: Decreased range of motion. Tenderness and swelling present. Feet:    Skin:     General: Skin is warm and dry. Findings: No abrasion or rash. Neurological:      Mental Status: She is alert and oriented to person, place, and time. GCS: GCS eye subscore is 4. GCS verbal subscore is 5. GCS motor subscore is 6. Cranial Nerves: No cranial nerve deficit. Sensory: No sensory deficit. Coordination: Coordination normal.      Gait: Gait normal.          Procedures     MDM          --------------------------------------------- PAST HISTORY ---------------------------------------------  Past Medical History:  has a past medical history of Acid reflux, Hyperlipidemia, Hypertension, Hypothyroidism, S/P appendectomy, and Type II or unspecified type diabetes mellitus without mention of complication, not stated as uncontrolled. Past Surgical History:  has a past surgical history that includes Appendectomy and Hysterectomy. Social History:  reports that she has never smoked. She has never used smokeless tobacco. She reports that she does not drink alcohol or use drugs. Family History: family history includes Diabetes in her brother, mother, and sister; High Blood Pressure in her mother. The patients home medications have been reviewed. Allergies: Demerol and Toradol [ketorolac tromethamine]    -------------------------------------------------- RESULTS -------------------------------------------------  Labs:  No results found for this visit on 01/10/21. Radiology:  XR FOOT LEFT (MIN 3 VIEWS)   Final Result   No acute abnormality.       Xr Foot Left (min 3 Views)    Result Date: 1/10/2021 EXAMINATION: THREE XRAY VIEWS OF THE LEFT FOOT 1/10/2021 3:00 pm COMPARISON: None. HISTORY: ORDERING SYSTEM PROVIDED HISTORY: struck 5th toe to [employer] TECHNOLOGIST PROVIDED HISTORY: Reason for exam:->struck 5th toe to dueñas board FINDINGS: Radiographs of the left foot reveal no evidence of fracture or joint dislocation. Moderate hallux valgus deformity the soft tissues are grossly unremarkable. A tiny enthesophyte is seen in the calcaneus, at the insertion of the plantar fascia. No acute abnormality.      ------------------------- NURSING NOTES AND VITALS REVIEWED ---------------------------  Date / Time Roomed:  1/10/2021  2:47 PM  ED Bed Assignment:  01/01    The nursing notes within the ED encounter and vital signs as below have been reviewed. BP (!) 159/85   Pulse 81   Temp 97.1 °F (36.2 °C) (Temporal)   Resp 16   Ht 5' (1.524 m)   Wt 130 lb (59 kg)   SpO2 99%   BMI 25.39 kg/m²   Oxygen Saturation Interpretation: Normal      ------------------------------------------ PROGRESS NOTES ------------------------------------------  I have spoken with the patient and discussed todays results, in addition to providing specific details for the plan of care and counseling regarding the diagnosis and prognosis. Their questions are answered at this time and they are agreeable with the plan. I discussed at length with them reasons for immediate return here for re evaluation. They will followup with primary care by calling their office tomorrow. --------------------------------- ADDITIONAL PROVIDER NOTES ---------------------------------  At this time the patient is without objective evidence of an acute process requiring hospitalization or inpatient management. They have remained hemodynamically stable throughout their entire ED visit and are stable for discharge with outpatient follow-up. The plan has been discussed in detail and they are aware of the specific conditions for emergent return, as well as the importance of follow-up. New Prescriptions    ACETAMINOPHEN (TYLENOL) 500 MG TABLET    Take 1 tablet by mouth 4 times daily as needed for Pain       Diagnosis:  1. Contusion of left foot, initial encounter        Disposition:  Patient's disposition: Discharge to home  Patient's condition is stable.                      Jared Gonsalves MD  01/10/21 1538

## 2021-03-08 PROBLEM — I16.0 HYPERTENSIVE URGENCY: Status: ACTIVE | Noted: 2021-01-01

## 2021-03-08 NOTE — ED PROVIDER NOTES
Patient was instructed to come to the ED for further evaluation. Her renal function has gone from 2 to 3 over the past 6 months. She reports worsening blood pressures despite treatment. She was told to take her hydralazine 3 times a day however it made her feel \"loopy\" and she went back to taking it twice a day with improvement in symptoms. No fever, chills, abdominal pain, decrease in urination or headache. The history is provided by the patient. Hypertension  Severity:  Unable to specify  Onset quality:  Unable to specify  Timing:  Unable to specify  Chronicity:  Chronic  Context: not medication change and not noncompliance    Relieved by:  Nothing  Worsened by:  Nothing  Ineffective treatments:  Ca channel blockers, diuretics and rest  Associated symptoms: no abdominal pain, no anxiety, no blurred vision, no chest pain, no fatigue, no fever, no headaches, no loss of consciousness, no nausea, no neck pain, no peripheral edema, no shortness of breath, no syncope, not vomiting and no weakness    Risk factors: kidney disease    Risk factors: no diabetes        Review of Systems   Constitutional: Negative for activity change, appetite change, chills, diaphoresis, fatigue and fever. HENT: Negative. Eyes: Negative for blurred vision. Respiratory: Negative for shortness of breath. Cardiovascular: Negative for chest pain and syncope. Gastrointestinal: Negative for abdominal pain, diarrhea, nausea and vomiting. Genitourinary: Negative for decreased urine volume, dysuria and flank pain. Musculoskeletal: Negative for myalgias and neck pain. Skin: Negative. Neurological: Negative for loss of consciousness, syncope, weakness, light-headedness and headaches. Psychiatric/Behavioral: Negative. The patient is not nervous/anxious. Physical Exam  Vitals signs and nursing note reviewed. Constitutional:       General: She is not in acute distress. Appearance: Normal appearance.  She is well-developed and normal weight. She is not ill-appearing or toxic-appearing. HENT:      Head: Normocephalic and atraumatic. Nose: Nose normal.      Mouth/Throat:      Mouth: Mucous membranes are moist.      Pharynx: Oropharynx is clear. Eyes:      Pupils: Pupils are equal, round, and reactive to light. Neck:      Musculoskeletal: Normal range of motion. Neck rigidity and muscular tenderness (left posterior paraspinous) present. Cardiovascular:      Rate and Rhythm: Normal rate and regular rhythm. Heart sounds: Normal heart sounds. No murmur. Pulmonary:      Effort: Pulmonary effort is normal. No respiratory distress. Breath sounds: Normal breath sounds. No wheezing or rales. Abdominal:      General: Bowel sounds are normal.      Palpations: Abdomen is soft. There is no mass. Tenderness: There is no abdominal tenderness. There is no right CVA tenderness, left CVA tenderness, guarding or rebound. Musculoskeletal: Normal range of motion. Right lower leg: No edema. Left lower leg: No edema. Skin:     General: Skin is warm and dry. Capillary Refill: Capillary refill takes less than 2 seconds. Coloration: Skin is not pale. Neurological:      General: No focal deficit present. Mental Status: She is alert and oriented to person, place, and time. Mental status is at baseline. Cranial Nerves: No cranial nerve deficit. Coordination: Coordination normal.   Psychiatric:         Mood and Affect: Mood normal.         Behavior: Behavior normal.         Thought Content:  Thought content normal.         Judgment: Judgment normal.         Procedures    MDM       EKG Interpretation    Interpreted by emergency department physician    Rhythm: 1 degree AV block  Rate: normal  Axis: normal  Ectopy: none  Conduction: normal  ST Segments: normal  T Waves: normal  Q Waves: none    Clinical Impression: no acute changes    Fitz Mclaughlin --------------------------------------------- PAST HISTORY ---------------------------------------------  Past Medical History:  has a past medical history of Acid reflux, Hyperlipidemia, Hypertension, Hypothyroidism, S/P appendectomy, and Type II or unspecified type diabetes mellitus without mention of complication, not stated as uncontrolled. Past Surgical History:  has a past surgical history that includes Appendectomy and Hysterectomy. Social History:  reports that she has never smoked. She has never used smokeless tobacco. She reports that she does not drink alcohol or use drugs. Family History: family history includes Diabetes in her brother, mother, and sister; High Blood Pressure in her mother. The patients home medications have been reviewed.     Allergies: Demerol and Toradol [ketorolac tromethamine]    -------------------------------------------------- RESULTS -------------------------------------------------    Lab  Results for orders placed or performed during the hospital encounter of 03/08/21   CBC Auto Differential   Result Value Ref Range    WBC 4.7 4.5 - 11.5 E9/L    RBC 3.43 (L) 3.50 - 5.50 E12/L    Hemoglobin 9.3 (L) 11.5 - 15.5 g/dL    Hematocrit 29.0 (L) 34.0 - 48.0 %    MCV 84.5 80.0 - 99.9 fL    MCH 27.1 26.0 - 35.0 pg    MCHC 32.1 32.0 - 34.5 %    RDW 13.4 11.5 - 15.0 fL    Platelets 485 840 - 184 E9/L    MPV 9.4 7.0 - 12.0 fL    Neutrophils % 58.1 43.0 - 80.0 %    Immature Granulocytes % 0.2 0.0 - 5.0 %    Lymphocytes % 25.7 20.0 - 42.0 %    Monocytes % 14.3 (H) 2.0 - 12.0 %    Eosinophils % 1.3 0.0 - 6.0 %    Basophils % 0.4 0.0 - 2.0 %    Neutrophils Absolute 2.73 1.80 - 7.30 E9/L    Immature Granulocytes # 0.01 E9/L    Lymphocytes Absolute 1.21 (L) 1.50 - 4.00 E9/L    Monocytes Absolute 0.67 0.10 - 0.95 E9/L    Eosinophils Absolute 0.06 0.05 - 0.50 E9/L    Basophils Absolute 0.02 0.00 - 0.20 N4/A   Basic Metabolic Panel   Result Value Ref Range    Sodium 137 132 - 146 mmol/L Potassium 3.8 3.5 - 5.0 mmol/L    Chloride 105 98 - 107 mmol/L    CO2 21 (L) 22 - 29 mmol/L    Anion Gap 11 7 - 16 mmol/L    Glucose 144 (H) 74 - 99 mg/dL    BUN 44 (H) 8 - 23 mg/dL    CREATININE 3.2 (H) 0.5 - 1.0 mg/dL    GFR Non-African American 18 >=60 mL/min/1.73    GFR African American 18     Calcium 9.1 8.6 - 10.2 mg/dL   Troponin   Result Value Ref Range    Troponin 0.12 (H) 0.00 - 0.03 ng/mL   EKG 12 Lead   Result Value Ref Range    Ventricular Rate 97 BPM    Atrial Rate 97 BPM    P-R Interval 216 ms    QRS Duration 88 ms    Q-T Interval 366 ms    QTc Calculation (Bazett) 464 ms    P Axis 68 degrees    R Axis 35 degrees    T Axis 67 degrees       Radiology  No orders to display       EKG: This EKG is signed and interpreted by me.        ------------------------- NURSING NOTES AND VITALS REVIEWED ---------------------------  Date / Time Roomed:  3/8/2021  1:21 PM  ED Bed Assignment:  07/07    The nursing notes within the ED encounter and vital signs as below have been reviewed. Patient Vitals for the past 24 hrs:   BP Temp Pulse Resp SpO2 Height Weight   03/08/21 1430   80 16      03/08/21 1400 (!) 162/85    98 %     03/08/21 1325 (!) 215/96 99.1 °F (37.3 °C) 94 14 99 % 5' (1.524 m) 117 lb (53.1 kg)       Oxygen Saturation Interpretation: Normal      ------------------------------------------ PROGRESS NOTES ------------------------------------------  Re-evaluation(s):  Time: 14:50. Patients symptoms show no change  Repeat physical examination is not changed        I have spoken with the patient and discussed todays results, in addition to providing specific details for the plan of care and counseling regarding the diagnosis and prognosis. Their questions are answered at this time and they are agreeable with the plan.      --------------------------------- ADDITIONAL PROVIDER NOTES ---------------------------------  Consultations:  Spoke with Dr. Gaby Jefferson,  They will admit this patient. This patient's ED course included: a personal history and physicial examination, multiple bedside re-evaluations and IV medications    This patient has been closely monitored during their ED course. Please note that the withdrawal or failure to initiate urgent interventions for this patient would likely result in a life threatening deterioration or permanent disability. Accordingly this patient received 0 minutes of critical care time, excluding separately billable procedures. Systems at risk for deterioration include: cardiac/renal.      Clinical Impression  1. Hypertensive urgency    2. Acute renal insufficiency          Disposition  Patient's disposition: Admit to telemetry  Patient's condition is stable.        [address], DO  03/08/21 [id number]

## 2021-03-08 NOTE — PLAN OF CARE
Problem: Falls - Risk of:  Goal: Will remain free from falls  Description: Will remain free from falls  Outcome: Met This Shift  Goal: Absence of physical injury  Description: Absence of physical injury  Outcome: Met This Shift     Problem: Cardiac:  Goal: Cerebral tissue perfusion will improve  Description: Cerebral tissue perfusion will improve  Outcome: Met This Shift     Problem: Coping:  Goal: Ability to identify and develop effective coping behavior will improve  Description: Ability to identify and develop effective coping behavior will improve  Outcome: Met This Shift     Problem: Health Behavior:  Goal: Identification of resources available to assist in meeting health care needs will improve  Description: Identification of resources available to assist in meeting health care needs will improve  Outcome: Met This Shift     Problem: Nutritional:  Goal: Ability to identify appropriate dietary choices will improve  Description: Ability to identify appropriate dietary choices will improve  Outcome: Met This Shift

## 2021-03-08 NOTE — H&P
Department of Internal Medicine  General Internal Medicine  Attending History and Physical      CHIEF COMPLAINT:  Abnormal Labs. Reason for Admission:  Acute On chronic Kidney Disease    History Obtained From:  patient    HISTORY OF PRESENT ILLNESS:      The patient is a 57 y.o. female with significant past medical history of diabetes type 2, Essential hypertension who presents to the ER at the request of her nephrologist. She noted that patient's renal function has declined over the past few months. Her baseline creatinine used to be around 2, now it's at 3.2. She was asked to come to the ER. In the ER, she was noted to have elevated BP. She received labetalol. Her BP improved a little bit. She is being admitted for both Hypertensive urgency and worsening renal function. Past Medical History:        Diagnosis Date    Acid reflux     Hyperlipidemia     Hypertension     Hypothyroidism     S/P appendectomy     Type II or unspecified type diabetes mellitus without mention of complication, not stated as uncontrolled      Past Surgical History:        Procedure Laterality Date    APPENDECTOMY      HYSTERECTOMY       Medications Prior to Admission:    Not in a hospital admission. Allergies:  Demerol and Toradol [ketorolac tromethamine]    Social History:   TOBACCO:   reports that she has never smoked.  She has never used smokeless tobacco.    Family History:       Problem Relation Age of Onset    Diabetes Mother     High Blood Pressure Mother     Diabetes Brother     Diabetes Sister      REVIEW OF SYSTEMS:  CONSTITUTIONAL:  negative for  fevers and chills  EYES:  negative for  contacts and glasses  HEENT:  negative for  hearing loss and tinnitus  RESPIRATORY:  negative for  dry cough and cough with sputum  CARDIOVASCULAR:  negative for  chest pain, dyspnea  GASTROINTESTINAL:  positive for nausea and vomiting  ENDOCRINE:  negative for heat intolerance and cold intolerance  MUSCULOSKELETAL:  negative for  myalgias and arthralgias  NEUROLOGICAL:  negative for headaches and dizziness  BEHAVIOR/PSYCH:  negative for poor appetite and increased appetite  PHYSICAL EXAM:    Vitals:  BP (!) 193/89   Pulse 99   Temp 98 °F (36.7 °C)   Resp 16   Ht 5' (1.524 m)   Wt 117 lb (53.1 kg)   SpO2 96%   BMI 22.85 kg/m²     CONSTITUTIONAL:  awake, alert, cooperative, no apparent distress, and appears stated age  EYES:  Lids and lashes normal, pupils equal, round and reactive to light, extra ocular muscles intact, sclera clear, conjunctiva normal  ENT:  Normocephalic, without obvious abnormality, atraumatic, sinuses nontender on palpation, external ears without lesions, oral pharynx with moist mucus membranes, tonsils without erythema or exudates, gums normal and good dentition.   NECK:  Supple, symmetrical, trachea midline, no adenopathy, thyroid symmetric, not enlarged and no tenderness, skin normal  HEMATOLOGIC/LYMPHATICS:  no cervical lymphadenopathy  LUNGS:  No increased work of breathing, good air exchange, clear to auscultation bilaterally, no crackles or wheezing  CARDIOVASCULAR:  Normal apical impulse, regular rate and rhythm, normal S1 and S2, no S3 or S4, and no murmur noted  ABDOMEN:  No scars, normal bowel sounds, soft, non-distended, non-tender, no masses palpated, no hepatosplenomegally  MUSCULOSKELETAL:  there is no redness, warmth, or swelling of the joints  NEUROLOGIC:  No focal neuro deficit  SKIN:  no bruising or bleeding    DATA:  CBC:   Lab Results   Component Value Date    WBC 4.7 03/08/2021    RBC 3.43 03/08/2021    HGB 9.3 03/08/2021    HCT 29.0 03/08/2021    MCV 84.5 03/08/2021    MCH 27.1 03/08/2021    MCHC 32.1 03/08/2021    RDW 13.4 03/08/2021     03/08/2021    MPV 9.4 03/08/2021     CMP:    Lab Results   Component Value Date     03/08/2021    K 3.8 03/08/2021     03/08/2021    CO2 21 03/08/2021    BUN 44 03/08/2021    CREATININE 3.2 03/08/2021    GFRAA 18 03/08/2021    MISSY

## 2021-03-09 NOTE — PROGRESS NOTES
Department of Internal Medicine  General Internal Medicine  Attending Progress Note      SUBJECTIVE:    She reports that she is doing okay today. Denied any further fever. No nausea or vomiting. Aware of plans to control her blood pressure at home while at the same time monitoring her renal function to see if there is improvement.   OBJECTIVE      Medications    Current Facility-Administered Medications: carvedilol (COREG) tablet 12.5 mg, 12.5 mg, Oral, BID WC  aspirin chewable tablet 81 mg, 81 mg, Oral, Daily  atorvastatin (LIPITOR) tablet 80 mg, 80 mg, Oral, Daily  clopidogrel (PLAVIX) tablet 75 mg, 75 mg, Oral, Daily  gabapentin (NEURONTIN) capsule 300 mg, 300 mg, Oral, Nightly  insulin glargine (LANTUS) injection vial 18 Units, 18 Units, Subcutaneous, QAM  levothyroxine (SYNTHROID) tablet 50 mcg, 50 mcg, Oral, Daily  famotidine (PEPCID) tablet 20 mg, 20 mg, Oral, Daily  sodium chloride flush 0.9 % injection 10 mL, 10 mL, Intravenous, 2 times per day  sodium chloride flush 0.9 % injection 10 mL, 10 mL, Intravenous, PRN  heparin (porcine) injection 5,000 Units, 5,000 Units, Subcutaneous, 3 times per day  promethazine (PHENERGAN) tablet 12.5 mg, 12.5 mg, Oral, Q6H PRN **OR** ondansetron (ZOFRAN) injection 4 mg, 4 mg, Intravenous, Q6H PRN  polyethylene glycol (GLYCOLAX) packet 17 g, 17 g, Oral, Daily PRN  acetaminophen (TYLENOL) tablet 650 mg, 650 mg, Oral, Q6H PRN **OR** acetaminophen (TYLENOL) suppository 650 mg, 650 mg, Rectal, Q6H PRN  labetalol (NORMODYNE;TRANDATE) injection 20 mg, 20 mg, Intravenous, Q6H PRN  0.9 % sodium chloride infusion, , Intravenous, Continuous  Physical    VITALS:  BP (!) 144/68   Pulse 77   Temp 101.5 °F (38.6 °C) (Oral)   Resp 18   Ht 5' (1.524 m)   Wt 117 lb (53.1 kg)   SpO2 93%   BMI 22.85 kg/m²   CONSTITUTIONAL:  awake, alert, cooperative, no apparent distress, and appears stated age  EYES:  Lids and lashes normal, pupils equal, round and reactive to light, extra ocular muscles intact, sclera clear, conjunctiva normal  ENT:  Normocephalic, without obvious abnormality, atraumatic, sinuses nontender on palpation, external ears without lesions, oral pharynx with moist mucus membranes, tonsils without erythema or exudates, gums normal and good dentition. NECK:  Supple, symmetrical, trachea midline, no adenopathy, thyroid symmetric, not enlarged and no tenderness, skin normal  HEMATOLOGIC/LYMPHATICS:  no cervical lymphadenopathy  BACK:  Symmetric, no curvature, spinous processes are non-tender on palpation, paraspinous muscles are non-tender on palpation, no costal vertebral tenderness  LUNGS:  No increased work of breathing, good air exchange, clear to auscultation bilaterally, no crackles or wheezing  CARDIOVASCULAR:  Normal apical impulse, regular rate and rhythm, normal S1 and S2, no S3 or S4, and no murmur noted  ABDOMEN:  No scars, normal bowel sounds, soft, non-distended, non-tender, no masses palpated, no hepatosplenomegally  MUSCULOSKELETAL:  there is no redness, warmth, or swelling of the joints  NEUROLOGIC:  Awake, alert, oriented to name, place and time. SKIN:  no bruising or bleeding  Data    CBC:   Lab Results   Component Value Date    WBC 3.5 03/09/2021    RBC 2.96 03/09/2021    HGB 8.0 03/09/2021    HCT 25.4 03/09/2021    MCV 85.8 03/09/2021    MCH 27.0 03/09/2021    MCHC 31.5 03/09/2021    RDW 13.9 03/09/2021     03/09/2021    MPV 10.0 03/09/2021     BMP:    Lab Results   Component Value Date     03/09/2021    K 4.2 03/09/2021     03/09/2021    CO2 20 03/09/2021    BUN 46 03/09/2021    LABALBU 2.9 03/09/2021    LABALBU 4.1 03/21/2011    CREATININE 3.5 03/09/2021    CALCIUM 8.6 03/09/2021    GFRAA 16 03/09/2021    LABGLOM 16 03/09/2021    GLUCOSE 226 03/09/2021    GLUCOSE 419 03/21/2011       ASSESSMENT AND PLAN      1. Hypertensive urgency:  Still elevated  Started coreg.  If this is not sufficiently controlled, will add amlodipine  Continue to monitor    2. Acute on chronic renal disease: stage 3. Etiology could be both Hypertension vs DM:  Creatinine not overtly worsening  Continue to monitor  BMP in am  Appreciate renal input  Hold all nephrotoxic agents including ACEI. 3. DM type 2:  Check A1c  Overall control is poor. accucheck ACHS  Continue sling scale     4. Hypothyroidism: On synthroid  Continue to monitor    5. Hyperlipidemia: On statin    6.  FUO: resolved  Continue to monitor

## 2021-03-09 NOTE — CONSULTS
[facility] MD Ara Hines MD Winda Llanos, MD Fritzi Crosser DO, [address] [place] [address] Evelyn SALEH .  Consultation  Patient's Name: Noble Cheatham  4:00 PM  3/9/2021    Nephrologist: Victorine Prader, MD    Reason for Consult: Acue kidney injury /chronic kidney disease     Requesting Physician:  Hermilo Hummel MD    Chief Complaint:  Abnormal blood work . History Obtained From:  Patient , records , staff     History of Present Ilness: The patient is a 57 y.o. female with significant past medical history of diabetes type 2, Essential hypertension who presents to the ER following being noticed to have progressively elevated cr numbers   In looking in records pt cr has been trending up rather fast over the last year to year and half . She does have around 7 g proteinuria based on spot urine /cr ratio . Cr on presentation was 3.2 . Cr today 3.5   She was also found to have bp of 200 . At home she is on hctz/lisinopril as part of her antihtn . I saw her in her room this am , she is alert oriented pleasant 56 y/o F . Past Medical History:   Diagnosis Date    Acid reflux     Hyperlipidemia     Hypertension     Hypothyroidism     S/P appendectomy     Type II or unspecified type diabetes mellitus without mention of complication, not stated as uncontrolled        Past Surgical History:   Procedure Laterality Date    APPENDECTOMY      HYSTERECTOMY         Family History   Problem Relation Age of Onset    Diabetes Mother     High Blood Pressure Mother     Diabetes Brother     Diabetes Sister         reports that she has never smoked. She has never used smokeless tobacco. She reports that she does not drink alcohol or use drugs.     Allergies:  Demerol and Toradol [ketorolac tromethamine]    Current Medications:    carvedilol (COREG) tablet 12.5 mg, BID WC  insulin lispro (HUMALOG) injection vial 0-12 Units, TID WC  insulin lispro (HUMALOG) injection vial 0-6 Units, Nightly  glucose (GLUTOSE) 40 % oral gel 15 g, PRN  dextrose 50 % IV solution, PRN  glucagon (rDNA) injection 1 mg, PRN  dextrose 5 % solution, PRN  aspirin chewable tablet 81 mg, Daily  atorvastatin (LIPITOR) tablet 80 mg, Daily  clopidogrel (PLAVIX) tablet 75 mg, Daily  gabapentin (NEURONTIN) capsule 300 mg, Nightly  insulin glargine (LANTUS) injection vial 18 Units, QAM  levothyroxine (SYNTHROID) tablet 50 mcg, Daily  famotidine (PEPCID) tablet 20 mg, Daily  sodium chloride flush 0.9 % injection 10 mL, 2 times per day  sodium chloride flush 0.9 % injection 10 mL, PRN  heparin (porcine) injection 5,000 Units, 3 times per day  promethazine (PHENERGAN) tablet 12.5 mg, Q6H PRN    Or  ondansetron (ZOFRAN) injection 4 mg, Q6H PRN  polyethylene glycol (GLYCOLAX) packet 17 g, Daily PRN  acetaminophen (TYLENOL) tablet 650 mg, Q6H PRN    Or  acetaminophen (TYLENOL) suppository 650 mg, Q6H PRN  labetalol (NORMODYNE;TRANDATE) injection 20 mg, Q6H PRN  0.9 % sodium chloride infusion, Continuous        Review of Systems:   Constitutional: no fevers , no chills , feels ok   Eyes: no eye pain , no itching , no drainage  Ears, nose, mouth, throat, and face: no ear ,nose pain , hearing is ok ,no nasal drainage   Respiratory: no sob ,no cough ,no wheezing . Cardiovascular: no chest pain , no palpitation ,no sob . Gastrointestinal: no nausea, vomiting , constipation , no abdominal pain . Genitourinary:no urinary retention , no burning , dysuria . No polyuria   Hematologic/lymphatic: no bleeding , no cougulation issues . Musculoskeletal:no joint pain , no swelling . Neurological: no headaches ,no weakness , no numbness . Endocrine: no thirst , no weight issues . Physical exam:   Vital signs BP (!) 121/57   Pulse 74   Temp 100 °F (37.8 °C) (Oral)   Resp 14   Ht 5' (1.524 m)   Wt 117 lb (53.1 kg)   SpO2 92%   BMI 22.85 kg/m²   Gen : NAD , appropriate for stated age .    Head : at , nc   Neck : supple , no thyromegaly noted . Eyes : EOMI , PERRLA   CV : RRR , No M/R/G . Lungs: CTAB , no wheezing , good flow heard b/l   Abd : soft , NT , BS + , No Organomegaly appreciated . Skin : soft, dry . Neuro : CN  II-XII grossly intact , no focal neurologic deficit . Psych : cooperative .      Data:   Labs:  CBC with Differential:    Lab Results   Component Value Date    WBC 3.5 03/09/2021    RBC 2.96 03/09/2021    HGB 8.0 03/09/2021    HCT 25.4 03/09/2021     03/09/2021    MCV 85.8 03/09/2021    MCH 27.0 03/09/2021    MCHC 31.5 03/09/2021    RDW 13.9 03/09/2021    SEGSPCT 59 02/27/2013    LYMPHOPCT 24.0 03/09/2021    MONOPCT 14.7 03/09/2021    BASOPCT 0.3 03/09/2021    MONOSABS 0.52 03/09/2021    LYMPHSABS 0.85 03/09/2021    EOSABS 0.00 03/09/2021    BASOSABS 0.01 03/09/2021     CMP:    Lab Results   Component Value Date     03/09/2021    K 4.2 03/09/2021     03/09/2021    CO2 20 03/09/2021    BUN 46 03/09/2021    CREATININE 3.5 03/09/2021    GFRAA 16 03/09/2021    LABGLOM 16 03/09/2021    GLUCOSE 226 03/09/2021    GLUCOSE 419 03/21/2011    PROT 5.7 03/09/2021    LABALBU 2.9 03/09/2021    LABALBU 4.1 03/21/2011    CALCIUM 8.6 03/09/2021    BILITOT <0.2 03/09/2021    ALKPHOS 84 03/09/2021    AST 18 03/09/2021    ALT 12 03/09/2021     Ionized Calcium:  No results found for: IONCA  Magnesium:  No results found for: MG  Phosphorus:    Lab Results   Component Value Date    PHOS 4.5 03/06/2021     U/A:    Lab Results   Component Value Date    COLORU Straw 03/08/2021    PHUR 5.5 03/08/2021    LABCAST RARE 03/27/2015    WBCUA 0-1 03/08/2021    WBCUA NONE 02/06/2012    RBCUA 1-3 03/08/2021    RBCUA 0-1 12/25/2012    YEAST FEW 04/04/2015    BACTERIA NONE SEEN 03/08/2021    CLARITYU Clear 03/08/2021    SPECGRAV 1.025 03/08/2021    LEUKOCYTESUR Negative 03/08/2021    UROBILINOGEN 0.2 03/08/2021    BILIRUBINUR Negative 03/08/2021    BILIRUBINUR NEGATIVE 02/06/2012    BLOODU SMALL 03/08/2021    GLUCOSEU 250 03/08/2021    GLUCOSEU >=1000 02/06/2012    AMORPHOUS FEW 03/08/2021     Microalbumen/Creatinine ratio:  No components found for: RUCREAT  Iron Saturation:  No components found for: PERCENTFE  TIBC:  No results found for: TIBC  FERRITIN:  No results found for: FERRITIN     Imaging:  XR CHEST PORTABLE   Final Result   Left upper lobe opacity worrisome for pneumonia. US RETROPERITONEAL LIMITED   Final Result   Mildly atrophic and echogenic kidneys, compatible with mild chronic renal   parenchymal disease. Unremarkable ultrasound of the bladder. Assessment    -subacute kidney injury vs progressive worsening of her CKD     -Nephrotic range proteinuria    -HTN urgency     -Anaemia of chronic disease     -Mineral bone disease     -Insulin depedent diabetes with complication       PLAN :    Ms Emiliana Ortiz has been having rather progressive elevation in cr over the last 1.5 years . Cr back on 3/2019 was 1 . Cr on 11/2019 1.6 . Cr back on 8/2020 was 2.1 and now cr is 3.2   The CKD is likley due to diabetic nephropathy thought the progression is rather fast .   Her current GAVIN is likley also exacerbated by her uncontrolled HTN . -check US kidney   -check ISSA ,ANCA , SPEP , UPEP   -increase coreg to 12.5 mg bid . Continue to hold lisinopril/hctz   -once bp better and if cr dose not go down to 2 then would consider kidney biopsy to R/O other etiologies . -no current need for RRT/HD .          Thank you Dr. Angie Kumar MD for allowing us to participate in care of [facility]          Electronically signed by Ella Muller MD on 3/9/2021 at 4:00 PM

## 2021-03-09 NOTE — PROGRESS NOTES
Physical Therapy Initial Evaluation    Room #:  0330/0330-02  Patient Name: María Cobian  YOB: 1960  MRN: [id number]    Referring Provider:   Martha Lockett MD     Date of Service: 3/9/2021    Evaluating Physical Therapist: Chema Kimbrough, PT #[id number]       Diagnosis:   Hypertensive urgency [I16.0]     worsening blood pressures despite treatment. Patient Active Problem List   Diagnosis    Diabetes mellitus type 2, insulin dependent ([facility] [address]    Hypothyroid    Hypertension    Hyperlipidemia    Hypertensive urgency        Tentative placement recommendation: Home    Equipment recommendation:  To be determined  possibly cane for balance     Prior Level of Function: Patient ambulated independently    Rehab Potential: good  - for baseline    Past medical history:   Past Medical History:   Diagnosis Date    Acid reflux     Hyperlipidemia     Hypertension     Hypothyroidism     S/P appendectomy     Type II or unspecified type diabetes mellitus without mention of complication, not stated as uncontrolled      Past Surgical History:   Procedure Laterality Date    APPENDECTOMY      HYSTERECTOMY         Precautions: Up as tolerated, falls ,      SUBJECTIVE:    Social history: Patient lives alone   in a apartment  with No steps  to enter Tub shower grab bars    Equipment owned: [address] [facility] [address], [address] and Shower chair,  All unused    AM-PAC Basic Mobility        AM-[facility] Mobility Inpatient   How much difficulty turning over in bed?: None  How much difficulty sitting down on / standing up from a chair with arms?: A Little  How much difficulty moving from lying on back to sitting on side of bed?: None  How much help from another person moving to and from a bed to a chair?: A Little  How much help from another person needed to walk in hospital room?: A Little  How much help from another person for climbing 3-5 steps with a railing?: A Little  AM-PAC Inpatient Mobility Raw Score : 20  AM-PAC Inpatient T-Scale Score : 47.67  Mobility Inpatient CMS 0-100% Score: 35.83  Mobility Inpatient CMS G-Code Modifier : [address] cleared patient for PT evaluation. The admitting diagnosis and active problem list as listed above have been reviewed prior to the initiation of this evaluation. OBJECTIVE;   Initial Evaluation  Date: 3/9/2021 Treatment Date:     Short Term/ Long Term   Goals   Was pt agreeable to Eval/treatment? Yes    To be met in 2 days   Pain level   0/10        Bed Mobility    Rolling: Independent    Supine to sit: Independent    Sit to supine: Independent    Scooting: Independent    Rolling: Independent    Supine to sit:  Independent    Sit to supine: Independent    Scooting: Independent     Transfers Sit to stand: Minimal assist of 1    Sit to stand: Independent     Ambulation    2 x 130 feet using  no device with Minimal assist of 1   Loss of balance x 2 minimal assist to recover     150 feet using  least restrictive device versus no device with Independent    Stair negotiation: ascended and descended   Not assessed            ROM Within functional limits        Strength BUE:  4/5  RLE:  4/5  LLE:  4/5   Increase strength in affected mm groups by 1/3 grade   Balance Sitting EOB:  good    Dynamic Standing:  fair    Sitting EOB:  good    Dynamic Standing: good       Patient is Alert & Oriented x person, place, time and situation and follows directions    Sensation:  Patient  denies numbness and tingling     Edema:  no    Endurance: fair  +    Vitals: room air    Blood Pressure at rest   Blood Pressure during session     Heart Rate at rest 87 Heart Rate during session     SPO2 at rest 91%  SPO2 during session  %     Patient education  Patient educated on role of Physical Therapy, risks of immobility, safety and plan of care and  importance of mobility while in hospital       Patient response to education:   Pt verbalized understanding Pt demonstrated skill Pt requires further education in this area   Yes Partial Yes activities ()   8 minutes  1 unit(s)     Amarilys Tapia, PT

## 2021-03-09 NOTE — PROGRESS NOTES
OCCUPATIONAL THERAPY  Initial Evaluation  Date:3/9/2021  Patient Name: Michelle Ivey  MRN: [id number]  : 1960  ROOM #: 0330/0330-02     Referring Provider: Luis Rivas MD  Evaluating OT: Cory Sample OTR/L [id number]    Placement Recommendation: HHOT   Recommended Adaptive Equipment: none     [facility]   AM-PAC Daily Activity Inpatient   How much help for putting on and taking off regular lower body clothing?: A Little  How much help for Bathing?: A Little  How much help for Toileting?: A Little  How much help for putting on and taking off regular upper body clothing?: A Little  How much help for taking care of personal grooming?: A Little  How much help for eating meals?: None  AM-PAC Inpatient Daily Activity Raw Score: 19  AM-PAC Inpatient ADL T-Scale Score : 40.22  ADL Inpatient CMS 0-100% Score: 42.8  ADL Inpatient CMS G-Code Modifier : CK    Based on patient's functional performance as stated below and their level of assistance needed prior to admission, this therapist believes that the patient would benefit from skilled Occupational Therapy following their hospital stay in an effort to increase safety and independence with completion of ADL/IADL tasks for functional independence and quality of life. Diagnosis:   1. Hypertensive urgency    2. Acute renal insufficiency      Pertinent Medical History:   Past Medical History:   Diagnosis Date    Acid reflux     Hyperlipidemia     Hypertension     Hypothyroidism     S/P appendectomy     Type II or unspecified type diabetes mellitus without mention of complication, not stated as uncontrolled       Precautions:  Falls, pt reports dizziness each time she takes her BP medication (nursing manager aware)  Pain Scale: Numeric Rate: no pain; Nursing notified. Social history: alone       Drive: no, daughter does all the driving   Home architecture: 3rd floor apartment, elevator, tub shower.    PLOF: independent with BADL and independent with IADL, pt ambulated with no device   Equipment owned: wheeled walker, cane, shower chair, grab bars  Cognition: A&O x 4; follows 3 step directions. good  Problem solving skills   good  Memory    good  Sequencing   good  Judgement/safety  Communication: intact   Visual perceptual skills: impaired, glaucoma and cataracts      Glasses: yes   Edema: no     Sensation: intact   Hand Dominance:  Left     X Right     Left Right Comment   Passive range of motion [facility]     Active range of motion [facility]     Muscle Grade 4/5 4/5    /pinch Strength Intact  Intact       Functional Assessment:   Initial Evaluation Status Date:   3/9/2021 Treatment Status  Date: STGs = LTGs  Time frame: 5 - 14 days   Feeding Independent   Independent    Grooming Supervision   Independent    UB Dressing Supervision   Independent    LB Dressing Minimal Assist   Independent    Bathing Minimal Assist  Independent    Toileting Supervision   Independent    Bed Mobility  Facilitated Supine to sit: Supervision   Scooting:Supervision  Sit to supine: Supervision    Rolling: Supervision  Supine to sit: Independent   Scooting:Independent  Sit to supine: Independent   Rolling: Independent   Functional Transfers Minimal Assist from EOB. Minimal Assist for transfer to and from commode with minimal verbal cues to use grab bar for safe commode transfer. Transfer training with verbal cues for hand placement throughout session to improve safety. Independent    Functional Mobility Minimal assist with IV pole to improve balance to and from bathroom and in hallway, verbal cues for walker sequence and safety. Two loss of balance with minimal assist to recover. Modified [place]    Activity Tolerance Fair   Good      Balance:   Sitting balance at EOB to increase dynamic sitting balance and activities tolerance with Supervision     Standing fair with IV pole to improve balance   Endurance: fair     Comments: Upon arrival to the room the patient was supine.  At end of the session, the patient was supine. Call light and phone within reach. Pt required verbal cues and instruction as noted above for safe facilitation and completion of tasks. Therapist provided skilled monitoring of the patient's response during treatment session. Prior to and at the end of session, environmental modifications/line management completed for patients safety and efficiency of treatment session. OT services provided to include instruction/training on safety and adapted techniques for completion of transfers and ADL's. Overall, the patient demonstrates difficulties with completion of BADL's and IADL's. Factors contributing to these difficulties includes decreased endurance and generalized weakness. Pt would benefit from continued skilled OT to increase independence with BADL's and IADL's. Treatment:    Bed mobility: Facilitated bed mobility with cues for proper body mechanics and sequencing to prepare for ADL completion. Functional transfers: Facilitated transfers from various surfaces with cues for body alignment, safety and hand placement. ADL completion: Self-care retraining for the above-mentioned ADLs; training on proper hand placement, safety technique, sequencing, and energy conservation techniques. Postural Balance: Sitting and standing balance retraining to improve righting reactions with postural changes during ADLs. Skilled positioning: Proper positioning to improve interaction with environment, overall functioning and decrease/prevent edema and contractures. Evaluation Complexity:   · Low Complexity  · History: Brief history including review of medical records relating to the problem  · Exam: 1-3 performance Deficits  · Assistance/Modification: No assistance or modifications required to perform tasks. No comorbities affecting occupational performance.     Assessment of current deficits:   Functional mobility [x]        ADLs [x]        Strength [x]      Cognition [] Functional transfers  [x]       IADLs [x]        Safety Awareness []      Endurance [x]  Fine Motor Coordination []       Balance [x]       Vision/perception []     Sensation []   Gross Motor Coordination []       ROM []         Delirium []                          Motor Control []    Plan of Care: OT 1-3x/week for 5-7 days PRN   Instruction/training on adapted ADL techniques and AE recommendations to increased functional independence with precautions   Functional transfer/mobility training/DME recommendations for increased independence, safety, and fall prevention    Therapeutic activity to facilitate/challenge dynamic balance, standing tolerance, fine motor dexterity/in-hand manipulation for increased independence with ALD's    Functional Mobility Training   Training on energy conservation techniques/strategies to improve independence/tolerance for self care routine   Positioning to Improve Functional [place], Safety, and Skin Integrity   Patient/family education to increase follow through with safety techniques and functional independence   Visual perceptual training to improve environmental scanning, visual attention/focus, and oculomotor skills for increased safety/independence with functional transfers/mobility and ADL's  Therapeutic exercise to improve motor endurance, ROM, and functional strength for ALD's and functional transfers   Splinting/positioning for increased function, prevention of contractures, and improved skin integrity   Recommendation of environmental modifications for increased safety with functional transfers/mobility and ADL's          Rehab Potential: Good for established goals developed with patient and family. Patient / Family Goal: return home      Patient and/or family were instructed on functional diagnosis, prognosis/goals and OT plan of care. Demonstrated fair understanding.     Evaluation time includes thorough review of current medical information, gathering information on past medical history/social history and prior level of function, completion of standardized testing/informal observation of tasks, assessment of data, and development of POC/Goals.     Time In and Out: 9:50 - 10:01am + 10:17-10:36am                  Min Units   OT Eval Low 02437 X     OT Eval Medium 63009     OT Eval High 90657     OT Re-Eval [id number]          ADL/Self Care 29475     Therapeutic Activities [id number] 23    Therapeutic Ex 22035     Orthotic Management 32919     Neuro Re-Ed 55662     Non-Billable Time     TOTAL TIMED TREATMENT 23 [facility] OTR/L [id number]

## 2021-03-09 NOTE — PROGRESS NOTES
Physician Progress Note      PATIENT:               Chelita Nolasco  CSN #:                  [id number]  :                       1960  ADMIT DATE:       3/8/2021 1:21 PM  [address] [place] DATE:  RESPONDING  PROVIDER #:        Jason Camarillo MD          QUERY TEXT:    Dear Attending Provider,    Pt admitted with Hypertensive urgency. Pt noted to have Acute on chronic   renal disease. If possible, please document in the progress notes and   discharge summary if you are evaluating and/or treating any of the following: The medical record reflects the following:  Risk Factors: Diabetes, HTN  Clinical Indicators include: 3/8 H&P: Her baseline creatinine used to be   around 2, now it's at 3.2. Per 3/9 IM PM:Acute on chronic renal disease:   stage 3. Treatment includes: IVF, Hold all nephrotoxic agents, monitor labs, and   assessments. Thank you,  Joshua Mills RN, BSN, CCDS  [phone]  Options provided:  -- Acute kidney failure on CKD 3  -- Acute kidney injury on CKD 3  -- Other - I will add my own diagnosis  -- Disagree - Not applicable / Not valid  -- Disagree - Clinically unable to determine / Unknown  -- Refer to Clinical Documentation Reviewer    PROVIDER RESPONSE TEXT:    This patient is in Acute kidney failure on CKD 3. Query created by:  Donna Jain on 3/9/2021 1:51 PM      Electronically signed by:  Jason Camarillo MD 3/9/2021 1:53 PM

## 2021-03-10 NOTE — CARE COORDINATION
CM Note: 3/10/2021 at 11:07am: COVID POSITIVE - test done on 3/9. Was transferred from the third floor. CM called in the room to talk about transition of care and to conduct ACP. However, Mrs. Enrique Pacheco said she does not want to talk as she is very weak. Per chart review, patient lives alone and was independent with her ADL's. Currently on 2L O2. Renal following. PT / OT saw on 3/9. Will follow up later to do ACP.  Sigrid Paniagua RN

## 2021-03-10 NOTE — PROGRESS NOTES
Physical Therapy    [id number]-    Patient unavailable for physical therapy treatment due to RN [facility]) stated patient has diarrhea and nausea. Kavin Mai  [facility]  LIC # [id number]

## 2021-03-10 NOTE — CONSULTS
[address] [facility]  Consult Note    [address] [facility] [address]  [place], [address]  Phone [phone]   [phone]      Admit Date: 3/8/2021  1:21 PM  Pt Name: Thompson Gonzalez  MRN: [id number]  : 1960  Reason for Consult:    Chief Complaint   Patient presents with    Abnormal Lab     creatnine level high per doctor    Hypertension     Requesting Physician:  Bruno Clark MD  PCP: Allison Herrera MD  History Obtained From:  patient, chart   ID consulted for Hypertensive urgency [I16.0]  on hospital day 2  CHIEF COMPLAINT       Chief Complaint   Patient presents with    Abnormal Lab     creatnine level high per doctor    Hypertension     HISTORYOF PRESENT ILLNESS      Lakia Woodruff is a 57 y.o. female who presents with significant past medical history of  has a past medical history of Acid reflux, Hyperlipidemia, Hypertension, Hypothyroidism, S/P appendectomy, and Type II or unspecified type diabetes mellitus without mention of complication, not stated as uncontrolled. ED TRIAGEVITALS  BP: (!) 171/80, Temp: 98.1 °F (36.7 °C), Pulse: 80, Resp: 20, SpO2: 95 %  HPI  Pt from home  Lives with grandson  Pt came in with htn/raquel   No covid vaccine  She denies covid exposure  A  was at her house 2 weeks ago   No other visitor  [id number]  Wbc4.7 hgb9.3 [id number]  [id number] cr3.2  Admitted fot htn urgency/raquel  Had persistent temp a covid +3/9  Currently in bed  Feels warm has weakness  Tc 98.1  Did not qualify for remdesivr due to [facility]   3/8 cxry Left upper lobe opacity worrisome for pneumonia  3/10 1.  Rapid development of widespread bilateral parenchymal consolidation   predominant from the hilar regions to the periphery of the lung that   superimposes to previous left upper lobe peripheral focal consolidation. US  Kidneys Mildly atrophic and echogenic kidneys, compatible with mild chronic renal   parenchymal disease.    Unremarkable ultrasound of the bladder. Was on ra 2->7L  REVIEW OF SYSTEMS    (2-9 systems for level 4, 10 or more for level 5)     REVIEW OFSYSTEMS:    CONSTITUTIONAL:     fever, chills, weight loss  ALLERGIES:    No urticaria, hay fever,    EYES:     No blurry vision, loss of vision,eye pain  ENT:      No hearing loss, sore throat  CARDIOVASCULAR:  No chest pain or palpitations  RESPIRATORY:    cough, sob  ENDOCRINE:    No increase thirst, urination   HEME-LYMPH:   No easy bruising or bleeding  GI:     No nausea, vomiting or diarrhea  :     No urinary complaints  NEURO:    No seizures, stroke, HA  MUSCULOSKELETAL:  No muscle aches or pain, no jointpain  SKIN:     No rash or itch  PSYCH:    No depression or anxiety    Medications Prior to Admission: insulin lispro, 1 Unit Dial, 100 UNIT/ML SOPN, 6 units with breakfast and lunch and 7 units with supper  Max 50 units per day  acetaminophen (TYLENOL) 500 MG tablet, Take 1 tablet by mouth 4 times daily as needed for Pain  insulin glargine (LANTUS SOLOSTAR) 100 UNIT/ML injection pen, Inject 18 Units into the skin every morning  atorvastatin (LIPITOR) 80 MG tablet, Take 80 mg by mouth daily  hydrALAZINE (APRESOLINE) 50 MG tablet, Take 50 mg by mouth 2 times daily  indapamide (LOZOL) 2.5 MG tablet, Take 2.5 mg by mouth every morning  raNITIdine (ZANTAC) 150 MG tablet, Take 150 mg by mouth daily  levothyroxine (SYNTHROID) 50 MCG tablet, Take 1 tablet by mouth daily  gabapentin (NEURONTIN) 300 MG capsule, Take 300 mg by mouth nightly. clopidogrel (PLAVIX) 75 MG tablet, Take 75 mg by mouth daily  aspirin 81 MG tablet, Take 81 mg by mouth daily. lisinopril-hydrochlorothiazide (PRINZIDE) 20-12.5 MG per tablet, Take 1 tablet by mouth daily. blood glucose test strips (ONETOUCH ULTRA) strip, 1 each by In Vitro route 5 times daily As needed.   Continuous Blood Gluc Sensor (DEXCOM G6 SENSOR) MISC, Change every 10 days  Continuous Blood Gluc Transmit (DEXCOM G6 TRANSMITTER) MISC, To use with sensors Lancets Misc.  MISC, by Does not apply route.'  CURRENT MEDICATIONS     Current Facility-Administered Medications:     acetaminophen (TYLENOL) tablet 1,000 mg, 1,000 mg, Oral, Q6H PRN, Al Stack DO, 1,000 mg at 03/10/21 1027    doxycycline hyclate (VIBRAMYCIN) capsule 100 mg, 100 mg, Oral, 2 times per day, Shawna Art MD    cefTRIAXone (ROCEPHIN) 1,000 mg in sterile water 10 mL IV syringe, 1,000 mg, Intravenous, Q24H, Shawna Art MD, 1,000 mg at 03/10/21 1200    carvedilol (COREG) tablet 25 mg, 25 mg, Oral, BID , Albert Infante MD    ipratropium-albuterol (DUONEB) nebulizer solution 1 ampule, 1 ampule, Inhalation, Q4H WA, Shawna Art MD    dexamethasone (PF) (DECADRON) injection 6 mg, 6 mg, Intravenous, Q24H, Shawna Art MD, 6 mg at 03/10/21 1429    [START ON 3/11/2021] heparin (porcine) injection 5,000 Units, 5,000 Units, Subcutaneous, 3 times per day, Shawna Art MD    acetaminophen (TYLENOL) tablet 650 mg, 650 mg, Oral, Once **AND** diphenhydrAMINE (BENADRYL) injection 25 mg, 25 mg, Intravenous, Once, Toney Prado MD    tocilizumab (ACTEMRA) 420 mg in sodium chloride 0.9 % 100 mL IVPB, 420 mg, Intravenous, Once, Toney Prado MD    EPINEPHrine PF 1 MG/ML injection 0.3 mg, 0.3 mg, Intramuscular, Once PRN, Toney Prado MD    methylPREDNISolone sodium (SOLU-MEDROL) injection 125 mg, 125 mg, Intravenous, Once PRN, Toney Prado MD    insulin lispro (HUMALOG) injection vial 0-12 Units, 0-12 Units, Subcutaneous, TID , Shawna Art MD, Stopped at 03/10/21 1201    insulin lispro (HUMALOG) injection vial 0-6 Units, 0-6 Units, Subcutaneous, Nightly, Shawna Art MD, 3 Units at 03/09/21 2146    glucose (GLUTOSE) 40 % oral gel 15 g, 15 g, Oral, PRN, Shawna Art MD, 15 g at 03/09/21 1540    dextrose 50 % IV solution, 12.5 g, Intravenous, PRN, Shawna Art MD    glucagon (rDNA) injection 1 mg, 1 mg, Intramuscular, PRN, Chuyita Christine MD    dextrose 5 % solution, 100 mL/hr, Intravenous, PRN, Chuyita Christine MD    aspirin chewable tablet 81 mg, 81 mg, Oral, Daily, Chuyita Christine MD, 81 mg at 03/09/21 0920    atorvastatin (LIPITOR) tablet 80 mg, 80 mg, Oral, Daily, Chuyita Christine MD, 80 mg at 03/09/21 0920    clopidogrel (PLAVIX) tablet 75 mg, 75 mg, Oral, Daily, Chuyita Christine MD, 75 mg at 03/09/21 0920    gabapentin (NEURONTIN) capsule 300 mg, 300 mg, Oral, Nightly, Chuyita Christine MD, 300 mg at 03/09/21 2146    insulin glargine (LANTUS) injection vial 18 Units, 18 Units, Subcutaneous, QAM, Chuyita Christine MD, 18 Units at 03/09/21 0920    levothyroxine (SYNTHROID) tablet 50 mcg, 50 mcg, Oral, Daily, Chuyita Christine MD, 50 mcg at 03/10/21 9604    famotidine (PEPCID) tablet 20 mg, 20 mg, Oral, Daily, Chuyita Christine MD, 20 mg at 03/09/21 0920    sodium chloride flush 0.9 % injection 10 mL, 10 mL, Intravenous, 2 times per day, Chuyita Christine MD, 10 mL at 03/08/21 2033    sodium chloride flush 0.9 % injection 10 mL, 10 mL, Intravenous, PRN, Chuyita Christine MD    promethazine (PHENERGAN) tablet 12.5 mg, 12.5 mg, Oral, Q6H PRN, 12.5 mg at 03/10/21 1026 **OR** ondansetron (ZOFRAN) injection 4 mg, 4 mg, Intravenous, Q6H PRN, Chuyita Christine MD, 4 mg at 03/10/21 0900    polyethylene glycol (GLYCOLAX) packet 17 g, 17 g, Oral, Daily PRN, Chuyita Christine MD    labetalol (NORMODYNE;TRANDATE) injection 20 mg, 20 mg, Intravenous, Q6H PRN, Chuyita Christine MD, 20 mg at 03/10/21 0857  ALLERGIES     Demerol and Toradol [ketorolac tromethamine]    There is no immunization history on file for this patient.   PAST MEDICAL HISTORY     Past Medical History:   Diagnosis Date    Acid reflux     Hyperlipidemia     Hypertension     Hypothyroidism     S/P appendectomy     Type II or unspecified type diabetes mellitus without mention of complication, not stated as uncontrolled 22 20   Temp: 100.8 °F (38.2 °C)  98.1 °F (36.7 °C) 98.1 °F (36.7 °C)   TempSrc:   Infrared Infrared   SpO2:  (!) 78% 96% 95%   Weight:       Height:         Physical Exam   Constitutional/General: Alert and oriented, NAD thin   Head: NC/AT  Eyes: PERRL, EOMI  Mouth: Normal mucosa, no thrush   Neck: Supple, full ROM,    Pulmonary: Lungs dec  to auscultation bilaterally. Not in respiratory distress  Cardiovascular:  Regular rate and rhythm, no murmurs, gallops, or rubs. Abdomen: Soft, + BS. No distension. Nontender. Extremities: Moves all extremities x 4. Warm and well perfused  Pulses:  Distal pulses intact  Skin: Warm and dry without rash  Neurologic:    No focal deficits  Psych: Normal Affect     DIAGNOSTICRESULTS   RADIOLOGY:   Xr Chest Portable    Result Date: 3/10/2021  EXAMINATION: ONE XRAY VIEW OF THE CHEST 3/10/2021 12:41 pm COMPARISON: September 4, 2014, March 13, 2019, March 8, 2021 HISTORY: ORDERING SYSTEM PROVIDED HISTORY: dyspnea TECHNOLOGIST PROVIDED HISTORY: Reason for exam:->dyspnea FINDINGS: Rapid development of developmental widespread pulmonary parenchymal opacities with some a air bronchogram throughout both lungs. The heart has normal size. There is a minimal pleural effusion bilaterally. 1.  Rapid development of widespread bilateral parenchymal consolidation predominant from the hilar regions to the periphery of the lung that superimposes to previous left upper lobe peripheral focal consolidation. 2.  The differential diagnosis include acute pulmonary edema, pulmonary hemorrhage, diffuse alveolar damage, bilateral pneumonia. Please correlate clinically. Xr Chest Portable    Result Date: 3/8/2021  EXAMINATION: ONE XRAY VIEW OF THE CHEST 3/8/2021 8:27 pm COMPARISON: 03/13/2019 HISTORY: ORDERING SYSTEM PROVIDED HISTORY: fever TECHNOLOGIST PROVIDED HISTORY: Reason for exam:->fever FINDINGS: Left upper lobe opacity worrisome for pneumonia. There is no effusion or pneumothorax. The cardiomediastinal silhouette is without acute process. The osseous structures are without acute process. Left upper lobe opacity worrisome for pneumonia. Us Retroperitoneal Limited    Result Date: 3/8/2021  EXAMINATION: ULTRASOUND OF THE KIDNEYS AND BLADDER 3/8/2021 8:20 pm COMPARISON: None. HISTORY: ORDERING SYSTEM PROVIDED HISTORY: raquel TECHNOLOGIST PROVIDED HISTORY: Reason for exam:->raquel What reading provider will be dictating this exam?->CRC FINDINGS: The right kidney measures 8.3 cm in length and the left kidney measures 8.5 cm in length. Kidneys demonstrate mildly increased cortical echogenicity. No hydronephrosis or intrarenal stones. No focal lesions. Bladder is unremarkable in appearance. Mildly atrophic and echogenic kidneys, compatible with mild chronic renal parenchymal disease. Unremarkable ultrasound of the bladder.      LABS  Recent Labs     03/08/21  1347 03/09/21  0510 03/10/21  1009   WBC 4.7 3.5* 9.2   HGB 9.3* 8.0* 8.2*   HCT 29.0* 25.4* 26.2*   MCV 84.5 85.8 87.6    291 250     Recent Labs     03/09/21  0510 03/10/21  1009 03/10/21  1332    138 139   K 4.2 3.8 3.9    109* 109*   CO2 20* 19* 19*   BUN 46* 47* 49*   CREATININE 3.5* 3.5* 3.6*   GFRAA 16 16 15   LABGLOM 16 16 15   GLUCOSE 226* 111* 157*   PROT 5.7*  --   --    LABALBU 2.9*  --   --    CALCIUM 8.6 8.0* 7.8*   BILITOT <0.2  --   --    ALKPHOS 84  --   --    AST 18  --   --    ALT 12  --   --        Lab Results   Component Value Date    COVID19 DETECTED 03/09/2021     COVID-19/LUDY-COV2 LABS  Recent Labs     03/08/21  1347 03/08/21  1852 03/09/21  0510 03/10/21  1332   TROPONINI 0.12* 0.10*  --   --    DDIMER  --   --   --  729   AST  --   --  18  --    ALT  --   --  12  --      Lab Results   Component Value Date    CHOL 168 08/05/2020    TRIG 182 08/05/2020    HDL 45 08/05/2020    LDLCALC 87 08/05/2020    LABVLDL 36 08/05/2020        MICROBIOLOGY:     Cultures :   Lab Results   Component Value Date BC 24 Hours no growth 03/08/2021     Lab Results   Component Value Date    BLOODCULT2 24 Hours no growth 03/08/2021          Urine Culture, Routine   Date Value Ref Range Status   03/08/2021 Growth not present, incubation continues  Preliminary   11/30/2015 <10,000 CFU/mL  Mixed gram positive organisms    Final   04/04/2015   Final    ,000 CFU/mL  Mixed ulisses isolated. Further workup and sensitivity testing  is not routinely indicated and will not be performed. Mixed ulisses isolated includes:  Mixed gram positive organisms          Patient is a 57 y.o. female who presented with   Chief Complaint   Patient presents with    Abnormal Lab     creatnine level high per doctor    Hypertension        FINAL IMPRESSION    Sepsis  viral pneumonia covid 19   1. Hypertensive urgency    2. Acute renal insufficiency        did not qualify for remdesivir due to raquel     doxycycline hyclate (VIBRAMYCIN) capsule 100 mg, 2 times per day  cefTRIAXone (ROCEPHIN) 1,000 mg in sterile water 10 mL IV syringe, Q24H  dexamethasone (PF) (DECADRON) injection 6 mg, Q24H  diphenhydrAMINE (BENADRYL) injection 25 mg, Once  tocilizumab (ACTEMRA) 420 mg in sodium chloride 0.9 % 100 mL IVPB, Once  EPINEPHrine PF 1 MG/ML injection 0.3 mg, Once PRN  methylPREDNISolone sodium (SOLU-MEDROL) injection 125 mg, Once PRN  insulin lispro (HUMALOG) injection vial 0-12 Units, TID WC     famotidine (PEPCID) tablet 20 mg, Daily       Discussed side to side and proning   IS    Check cx  covid ab  followlabs  Urine legionella/S penumonia  Mycoplasma   bladder scan     Imaging and labs were reviewed per medical records and any ID pertinent labs were addressed with the patient. The patient/FAMILY  was educated about the diagnosis, prognosis, indications, risks and benefits of treatment. An opportunity to ask questions was given to the patient/FAMILY and questions were answered. Thank you for involving me in the care of Michelle Ivey.  Please do not

## 2021-03-10 NOTE — PLAN OF CARE
Problem: Falls - Risk of:  Goal: Will remain free from falls  Description: Will remain free from falls  Outcome: Met This Shift  Goal: Absence of physical injury  Description: Absence of physical injury  Outcome: Met This Shift     Problem: Cardiac:  Goal: Hemodynamic stability will improve  Description: Hemodynamic stability will improve  Outcome: Met This Shift  Goal: Complications related to the disease process, condition or treatment will be avoided or minimized  Description: Complications related to the disease process, condition or treatment will be avoided or minimized  Outcome: Met This Shift  Goal: Cerebral tissue perfusion will improve  Description: Cerebral tissue perfusion will improve  Outcome: Met This Shift     Problem: Fluid Volume:  Goal: Maintenance of adequate hydration will improve  Description: Maintenance of adequate hydration will improve  Outcome: Met This Shift     Problem: Physical Regulation:  Goal: Complications related to the disease process, condition or treatment will be avoided or minimized  Description: Complications related to the disease process, condition or treatment will be avoided or minimized  Outcome: Met This Shift  Goal: Will regain or maintain usual level of consciousness  Description: Will regain or maintain usual level of consciousness  Outcome: Met This Shift     Problem: Airway Clearance - Ineffective  Goal: Achieve or maintain patent airway  Outcome: Met This Shift     Problem: Gas Exchange - Impaired  Goal: Absence of hypoxia  Outcome: Met This Shift  Goal: Promote optimal lung function  Outcome: Met This Shift     Problem: Breathing Pattern - Ineffective  Goal: Ability to achieve and maintain a regular respiratory rate  Outcome: Met This Shift     Problem:  Body Temperature -  Risk of, Imbalanced  Goal: Will regain or maintain usual level of consciousness  Outcome: Met This Shift  Goal: Complications related to the disease process, condition or treatment will be avoided or minimized  Outcome: Met This Shift     Problem: Isolation Precautions - Risk of Spread of Infection  Goal: Prevent transmission of infection  Outcome: Met This Shift     Problem: Risk for Fluid Volume Deficit  Goal: Maintain normal heart rhythm  Outcome: Met This Shift     Problem: Patient Education: Go to Patient Education Activity  Goal: Patient/Family Education  Outcome: Met This Shift

## 2021-03-10 NOTE — PROGRESS NOTES
Patient noted for increased work of breathing and congestion. O2 increased to obtain sat above 90%. Dr. Mitesh Sullivan notified of lab results and chest xray results. New orders received. Lasix given per order.

## 2021-03-10 NOTE — PROGRESS NOTES
Pharmacy Documentation     Medication: Remdesivir     Date: 3/10/21  Physician: Dr. Sammy Puente consulted to initiate remdesivir. Patient does not currently meet [facility] Remdesivir Criteria for Use to initiate remdesivir based on: patient's renal function, SCr 3.6 with a GFR of 15 and CrCl 12 mL/min, not on dialysis. [facility] will sign off. Please re-consult if the clinical condition changes and patient meets criteria for initiation based on [facility] Remdesivir Criteria for Use.

## 2021-03-10 NOTE — PROGRESS NOTES
Department of Internal Medicine  General Internal Medicine  Attending Progress Note      SUBJECTIVE:    Patient reports feeling weak. Denied fever and chills. No chest pain.      OBJECTIVE      Medications    Current Facility-Administered Medications: acetaminophen (TYLENOL) tablet 1,000 mg, 1,000 mg, Oral, Q6H PRN  doxycycline hyclate (VIBRAMYCIN) capsule 100 mg, 100 mg, Oral, 2 times per day  cefTRIAXone (ROCEPHIN) 1,000 mg in sterile water 10 mL IV syringe, 1,000 mg, Intravenous, Q24H  carvedilol (COREG) tablet 25 mg, 25 mg, Oral, BID WC  ipratropium-albuterol (DUONEB) nebulizer solution 1 ampule, 1 ampule, Inhalation, Q4H WA  insulin lispro (HUMALOG) injection vial 0-12 Units, 0-12 Units, Subcutaneous, TID WC  insulin lispro (HUMALOG) injection vial 0-6 Units, 0-6 Units, Subcutaneous, Nightly  glucose (GLUTOSE) 40 % oral gel 15 g, 15 g, Oral, PRN  dextrose 50 % IV solution, 12.5 g, Intravenous, PRN  glucagon (rDNA) injection 1 mg, 1 mg, Intramuscular, PRN  dextrose 5 % solution, 100 mL/hr, Intravenous, PRN  aspirin chewable tablet 81 mg, 81 mg, Oral, Daily  atorvastatin (LIPITOR) tablet 80 mg, 80 mg, Oral, Daily  clopidogrel (PLAVIX) tablet 75 mg, 75 mg, Oral, Daily  gabapentin (NEURONTIN) capsule 300 mg, 300 mg, Oral, Nightly  insulin glargine (LANTUS) injection vial 18 Units, 18 Units, Subcutaneous, QAM  levothyroxine (SYNTHROID) tablet 50 mcg, 50 mcg, Oral, Daily  famotidine (PEPCID) tablet 20 mg, 20 mg, Oral, Daily  sodium chloride flush 0.9 % injection 10 mL, 10 mL, Intravenous, 2 times per day  sodium chloride flush 0.9 % injection 10 mL, 10 mL, Intravenous, PRN  heparin (porcine) injection 5,000 Units, 5,000 Units, Subcutaneous, 3 times per day  promethazine (PHENERGAN) tablet 12.5 mg, 12.5 mg, Oral, Q6H PRN **OR** ondansetron (ZOFRAN) injection 4 mg, 4 mg, Intravenous, Q6H PRN  polyethylene glycol (GLYCOLAX) packet 17 g, 17 g, Oral, Daily PRN  labetalol (NORMODYNE;TRANDATE) injection 20 mg, 20 mg, Intravenous, Q6H PRN  Physical    VITALS:  BP (!) 150/72   Pulse 88   Temp 100.8 °F (38.2 °C)   Resp 18   Ht 5' (1.524 m)   Wt 117 lb (53.1 kg)   SpO2 97%   BMI 22.85 kg/m²   CONSTITUTIONAL:  awake, alert, cooperative, no apparent distress, and appears stated age  EYES:  Lids and lashes normal, pupils equal, round and reactive to light, extra ocular muscles intact, sclera clear, conjunctiva normal  ENT:  Normocephalic, without obvious abnormality, atraumatic, sinuses nontender on palpation, external ears without lesions, oral pharynx with moist mucus membranes, tonsils without erythema or exudates, gums normal and good dentition. NECK:  Supple, symmetrical, trachea midline, no adenopathy, thyroid symmetric, not enlarged and no tenderness, skin normal  HEMATOLOGIC/LYMPHATICS:  no cervical lymphadenopathy  BACK:  Symmetric, no curvature, spinous processes are non-tender on palpation, paraspinous muscles are non-tender on palpation, no costal vertebral tenderness  LUNGS:  crackles diffuse, wheeze diffuse  CARDIOVASCULAR:  Normal apical impulse, regular rate and rhythm, normal S1 and S2, no S3 or S4, and no murmur noted  ABDOMEN:  No scars, normal bowel sounds, soft, non-distended, non-tender, no masses palpated, no hepatosplenomegally  MUSCULOSKELETAL:  there is no redness, warmth, or swelling of the joints  NEUROLOGIC:  Awake, alert, oriented to name, place and time.     SKIN:  no bruising or bleeding  Data    CBC:   Lab Results   Component Value Date    WBC 9.2 03/10/2021    RBC 2.99 03/10/2021    HGB 8.2 03/10/2021    HCT 26.2 03/10/2021    MCV 87.6 03/10/2021    MCH 27.4 03/10/2021    MCHC 31.3 03/10/2021    RDW 13.9 03/10/2021     03/10/2021    MPV 10.3 03/10/2021     CMP:    Lab Results   Component Value Date     03/10/2021    K 3.8 03/10/2021    K 4.2 03/09/2021     03/10/2021    CO2 19 03/10/2021    BUN 47 03/10/2021    CREATININE 3.5 03/10/2021    GFRAA 16 03/10/2021    LABGLOM 16 03/10/2021    GLUCOSE 111 03/10/2021    GLUCOSE 419 03/21/2011    PROT 5.7 03/09/2021    LABALBU 2.9 03/09/2021    LABALBU 4.1 03/21/2011    CALCIUM 8.0 03/10/2021    BILITOT <0.2 03/09/2021    ALKPHOS 84 03/09/2021    AST 18 03/09/2021    ALT 12 03/09/2021       ASSESSMENT AND PLAN      1. Hypertensive urgency:  Better controlled  Continue current medication. May add amlodipine or Imdur for optimal BP control    2. Fever due COVID 19 Viral Infection:  Will start patient on decadron. Will be cautious of patient blood glucose level  Symptomatic treatment  Check Procal, d-dimer   Added breathing treatment due to wheezing    3. Acute on Chronic Kidney disease:  Renal function is stable  Continue to monitor  Stopped IV fluid due to concern for pulm edema  Get X ray of the chest to evaluate for fluid overload    4. Diabetes Mellitus type 2;  Continue current insulin management  Lantus and diabetic diet    5. Hypothyroidism: on synthroid    6. ?PNA; added Doxy and Ceftriaxone.   Continue to monitor

## 2021-03-11 NOTE — PROGRESS NOTES
Physical Therapy Treatment Note    Room #:  6686/2627-77  Patient Name: Deyanira Heredia  YOB: 1960  MRN: [id number]    Referring Provider:   Cory Vasquez MD     Date of Service: 3/11/2021    Evaluating Physical Therapist: Debbie James, PT #[id number]       Diagnosis:   Hypertensive urgency [I16.0]     worsening blood pressures despite treatment. Patient Active Problem List   Diagnosis    Diabetes mellitus type 2, insulin dependent ([facility] [address]    Hypothyroid    Hypertension    Hyperlipidemia    Hypertensive urgency        Tentative placement recommendation: Home    Equipment recommendation:  To be determined  possibly cane for balance     Prior Level of Function: Patient ambulated independently    Rehab Potential: good  - for baseline    Past medical history:   Past Medical History:   Diagnosis Date    Acid reflux     Hyperlipidemia     Hypertension     Hypothyroidism     S/P appendectomy     Type II or unspecified type diabetes mellitus without mention of complication, not stated as uncontrolled      Past Surgical History:   Procedure Laterality Date    APPENDECTOMY      HYSTERECTOMY         Precautions: Up as tolerated, falls ,      SUBJECTIVE:    Social history: Patient lives alone   in a apartment  with No steps  to enter Tub shower grab bars    Equipment owned: [employer], [address] and Shower chair,  All unused    AM-PAC Basic Mobility        -[facility] Mobility Inpatient   How much difficulty turning over in bed?: None  How much difficulty sitting down on / standing up from a chair with arms?: A Little  How much difficulty moving from lying on back to sitting on side of bed?: None  How much help from another person moving to and from a bed to a chair?: A Little  How much help from another person needed to walk in hospital room?: A Little  How much help from another person for climbing 3-5 steps with a railing?: A Little  AM-[facility] Inpatient Mobility Raw Score : 20  AM-PAC Inpatient T-Scale Score : 47.67  Mobility Inpatient CMS 0-100% Score: 35.83  Mobility Inpatient CMS G-Code Modifier : [address] cleared patient for PT treatment. Pt c/o being tired. OBJECTIVE;   Initial Evaluation  Date: 3/9/2021 Treatment Date:  3/11/2021       Short Term/ Long Term   Goals   Was pt agreeable to Eval/treatment? Yes   yes To be met in 2 days   Pain level   0/10    0/10    Bed Mobility    Rolling: Independent    Supine to sit: Independent    Sit to supine: Independent    Scooting: Independent   Rolling: Independent   Supine to sit: Independent   Sit to supine: Independent   Scooting: Independent    Rolling: Independent    Supine to sit: Independent    Sit to supine: Independent    Scooting: Independent     Transfers Sit to stand: Minimal assist of 1   Sit to stand: Minimal assist of 1      Sit to stand: Independent     Ambulation    2 x 130 feet using  no device with Minimal assist of 1   Loss of balance x 2 minimal assist to recover   2 x 15 feet using  hand held assist with Minimal assist of 1   V/C for upright posture, pacing, and safety.     150 feet using  least restrictive device versus no device with Independent    Stair negotiation: ascended and descended   Not assessed            ROM Within functional limits        Strength BUE:  4/5  RLE:  4/5  LLE:  4/5   Increase strength in affected mm groups by 1/3 grade   Balance Sitting EOB:  good    Dynamic Standing:  fair   Sitting EOB: good   Dynamic Standing: fair hand held assist   Sitting EOB:  good    Dynamic Standing: good       Patient is Alert & Oriented x person, place, time and situation and follows directions    Sensation:  Patient  denies numbness and tingling     Edema:  no    Endurance: fair  +    Vitals: 2 liters nasal cannula    Blood Pressure at rest   Blood Pressure during session     Heart Rate at rest  Heart Rate during session     SPO2 at rest 99%  SPO2 during session  97%     Patient education  Patient educated on role of Physical Therapy, risks of immobility, safety and plan of care and  importance of mobility while in hospital       Patient response to education:   Pt verbalized understanding Pt demonstrated skill Pt requires further education in this area   Yes Partial Yes      Treatment:  Patient practiced and was instructed/facilitated in the following treatment: Patient transferred to edge of bed  Sat edge of bed 30 minutes with Supervision  to increase dynamic sitting balance and activity tolerance. Educated patient on pursed lip breathing, using the spirometer to increase lung capacity, pacing, and energy conservation. Pt stood, ambulated to the restroom, and back to the bed. Returned to bed per patient request.      Therapeutic Exercises:  not performed       At end of session, patient in bed with   call light and phone within reach,   all lines and tubes intact, nursing notified. Patient would benefit from continued skilled Physical Therapy to improve functional independence and quality of life. Patient's/ family goals   home        ASSESSMENT: Patient exhibits decreased strength, balance and endurance impairing functional mobility, transfers and gait  patient needing extra time throughout tx session due to feeling tired and to sit to get her bearings. Pt was unsteady during ambulation due to impaired strength/endurance. Pt at risk for falls. Plan of Care:     -Standing Balance: Perform strengthening exercises in standing to promote motor control with or without upper extremity support   -Transfers: Cues for hand placement, technique and safety  -Gait: Gait training and Standing activities to improve: base of support, weight shift, weight bearing    -Endurance: Utilize Supervised activities to increase level of endurance to allow for safe functional mobility including transfers and gait     Patient and or family understand(s) diagnosis, prognosis, and plan of care. Frequency of treatments: Patient will be seen  daily. Time in  8:40  Time out  9:18    Total Treatment Time  38 minutes        CPT codes:    Therapeutic activities (65481)   38 minutes  3 unit(s)   Ebonie Mai  [facility]  LIC # [id number]

## 2021-03-11 NOTE — PLAN OF CARE
Problem: Falls - Risk of:  Goal: Will remain free from falls  3/11/2021 0111 by Tessy Arzate RN  Outcome: Ongoing  3/10/2021 1234 by George Henderson RN  Outcome: Met This Shift  Goal: Absence of physical injury  3/11/2021 0111 by Tessy Arzate RN  Outcome: Ongoing  3/10/2021 1234 by George Henderson RN  Outcome: Met This Shift     Problem:  Activity:  Goal: Ability to tolerate increased activity will improve  3/11/2021 0111 by Tessy Arzate RN  Outcome: Ongoing  3/10/2021 1234 by George Henderson RN  Outcome: Not Met This Shift     Problem: Cardiac:  Goal: Hemodynamic stability will improve  3/11/2021 0111 by Tessy Arzate RN  Outcome: Ongoing  3/10/2021 1234 by George Henderson RN  Outcome: Met This Shift  Goal: Complications related to the disease process, condition or treatment will be avoided or minimized  3/11/2021 0111 by Tessy Arzate RN  Outcome: Ongoing  3/10/2021 1234 by George Henderson RN  Outcome: Met This Shift  Goal: Cerebral tissue perfusion will improve  3/11/2021 0111 by Tessy Arzate RN  Outcome: Ongoing  3/10/2021 1234 by George Henderson RN  Outcome: Met This Shift     Problem: Coping:  Goal: Ability to identify and develop effective coping behavior will improve  3/11/2021 0111 by Tessy Arzate RN  Outcome: Ongoing  3/10/2021 1234 by George Henderson RN  Outcome: Not Met This Shift     Problem: Health Behavior:  Goal: Identification of resources available to assist in meeting health care needs will improve  3/11/2021 0111 by Tessy Arzate RN  Outcome: Ongoing  3/10/2021 1234 by George Henderson RN  Outcome: Not Met This Shift     Problem: Nutritional:  Goal: Ability to identify appropriate dietary choices will improve  3/11/2021 0111 by Tessy Arzate RN  Outcome: Ongoing  3/10/2021 1234 by George Henderson RN  Outcome: Not Met This Shift     Problem: Fluid Volume:  Goal: Maintenance of adequate hydration will improve  3/11/2021 0111 by Tessy Arzate RN  Outcome: Ongoing 3/10/2021 1234 by Agatha Hernández RN  Outcome: Met This Shift     Problem: Physical Regulation:  Goal: Complications related to the disease process, condition or treatment will be avoided or minimized  3/11/2021 0111 by Becky De La Cruz RN  Outcome: Ongoing  3/10/2021 1234 by Agatha Hernández RN  Outcome: Met This Shift  Goal: Ability to maintain a body temperature in the normal range will improve  3/11/2021 0111 by Becky De La Cruz RN  Outcome: Ongoing  3/10/2021 1234 by Agatha Hernández RN  Outcome: Not Met This Shift  Goal: Will regain or maintain usual level of consciousness  3/11/2021 0111 by Becky De La Cruz RN  Outcome: Ongoing  3/10/2021 1234 by Agatha Hernández RN  Outcome: Met This Shift     Problem: Serum Glucose Level - Abnormal:  Goal: Ability to maintain appropriate glucose levels has stabilized  3/11/2021 0111 by Becky De La Cruz RN  Outcome: Ongoing  3/10/2021 1234 by Agatha Hernández RN  Outcome: Not Met This Shift     Problem: Airway Clearance - Ineffective  Goal: Achieve or maintain patent airway  3/11/2021 0111 by Becky De La Cruz RN  Outcome: Ongoing  3/10/2021 1234 by Agatha Hernández RN  Outcome: Met This Shift     Problem: Gas Exchange - Impaired  Goal: Absence of hypoxia  3/11/2021 0111 by Becky De La Cruz RN  Outcome: Ongoing  3/10/2021 1234 by Agatha Hernández RN  Outcome: Met This Shift  Goal: Promote optimal lung function  3/11/2021 0111 by Becky De La Cruz RN  Outcome: Ongoing  3/10/2021 1234 by Agatha Hernández RN  Outcome: Met This Shift     Problem: Breathing Pattern - Ineffective  Goal: Ability to achieve and maintain a regular respiratory rate  3/11/2021 0111 by Becky De La Cruz RN  Outcome: Ongoing  3/10/2021 1234 by Agatha Hernández RN  Outcome: Met This Shift     Problem:  Body Temperature -  Risk of, Imbalanced  Goal: Ability to maintain a body temperature within defined limits  3/11/2021 0111 by Bceky De La Cruz RN  Outcome: Ongoing  3/10/2021 1234 by Agatha Hernández RN  Outcome: Not Met This Shift  Goal: Will regain or maintain usual level of consciousness  3/11/2021 0111 by Filomena Ramirez RN  Outcome: Ongoing  3/10/2021 1234 by Americo Apple RN  Outcome: Met This Shift  Goal: Complications related to the disease process, condition or treatment will be avoided or minimized  3/11/2021 0111 by Filomena Ramirez RN  Outcome: Ongoing  3/10/2021 1234 by Americo Apple RN  Outcome: Met This Shift     Problem: Isolation Precautions - Risk of Spread of Infection  Goal: Prevent transmission of infection  3/11/2021 0111 by Filomena Ramirez RN  Outcome: Ongoing  3/10/2021 1234 by Americo Apple RN  Outcome: Met This Shift     Problem: Nutrition Deficits  Goal: Optimize nutritional status  3/11/2021 0111 by Filomena Ramirez RN  Outcome: Ongoing  3/10/2021 1234 by Americo Apple RN  Outcome: Not Met This Shift     Problem: Risk for Fluid Volume Deficit  Goal: Maintain normal heart rhythm  3/11/2021 0111 by Filomena Ramirez RN  Outcome: Ongoing  3/10/2021 1234 by Americo Apple RN  Outcome: Met This Shift  Goal: Maintain absence of muscle cramping  Outcome: Ongoing  Goal: Maintain normal serum potassium, sodium, calcium, phosphorus, and pH  Outcome: Ongoing     Problem: Loneliness or Risk for Loneliness  Goal: Demonstrate positive use of time alone when socialization is not possible  3/11/2021 0111 by Filomena Rmairez RN  Outcome: Ongoing  3/10/2021 1234 by Americo Apple RN  Outcome: Not Met This Shift     Problem: Fatigue  Goal: Verbalize increase energy and improved vitality  3/11/2021 0111 by Filomena Ramirez RN  Outcome: Ongoing  3/10/2021 1234 by Americo Apple RN  Outcome: Not Met This Shift     Problem: Patient Education: Go to Patient Education Activity  Goal: Patient/Family Education  3/11/2021 0111 by Filomena Ramirez RN  Outcome: Ongoing  3/10/2021 1234 by Americo Apple RN  Outcome: Met This Shift

## 2021-03-11 NOTE — PROGRESS NOTES
[facility]  [facility]  Progress Note    NAME: Chantal Dick  MR:  [id number]  :   1960  DATE OF SERVICE:21    This is a face to face encounter with Chantal Dick 57 y.o. female on 21  ID following for   Chief Complaint   Patient presents with    Abnormal Lab     creatnine level high per doctor    Hypertension     SUBJECTIVE:  CURRENTLY ON RA  Pt's fever is better  Has nausea min cough   No diarrhea  No urinary complaints  Wbc17.9 cr4.3  Patient is tolerating medications. No reported adverse drug reactions. Review of systems:  As stated above in the chief complaint, otherwise negative.     Medications:  Scheduled Meds:   amLODIPine  5 mg Oral Daily    albuterol-ipratropium  1 puff Inhalation Q6H    doxycycline hyclate  100 mg Oral 2 times per day    cefTRIAXone (ROCEPHIN) IV  1,000 mg Intravenous Q24H    carvedilol  25 mg Oral BID WC    heparin (porcine)  5,000 Units Subcutaneous 3 times per day    calcitRIOL  0.25 mcg Oral Daily    dexamethasone  4 mg Intravenous Q8H    pentoxifylline  400 mg Oral BID    insulin lispro  0-12 Units Subcutaneous TID WC    insulin lispro  0-6 Units Subcutaneous Nightly    aspirin  81 mg Oral Daily    atorvastatin  80 mg Oral Daily    clopidogrel  75 mg Oral Daily    gabapentin  300 mg Oral Nightly    insulin glargine  18 Units Subcutaneous QAM    levothyroxine  50 mcg Oral Daily    famotidine  20 mg Oral Daily    sodium chloride flush  10 mL Intravenous 2 times per day     Continuous Infusions:   sodium bicarbonate infusion      dextrose       PRN Meds:acetaminophen, perflutren lipid microspheres, glucose, dextrose, glucagon (rDNA), dextrose, sodium chloride flush, promethazine **OR** ondansetron, polyethylene glycol, labetalol    OBJECTIVE:  BP (!) 158/62   Pulse 85   Temp 97.2 °F (36.2 °C) (Infrared)   Resp 16   Ht 5' (1.524 m)   Wt 117 lb (53.1 kg)   SpO2 93%   BMI 22.85 kg/m²   Temp  Av.8 °F (36.6 °C)  Min: 97.2 °F (36.2 °C)  Max: 98.1 °F (36.7 °C)  Constitutional:  The patient is awake, alert, and oriented. THIN  Skin:    Warm and dry. No rashes were noted. HEENT:   Round and reactive pupils. AT/NC  Chest:   No use of accessory muscles to breathe. Symmetrical expansion. Crackles right   Cardiovascular:  S1 and S2 are rhythmic and regular. No murmurs appreciated. Abdomen:   Positive bowel sounds to auscultation. Benign to palpation. distended   Extremities:   No clubbing, no cyanosis, no edema.   CNS    AAxO   Lines: piv    Radiology:  Laboratory and Tests Review:  Lab Results   Component Value Date    WBC 17.9 (H) 03/11/2021    WBC 9.2 03/10/2021    WBC 3.5 (L) 03/09/2021    HGB 8.3 (L) 03/11/2021    HCT 26.2 (L) 03/11/2021    MCV 87.0 03/11/2021     03/11/2021     No results found for: CRP  Lab Results   Component Value Date    ALT 24 03/11/2021    AST 55 (H) 03/11/2021    ALKPHOS 76 03/11/2021    BILITOT <0.2 03/11/2021     Lab Results   Component Value Date     03/11/2021    K 3.9 03/11/2021    K 4.2 03/09/2021     03/11/2021    CO2 16 03/11/2021    BUN 63 03/11/2021    CREATININE 4.3 03/11/2021    CREATININE 3.6 03/10/2021    CREATININE 3.5 03/10/2021    GFRAA 13 03/11/2021    LABGLOM 13 03/11/2021    GLUCOSE 200 03/11/2021    GLUCOSE 419 03/21/2011    PROT 5.3 03/11/2021    LABALBU 2.5 03/11/2021    LABALBU 4.1 03/21/2011    CALCIUM 8.0 03/11/2021    BILITOT <0.2 03/11/2021    ALKPHOS 76 03/11/2021    AST 55 03/11/2021    ALT 24 03/11/2021     Lab Results   Component Value Date    CRP 10.8 (H) 03/11/2021     Lab Results   Component Value Date    SEDRATE 44 (H) 03/11/2021     Recent Labs     03/08/21  1347 03/08/21  1852 03/09/21  0510 03/10/21  1332 03/11/21  0645   CRP  --   --   --   --  10.8*   PROCAL  --   --   --   --  50.10*   FERRITIN  --   --   --   --  646   LDH  --   --   --   --  447*   TROPONINI 0.12* 0.10*  --   --  0.11*   DDIMER  --   --   --  759 173 FIBRINOGEN  --   --   --   --  569*   INR  --   --   --   --  1.0   PROTIME  --   --   --   --  11.9   AST  --   --  18  --  55*   ALT  --   --  12  --  24     Lab Results   Component Value Date    CHOL 168 08/05/2020    TRIG 182 08/05/2020    HDL 45 08/05/2020    LDLCALC 87 08/05/2020    LABVLDL 36 08/05/2020     Lab Results   Component Value Date/Time    VITD25 27 (L) 03/11/2021 06:45 AM       Microbiology:   Recent Labs     03/10/21  1009   COVID19 Non-Reactive     Lab Results   Component Value Date    BC 24 Hours no growth 03/08/2021    BLOODCULT2 24 Hours no growth 03/08/2021       Recent Labs     03/08/21  2336   LABURIN <10,000 CFU/mL  Mixed gram positive organisms        ASSESSMENT/PLAN:  Sepsis   Fevers better   Leukocytosis  COVID Viral pneumonia      doxycycline hyclate (VIBRAMYCIN) capsule 100 mg, 2 times per day  cefTRIAXone (ROCEPHIN) 1,000 mg in sterile water 10 mL IV syringe, Q24H  heparin (porcine) injection 5,000 Units, 3 times per day  dexamethasone (DECADRON) injection 4 mg, Q8H    S/p toci  BLADDER SCAN YESTERDAY Pt was bladder scanned and results where 87 cc. WILL REPEAT    FOLLOW CLINICALLY REPEAT PROCAL       · Monitor labs    Imaging and labs were reviewed per medical records. The patient was educated about the diagnosis, prognosis, indications, risks and benefits of treatment. An opportunity to ask questions was given to the patient/FAMILY. Thank you for involving me in the care of [place]  I will continue to follow. Please do not hesitate to call for any questions or concerns.     Electronically signed by Anne Dodge MD on 3/11/2021 at 12:21 PM

## 2021-03-11 NOTE — PROGRESS NOTES
[facility] MD So Jorgensenin Officer, MD Abner Diaz MD   Theodor Master, DO, [address] .  Progress note   Patient's Name: Mark Guadarrama  11:34 AM  3/11/2021          Found to have covid 19 Pna . Now in isolation . Breathing better o2/nc . Cr went up   Had a dose of lasix yesterday   bp better though still on higher side       History of Present Ilness: The patient is a 57 y.o. female with significant past medical history of diabetes type 2, Essential hypertension who presents to the ER following being noticed to have progressively elevated cr numbers   In looking in records pt cr has been trending up rather fast over the last year to year and half . She does have around 7 g proteinuria based on spot urine /cr ratio . Cr on presentation was 3.2 . Cr today 3.5   She was also found to have bp of 200 . At home she is on hctz/lisinopril as part of her antihtn . I saw her in her room this am , she is alert oriented pleasant 58 y/o F . Past Medical History:   Diagnosis Date    Acid reflux     Hyperlipidemia     Hypertension     Hypothyroidism     S/P appendectomy     Type II or unspecified type diabetes mellitus without mention of complication, not stated as uncontrolled        Past Surgical History:   Procedure Laterality Date    APPENDECTOMY      HYSTERECTOMY         Family History   Problem Relation Age of Onset    Diabetes Mother     High Blood Pressure Mother     Diabetes Brother     Diabetes Sister         reports that she has never smoked. She has never used smokeless tobacco. She reports that she does not drink alcohol or use drugs.     Allergies:  Demerol and Toradol [ketorolac tromethamine]    Current Medications:    sodium bicarbonate 150 mEq in dextrose 5 % 1,000 mL infusion, Continuous  amLODIPine (NORVASC) tablet 5 mg, Daily  acetaminophen (TYLENOL) tablet 1,000 mg, Q6H PRN  doxycycline hyclate (VIBRAMYCIN) capsule 100 mg, 2 times per day  cefTRIAXone (ROCEPHIN) 1,000 mg in sterile water 10 mL IV syringe, Q24H  carvedilol (COREG) tablet 25 mg, BID WC  ipratropium-albuterol (DUONEB) nebulizer solution 1 ampule, Q4H WA  heparin (porcine) injection 5,000 Units, 3 times per day  calcitRIOL (ROCALTROL) capsule 0.25 mcg, Daily  albuterol sulfate  (90 Base) MCG/ACT inhaler 2 puff, Q6H PRN  dexamethasone (DECADRON) injection 4 mg, Q8H  perflutren lipid microspheres (DEFINITY) injection 1.65 mg, ONCE PRN  pentoxifylline (TRENTAL) extended release tablet 400 mg, BID  insulin lispro (HUMALOG) injection vial 0-12 Units, TID WC  insulin lispro (HUMALOG) injection vial 0-6 Units, Nightly  glucose (GLUTOSE) 40 % oral gel 15 g, PRN  dextrose 50 % IV solution, PRN  glucagon (rDNA) injection 1 mg, PRN  dextrose 5 % solution, PRN  aspirin chewable tablet 81 mg, Daily  atorvastatin (LIPITOR) tablet 80 mg, Daily  clopidogrel (PLAVIX) tablet 75 mg, Daily  gabapentin (NEURONTIN) capsule 300 mg, Nightly  insulin glargine (LANTUS) injection vial 18 Units, QAM  levothyroxine (SYNTHROID) tablet 50 mcg, Daily  famotidine (PEPCID) tablet 20 mg, Daily  sodium chloride flush 0.9 % injection 10 mL, 2 times per day  sodium chloride flush 0.9 % injection 10 mL, PRN  promethazine (PHENERGAN) tablet 12.5 mg, Q6H PRN    Or  ondansetron (ZOFRAN) injection 4 mg, Q6H PRN  polyethylene glycol (GLYCOLAX) packet 17 g, Daily PRN  labetalol (NORMODYNE;TRANDATE) injection 20 mg, Q6H PRN        Review of Systems:     No face to face encounter done as he in isolation for COVID 19 PNA . Case discuseed with RN including PE findings     Physical exam:   Vital signs BP (!) 158/62   Pulse 85   Temp 97.2 °F (36.2 °C) (Infrared)   Resp 16   Ht 5' (1.524 m)   Wt 117 lb (53.1 kg)   SpO2 93%   BMI 22.85 kg/m²     No face to face encounter done as he in isolation for COVID 19 PNA .  Case discuseed with RN including PE findings     Data:   Labs:  CBC with Differential:    Lab Results   Component Value Date    WBC 17.9 03/11/2021    RBC 3.01 03/11/2021    HGB 8.3 03/11/2021    HCT 26.2 03/11/2021     03/11/2021    MCV 87.0 03/11/2021    MCH 27.6 03/11/2021    MCHC 31.7 03/11/2021    RDW 14.1 03/11/2021    SEGSPCT 59 02/27/2013    METASPCT 12.0 03/11/2021    LYMPHOPCT 2.0 03/11/2021    MONOPCT 1.0 03/11/2021    MYELOPCT 2.0 03/11/2021    BASOPCT 0.0 03/11/2021    MONOSABS 0.18 03/11/2021    LYMPHSABS 0.36 03/11/2021    EOSABS 0.00 03/11/2021    BASOSABS 0.00 03/11/2021     CMP:    Lab Results   Component Value Date     03/11/2021    K 3.9 03/11/2021    K 4.2 03/09/2021     03/11/2021    CO2 16 03/11/2021    BUN 63 03/11/2021    CREATININE 4.3 03/11/2021    GFRAA 13 03/11/2021    LABGLOM 13 03/11/2021    GLUCOSE 200 03/11/2021    GLUCOSE 419 03/21/2011    PROT 5.3 03/11/2021    LABALBU 2.5 03/11/2021    LABALBU 4.1 03/21/2011    CALCIUM 8.0 03/11/2021    BILITOT <0.2 03/11/2021    ALKPHOS 76 03/11/2021    AST 55 03/11/2021    ALT 24 03/11/2021     Ionized Calcium:  No results found for: IONCA  Magnesium:    Lab Results   Component Value Date    MG 2.2 03/11/2021     Phosphorus:    Lab Results   Component Value Date    PHOS 7.9 03/11/2021     U/A:    Lab Results   Component Value Date    COLORU Straw 03/08/2021    PHUR 5.5 03/08/2021    LABCAST RARE 03/27/2015    WBCUA 0-1 03/08/2021    WBCUA NONE 02/06/2012    RBCUA 1-3 03/08/2021    RBCUA 0-1 12/25/2012    YEAST FEW 04/04/2015    BACTERIA NONE SEEN 03/08/2021    CLARITYU Clear 03/08/2021    SPECGRAV 1.025 03/08/2021    LEUKOCYTESUR Negative 03/08/2021    UROBILINOGEN 0.2 03/08/2021    BILIRUBINUR Negative 03/08/2021    BILIRUBINUR NEGATIVE 02/06/2012    BLOODU SMALL 03/08/2021    GLUCOSEU 250 03/08/2021    GLUCOSEU >=1000 02/06/2012    AMORPHOUS FEW 03/08/2021     Microalbumen/Creatinine ratio:  No components found for: RUCREAT  Iron Saturation:  No components found for: PERCENTFE  TIBC:  No results found for: TIBC FERRITIN:    Lab Results   Component Value Date    FERRITIN 646 03/11/2021        Imaging:  XR CHEST PORTABLE   Final Result   1. Rapid development of widespread bilateral parenchymal consolidation   predominant from the hilar regions to the periphery of the lung that   superimposes to previous left upper lobe peripheral focal consolidation. 2.  The differential diagnosis include acute pulmonary edema, pulmonary   hemorrhage, diffuse alveolar damage, bilateral pneumonia. Please correlate   clinically. XR CHEST PORTABLE   Final Result   Left upper lobe opacity worrisome for pneumonia. US RETROPERITONEAL LIMITED   Final Result   Mildly atrophic and echogenic kidneys, compatible with mild chronic renal   parenchymal disease. Unremarkable ultrasound of the bladder. US DUP LOWER EXTREMITIES BILATERAL VENOUS    (Results Pending)       Assessment    -subacute kidney injury vs progressive worsening of her CKD     -Nephrotic range proteinuria    -HTN urgency     -Anaemia of chronic disease     -Mineral bone disease     -Insulin depedent diabetes with complication     -COVID 19 PNA with respiratory failure       PLAN :    Ms Rd Egan has been having rather progressive elevation in cr over the last 1.5 years . Cr back on 3/2019 was 1 . Cr on 11/2019 1.6 . Cr back on 8/2020 was 2.1 and now cr is 3.2   The CKD is likley due to diabetic nephropathy thought the progression is rather fast .   Her current GAVIN is likley also exacerbated by her uncontrolled HTN on presentation . Cr up to 4s today (possibly due to hypoxia , diuresing )    -continue coreg to 25 mg bid , amlodipine  -f/u ISSA ,ANCA , SPEP , UPEP   -f/u the need for RRT/HD .            Electronically signed by Cheri Marroquin MD on 3/11/2021 at 11:34 AM

## 2021-03-11 NOTE — PROGRESS NOTES
Pt had benadryl with actemra and is very sleepy. Will start po meds in am  Pt has not been eating. Pt was bladder scanned and results where 87 cc.

## 2021-03-11 NOTE — CARE COORDINATION
CM Note: 3/11/2021 at 11:10am: COVID POSITIVE - test done on 3/9. CM called and talked to the patient in her room for transition of care and to conduct ACP. States she is feeling better than yesterday. States she lives with her 12year old grandson in a one story home. States she is independent with her ADL's but does not drive. States she has a service that picks her up and takes her to appts. States no home O2 or nebulizer - currently on 2L NC. DME: cane, rollator - does not use. PCP: Dr. Galindo Officer at [facility]. Pharmacy: [facility] in [place]. States plans are to return home when discharged and her son or granddaughter will pick her up. Discussed PT / OT recommendations for HHC and patient states she would like to have it. Choice list provided for [facility] and she selected [facility]. Referral called to [facility] and they can accept. Will need HHC orders prior to discharge. Ruy Kraft RN  ADDENDUM: [facility] orders received.  Ruy Kraft RN

## 2021-03-11 NOTE — PROGRESS NOTES
[facility] MD Justin Bobby MD Yvette Locke, MD Lynnette Brackett, DO, Chelsie Sam MD .  Progress note   Patient's Name: Beth Quintana  8:02 PM  3/10/2021          Found to have covid 19 Pna . Now in isolation . Cr relatively stable over last 24 hours . BP starting to do better       History of Present Ilness: The patient is a 57 y.o. female with significant past medical history of diabetes type 2, Essential hypertension who presents to the ER following being noticed to have progressively elevated cr numbers   In looking in records pt cr has been trending up rather fast over the last year to year and half . She does have around 7 g proteinuria based on spot urine /cr ratio . Cr on presentation was 3.2 . Cr today 3.5   She was also found to have bp of 200 . At home she is on hctz/lisinopril as part of her antihtn . I saw her in her room this am , she is alert oriented pleasant 58 y/o F . Past Medical History:   Diagnosis Date    Acid reflux     Hyperlipidemia     Hypertension     Hypothyroidism     S/P appendectomy     Type II or unspecified type diabetes mellitus without mention of complication, not stated as uncontrolled        Past Surgical History:   Procedure Laterality Date    APPENDECTOMY      HYSTERECTOMY         Family History   Problem Relation Age of Onset    Diabetes Mother     High Blood Pressure Mother     Diabetes Brother     Diabetes Sister         reports that she has never smoked. She has never used smokeless tobacco. She reports that she does not drink alcohol or use drugs.     Allergies:  Demerol and Toradol [ketorolac tromethamine]    Current Medications:    acetaminophen (TYLENOL) tablet 1,000 mg, Q6H PRN  doxycycline hyclate (VIBRAMYCIN) capsule 100 mg, 2 times per day  cefTRIAXone (ROCEPHIN) 1,000 mg in sterile water 10 mL IV syringe, Q24H  carvedilol (COREG) tablet 25 mg, BID WC  ipratropium-albuterol (DUONEB) nebulizer solution 1 ampule, Q4H WA  [START ON 3/11/2021] heparin (porcine) injection 5,000 Units, 3 times per day  EPINEPHrine PF 1 MG/ML injection 0.3 mg, Once PRN  methylPREDNISolone sodium (SOLU-MEDROL) injection 125 mg, Once PRN  calcitRIOL (ROCALTROL) capsule 0.25 mcg, Daily  albuterol sulfate  (90 Base) MCG/ACT inhaler 2 puff, Q6H PRN  dexamethasone (DECADRON) injection 4 mg, Q8H  perflutren lipid microspheres (DEFINITY) injection 1.65 mg, ONCE PRN  pentoxifylline (TRENTAL) extended release tablet 400 mg, BID  insulin lispro (HUMALOG) injection vial 0-12 Units, TID WC  insulin lispro (HUMALOG) injection vial 0-6 Units, Nightly  glucose (GLUTOSE) 40 % oral gel 15 g, PRN  dextrose 50 % IV solution, PRN  glucagon (rDNA) injection 1 mg, PRN  dextrose 5 % solution, PRN  aspirin chewable tablet 81 mg, Daily  atorvastatin (LIPITOR) tablet 80 mg, Daily  clopidogrel (PLAVIX) tablet 75 mg, Daily  gabapentin (NEURONTIN) capsule 300 mg, Nightly  insulin glargine (LANTUS) injection vial 18 Units, QAM  levothyroxine (SYNTHROID) tablet 50 mcg, Daily  famotidine (PEPCID) tablet 20 mg, Daily  sodium chloride flush 0.9 % injection 10 mL, 2 times per day  sodium chloride flush 0.9 % injection 10 mL, PRN  promethazine (PHENERGAN) tablet 12.5 mg, Q6H PRN    Or  ondansetron (ZOFRAN) injection 4 mg, Q6H PRN  polyethylene glycol (GLYCOLAX) packet 17 g, Daily PRN  labetalol (NORMODYNE;TRANDATE) injection 20 mg, Q6H PRN        Review of Systems:     No face to face encounter done as he in isolation for COVID 19 PNA . Case discuseed with RN including PE findings     Physical exam:   Vital signs BP (!) 166/78   Pulse 80   Temp 98.1 °F (36.7 °C) (Infrared)   Resp 20   Ht 5' (1.524 m)   Wt 117 lb (53.1 kg)   SpO2 93%   BMI 22.85 kg/m²     No face to face encounter done as he in isolation for COVID 19 PNA .  Case discuseed with RN including PE findings     Data:   Labs:  CBC with Differential:    Lab Results   Component Value Date    WBC 9.2 03/10/2021    RBC 2.99 03/10/2021    HGB 8.2 03/10/2021    HCT 26.2 03/10/2021     03/10/2021    MCV 87.6 03/10/2021    MCH 27.4 03/10/2021    MCHC 31.3 03/10/2021    RDW 13.9 03/10/2021    SEGSPCT 59 02/27/2013    LYMPHOPCT 15.1 03/10/2021    MONOPCT 4.0 03/10/2021    BASOPCT 0.1 03/10/2021    MONOSABS 0.37 03/10/2021    LYMPHSABS 1.38 03/10/2021    EOSABS 0.00 03/10/2021    BASOSABS 0.01 03/10/2021     CMP:    Lab Results   Component Value Date     03/10/2021    K 3.9 03/10/2021    K 4.2 03/09/2021     03/10/2021    CO2 19 03/10/2021    BUN 49 03/10/2021    CREATININE 3.6 03/10/2021    GFRAA 15 03/10/2021    LABGLOM 15 03/10/2021    GLUCOSE 157 03/10/2021    GLUCOSE 419 03/21/2011    PROT 5.7 03/09/2021    LABALBU 2.9 03/09/2021    LABALBU 4.1 03/21/2011    CALCIUM 7.8 03/10/2021    BILITOT <0.2 03/09/2021    ALKPHOS 84 03/09/2021    AST 18 03/09/2021    ALT 12 03/09/2021     Ionized Calcium:  No results found for: IONCA  Magnesium:  No results found for: MG  Phosphorus:    Lab Results   Component Value Date    PHOS 4.5 03/06/2021     U/A:    Lab Results   Component Value Date    COLORU Straw 03/08/2021    PHUR 5.5 03/08/2021    LABCAST RARE 03/27/2015    WBCUA 0-1 03/08/2021    WBCUA NONE 02/06/2012    RBCUA 1-3 03/08/2021    RBCUA 0-1 12/25/2012    YEAST FEW 04/04/2015    BACTERIA NONE SEEN 03/08/2021    CLARITYU Clear 03/08/2021    SPECGRAV 1.025 03/08/2021    LEUKOCYTESUR Negative 03/08/2021    UROBILINOGEN 0.2 03/08/2021    BILIRUBINUR Negative 03/08/2021    BILIRUBINUR NEGATIVE 02/06/2012    BLOODU SMALL 03/08/2021    GLUCOSEU 250 03/08/2021    GLUCOSEU >=1000 02/06/2012    AMORPHOUS FEW 03/08/2021     Microalbumen/Creatinine ratio:  No components found for: RUCREAT  Iron Saturation:  No components found for: PERCENTFE  TIBC:  No results found for: TIBC  FERRITIN:  No results found for: FERRITIN     Imaging:  XR CHEST PORTABLE   Final Result   1.   Rapid development of widespread bilateral parenchymal consolidation   predominant from the hilar regions to the periphery of the lung that   superimposes to previous left upper lobe peripheral focal consolidation. 2.  The differential diagnosis include acute pulmonary edema, pulmonary   hemorrhage, diffuse alveolar damage, bilateral pneumonia. Please correlate   clinically. XR CHEST PORTABLE   Final Result   Left upper lobe opacity worrisome for pneumonia. US RETROPERITONEAL LIMITED   Final Result   Mildly atrophic and echogenic kidneys, compatible with mild chronic renal   parenchymal disease. Unremarkable ultrasound of the bladder. US DUP LOWER EXTREMITIES BILATERAL VENOUS    (Results Pending)       Assessment    -subacute kidney injury vs progressive worsening of her CKD     -Nephrotic range proteinuria    -HTN urgency     -Anaemia of chronic disease     -Mineral bone disease     -Insulin depedent diabetes with complication     -COVID 19 PNA with respiratory failure       PLAN :    Ms Radha Coello has been having rather progressive elevation in cr over the last 1.5 years . Cr back on 3/2019 was 1 . Cr on 11/2019 1.6 . Cr back on 8/2020 was 2.1 and now cr is 3.2   The CKD is likley due to diabetic nephropathy thought the progression is rather fast .   Her current GAVIN is likley also exacerbated by her uncontrolled HTN . Her kidney function has been stable over last 24 hours     -increase coreg to 25 mg bid  -f/u ISSA ,ANCA , SPEP , UPEP   -no current need for RRT/HD .    -am labs        Electronically signed by Pipo Philippe MD on 3/10/2021 at 8:02 PM

## 2021-03-11 NOTE — PROGRESS NOTES
Sodium   Date Value Ref Range Status   03/11/2021 139 132 - 146 mmol/L Final   03/10/2021 139 132 - 146 mmol/L Final   03/10/2021 138 132 - 146 mmol/L Final     Potassium   Date Value Ref Range Status   03/11/2021 3.9 3.5 - 5.0 mmol/L Final   03/10/2021 3.9 3.5 - 5.0 mmol/L Final   03/10/2021 3.8 3.5 - 5.0 mmol/L Final     Potassium reflex Magnesium   Date Value Ref Range Status   03/09/2021 4.2 3.5 - 5.0 mmol/L Final     Chloride   Date Value Ref Range Status   03/11/2021 107 98 - 107 mmol/L Final   03/10/2021 109 (H) 98 - 107 mmol/L Final   03/10/2021 109 (H) 98 - 107 mmol/L Final     CO2   Date Value Ref Range Status   03/11/2021 16 (L) 22 - 29 mmol/L Final   03/10/2021 19 (L) 22 - 29 mmol/L Final   03/10/2021 19 (L) 22 - 29 mmol/L Final     BUN   Date Value Ref Range Status   03/11/2021 63 (H) 8 - 23 mg/dL Final   03/10/2021 49 (H) 8 - 23 mg/dL Final   03/10/2021 47 (H) 8 - 23 mg/dL Final     CREATININE   Date Value Ref Range Status   03/11/2021 4.3 (H) 0.5 - 1.0 mg/dL Final   03/10/2021 3.6 (H) 0.5 - 1.0 mg/dL Final   03/10/2021 3.5 (H) 0.5 - 1.0 mg/dL Final     Glucose   Date Value Ref Range Status   03/11/2021 200 (H) 74 - 99 mg/dL Final   03/10/2021 157 (H) 74 - 99 mg/dL Final   03/10/2021 111 (H) 74 - 99 mg/dL Final   03/21/2011 419 (H) 70 - 110 mg/dL Final     Calcium   Date Value Ref Range Status   03/11/2021 8.0 (L) 8.6 - 10.2 mg/dL Final   03/10/2021 7.8 (L) 8.6 - 10.2 mg/dL Final   03/10/2021 8.0 (L) 8.6 - 10.2 mg/dL Final     Total Protein   Date Value Ref Range Status   03/11/2021 5.3 (L) 6.4 - 8.3 g/dL Final   03/09/2021 5.7 (L) 6.4 - 8.3 g/dL Final   03/13/2019 7.5 6.4 - 8.3 g/dL Final     Albumin   Date Value Ref Range Status   03/11/2021 2.5 (L) 3.5 - 5.2 g/dL Final   03/09/2021 2.9 (L) 3.5 - 5.2 g/dL Final   03/06/2021 3.3 (L) 3.5 - 5.2 g/dL Final   03/21/2011 4.1 3.2 - 4.8 g/dL Final     Total Bilirubin   Date Value Ref Range Status   03/11/2021 <0.2 0.0 - 1.2 mg/dL Final   03/09/2021 <0.2 0.0 - 1.2 mg/dL Final   03/13/2019 <0.2 0.0 - 1.2 mg/dL Final     Alkaline Phosphatase   Date Value Ref Range Status   03/11/2021 76 35 - 104 U/L Final   03/09/2021 84 35 - 104 U/L Final   03/13/2019 106 (H) 35 - 104 U/L Final     AST   Date Value Ref Range Status   03/11/2021 55 (H) 0 - 31 U/L Final     Comment:     Specimen is slightly Hemolyzed. Result may be artificially increased. 03/09/2021 18 0 - 31 U/L Final   03/13/2019 13 0 - 31 U/L Final     ALT   Date Value Ref Range Status   03/11/2021 24 0 - 32 U/L Final   03/09/2021 12 0 - 32 U/L Final   03/13/2019 8 0 - 32 U/L Final     GFR Non-   Date Value Ref Range Status   03/11/2021 13 >=60 mL/min/1.73 Final     Comment:     Chronic Kidney Disease: less than 60 ml/min/1.73 sq.m. Kidney Failure: less than 15 ml/min/1.73 sq.m. Results valid for patients 18 years and older. 03/10/2021 15 >=60 mL/min/1.73 Final     Comment:     Chronic Kidney Disease: less than 60 ml/min/1.73 sq.m. Kidney Failure: less than 15 ml/min/1.73 sq.m. Results valid for patients 18 years and older. 03/10/2021 16 >=60 mL/min/1.73 Final     Comment:     Chronic Kidney Disease: less than 60 ml/min/1.73 sq.m. Kidney Failure: less than 15 ml/min/1.73 sq.m. Results valid for patients 18 years and older. GFR    Date Value Ref Range Status   03/11/2021 13  Final   03/10/2021 15  Final   03/10/2021 16  Final     Magnesium   Date Value Ref Range Status   03/11/2021 2.2 1.6 - 2.6 mg/dL Final     Phosphorus   Date Value Ref Range Status   03/11/2021 7.9 (H) 2.5 - 4.5 mg/dL Final   03/06/2021 4.5 2.5 - 4.5 mg/dL Final   08/05/2020 3.8 2.5 - 4.5 mg/dL Final     Recent Labs     03/10/21  1230   PH 7.235*   PO2 44.9*   PCO2 39.0   HCO3 16.2*   BE -10.6*   O2SAT 76.9*       RADIOLOGY:  XR CHEST PORTABLE   Final Result   1.   Rapid development of widespread bilateral parenchymal consolidation   predominant from the hilar regions to the intervene urgently. I managed/supervised life or organ supporting interventions that required frequent physician assessment. I devoted my full attention to the direct care of this patient for the amount of time indicated below. Time I spent with the family or surrogate(s) is included only if the patient was incapable of providing the necessary information or participating in medical decisions  Time devoted to teaching and to any procedures I billed separately is not included.     CRITICAL CARE TIME:  32 minutes    Electronically signed by Magaly Ortega DO on 3/11/2021 at 4:46 PM

## 2021-03-11 NOTE — PROGRESS NOTES
[address] [place], [place] [address]   Progress Note    Admitting Date and Time: 3/8/2021  1:21 PM  Admit Dx: Hypertensive urgency [I16.0]    Subjective:    Patient reports that she is feeling much better today. Noted improved breathing and no longer requiring oxygen at rest.     ROS: denies fever, chills, cp, sob, n/v, HA unless stated above.      doxycycline hyclate  100 mg Oral 2 times per day    cefTRIAXone (ROCEPHIN) IV  1,000 mg Intravenous Q24H    carvedilol  25 mg Oral BID WC    ipratropium-albuterol  1 ampule Inhalation Q4H WA    heparin (porcine)  5,000 Units Subcutaneous 3 times per day    calcitRIOL  0.25 mcg Oral Daily    dexamethasone  4 mg Intravenous Q8H    pentoxifylline  400 mg Oral BID    insulin lispro  0-12 Units Subcutaneous TID WC    insulin lispro  0-6 Units Subcutaneous Nightly    aspirin  81 mg Oral Daily    atorvastatin  80 mg Oral Daily    clopidogrel  75 mg Oral Daily    gabapentin  300 mg Oral Nightly    insulin glargine  18 Units Subcutaneous QAM    levothyroxine  50 mcg Oral Daily    famotidine  20 mg Oral Daily    sodium chloride flush  10 mL Intravenous 2 times per day     acetaminophen, 1,000 mg, Q6H PRN  albuterol sulfate HFA, 2 puff, Q6H PRN  perflutren lipid microspheres, 1.5 mL, ONCE PRN  glucose, 15 g, PRN  dextrose, 12.5 g, PRN  glucagon (rDNA), 1 mg, PRN  dextrose, 100 mL/hr, PRN  sodium chloride flush, 10 mL, PRN  promethazine, 12.5 mg, Q6H PRN    Or  ondansetron, 4 mg, Q6H PRN  polyethylene glycol, 17 g, Daily PRN  labetalol, 20 mg, Q6H PRN         Objective:    BP (!) 158/62   Pulse 85   Temp 97.2 °F (36.2 °C) (Infrared)   Resp 16   Ht 5' (1.524 m)   Wt 117 lb (53.1 kg)   SpO2 93%   BMI 22.85 kg/m²   General Appearance: alert and oriented to person, place and time and in no acute distress  Skin: warm and dry  Head: normocephalic and atraumatic  Eyes: pupils equal, round, and reactive to light, extraocular eye movements intact, conjunctivae normal  Neck: neck supple and non tender without mass   Pulmonary/Chest: bibasilar crackles, but much improved  Cardiovascular: normal rate, normal S1 and S2 and no carotid bruits  Abdomen: soft, non-tender, non-distended, normal bowel sounds, no masses or organomegaly  Extremities: no cyanosis, no clubbing and no edema  Neurologic: no cranial nerve deficit and speech normal      Recent Labs     03/10/21  1009 03/10/21  1332 03/11/21  0645    139 139   K 3.8 3.9 3.9   * 109* 107   CO2 19* 19* 16*   BUN 47* 49* 63*   CREATININE 3.5* 3.6* 4.3*   GLUCOSE 111* 157* 200*   CALCIUM 8.0* 7.8* 8.0*       Recent Labs     03/09/21  0510 03/11/21  0645   ALKPHOS 84 76   PROT 5.7* 5.3*   LABALBU 2.9* 2.5*   BILITOT <0.2 <0.2   AST 18 55*   ALT 12 24       Recent Labs     03/09/21  0510 03/10/21  1009 03/11/21  0645   WBC 3.5* 9.2 17.9*   RBC 2.96* 2.99* 3.01*   HGB 8.0* 8.2* 8.3*   HCT 25.4* 26.2* 26.2*   MCV 85.8 87.6 87.0   MCH 27.0 27.4 27.6   MCHC 31.5* 31.3* 31.7*   RDW 13.9 13.9 14.1    250 258   MPV 10.0 10.3 10.5            Radiology:   XR CHEST PORTABLE   Final Result   1. Rapid development of widespread bilateral parenchymal consolidation   predominant from the hilar regions to the periphery of the lung that   superimposes to previous left upper lobe peripheral focal consolidation. 2.  The differential diagnosis include acute pulmonary edema, pulmonary   hemorrhage, diffuse alveolar damage, bilateral pneumonia. Please correlate   clinically. XR CHEST PORTABLE   Final Result   Left upper lobe opacity worrisome for pneumonia. US RETROPERITONEAL LIMITED   Final Result   Mildly atrophic and echogenic kidneys, compatible with mild chronic renal   parenchymal disease. Unremarkable ultrasound of the bladder. US DUP LOWER EXTREMITIES BILATERAL VENOUS    (Results Pending)       Assessment:    1. Hypertensive urgency: better controlled, but not optimal control.  Currently on coreg. Will add amlodipine as this has the most safety profile compare to al other antihypertensive agents. Continue to monitor    2. Acute Respiratory Failure with Hypoxia due to covid 19: continue decadron. Continue breathing treatment. Appreciate ID and Pulm input. Received 1 time dose of lasix yesterday to due to concern for pulm edema    3. Covid PNA: continue doxy and Ceftriaxone    4. DM type 2: better control. Decadron is affecting optimal BGL control. Will continue accucheck ACHS. Continue to monitor    5. Diarrhea: may be covid related. Resolved    6. Hypothyroidism: on synthroid. 7. Acute on chronic kidney disease: worsening renal function. Creatinine is 4.3 today. Will continue to monitor. Avoid nephrotoxic agents. Appreciate Nephro input. Concern that DM and Hypertension may have contributed to the worsening of renal function. NOTE: This report was transcribed using voice recognition software. Every effort was made to ensure accuracy; however, inadvertent computerized transcription errors may be present.      Electronically signed by Guillermo Mcanir MD on 3/11/2021 at 10:58 AM

## 2021-03-11 NOTE — ACP (ADVANCE CARE PLANNING)
Advance Care Planning     Advance Care Planning Activator (Inpatient)  Conversation Note      Date of ACP Conversation: 3/11/2021  Conversation Conducted with: Patient  ACP Activator: [id number] Decision Maker:     Current Designated Health Care Decision Maker:     Primary Decision Maker: Azalia Ramirez - [phone]    Care Preferences    Ventilation: \"If you were in your present state of health and suddenly became very ill and were unable to breathe on your own, what would your preference be about the use of a ventilator (breathing machine) if it were available to you? \"      Would the patient desire the use of ventilator (breathing machine)?: yes    \"If your health worsens and it becomes clear that your chance of recovery is unlikely, what would your preference be about the use of a ventilator (breathing machine) if it were available to you? \"     Would the patient desire the use of ventilator (breathing machine)?: Yes      Resuscitation  \"CPR works best to restart the heart when there is a sudden event, like a heart attack, in someone who is otherwise healthy. Unfortunately, CPR does not typically restart the heart for people who have serious health conditions or who are very sick. \"    \"In the event your heart stopped as a result of an underlying serious health condition, would you want attempts to be made to restart your heart (answer \"yes\" for attempt to resuscitate) or would you prefer a natural death (answer \"no\" for do not attempt to resuscitate)? \" yes       [] Yes   [x] No   Educated Patient / Crystal Jersey regarding differences between Advance Directives and portable DNR orders.     Length of ACP Conversation in minutes:      Conversation Outcomes:  [x] ACP discussion completed  [] Existing advance directive reviewed with patient; no changes to patient's previously recorded wishes  [] New Advance Directive completed  [] Portable Do Not Rescitate prepared for Provider review and signature  [] POLST/POST/MOLST/MOST prepared for Provider review and signature      Follow-up plan:    [] Schedule follow-up conversation to continue planning  [] Referred individual to Provider for additional questions/concerns   [] Advised patient/agent/surrogate to review completed ACP document and update if needed with changes in condition, patient preferences or care setting    [x] This note routed to one or more involved healthcare providers         =

## 2021-03-12 NOTE — PROGRESS NOTES
[facility]  [facility]  Progress Note    NAME: Cande Arias  MR:  [id number]  :   1960  DATE OF SERVICE:21    This is a face to face encounter with Cande Arias 57 y.o. female on 21  ID following for   Chief Complaint   Patient presents with    Abnormal Lab     creatnine level high per doctor    Hypertension     SUBJECTIVE:  On ra awake and alert nad  Afebrile cr4.3  wbc18.5  Patient is tolerating medications. No reported adverse drug reactions. Review of systems:  As stated above in the chief complaint, otherwise negative. Medications:  Scheduled Meds:   amLODIPine  5 mg Oral BID    albuterol-ipratropium  1 puff Inhalation Q6H    levofloxacin  250 mg Intravenous Q24H    insulin glargine  20 Units Subcutaneous QAM    carvedilol  25 mg Oral BID WC    heparin (porcine)  5,000 Units Subcutaneous 3 times per day    calcitRIOL  0.25 mcg Oral Daily    dexamethasone  4 mg Intravenous Q8H    pentoxifylline  400 mg Oral BID    insulin lispro  0-12 Units Subcutaneous TID WC    insulin lispro  0-6 Units Subcutaneous Nightly    aspirin  81 mg Oral Daily    atorvastatin  80 mg Oral Daily    clopidogrel  75 mg Oral Daily    gabapentin  300 mg Oral Nightly    levothyroxine  50 mcg Oral Daily    famotidine  20 mg Oral Daily    sodium chloride flush  10 mL Intravenous 2 times per day     Continuous Infusions:   sodium chloride      dextrose       PRN Meds:sodium chloride, acetaminophen, perflutren lipid microspheres, glucose, dextrose, glucagon (rDNA), dextrose, sodium chloride flush, promethazine **OR** ondansetron, polyethylene glycol, labetalol    OBJECTIVE:  /63   Pulse 86   Temp 98.1 °F (36.7 °C) (Infrared)   Resp 16   Ht 5' (1.524 m)   Wt 117 lb (53.1 kg)   SpO2 93%   BMI 22.85 kg/m²   Temp  Av.2 °F (36.8 °C)  Min: 97.2 °F (36.2 °C)  Max: 98.8 °F (37.1 °C)  Constitutional:  The patient is awake, alert, THIN  Skin:    Warm and dry. HEENT:    .  AT/NC  Chest:   No use of accessory muscles to breathe. Symmetrical expansion. Dec bs   Cardiovascular:  S1 and S2 are rhythmic and regular   Abdomen:   Positive bowel sounds to auscultation. Benign to palpation. nt distended   Extremities:   No clubbing, no cyanosis, no edema.   CNS    AAxO   Lines: piv    Radiology:  Laboratory and Tests Review:  Lab Results   Component Value Date    WBC 18.5 (H) 03/12/2021    WBC 17.9 (H) 03/11/2021    WBC 9.2 03/10/2021    HGB 7.4 (L) 03/12/2021    HCT 22.5 (L) 03/12/2021    MCV 83.3 03/12/2021     03/12/2021     No results found for: [facility]  Lab Results   Component Value Date    ALT 24 03/11/2021    AST 55 (H) 03/11/2021    ALKPHOS 76 03/11/2021    BILITOT <0.2 03/11/2021     Lab Results   Component Value Date     03/12/2021    K 3.6 03/12/2021    K 4.2 03/09/2021     03/12/2021    CO2 22 03/12/2021    BUN 78 03/12/2021    CREATININE 4.5 03/12/2021    CREATININE 4.4 03/11/2021    CREATININE 4.3 03/11/2021    GFRAA 12 03/12/2021    LABGLOM 12 03/12/2021    GLUCOSE 207 03/12/2021    GLUCOSE 419 03/21/2011    PROT 5.3 03/12/2021    LABALBU 2.4 03/12/2021    LABALBU 4.1 03/21/2011    CALCIUM 8.0 03/12/2021    BILITOT <0.2 03/11/2021    ALKPHOS 76 03/11/2021    AST 55 03/11/2021    ALT 24 03/11/2021     Lab Results   Component Value Date    CRP 10.8 (H) 03/11/2021     Lab Results   Component Value Date    SEDRATE 44 (H) 03/11/2021     Recent Labs     03/10/21  1332 03/11/21  0645 03/12/21  0628   CRP  --  10.8*  --    PROCAL  --  50.10* 57.61*   FERRITIN  --  646  --    LDH  --  447*  --    TROPONINI  --  0.11*  --    DDIMER 729 850  --    FIBRINOGEN  --  569*  --    INR  --  1.0  --    PROTIME  --  11.9  --    AST  --  55*  --    ALT  --  24  --      Lab Results   Component Value Date    CHOL 168 08/05/2020    TRIG 182 08/05/2020    HDL 45 08/05/2020    LDLCALC 87 08/05/2020    LABVLDL 36 08/05/2020     Lab Results   Component Value Date/Time VITD25 27 (L) 03/11/2021 06:45 AM       Microbiology:   Recent Labs     03/10/21  1009   COVID19 Non-Reactive     Lab Results   Component Value Date    BC 24 Hours no growth 03/08/2021    BLOODCULT2 24 Hours no growth 03/08/2021       No results for input(s): Carly Primmer in the last 72 hours. ASSESSMENT/PLAN:  Sepsis   Fevers better   Leukocytosis follow   Urine with gpo vanco x1 vanco level in am   COVID Viral pneumonia-elevated procal         levoFLOXacin (LEVAQUIN) 250 MG/50ML infusion 250 mg, Q24H  dexamethasone (DECADRON) injection 4 mg, Q8H        S/p toci  S/p convalescent plasma             · Monitor labs    Imaging and labs were reviewed per medical records. The patient was educated about the diagnosis, prognosis, indications, risks and benefits of treatment. An opportunity to ask questions was given to the patient/FAMILY. Thank you for involving me in the care of [place]  I will continue to follow. Please do not hesitate to call for any questions or concerns.     Electronically signed by Cecily Quiñones MD on 3/12/2021 at 4:05 PM

## 2021-03-12 NOTE — PLAN OF CARE
Problem: Falls - Risk of:  Goal: Will remain free from falls  Outcome: Ongoing  Goal: Absence of physical injury  Outcome: Ongoing     Problem: Activity:  Goal: Ability to tolerate increased activity will improve  Outcome: Ongoing     Problem: Cardiac:  Goal: Hemodynamic stability will improve  Outcome: Ongoing  Goal: Complications related to the disease process, condition or treatment will be avoided or minimized  Outcome: Ongoing  Goal: Cerebral tissue perfusion will improve  Outcome: Ongoing     Problem: Coping:  Goal: Ability to identify and develop effective coping behavior will improve  Outcome: Ongoing     Problem: Health Behavior:  Goal: Identification of resources available to assist in meeting health care needs will improve  Outcome: Ongoing     Problem: Nutritional:  Goal: Ability to identify appropriate dietary choices will improve  Outcome: Ongoing     Problem: Fluid Volume:  Goal: Maintenance of adequate hydration will improve  Outcome: Ongoing     Problem: Physical Regulation:  Goal: Complications related to the disease process, condition or treatment will be avoided or minimized  Outcome: Ongoing  Goal: Ability to maintain a body temperature in the normal range will improve  Outcome: Ongoing  Goal: Will regain or maintain usual level of consciousness  Outcome: Ongoing     Problem: Serum Glucose Level - Abnormal:  Goal: Ability to maintain appropriate glucose levels has stabilized  Outcome: Ongoing     Problem: Airway Clearance - Ineffective  Goal: Achieve or maintain patent airway  Outcome: Ongoing     Problem: Gas Exchange - Impaired  Goal: Absence of hypoxia  Outcome: Ongoing  Goal: Promote optimal lung function  Outcome: Ongoing     Problem: Breathing Pattern - Ineffective  Goal: Ability to achieve and maintain a regular respiratory rate  Outcome: Ongoing     Problem:  Body Temperature -  Risk of, Imbalanced  Goal: Ability to maintain a body temperature within defined limits  Outcome: Ongoing Goal: Will regain or maintain usual level of consciousness  Outcome: Ongoing  Goal: Complications related to the disease process, condition or treatment will be avoided or minimized  Outcome: Ongoing     Problem: Isolation Precautions - Risk of Spread of Infection  Goal: Prevent transmission of infection  Outcome: Ongoing     Problem: Nutrition Deficits  Goal: Optimize nutritional status  Outcome: Ongoing     Problem: Risk for Fluid Volume Deficit  Goal: Maintain normal heart rhythm  Outcome: Ongoing  Goal: Maintain absence of muscle cramping  Outcome: Ongoing  Goal: Maintain normal serum potassium, sodium, calcium, phosphorus, and pH  Outcome: Ongoing     Problem: Loneliness or Risk for Loneliness  Goal: Demonstrate positive use of time alone when socialization is not possible  Outcome: Ongoing     Problem: Fatigue  Goal: Verbalize increase energy and improved vitality  Outcome: Ongoing     Problem: Patient Education: Go to Patient Education Activity  Goal: Patient/Family Education  Outcome: Ongoing

## 2021-03-12 NOTE — PROGRESS NOTES
Department of Internal Medicine  General Internal Medicine  Attending Progress Note    Chief Complaint   Patient presents with    Abnormal Lab     creatnine level high per doctor    Hypertension       SUBJECTIVE:    Reports that she is feeling better. Denied fever and chills. No chest pain. No nausea or vomiting. Noted that she's not had any diarrhea since 2 days ago.      OBJECTIVE      Medications    Current Facility-Administered Medications: sodium bicarbonate 150 mEq in dextrose 5 % 1,000 mL infusion, , Intravenous, Continuous  amLODIPine (NORVASC) tablet 5 mg, 5 mg, Oral, Daily  albuterol-ipratropium (COMBIVENT RESPIMAT)  MCG/ACT inhaler 1 puff, 1 puff, Inhalation, Q6H  0.9 % sodium chloride infusion, , Intravenous, PRN  levoFLOXacin (LEVAQUIN) 250 MG/50ML infusion 250 mg, 250 mg, Intravenous, Q24H  insulin glargine (LANTUS) injection vial 20 Units, 20 Units, Subcutaneous, QAM  acetaminophen (TYLENOL) tablet 1,000 mg, 1,000 mg, Oral, Q6H PRN  carvedilol (COREG) tablet 25 mg, 25 mg, Oral, BID WC  heparin (porcine) injection 5,000 Units, 5,000 Units, Subcutaneous, 3 times per day  calcitRIOL (ROCALTROL) capsule 0.25 mcg, 0.25 mcg, Oral, Daily  dexamethasone (DECADRON) injection 4 mg, 4 mg, Intravenous, Q8H  perflutren lipid microspheres (DEFINITY) injection 1.65 mg, 1.5 mL, Intravenous, ONCE PRN  pentoxifylline (TRENTAL) extended release tablet 400 mg, 400 mg, Oral, BID  insulin lispro (HUMALOG) injection vial 0-12 Units, 0-12 Units, Subcutaneous, TID WC  insulin lispro (HUMALOG) injection vial 0-6 Units, 0-6 Units, Subcutaneous, Nightly  glucose (GLUTOSE) 40 % oral gel 15 g, 15 g, Oral, PRN  dextrose 50 % IV solution, 12.5 g, Intravenous, PRN  glucagon (rDNA) injection 1 mg, 1 mg, Intramuscular, PRN  dextrose 5 % solution, 100 mL/hr, Intravenous, PRN  aspirin chewable tablet 81 mg, 81 mg, Oral, Daily  atorvastatin (LIPITOR) tablet 80 mg, 80 mg, Oral, Daily  clopidogrel (PLAVIX) tablet 75 mg, 75 mg, Oral, Daily  gabapentin (NEURONTIN) capsule 300 mg, 300 mg, Oral, Nightly  levothyroxine (SYNTHROID) tablet 50 mcg, 50 mcg, Oral, Daily  famotidine (PEPCID) tablet 20 mg, 20 mg, Oral, Daily  sodium chloride flush 0.9 % injection 10 mL, 10 mL, Intravenous, 2 times per day  sodium chloride flush 0.9 % injection 10 mL, 10 mL, Intravenous, PRN  promethazine (PHENERGAN) tablet 12.5 mg, 12.5 mg, Oral, Q6H PRN **OR** ondansetron (ZOFRAN) injection 4 mg, 4 mg, Intravenous, Q6H PRN  polyethylene glycol (GLYCOLAX) packet 17 g, 17 g, Oral, Daily PRN  labetalol (NORMODYNE;TRANDATE) injection 20 mg, 20 mg, Intravenous, Q6H PRN  Physical    VITALS:  BP (!) 191/83   Pulse 75   Temp 98.3 °F (36.8 °C) (Infrared)   Resp 16   Ht 5' (1.524 m)   Wt 117 lb (53.1 kg)   SpO2 95%   BMI 22.85 kg/m²   CONSTITUTIONAL:  awake, alert, cooperative, no apparent distress, and appears stated age  EYES:  Lids and lashes normal, pupils equal, round and reactive to light, extra ocular muscles intact, sclera clear, conjunctiva normal  ENT:  Normocephalic, without obvious abnormality, atraumatic, sinuses nontender on palpation, external ears without lesions, oral pharynx with moist mucus membranes, tonsils without erythema or exudates, gums normal and good dentition.   NECK:  Supple, symmetrical, trachea midline, no adenopathy, thyroid symmetric, not enlarged and no tenderness, skin normal  HEMATOLOGIC/LYMPHATICS:  no cervical lymphadenopathy  BACK:  Symmetric, no curvature, spinous processes are non-tender on palpation, paraspinous muscles are non-tender on palpation, no costal vertebral tenderness  LUNGS:  Coarse lung sounds  CARDIOVASCULAR:  Normal apical impulse, regular rate and rhythm, normal S1 and S2, no S3 or S4, and no murmur noted  ABDOMEN:  No scars, normal bowel sounds, soft, non-distended, non-tender, no masses palpated, no hepatosplenomegally  MUSCULOSKELETAL:  there is no redness, warmth, or swelling of the joints  NEUROLOGIC:  Awake, alert, oriented to name, place and time. SKIN:  no bruising or bleeding  Data    CBC:   Lab Results   Component Value Date    WBC 18.5 03/12/2021    RBC 2.70 03/12/2021    HGB 7.4 03/12/2021    HCT 22.5 03/12/2021    MCV 83.3 03/12/2021    MCH 27.4 03/12/2021    MCHC 32.9 03/12/2021    RDW 14.2 03/12/2021     03/12/2021    MPV 10.4 03/12/2021     CMP:    Lab Results   Component Value Date     03/12/2021    K 3.6 03/12/2021    K 4.2 03/09/2021     03/12/2021    CO2 22 03/12/2021    BUN 78 03/12/2021    CREATININE 4.5 03/12/2021    GFRAA 12 03/12/2021    LABGLOM 12 03/12/2021    GLUCOSE 207 03/12/2021    GLUCOSE 419 03/21/2011    PROT 5.3 03/11/2021    LABALBU 2.5 03/11/2021    LABALBU 4.1 03/21/2011    CALCIUM 8.0 03/12/2021    BILITOT <0.2 03/11/2021    ALKPHOS 76 03/11/2021    AST 55 03/11/2021    ALT 24 03/11/2021       ASSESSMENT AND PLAN      1. Hypertensive urgency:  Seems better controlled  Continue Coreg and Norvasc  Will monitor and if continues to remain elevated, will add imdur    2. Acute respiratory failure with hypoxia: continue supplemental oxygen  This is related to Covid Viral infection    3. COVID 19 Viral infection: continue remdesivir, decadron and already received FFP    4. Acute on chronic renal failure: creatinine is worse than admission, but stable in the last 24 hours. Defer further recommendation to renal. Possible additional work up  ? Autoimmune such as good pastures disease in the setting of anemia. Hx of Accelerated HTN and DM may be contributing    5. DM type 2: continue current management. Decadron also affecting adequate control  Continue current insulin dose    6.

## 2021-03-12 NOTE — PROGRESS NOTES
[facility] MD Ryann Staples MD Ronie Holster, MD Bennye Botts, DO, Gisela River MD .  Progress note   Patient's Name: Beryle Ferri  12:03 PM  3/12/2021          Found to have covid 19 Pna . Now in isolation . Breathing better o2/nc . Cr relatively stable over last 24 hours at 4.4   No more reported diarrhea      History of Present Ilness: The patient is a 57 y.o. female with significant past medical history of diabetes type 2, Essential hypertension who presents to the ER following being noticed to have progressively elevated cr numbers   In looking in records pt cr has been trending up rather fast over the last year to year and half . She does have around 7 g proteinuria based on spot urine /cr ratio . Cr on presentation was 3.2 . Cr today 3.5   She was also found to have bp of 200 . At home she is on hctz/lisinopril as part of her antihtn . I saw her in her room this am , she is alert oriented pleasant 56 y/o F . Past Medical History:   Diagnosis Date    Acid reflux     Hyperlipidemia     Hypertension     Hypothyroidism     S/P appendectomy     Type II or unspecified type diabetes mellitus without mention of complication, not stated as uncontrolled        Past Surgical History:   Procedure Laterality Date    APPENDECTOMY      HYSTERECTOMY         Family History   Problem Relation Age of Onset    Diabetes Mother     High Blood Pressure Mother     Diabetes Brother     Diabetes Sister         reports that she has never smoked. She has never used smokeless tobacco. She reports that she does not drink alcohol or use drugs.     Allergies:  Demerol and Toradol [ketorolac tromethamine]    Current Medications:    amLODIPine (NORVASC) tablet 5 mg, BID  albuterol-ipratropium (COMBIVENT RESPIMAT)  MCG/ACT inhaler 1 puff, Q6H  0.9 % sodium chloride infusion, PRN  levoFLOXacin (LEVAQUIN) 250 MG/50ML infusion 250 mg, Q24H  insulin glargine (LANTUS) injection vial 20 Units, QAM  acetaminophen (TYLENOL) tablet 1,000 mg, Q6H PRN  carvedilol (COREG) tablet 25 mg, BID WC  heparin (porcine) injection 5,000 Units, 3 times per day  calcitRIOL (ROCALTROL) capsule 0.25 mcg, Daily  dexamethasone (DECADRON) injection 4 mg, Q8H  perflutren lipid microspheres (DEFINITY) injection 1.65 mg, ONCE PRN  pentoxifylline (TRENTAL) extended release tablet 400 mg, BID  insulin lispro (HUMALOG) injection vial 0-12 Units, TID WC  insulin lispro (HUMALOG) injection vial 0-6 Units, Nightly  glucose (GLUTOSE) 40 % oral gel 15 g, PRN  dextrose 50 % IV solution, PRN  glucagon (rDNA) injection 1 mg, PRN  dextrose 5 % solution, PRN  aspirin chewable tablet 81 mg, Daily  atorvastatin (LIPITOR) tablet 80 mg, Daily  clopidogrel (PLAVIX) tablet 75 mg, Daily  gabapentin (NEURONTIN) capsule 300 mg, Nightly  levothyroxine (SYNTHROID) tablet 50 mcg, Daily  famotidine (PEPCID) tablet 20 mg, Daily  sodium chloride flush 0.9 % injection 10 mL, 2 times per day  sodium chloride flush 0.9 % injection 10 mL, PRN  promethazine (PHENERGAN) tablet 12.5 mg, Q6H PRN    Or  ondansetron (ZOFRAN) injection 4 mg, Q6H PRN  polyethylene glycol (GLYCOLAX) packet 17 g, Daily PRN  labetalol (NORMODYNE;TRANDATE) injection 20 mg, Q6H PRN        Review of Systems:     No face to face encounter done as he in isolation for COVID 19 PNA . Case discuseed with RN including PE findings     Physical exam:   Vital signs BP (!) 191/83   Pulse 75   Temp 98.3 °F (36.8 °C) (Infrared)   Resp 16   Ht 5' (1.524 m)   Wt 117 lb (53.1 kg)   SpO2 95%   BMI 22.85 kg/m²     No face to face encounter done as he in isolation for COVID 19 PNA .  Case discuseed with RN including PE findings     Data:   Labs:  CBC with Differential:    Lab Results   Component Value Date    WBC 18.5 03/12/2021    RBC 2.70 03/12/2021    HGB 7.4 03/12/2021    HCT 22.5 03/12/2021     03/12/2021    MCV 83.3 03/12/2021    MCH 27.4 03/12/2021 MCHC 32.9 03/12/2021    RDW 14.2 03/12/2021    SEGSPCT 59 02/27/2013    METASPCT 12.0 03/11/2021    LYMPHOPCT 1.0 03/12/2021    MONOPCT 3.0 03/12/2021    MYELOPCT 2.0 03/11/2021    BASOPCT 0.0 03/12/2021    MONOSABS 0.56 03/12/2021    LYMPHSABS 0.19 03/12/2021    EOSABS 0.00 03/12/2021    BASOSABS 0.00 03/12/2021     CMP:    Lab Results   Component Value Date     03/12/2021    K 3.6 03/12/2021    K 4.2 03/09/2021     03/12/2021    CO2 22 03/12/2021    BUN 78 03/12/2021    CREATININE 4.5 03/12/2021    GFRAA 12 03/12/2021    LABGLOM 12 03/12/2021    GLUCOSE 207 03/12/2021    GLUCOSE 419 03/21/2011    PROT 5.3 03/11/2021    LABALBU 2.5 03/11/2021    LABALBU 4.1 03/21/2011    CALCIUM 8.0 03/12/2021    BILITOT <0.2 03/11/2021    ALKPHOS 76 03/11/2021    AST 55 03/11/2021    ALT 24 03/11/2021     Ionized Calcium:  No results found for: IONCA  Magnesium:    Lab Results   Component Value Date    MG 2.2 03/11/2021     Phosphorus:    Lab Results   Component Value Date    PHOS 7.9 03/11/2021     U/A:    Lab Results   Component Value Date    COLORU Straw 03/08/2021    PHUR 5.5 03/08/2021    LABCAST RARE 03/27/2015    WBCUA 0-1 03/08/2021    WBCUA NONE 02/06/2012    RBCUA 1-3 03/08/2021    RBCUA 0-1 12/25/2012    YEAST FEW 04/04/2015    BACTERIA NONE SEEN 03/08/2021    CLARITYU Clear 03/08/2021    SPECGRAV 1.025 03/08/2021    LEUKOCYTESUR Negative 03/08/2021    UROBILINOGEN 0.2 03/08/2021    BILIRUBINUR Negative 03/08/2021    BILIRUBINUR NEGATIVE 02/06/2012    BLOODU SMALL 03/08/2021    GLUCOSEU 250 03/08/2021    GLUCOSEU >=1000 02/06/2012    AMORPHOUS FEW 03/08/2021     Microalbumen/Creatinine ratio:  No components found for: RUCREAT  Iron Saturation:  No components found for: PERCENTFE  TIBC:  No results found for: TIBC  FERRITIN:    Lab Results   Component Value Date    FERRITIN 646 03/11/2021        Imaging:  XR CHEST PORTABLE   Final Result   Continued but improved bilateral pulmonary airspace opacities, left greater   than right         XR CHEST PORTABLE   Final Result   1. Rapid development of widespread bilateral parenchymal consolidation   predominant from the hilar regions to the periphery of the lung that   superimposes to previous left upper lobe peripheral focal consolidation. 2.  The differential diagnosis include acute pulmonary edema, pulmonary   hemorrhage, diffuse alveolar damage, bilateral pneumonia. Please correlate   clinically. XR CHEST PORTABLE   Final Result   Left upper lobe opacity worrisome for pneumonia. US RETROPERITONEAL LIMITED   Final Result   Mildly atrophic and echogenic kidneys, compatible with mild chronic renal   parenchymal disease. Unremarkable ultrasound of the bladder. US DUP LOWER EXTREMITIES BILATERAL VENOUS    (Results Pending)       Assessment    -subacute kidney injury vs progressive worsening of her CKD     -Nephrotic range proteinuria    -HTN urgency     -Anaemia of chronic disease     -Mineral bone disease     -Insulin depedent diabetes with complication     -COVID 19 PNA with respiratory failure       PLAN :    Ms Claude Grooms has been having rather progressive elevation in cr over the last 1.5 years . Cr back on 3/2019 was 1 . Cr on 11/2019 1.6 . Cr back on 8/2020 was 2.1 and now cr is 3.2   The CKD is likley due to diabetic nephropathy thought the progression is rather fast .   Her current GAVIN is likley also exacerbated by her uncontrolled HTN on presentation .     -Creatinine relatively stable over last 24 hours at 4.4 (plateu ? ? )    -continue coreg to 25 mg bid   -increase amlodipine to 5 mg bid   -f/u ISSA (negative ) ,ANCA , SPEP , UPEP (pending)  -f/u the need for RRT/HD .            Electronically signed by Victorine Prader, MD on 3/12/2021 at 12:03 PM

## 2021-03-12 NOTE — PROGRESS NOTES
Please ask Doctor to auscultate lungs to see if resp tx or lasix is needed. Thank you. Pt had 2 units of FFP during the night. Pt is on room air, sleeping from side to side, C & DB, voided another 100 cc cloudy urine. IVF at 70 cc hour which where on hold during transfusions.

## 2021-03-12 NOTE — CARE COORDINATION
KARI Note: 3/12/2021 at 3:10pm: COVID POSITIVE - test done on 3/9. CM called and talked to the patient on the phone in his room. Currently on O2 1L - no home O2. DME: cane and rollator. States she plans to return home when discharged and her son or granddaughter will pick her up. [facility] is following and orders received - they will need notified when patient is discharged. Will need to watch for home O2.  Reji Espinal RN

## 2021-03-12 NOTE — PROGRESS NOTES
PT has now finished the 2 unit of FFP and lungs remain with coarse sound (crackles), exp wheezes at times, and non pro cough. O2 1 Liter remains on. Pt denies SOB or any respiratory distress. Will continue to monitor.

## 2021-03-12 NOTE — PROGRESS NOTES
Physical Therapy Treatment Note    Room #:  6635/1460-55  Patient Name: Chaitanya Gomez  YOB: 1960  MRN: [id number]    Referring Provider:   Eduardo Johnson MD     Date of Service: 3/12/2021    Evaluating Physical Therapist: Karine Galvin, PT #[id number]       Diagnosis:   Hypertensive urgency [I16.0]     worsening blood pressures despite treatment. Patient Active Problem List   Diagnosis    Diabetes mellitus type 2, insulin dependent ([facility] [address]    Hypothyroid    Hypertension    Hyperlipidemia    Hypertensive urgency        Tentative placement recommendation: Home    Equipment recommendation:  To be determined  possibly cane for balance     Prior Level of Function: Patient ambulated independently    Rehab Potential: good  - for baseline    Past medical history:   Past Medical History:   Diagnosis Date    Acid reflux     Hyperlipidemia     Hypertension     Hypothyroidism     S/P appendectomy     Type II or unspecified type diabetes mellitus without mention of complication, not stated as uncontrolled      Past Surgical History:   Procedure Laterality Date    APPENDECTOMY      HYSTERECTOMY         Precautions: Up as tolerated, falls ,      SUBJECTIVE:    Social history: Patient lives alone   in a apartment  with No steps  to enter Tub shower grab bars    Equipment owned: [employer], [address] and Shower chair,  All unused    AM-PAC Basic Mobility        AM-[facility] Mobility Inpatient   How much difficulty turning over in bed?: None  How much difficulty sitting down on / standing up from a chair with arms?: A Little  How much difficulty moving from lying on back to sitting on side of bed?: None  How much help from another person moving to and from a bed to a chair?: A Little  How much help from another person needed to walk in hospital room?: A Little  How much help from another person for climbing 3-5 steps with a railing?: A Little  AM-[facility] Inpatient Mobility Raw Score : 20  AM-PAC Inpatient T-Scale Score : 47.67  Mobility Inpatient CMS 0-100% Score: 35.83  Mobility Inpatient CMS G-Code Modifier : [address] cleared patient for PT treatment. Pt c/o being tired. OBJECTIVE;   Initial Evaluation  Date: 3/9/2021 Treatment Date:  3/12/2021       Short Term/ Long Term   Goals   Was pt agreeable to Eval/treatment? Yes   yes To be met in 2 days   Pain level   0/10    0/10    Bed Mobility    Rolling: Independent    Supine to sit: Independent    Sit to supine: Independent    Scooting: Independent   Rolling: Independent   Supine to sit: Independent   Sit to supine: Independent   Scooting: Independent    Rolling: Independent    Supine to sit: Independent    Sit to supine: Independent    Scooting: Independent     Transfers Sit to stand: Minimal assist of 1   Sit to stand: Minimal assist of 1      Sit to stand: Independent     Ambulation    2 x 130 feet using  no device with Minimal assist of 1   Loss of balance x 2 minimal assist to recover   2 x 15 feet using  hand held assist with Minimal assist of 1   V/C for upright posture, pacing, and safety.     150 feet using  least restrictive device versus no device with Independent    Stair negotiation: ascended and descended   Not assessed            ROM Within functional limits        Strength BUE:  4/5  RLE:  4/5  LLE:  4/5   Increase strength in affected mm groups by 1/3 grade   Balance Sitting EOB:  good    Dynamic Standing:  fair   Sitting EOB: good   Dynamic Standing: fair hand held assist   Sitting EOB:  good    Dynamic Standing: good       Patient is Alert & Oriented x person, place, time and situation and follows directions    Sensation:  Patient  denies numbness and tingling     Edema:  no    Endurance: fair  +    Vitals: 2 liters nasal cannula    Blood Pressure at rest   Blood Pressure during session     Heart Rate at rest  Heart Rate during session     SPO2 at rest 99%  SPO2 during session  97%     Patient education  Patient educated on role of Physical Therapy, risks of immobility, safety and plan of care and  importance of mobility while in hospital       Patient response to education:   Pt verbalized understanding Pt demonstrated skill Pt requires further education in this area   Yes Partial Yes      Treatment:  Patient practiced and was instructed/facilitated in the following treatment: Patient transferred to edge of bed  Sat edge of bed 30 minutes with Supervision  to increase dynamic sitting balance and activity tolerance. Educated patient on pursed lip breathing, using the spirometer to increase lung capacity, pacing, and energy conservation. Pt stood, ambulated to the restroom, and back to the bed. Returned to bed per patient request.      Therapeutic Exercises:  not performed       At end of session, patient in bed with   call light and phone within reach,   all lines and tubes intact, nursing notified. Patient would benefit from continued skilled Physical Therapy to improve functional independence and quality of life. Patient's/ family goals   home        ASSESSMENT: Patient exhibits decreased strength, balance and endurance impairing functional mobility, transfers and gait  patient needing extra time throughout tx session due to feeling tired and to sit to get her bearings. Pt was unsteady during ambulation due to impaired strength/endurance. Pt at risk for falls. Patient c/o being tired and nausea which limited her progress with therapy.     Plan of Care:     -Standing Balance: Perform strengthening exercises in standing to promote motor control with or without upper extremity support   -Transfers: Cues for hand placement, technique and safety  -Gait: Gait training and Standing activities to improve: base of support, weight shift, weight bearing    -Endurance: Utilize Supervised activities to increase level of endurance to allow for safe functional mobility including transfers and gait     Patient and or family understand(s) diagnosis, prognosis, and plan of care. Frequency of treatments: Patient will be seen  daily. Time in  9:22  Time out  10:00    Total Treatment Time  38 minutes        CPT codes:    Therapeutic activities (10309)   38 minutes  3 unit(s)   Jr Mai  [facility]  LIC # [id number]

## 2021-03-12 NOTE — PROGRESS NOTES
Physician Progress Note      PATIENT:               Alexandria Jacobsen  CSN #:                  [id number]  :                       1960  ADMIT DATE:       3/8/2021 1:21 PM  [address] [place] DATE:  RESPONDING  PROVIDER #:        Noble Morrow MD          QUERY TEXT:    Dear Attending Provider,    Pt admitted with Hypertensive urgency and positive for COVID on 3/9. Noted   documentation of Sepsis by ordered Infectious Disease  consultant in 3/10 and   3/11 PN. If possible, please document in progress notes and discharge   summary:    The medical record reflects the following:  Risk Factors include: Diabetes, COVID+  Clinical Indicators include: WBC: 3/8-4.7, 3/9-3.5, 3/11-17.9, 3/12-18.5; VS:   3/8-212/74, T-102.2, 112, 18; 3/9:189/80, T-102.5, 96, 18; 3/10:185/77, T-102,   89, 18;  Procalcitonin on 3/11:50.10  Treatment includes: Cont IVF, FFP,  Rocephin iv, Levaquin iv, ID consult, lab   monitoring and assessments. Thank you,  Keena Lindsey RN, BSN, CCDS  [phone]  Options provided:  -- Sepsis confirmed present on admission  -- Sepsis confirmed not present on admission  -- Sepsis ruled out  -- Other - I will add my own diagnosis  -- Disagree - Not applicable / Not valid  -- Disagree - Clinically unable to determine / Unknown  -- Refer to Clinical Documentation Reviewer    PROVIDER RESPONSE TEXT:    The diagnosis of Sepsis was ruled out. Query created by:  Shmuel Jimenez on 3/12/2021 11:11 AM      Electronically signed by:  Noble Morrow MD 3/12/2021 11:28 AM

## 2021-03-13 NOTE — PROGRESS NOTES
[facility]  [facility]  Progress Note    Chief Complaint   Patient presents with    Abnormal Lab     creatnine level high per doctor    Hypertension     SUBJECTIVE:  Awake, alert, oriented  No urinary complaints  On 3 L via NC   Afebrile  wbc18.5  Patient is tolerating medications. No reported adverse drug reactions. Review of systems:  As stated above in the chief complaint, otherwise negative. Medications:  Scheduled Meds:   amLODIPine  5 mg Oral BID    albuterol-ipratropium  1 puff Inhalation Q6H    levofloxacin  250 mg Intravenous Q24H    insulin glargine  20 Units Subcutaneous QAM    carvedilol  25 mg Oral BID WC    heparin (porcine)  5,000 Units Subcutaneous 3 times per day    calcitRIOL  0.25 mcg Oral Daily    dexamethasone  4 mg Intravenous Q8H    pentoxifylline  400 mg Oral BID    insulin lispro  0-12 Units Subcutaneous TID WC    insulin lispro  0-6 Units Subcutaneous Nightly    aspirin  81 mg Oral Daily    atorvastatin  80 mg Oral Daily    clopidogrel  75 mg Oral Daily    gabapentin  300 mg Oral Nightly    levothyroxine  50 mcg Oral Daily    famotidine  20 mg Oral Daily    sodium chloride flush  10 mL Intravenous 2 times per day     Continuous Infusions:   sodium chloride      dextrose       PRN Meds:sodium chloride, acetaminophen, perflutren lipid microspheres, glucose, dextrose, glucagon (rDNA), dextrose, sodium chloride flush, promethazine **OR** ondansetron, polyethylene glycol, labetalol    OBJECTIVE:  /69   Pulse 68   Temp 98.1 °F (36.7 °C) (Oral)   Resp 17   Ht 5' (1.524 m)   Wt 117 lb (53.1 kg)   SpO2 97%   BMI 22.85 kg/m²   Temp  Av °F (36.7 °C)  Min: 97.9 °F (36.6 °C)  Max: 98.1 °F (36.7 °C)  Constitutional:  The patient is awake, alert, THIN  Skin:    Warm and dry. HEENT:    .  AT/NC  Chest:   No use of accessory muscles to breathe. Symmetrical expansion.  Dec bs   Cardiovascular:  S1 and S2 are rhythmic and regular Abdomen:   Positive bowel sounds to auscultation. Benign to palpation. nt distended   Extremities:   No clubbing, no cyanosis, no edema. CNS    AAxO   Lines: piv    Radiology:  Laboratory and Tests Review:  Lab Results   Component Value Date    WBC 18.5 (H) 03/12/2021    WBC 17.9 (H) 03/11/2021    WBC 9.2 03/10/2021    HGB 7.4 (L) 03/12/2021    HCT 22.5 (L) 03/12/2021    MCV 83.3 03/12/2021     03/12/2021     No results found for: [facility]  Lab Results   Component Value Date    ALT 24 03/11/2021    AST 55 (H) 03/11/2021    ALKPHOS 76 03/11/2021    BILITOT <0.2 03/11/2021     Lab Results   Component Value Date     03/12/2021    K 3.4 03/12/2021    K 4.2 03/09/2021     03/12/2021    CO2 24 03/12/2021    BUN 80 03/12/2021    CREATININE 4.3 03/12/2021    CREATININE 4.5 03/12/2021    CREATININE 4.4 03/11/2021    GFRAA 13 03/12/2021    LABGLOM 13 03/12/2021    GLUCOSE 94 03/12/2021    GLUCOSE 419 03/21/2011    PROT 5.3 03/12/2021    LABALBU 2.4 03/12/2021    LABALBU 4.1 03/21/2011    CALCIUM 7.9 03/12/2021    BILITOT <0.2 03/11/2021    ALKPHOS 76 03/11/2021    AST 55 03/11/2021    ALT 24 03/11/2021     Lab Results   Component Value Date    CRP 10.8 (H) 03/11/2021     Lab Results   Component Value Date    SEDRATE 44 (H) 03/11/2021     Recent Labs     03/10/21  1332 03/11/21  0645 03/12/21  0628   CRP  --  10.8*  --    PROCAL  --  50.10* 57.61*   FERRITIN  --  646  --    LDH  --  447*  --    TROPONINI  --  0.11*  --    DDIMER 729 850  --    FIBRINOGEN  --  569*  --    INR  --  1.0  --    PROTIME  --  11.9  --    AST  --  55*  --    ALT  --  24  --      Lab Results   Component Value Date    CHOL 168 08/05/2020    TRIG 182 08/05/2020    HDL 45 08/05/2020    LDLCALC 87 08/05/2020    LABVLDL 36 08/05/2020     Lab Results   Component Value Date/Time    VITD25 27 (L) 03/11/2021 06:45 AM       Microbiology:   No results for input(s): COVID19 in the last 72 hours.   Lab Results   Component Value Date    BC 24 Hours no growth 03/08/2021    BLOODCULT2 24 Hours no growth 03/08/2021       No results for input(s): LABURIN, ORG in the last 72 hours. ASSESSMENT/PLAN:  Sepsis   Fevers resolved   Leukocytosis follow   Urine with gpo vanco x1 --- trough 19.6 - pharmacy to dose pending cultures   COVID Viral pneumonia-elevated procal       levoFLOXacin (LEVAQUIN) 250 MG/50ML infusion 250 mg, Q24H  dexamethasone (DECADRON) injection 4 mg, Q8H    S/p toci  S/p convalescent plasma     · Monitor labs - follow biomarkers         Electronically signed by INDRA Jacob NP on 3/13/2021 at 12:15 PM     I have discussed the case, including pertinent history and physical  exam findings . I have seen and examined the patient and the key elements of the encounter have been performed by me. I agree with the assessment, plan and orders as documented.       Treatment plan as per my recommendation     Gilbert Bhatti MD, FACP  3/13/2021  11:28 PM

## 2021-03-13 NOTE — PROGRESS NOTES
Department of Internal Medicine  General Internal Medicine  Attending Progress Note      SUBJECTIVE:    Reports that she is doing better today. She denied fever and chills. No chest pain. No nausea or vomiting.  No diarrhea    OBJECTIVE      Medications    Current Facility-Administered Medications: amLODIPine (NORVASC) tablet 5 mg, 5 mg, Oral, BID  albuterol-ipratropium (COMBIVENT RESPIMAT)  MCG/ACT inhaler 1 puff, 1 puff, Inhalation, Q6H  0.9 % sodium chloride infusion, , Intravenous, PRN  levoFLOXacin (LEVAQUIN) 250 MG/50ML infusion 250 mg, 250 mg, Intravenous, Q24H  insulin glargine (LANTUS) injection vial 20 Units, 20 Units, Subcutaneous, QAM  acetaminophen (TYLENOL) tablet 1,000 mg, 1,000 mg, Oral, Q6H PRN  carvedilol (COREG) tablet 25 mg, 25 mg, Oral, BID WC  heparin (porcine) injection 5,000 Units, 5,000 Units, Subcutaneous, 3 times per day  calcitRIOL (ROCALTROL) capsule 0.25 mcg, 0.25 mcg, Oral, Daily  dexamethasone (DECADRON) injection 4 mg, 4 mg, Intravenous, Q8H  perflutren lipid microspheres (DEFINITY) injection 1.65 mg, 1.5 mL, Intravenous, ONCE PRN  pentoxifylline (TRENTAL) extended release tablet 400 mg, 400 mg, Oral, BID  insulin lispro (HUMALOG) injection vial 0-12 Units, 0-12 Units, Subcutaneous, TID WC  insulin lispro (HUMALOG) injection vial 0-6 Units, 0-6 Units, Subcutaneous, Nightly  glucose (GLUTOSE) 40 % oral gel 15 g, 15 g, Oral, PRN  dextrose 50 % IV solution, 12.5 g, Intravenous, PRN  glucagon (rDNA) injection 1 mg, 1 mg, Intramuscular, PRN  dextrose 5 % solution, 100 mL/hr, Intravenous, PRN  aspirin chewable tablet 81 mg, 81 mg, Oral, Daily  atorvastatin (LIPITOR) tablet 80 mg, 80 mg, Oral, Daily  clopidogrel (PLAVIX) tablet 75 mg, 75 mg, Oral, Daily  gabapentin (NEURONTIN) capsule 300 mg, 300 mg, Oral, Nightly  levothyroxine (SYNTHROID) tablet 50 mcg, 50 mcg, Oral, Daily  famotidine (PEPCID) tablet 20 mg, 20 mg, Oral, Daily  sodium chloride flush 0.9 % injection 10 mL, 10 mL, Intravenous, 2 times per day  sodium chloride flush 0.9 % injection 10 mL, 10 mL, Intravenous, PRN  promethazine (PHENERGAN) tablet 12.5 mg, 12.5 mg, Oral, Q6H PRN **OR** ondansetron (ZOFRAN) injection 4 mg, 4 mg, Intravenous, Q6H PRN  polyethylene glycol (GLYCOLAX) packet 17 g, 17 g, Oral, Daily PRN  labetalol (NORMODYNE;TRANDATE) injection 20 mg, 20 mg, Intravenous, Q6H PRN  Physical    VITALS:  /69   Pulse 68   Temp 98.1 °F (36.7 °C) (Oral)   Resp 17   Ht 5' (1.524 m)   Wt 117 lb (53.1 kg)   SpO2 97%   BMI 22.85 kg/m²   CONSTITUTIONAL:  awake, alert, cooperative, no apparent distress, and appears stated age  EYES:  Lids and lashes normal, pupils equal, round and reactive to light, extra ocular muscles intact, sclera clear, conjunctiva normal  ENT:  Normocephalic, without obvious abnormality, atraumatic, sinuses nontender on palpation, external ears without lesions, oral pharynx with moist mucus membranes, tonsils without erythema or exudates, gums normal and good dentition. NECK:  Supple, symmetrical, trachea midline, no adenopathy, thyroid symmetric, not enlarged and no tenderness, skin normal  HEMATOLOGIC/LYMPHATICS:  no cervical lymphadenopathy  BACK:  Symmetric, no curvature, spinous processes are non-tender on palpation, paraspinous muscles are non-tender on palpation, no costal vertebral tenderness  LUNGS:  Positive crackles, but improving  CARDIOVASCULAR:  Normal apical impulse, regular rate and rhythm, normal S1 and S2, no S3 or S4, and no murmur noted  ABDOMEN:  No scars, normal bowel sounds, soft, non-distended, non-tender, no masses palpated, no hepatosplenomegally  MUSCULOSKELETAL:  There is no redness, warmth, or swelling of the joints. Full range of motion noted. Motor strength is 5 out of 5 all extremities bilaterally. Tone is normal.  NEUROLOGIC:  Awake, alert, oriented to name, place and time.     SKIN:  no bruising or bleeding  Data    CBC:   Lab Results   Component Value Date WBC 18.5 03/12/2021    RBC 2.70 03/12/2021    HGB 7.4 03/12/2021    HCT 22.5 03/12/2021    MCV 83.3 03/12/2021    MCH 27.4 03/12/2021    MCHC 32.9 03/12/2021    RDW 14.2 03/12/2021     03/12/2021    MPV 10.4 03/12/2021     CMP:    Lab Results   Component Value Date     03/12/2021    K 3.4 03/12/2021    K 4.2 03/09/2021     03/12/2021    CO2 24 03/12/2021    BUN 80 03/12/2021    CREATININE 4.3 03/12/2021    GFRAA 13 03/12/2021    LABGLOM 13 03/12/2021    GLUCOSE 94 03/12/2021    GLUCOSE 419 03/21/2011    PROT 5.3 03/12/2021    LABALBU 2.4 03/12/2021    LABALBU 4.1 03/21/2011    CALCIUM 7.9 03/12/2021    BILITOT <0.2 03/11/2021    ALKPHOS 76 03/11/2021    AST 55 03/11/2021    ALT 24 03/11/2021       ASSESSMENT AND PLAN      Active Problems:  1. Hypertensive urgency:  Better controlled. Continue current medication ( Coreg and Amlodipine)  Continue to monitor    2. Acute Respiratory failure with hypoxia: breathing has improved  Continue to wean off as tolerated  Continue supplemental Oxygen  Respiratory failure is due to Covid    3. COVID PNA:  Improving  Continue Decadron and current meds. 4. Diabetes mellitus type 2:  BGl is fairly controlled  This is related to Decadron  Continue current insulin dosing  Dm diet    5. Acute on chronic renal disease: renal function seems to be stabilizing  Etiology is still suspected to be related to HTN Urgency and DM  Work up for other causes including autoimmune related per Nephro    6. Anemia ( acute on Chronic) suspects related to renal disease  Transfuse as needed  Continue to monitor.

## 2021-03-13 NOTE — PROGRESS NOTES
Attempted to call daughter Fred Barkley for updates. She did not answer the phone. Update. Spoke to patients daughter. Answered all questions. She would prefer to be an evening update.

## 2021-03-13 NOTE — PROGRESS NOTES
Pharmacy Consultation Note  (Antibiotic Dosing and Monitoring)    Initial consult date: 3/13/21  Consulting physician: JOVANA Berg  Drug(s): Vancomycin  Indication: UTI    Ht Readings from Last 1 Encounters:   21 5' (1.524 m)     Wt Readings from Last 1 Encounters:   21 117 lb (53.1 kg)         Age/  Gender IBW DW  Allergy Information   57 y.o.     female 45.5 kg 53.1 kg  Demerol and Toradol [ketorolac tromethamine]                 Date  WBC BUN/CR UOP Drug/Dose Time   Given Level(s)   (Time) Comments   3/12  (#1) 18.5 80/4.3 -- Vancomycin 1,000 mg IV x 1 2015     3/13  (#2) -- -- -- No dose -- 19.6 mcg/mL @ 110 [facility] consulted     (#3)            (#4)            (#5)            (#6)            (#7)            Estimated Creatinine Clearance: 10 mL/min (A) (based on SCr of 4.3 mg/dL (H)).   UOP over the past 24 hours:       Intake/Output Summary (Last 24 hours) at 3/13/2021 1235  Last data filed at 3/13/2021 0650  Gross per 24 hour   Intake 90 ml   Output 0 ml   Net 90 ml       Temp max: Temp (24hrs), Av °F (36.7 °C), Min:97.9 °F (36.6 °C), Max:98.1 °F (36.7 °C)      Antibiotic Regimen:  Antibiotic Dose Date Initiated   Levofloxacin 250 mg IV Q24H 3/11     Cultures:  available culture and sensitivity results were reviewed in EPIC  Culture Date Result    COVID-19 3/9 Detected   Blood x 2 3/8 NGTD   Urine 3/8 Mixed gram positive organisms (<10,000 cfu)   Legionella/strep pneumo urine ag 3/11 Negative     Assessment:  · Consulted by JOVANA Berg to dose/monitor vancomycin  · Goal trough level:  15-20 mcg/mL  · Pt is a 57 yof admitted with COVID-19 and GAVIN on CKD, initiated on vancomycin for a UTI  · Serum creatinine yesterday: 4.3; CrCl ~ 10 mL/min; baseline Scr ~ 1.3-2.1  · Patient received Vancomycin 1,000 mg IV x 1 yesterday, random AM level today = 19.6 mcg/mL    Plan:  · No dose today, repeat random level tomorrow with morning labs  · Dose by levels  · Follow renal function  · Pharmacist will follow and monitor/adjust dosing as necessary      Thank you for the consult,    Trupti PringleD, BCPS 3/13/2021 12:35 PM   [phone]

## 2021-03-13 NOTE — PLAN OF CARE
Problem: Falls - Risk of:  Goal: Will remain free from falls  Description: Will remain free from falls  Outcome: Met This Shift     Problem: Falls - Risk of:  Goal: Absence of physical injury  Description: Absence of physical injury  Outcome: Met This Shift     Problem:  Activity:  Goal: Ability to tolerate increased activity will improve  Description: Ability to tolerate increased activity will improve  Outcome: Met This Shift     Problem: Cardiac:  Goal: Hemodynamic stability will improve  Description: Hemodynamic stability will improve  Outcome: Met This Shift     Problem: Cardiac:  Goal: Complications related to the disease process, condition or treatment will be avoided or minimized  Description: Complications related to the disease process, condition or treatment will be avoided or minimized  Outcome: Met This Shift     Problem: Cardiac:  Goal: Cerebral tissue perfusion will improve  Description: Cerebral tissue perfusion will improve  Outcome: Met This Shift     Problem: Coping:  Goal: Ability to identify and develop effective coping behavior will improve  Description: Ability to identify and develop effective coping behavior will improve  Outcome: Met This Shift     Problem: Health Behavior:  Goal: Identification of resources available to assist in meeting health care needs will improve  Description: Identification of resources available to assist in meeting health care needs will improve  Outcome: Met This Shift     Problem: Nutritional:  Goal: Ability to identify appropriate dietary choices will improve  Description: Ability to identify appropriate dietary choices will improve  Outcome: Met This Shift     Problem: Fluid Volume:  Goal: Maintenance of adequate hydration will improve  Description: Maintenance of adequate hydration will improve  Outcome: Met This Shift     Problem: Physical Regulation:  Goal: Complications related to the disease process, condition or treatment will be avoided or minimized Description: Complications related to the disease process, condition or treatment will be avoided or minimized  Outcome: Met This Shift     Problem: Physical Regulation:  Goal: Ability to maintain a body temperature in the normal range will improve  Description: Ability to maintain a body temperature in the normal range will improve  Outcome: Met This Shift     Problem: Physical Regulation:  Goal: Will regain or maintain usual level of consciousness  Description: Will regain or maintain usual level of consciousness  Outcome: Met This Shift     Problem: Serum Glucose Level - Abnormal:  Goal: Ability to maintain appropriate glucose levels has stabilized  Description: Ability to maintain appropriate glucose levels has stabilized  Outcome: Met This Shift     Problem: Airway Clearance - Ineffective  Goal: Achieve or maintain patent airway  Outcome: Met This Shift     Problem: Gas Exchange - Impaired  Goal: Absence of hypoxia  Outcome: Met This Shift     Problem: Gas Exchange - Impaired  Goal: Promote optimal lung function  Outcome: Met This Shift     Problem: Breathing Pattern - Ineffective  Goal: Ability to achieve and maintain a regular respiratory rate  Outcome: Met This Shift     Problem: Body Temperature -  Risk of, Imbalanced  Goal: Ability to maintain a body temperature within defined limits  Outcome: Met This Shift     Problem: Body Temperature -  Risk of, Imbalanced  Goal: Will regain or maintain usual level of consciousness  Outcome: Met This Shift     Problem:  Body Temperature -  Risk of, Imbalanced  Goal: Complications related to the disease process, condition or treatment will be avoided or minimized  Outcome: Met This Shift     Problem: Isolation Precautions - Risk of Spread of Infection  Goal: Prevent transmission of infection  Outcome: Met This Shift     Problem: Nutrition Deficits  Goal: Optimize nutritional status  Outcome: Met This Shift     Problem: Risk for Fluid Volume Deficit  Goal: Maintain normal heart rhythm  Outcome: Met This Shift     Problem: Risk for Fluid Volume Deficit  Goal: Maintain absence of muscle cramping  Outcome: Met This Shift     Problem: Risk for Fluid Volume Deficit  Goal: Maintain normal serum potassium, sodium, calcium, phosphorus, and pH  Outcome: Met This Shift     Problem: Loneliness or Risk for Loneliness  Goal: Demonstrate positive use of time alone when socialization is not possible  Outcome: Met This Shift     Problem: Fatigue  Goal: Verbalize increase energy and improved vitality  Outcome: Met This Shift     Problem: Patient Education: Go to Patient Education Activity  Goal: Patient/Family Education  Outcome: Met This Shift

## 2021-03-13 NOTE — PROGRESS NOTES
[facility] Roberto A. Rachelle Elam, MD Gilford Bandy, MD Christian Rotunda, MD Carolan Grice, DO, JAMES Caballero MD .  Progress note   Patient's Name: Fortino Greenberg  12:10 PM  3/13/2021          Found to have covid 19 Pna . Now in isolation . Breathing better o2/nc . Cr relatively stable over last 24 hours at 4.2 . Await blood work from today  No more reported diarrhea  BP better       History of Present Ilness: The patient is a 57 y.o. female with significant past medical history of diabetes type 2, Essential hypertension who presents to the ER following being noticed to have progressively elevated cr numbers   In looking in records pt cr has been trending up rather fast over the last year to year and half . She does have around 7 g proteinuria based on spot urine /cr ratio . Cr on presentation was 3.2 . Cr today 3.5   She was also found to have bp of 200 . At home she is on hctz/lisinopril as part of her antihtn . I saw her in her room this am , she is alert oriented pleasant 56 y/o F . Past Medical History:   Diagnosis Date    Acid reflux     Hyperlipidemia     Hypertension     Hypothyroidism     S/P appendectomy     Type II or unspecified type diabetes mellitus without mention of complication, not stated as uncontrolled        Past Surgical History:   Procedure Laterality Date    APPENDECTOMY      HYSTERECTOMY         Family History   Problem Relation Age of Onset    Diabetes Mother     High Blood Pressure Mother     Diabetes Brother     Diabetes Sister         reports that she has never smoked. She has never used smokeless tobacco. She reports that she does not drink alcohol or use drugs.     Allergies:  Demerol and Toradol [ketorolac tromethamine]    Current Medications:    amLODIPine (NORVASC) tablet 5 mg, BID  albuterol-ipratropium (COMBIVENT RESPIMAT)  MCG/ACT inhaler 1 puff, Q6H  0.9 % sodium chloride infusion, PRN  levoFLOXacin (LEVAQUIN) 250 03/12/2021    MCH 27.4 03/12/2021    MCHC 32.9 03/12/2021    RDW 14.2 03/12/2021    SEGSPCT 59 02/27/2013    METASPCT 12.0 03/11/2021    LYMPHOPCT 1.0 03/12/2021    MONOPCT 3.0 03/12/2021    MYELOPCT 2.0 03/11/2021    BASOPCT 0.0 03/12/2021    MONOSABS 0.56 03/12/2021    LYMPHSABS 0.19 03/12/2021    EOSABS 0.00 03/12/2021    BASOSABS 0.00 03/12/2021     CMP:    Lab Results   Component Value Date     03/12/2021    K 3.4 03/12/2021    K 4.2 03/09/2021     03/12/2021    CO2 24 03/12/2021    BUN 80 03/12/2021    CREATININE 4.3 03/12/2021    GFRAA 13 03/12/2021    LABGLOM 13 03/12/2021    GLUCOSE 94 03/12/2021    GLUCOSE 419 03/21/2011    PROT 5.3 03/12/2021    LABALBU 2.4 03/12/2021    LABALBU 4.1 03/21/2011    CALCIUM 7.9 03/12/2021    BILITOT <0.2 03/11/2021    ALKPHOS 76 03/11/2021    AST 55 03/11/2021    ALT 24 03/11/2021     Ionized Calcium:  No results found for: IONCA  Magnesium:    Lab Results   Component Value Date    MG 2.2 03/11/2021     Phosphorus:    Lab Results   Component Value Date    PHOS 7.9 03/11/2021     U/A:    Lab Results   Component Value Date    COLORU Straw 03/08/2021    PHUR 5.5 03/08/2021    LABCAST RARE 03/27/2015    WBCUA 0-1 03/08/2021    WBCUA NONE 02/06/2012    RBCUA 1-3 03/08/2021    RBCUA 0-1 12/25/2012    YEAST FEW 04/04/2015    BACTERIA NONE SEEN 03/08/2021    CLARITYU Clear 03/08/2021    SPECGRAV 1.025 03/08/2021    LEUKOCYTESUR Negative 03/08/2021    UROBILINOGEN 0.2 03/08/2021    BILIRUBINUR Negative 03/08/2021    BILIRUBINUR NEGATIVE 02/06/2012    BLOODU SMALL 03/08/2021    GLUCOSEU 250 03/08/2021    GLUCOSEU >=1000 02/06/2012    AMORPHOUS FEW 03/08/2021     Microalbumen/Creatinine ratio:  No components found for: RUCREAT  Iron Saturation:  No components found for: PERCENTFE  TIBC:  No results found for: TIBC  FERRITIN:    Lab Results   Component Value Date    FERRITIN 646 03/11/2021        Imaging:  US DUP LOWER EXTREMITIES BILATERAL VENOUS   Final Result   Normal XR CHEST PORTABLE   Final Result   Continued but improved bilateral pulmonary airspace opacities, left greater   than right         XR CHEST PORTABLE   Final Result   1. Rapid development of widespread bilateral parenchymal consolidation   predominant from the hilar regions to the periphery of the lung that   superimposes to previous left upper lobe peripheral focal consolidation. 2.  The differential diagnosis include acute pulmonary edema, pulmonary   hemorrhage, diffuse alveolar damage, bilateral pneumonia. Please correlate   clinically. XR CHEST PORTABLE   Final Result   Left upper lobe opacity worrisome for pneumonia. US RETROPERITONEAL LIMITED   Final Result   Mildly atrophic and echogenic kidneys, compatible with mild chronic renal   parenchymal disease. Unremarkable ultrasound of the bladder. Assessment    -subacute kidney injury vs progressive worsening of her CKD     -Nephrotic range proteinuria    -HTN urgency     -Anaemia of chronic disease     -Mineral bone disease     -Insulin depedent diabetes with complication     -COVID 19 PNA with respiratory failure       PLAN :    Ms Rossana Sy has been having rather progressive elevation in cr over the last 1.5 years . Cr back on 3/2019 was 1 . Cr on 11/2019 1.6 . Cr back on 8/2020 was 2.1 and now cr is 3.2   The CKD is likley due to diabetic nephropathy thought the progression is rather fast .   Her current GAVIN is likley also exacerbated by her uncontrolled HTN on presentation .     -Creatinine relatively stable over last 48 hours  (plateu ??)  . Bmp pending from today   -continue coreg to 25 mg bid   -increase amlodipine to 5 mg bid   -ISSA (negative ) ,ANCA(negative , SPEP (negative), UPEP (negative)  -f/u the need for RRT/HD .            Electronically signed by Arik Hendricks MD on 3/13/2021 at 12:10 PM

## 2021-03-14 NOTE — PLAN OF CARE
Problem: Falls - Risk of:  Goal: Will remain free from falls  Description: Will remain free from falls  Outcome: Met This Shift  Goal: Absence of physical injury  Description: Absence of physical injury  Outcome: Met This Shift     Problem:  Activity:  Goal: Ability to tolerate increased activity will improve  Description: Ability to tolerate increased activity will improve  Outcome: Met This Shift     Problem: Cardiac:  Goal: Hemodynamic stability will improve  Description: Hemodynamic stability will improve  Outcome: Met This Shift  Goal: Complications related to the disease process, condition or treatment will be avoided or minimized  Description: Complications related to the disease process, condition or treatment will be avoided or minimized  Outcome: Met This Shift  Goal: Cerebral tissue perfusion will improve  Description: Cerebral tissue perfusion will improve  Outcome: Met This Shift     Problem: Coping:  Goal: Ability to identify and develop effective coping behavior will improve  Description: Ability to identify and develop effective coping behavior will improve  Outcome: Met This Shift     Problem: Health Behavior:  Goal: Identification of resources available to assist in meeting health care needs will improve  Description: Identification of resources available to assist in meeting health care needs will improve  Outcome: Met This Shift     Problem: Nutritional:  Goal: Ability to identify appropriate dietary choices will improve  Description: Ability to identify appropriate dietary choices will improve  Outcome: Met This Shift     Problem: Fluid Volume:  Goal: Maintenance of adequate hydration will improve  Description: Maintenance of adequate hydration will improve  Outcome: Met This Shift     Problem: Physical Regulation:  Goal: Complications related to the disease process, condition or treatment will be avoided or minimized  Description: Complications related to the disease process, condition or treatment will be avoided or minimized  Outcome: Met This Shift  Goal: Ability to maintain a body temperature in the normal range will improve  Description: Ability to maintain a body temperature in the normal range will improve  Outcome: Met This Shift  Goal: Will regain or maintain usual level of consciousness  Description: Will regain or maintain usual level of consciousness  Outcome: Met This Shift     Problem: Serum Glucose Level - Abnormal:  Goal: Ability to maintain appropriate glucose levels has stabilized  Description: Ability to maintain appropriate glucose levels has stabilized  Outcome: Met This Shift     Problem: Airway Clearance - Ineffective  Goal: Achieve or maintain patent airway  Outcome: Met This Shift     Problem: Gas Exchange - Impaired  Goal: Absence of hypoxia  Outcome: Met This Shift  Goal: Promote optimal lung function  Outcome: Met This Shift     Problem: Breathing Pattern - Ineffective  Goal: Ability to achieve and maintain a regular respiratory rate  Outcome: Met This Shift     Problem:  Body Temperature -  Risk of, Imbalanced  Goal: Ability to maintain a body temperature within defined limits  Outcome: Met This Shift  Goal: Will regain or maintain usual level of consciousness  Outcome: Met This Shift  Goal: Complications related to the disease process, condition or treatment will be avoided or minimized  Outcome: Met This Shift     Problem: Isolation Precautions - Risk of Spread of Infection  Goal: Prevent transmission of infection  Outcome: Met This Shift     Problem: Nutrition Deficits  Goal: Optimize nutritional status  Outcome: Met This Shift     Problem: Risk for Fluid Volume Deficit  Goal: Maintain normal heart rhythm  Outcome: Met This Shift  Goal: Maintain absence of muscle cramping  Outcome: Met This Shift  Goal: Maintain normal serum potassium, sodium, calcium, phosphorus, and pH  Outcome: Met This Shift     Problem: Loneliness or Risk for Loneliness  Goal: Demonstrate positive

## 2021-03-14 NOTE — PROGRESS NOTES
Patient's blood sugar recheck was 51. Patient encouraged to drink orange juice and kayla crackers. Patient is alert and oriented and responsive/asymptomatic.  Will recheck in 15 minutes per protocol

## 2021-03-14 NOTE — PROGRESS NOTES
Patients blood sugar back to 81 after 238 ml of orange juice and half an ice cream cup.  Will recheck within the next hour

## 2021-03-14 NOTE — PROGRESS NOTES
resolved   Leukocytosis follow   Urine with gpo vanco x1 -pharmacy to dose   COVID Viral pneumonia-elevated procal       levoFLOXacin (LEVAQUIN) 250 MG/50ML infusion 250 mg, Q24H  dexamethasone (DECADRON) injection 4 mg, Q8H    S/p toci  S/p convalescent plasma     · Monitor labs - follow biomarkers       INDRA Leon - NP  3/14/2021  11:02 AM     I have discussed the case, including pertinent history and physical  exam findings . I have seen and examined the patient and the key elements of the encounter have been performed by me. I agree with the assessment, plan and orders as documented.       Treatment plan as per my recommendation     Berna Patel MD, FACP  3/14/2021  10:31 PM

## 2021-03-14 NOTE — PROGRESS NOTES
Pharmacy Consultation Note  (Antibiotic Dosing and Monitoring)    Initial consult date: 3/13/21  Consulting physician: JOVANA Kelley  Drug(s): Vancomycin  Indication: UTI    Ht Readings from Last 1 Encounters:   21 5' (1.524 m)     Wt Readings from Last 1 Encounters:   21 117 lb (53.1 kg)         Age/  Gender IBW DW  Allergy Information   57 y.o.     female 45.5 kg 53.1 kg  Demerol and Toradol [ketorolac tromethamine]                 Date  WBC BUN/CR UOP Drug/Dose Time   Given Level(s)   (Time) Comments   3/12  (#1) 18.5 80/4.3 -- Vancomycin 1,000 mg IV x 1 2015     3/13  (#2) -- -- -- No dose -- 19.6 mcg/mL @ 110 [facility] consulted   3/14  (#3) -- 85/4.5 -- Vancomycin 500 mg IV x 1 <1500> 13.5 mcg/mL @ 0635      (#4)            (#5)            (#6)            (#7)            Estimated Creatinine Clearance: 9 mL/min (A) (based on SCr of 4.5 mg/dL (H)).   UOP over the past 24 hours:       Intake/Output Summary (Last 24 hours) at 3/14/2021 1414  Last data filed at 3/14/2021 1123  Gross per 24 hour   Intake 6026.67 ml   Output 550 ml   Net 5476.67 ml       Temp max: Temp (24hrs), Av °F (36.7 °C), Min:97.2 °F (36.2 °C), Max:98.4 °F (36.9 °C)      Antibiotic Regimen:  Antibiotic Dose Date Initiated   Levofloxacin 250 mg IV Q48H 3/11     Cultures:  available culture and sensitivity results were reviewed in EPIC  Culture Date Result    COVID-19 3/9 Detected   Blood x 2 3/8 NGTD   Urine 3/8 Mixed gram positive organisms (<10,000 cfu)   Legionella/strep pneumo urine ag 3/11 Negative     Assessment:  · Consulted by JOVANA Kelley to dose/monitor vancomycin  · Goal trough level:  15-20 mcg/mL  · Pt is a 57 yof admitted with COVID-19 and GAVIN on CKD, initiated on vancomycin for a UTI  · Serum creatinine today: 4.5; CrCl ~ 10 mL/min; baseline Scr ~ 1.3-2.1  · 3/13: Patient received Vancomycin 1,000 mg IV x 1 yesterday, random AM level today = 19.6 mcg/mL  · 3/14: Random AM level = 13.5 mcg/mL

## 2021-03-14 NOTE — PROGRESS NOTES
Department of Internal Medicine  General Internal Medicine  Attending Progress Note      SUBJECTIVE:    Reports that he is doing better. Denied fever and chills. No chest pain. No nausea or vomiting. Noted that her taste is coming back and she is beginning to eat more. Diarrhea has improved.      OBJECTIVE      Medications    Current Facility-Administered Medications: vancomycin (VANCOCIN) intermittent dosing (placeholder), , Other, RX Placeholder  levoFLOXacin (LEVAQUIN) 250 MG/50ML infusion 250 mg, 250 mg, Intravenous, Q48H  amLODIPine (NORVASC) tablet 5 mg, 5 mg, Oral, BID  albuterol-ipratropium (COMBIVENT RESPIMAT)  MCG/ACT inhaler 1 puff, 1 puff, Inhalation, Q6H  0.9 % sodium chloride infusion, , Intravenous, PRN  insulin glargine (LANTUS) injection vial 20 Units, 20 Units, Subcutaneous, QAM  acetaminophen (TYLENOL) tablet 1,000 mg, 1,000 mg, Oral, Q6H PRN  carvedilol (COREG) tablet 25 mg, 25 mg, Oral, BID WC  heparin (porcine) injection 5,000 Units, 5,000 Units, Subcutaneous, 3 times per day  calcitRIOL (ROCALTROL) capsule 0.25 mcg, 0.25 mcg, Oral, Daily  dexamethasone (DECADRON) injection 4 mg, 4 mg, Intravenous, Q8H  perflutren lipid microspheres (DEFINITY) injection 1.65 mg, 1.5 mL, Intravenous, ONCE PRN  pentoxifylline (TRENTAL) extended release tablet 400 mg, 400 mg, Oral, BID  insulin lispro (HUMALOG) injection vial 0-12 Units, 0-12 Units, Subcutaneous, TID WC  insulin lispro (HUMALOG) injection vial 0-6 Units, 0-6 Units, Subcutaneous, Nightly  glucose (GLUTOSE) 40 % oral gel 15 g, 15 g, Oral, PRN  dextrose 50 % IV solution, 12.5 g, Intravenous, PRN  glucagon (rDNA) injection 1 mg, 1 mg, Intramuscular, PRN  dextrose 5 % solution, 100 mL/hr, Intravenous, PRN  aspirin chewable tablet 81 mg, 81 mg, Oral, Daily  atorvastatin (LIPITOR) tablet 80 mg, 80 mg, Oral, Daily  clopidogrel (PLAVIX) tablet 75 mg, 75 mg, Oral, Daily  gabapentin (NEURONTIN) capsule 300 mg, 300 mg, Oral, Nightly  levothyroxine (SYNTHROID) tablet 50 mcg, 50 mcg, Oral, Daily  famotidine (PEPCID) tablet 20 mg, 20 mg, Oral, Daily  sodium chloride flush 0.9 % injection 10 mL, 10 mL, Intravenous, 2 times per day  sodium chloride flush 0.9 % injection 10 mL, 10 mL, Intravenous, PRN  promethazine (PHENERGAN) tablet 12.5 mg, 12.5 mg, Oral, Q6H PRN **OR** ondansetron (ZOFRAN) injection 4 mg, 4 mg, Intravenous, Q6H PRN  polyethylene glycol (GLYCOLAX) packet 17 g, 17 g, Oral, Daily PRN  labetalol (NORMODYNE;TRANDATE) injection 20 mg, 20 mg, Intravenous, Q6H PRN  Physical    VITALS:  BP (!) 145/70   Pulse 63   Temp 97.2 °F (36.2 °C) (Infrared)   Resp 18   Ht 5' (1.524 m)   Wt 117 lb (53.1 kg)   SpO2 96%   BMI 22.85 kg/m²   CONSTITUTIONAL:  awake, alert, cooperative, no apparent distress, and appears stated age  EYES:  Lids and lashes normal, pupils equal, round and reactive to light, extra ocular muscles intact, sclera clear, conjunctiva normal  ENT:  Normocephalic, without obvious abnormality, atraumatic, sinuses nontender on palpation, external ears without lesions, oral pharynx with moist mucus membranes, tonsils without erythema or exudates, gums normal and good dentition. NECK:  Supple, symmetrical, trachea midline, no adenopathy, thyroid symmetric, not enlarged and no tenderness, skin normal  HEMATOLOGIC/LYMPHATICS:  no cervical lymphadenopathy  BACK:  Symmetric, no curvature, spinous processes are non-tender on palpation, paraspinous muscles are non-tender on palpation, no costal vertebral tenderness  LUNGS:  Improved Crackles  CARDIOVASCULAR:  Normal apical impulse, regular rate and rhythm, normal S1 and S2, no S3 or S4, and no murmur noted  ABDOMEN:  No scars, normal bowel sounds, soft, non-distended, non-tender, no masses palpated, no hepatosplenomegally  MUSCULOSKELETAL:  There is no redness, warmth, or swelling of the joints. Full range of motion noted. Motor strength is 5 out of 5 all extremities bilaterally.   Tone is normal. hypoglycemia this morning  Hold morning lantus  Continue accucheck achs  Diabetic diet  Poor oral intake yesterday may have contributed to hypoglycemia    5. Hypothyroidism: On synthroid. ADDENDUM:  6. Anemia due to chronic renal disease:  hgb is 7.4  Will continue to monitor  Transfuse if needed.

## 2021-03-14 NOTE — PROGRESS NOTES
[facility] MD Leyda Foreman MD [place], MD Kerwin Salinas, DO, [address] .  Progress note   Patient's Name: Michelle Ivey  7:07 PM  3/14/2021          Found to have covid 19 Pna . Now in isolation . Breathing better o2/nc . Cr relatively stable over last 24 hours at 4.2 . No more reported diarrhea  BP better   D/w RN on floor . History of Present Ilness: The patient is a 57 y.o. female with significant past medical history of diabetes type 2, Essential hypertension who presents to the ER following being noticed to have progressively elevated cr numbers   In looking in records pt cr has been trending up rather fast over the last year to year and half . She does have around 7 g proteinuria based on spot urine /cr ratio . Cr on presentation was 3.2 . Cr today 3.5   She was also found to have bp of 200 . At home she is on hctz/lisinopril as part of her antihtn . I saw her in her room this am , she is alert oriented pleasant 56 y/o F . Past Medical History:   Diagnosis Date    Acid reflux     Hyperlipidemia     Hypertension     Hypothyroidism     S/P appendectomy     Type II or unspecified type diabetes mellitus without mention of complication, not stated as uncontrolled        Past Surgical History:   Procedure Laterality Date    APPENDECTOMY      HYSTERECTOMY         Family History   Problem Relation Age of Onset    Diabetes Mother     High Blood Pressure Mother     Diabetes Brother     Diabetes Sister         reports that she has never smoked. She has never used smokeless tobacco. She reports that she does not drink alcohol or use drugs.     Allergies:  Demerol and Toradol [ketorolac tromethamine]    Current Medications:    0.9 % sodium chloride infusion, Continuous  vancomycin (VANCOCIN) intermittent dosing (placeholder), RX Placeholder  levoFLOXacin (LEVAQUIN) 250 MG/50ML infusion 250 mg, Q48H  amLODIPine (NORVASC) tablet 5 mg, BID  albuterol-ipratropium (COMBIVENT RESPIMAT)  MCG/ACT inhaler 1 puff, Q6H  0.9 % sodium chloride infusion, PRN  insulin glargine (LANTUS) injection vial 20 Units, QAM  acetaminophen (TYLENOL) tablet 1,000 mg, Q6H PRN  carvedilol (COREG) tablet 25 mg, BID WC  heparin (porcine) injection 5,000 Units, 3 times per day  calcitRIOL (ROCALTROL) capsule 0.25 mcg, Daily  dexamethasone (DECADRON) injection 4 mg, Q8H  perflutren lipid microspheres (DEFINITY) injection 1.65 mg, ONCE PRN  pentoxifylline (TRENTAL) extended release tablet 400 mg, BID  insulin lispro (HUMALOG) injection vial 0-12 Units, TID WC  insulin lispro (HUMALOG) injection vial 0-6 Units, Nightly  glucose (GLUTOSE) 40 % oral gel 15 g, PRN  dextrose 50 % IV solution, PRN  glucagon (rDNA) injection 1 mg, PRN  dextrose 5 % solution, PRN  aspirin chewable tablet 81 mg, Daily  atorvastatin (LIPITOR) tablet 80 mg, Daily  clopidogrel (PLAVIX) tablet 75 mg, Daily  gabapentin (NEURONTIN) capsule 300 mg, Nightly  levothyroxine (SYNTHROID) tablet 50 mcg, Daily  famotidine (PEPCID) tablet 20 mg, Daily  sodium chloride flush 0.9 % injection 10 mL, 2 times per day  sodium chloride flush 0.9 % injection 10 mL, PRN  promethazine (PHENERGAN) tablet 12.5 mg, Q6H PRN    Or  ondansetron (ZOFRAN) injection 4 mg, Q6H PRN  polyethylene glycol (GLYCOLAX) packet 17 g, Daily PRN  labetalol (NORMODYNE;TRANDATE) injection 20 mg, Q6H PRN        Review of Systems:     No face to face encounter done as he in isolation for COVID 19 PNA . Case discuseed with RN including PE findings     Physical exam:   Vital signs /60   Pulse 66   Temp 98.4 °F (36.9 °C) (Oral)   Resp 18   Ht 5' (1.524 m)   Wt 117 lb (53.1 kg)   SpO2 96%   BMI 22.85 kg/m²     No face to face encounter done as he in isolation for COVID 19 PNA .  Case discuseed with RN including PE findings     Data:   Labs:  CBC with Differential:    Lab Results   Component Value Date    WBC 18.5 03/12/2021 RBC 2.70 03/12/2021    HGB 7.4 03/12/2021    HCT 22.5 03/12/2021     03/12/2021    MCV 83.3 03/12/2021    MCH 27.4 03/12/2021    MCHC 32.9 03/12/2021    RDW 14.2 03/12/2021    SEGSPCT 59 02/27/2013    METASPCT 12.0 03/11/2021    LYMPHOPCT 1.0 03/12/2021    MONOPCT 3.0 03/12/2021    MYELOPCT 2.0 03/11/2021    BASOPCT 0.0 03/12/2021    MONOSABS 0.56 03/12/2021    LYMPHSABS 0.19 03/12/2021    EOSABS 0.00 03/12/2021    BASOSABS 0.00 03/12/2021     CMP:    Lab Results   Component Value Date     03/14/2021    K 3.4 03/14/2021    K 4.2 03/09/2021     03/14/2021    CO2 24 03/14/2021    BUN 85 03/14/2021    CREATININE 4.5 03/14/2021    GFRAA 12 03/14/2021    LABGLOM 12 03/14/2021    GLUCOSE 120 03/14/2021    GLUCOSE 419 03/21/2011    PROT 5.3 03/12/2021    LABALBU 2.4 03/12/2021    LABALBU 4.1 03/21/2011    CALCIUM 8.1 03/14/2021    BILITOT <0.2 03/11/2021    ALKPHOS 76 03/11/2021    AST 55 03/11/2021    ALT 24 03/11/2021     Ionized Calcium:  No results found for: IONCA  Magnesium:    Lab Results   Component Value Date    MG 2.2 03/11/2021     Phosphorus:    Lab Results   Component Value Date    PHOS 7.9 03/11/2021     U/A:    Lab Results   Component Value Date    COLORU Straw 03/08/2021    PHUR 5.5 03/08/2021    LABCAST RARE 03/27/2015    WBCUA 0-1 03/08/2021    WBCUA NONE 02/06/2012    RBCUA 1-3 03/08/2021    RBCUA 0-1 12/25/2012    YEAST FEW 04/04/2015    BACTERIA NONE SEEN 03/08/2021    CLARITYU Clear 03/08/2021    SPECGRAV 1.025 03/08/2021    LEUKOCYTESUR Negative 03/08/2021    UROBILINOGEN 0.2 03/08/2021    BILIRUBINUR Negative 03/08/2021    BILIRUBINUR NEGATIVE 02/06/2012    BLOODU SMALL 03/08/2021    GLUCOSEU 250 03/08/2021    GLUCOSEU >=1000 02/06/2012    AMORPHOUS FEW 03/08/2021     Microalbumen/Creatinine ratio:  No components found for: RUCREAT  Iron Saturation:  No components found for: PERCENTFE  TIBC:  No results found for: TIBC  FERRITIN:    Lab Results   Component Value Date    FERRITIN 646 03/11/2021        Imaging:  US DUP LOWER EXTREMITIES BILATERAL VENOUS   Final Result   Normal         XR CHEST PORTABLE   Final Result   Continued but improved bilateral pulmonary airspace opacities, left greater   than right         XR CHEST PORTABLE   Final Result   1. Rapid development of widespread bilateral parenchymal consolidation   predominant from the hilar regions to the periphery of the lung that   superimposes to previous left upper lobe peripheral focal consolidation. 2.  The differential diagnosis include acute pulmonary edema, pulmonary   hemorrhage, diffuse alveolar damage, bilateral pneumonia. Please correlate   clinically. XR CHEST PORTABLE   Final Result   Left upper lobe opacity worrisome for pneumonia. US RETROPERITONEAL LIMITED   Final Result   Mildly atrophic and echogenic kidneys, compatible with mild chronic renal   parenchymal disease. Unremarkable ultrasound of the bladder. Assessment    -subacute kidney injury vs progressive worsening of her CKD     -Nephrotic range proteinuria    -HTN urgency     -Anaemia of chronic disease     -Mineral bone disease     -Insulin depedent diabetes with complication     -COVID 19 PNA with respiratory failure       PLAN :    Ms Emiliana Ortiz has been having rather progressive elevation in cr over the last 1.5 years . Cr back on 3/2019 was 1 . Cr on 11/2019 1.6 . Cr back on 8/2020 was 2.1 and now cr is 3.2   The CKD is likley due to diabetic nephropathy thought the progression is rather fast .   Her current GAVIN is likley also exacerbated by her uncontrolled HTN on presentation .     -Creatinine relatively stable over last 48 hours  (plateu ??)  . -with poor po intake , will start gentle iv fluids   -continue coreg to 25 mg bid   -increase amlodipine to 5 mg bid   -ISSA (negative ) ,ANCA(negative , SPEP (negative), UPEP (negative)  -no current need for RRT/HD .  Will f/u closely           Electronically signed by Abby Mcneal Evelio Noriega MD on 3/14/2021 at 7:07 PM

## 2021-03-15 NOTE — PROGRESS NOTES
[facility]  [facility]  Progress Note    NAME: Karen Paniagua  MR:  [id number]  :   1960  DATE OF SERVICE:03/15/21    This is a face to face encounter with Karen Paniagua 57 y.o. female on 03/15/21  ID following for   Chief Complaint   Patient presents with    Abnormal Lab     creatnine level high per doctor    Hypertension     SUBJECTIVE:  ON O2 2L  IN BED HAS NO C/O   DEC APPETITE NO THRUSH   Patient is tolerating medications. No reported adverse drug reactions. Review of systems:  As stated above in the chief complaint, otherwise negative.     Medications:  Scheduled Meds:   vancomycin (VANCOCIN) intermittent dosing (placeholder)   Other RX Placeholder    levofloxacin  250 mg Intravenous Q48H    amLODIPine  5 mg Oral BID    albuterol-ipratropium  1 puff Inhalation Q6H    insulin glargine  20 Units Subcutaneous QAM    carvedilol  25 mg Oral BID WC    heparin (porcine)  5,000 Units Subcutaneous 3 times per day    calcitRIOL  0.25 mcg Oral Daily    dexamethasone  4 mg Intravenous Q8H    pentoxifylline  400 mg Oral BID    insulin lispro  0-12 Units Subcutaneous TID WC    insulin lispro  0-6 Units Subcutaneous Nightly    aspirin  81 mg Oral Daily    atorvastatin  80 mg Oral Daily    clopidogrel  75 mg Oral Daily    gabapentin  300 mg Oral Nightly    levothyroxine  50 mcg Oral Daily    famotidine  20 mg Oral Daily    sodium chloride flush  10 mL Intravenous 2 times per day     Continuous Infusions:   sodium chloride 50 mL/hr at 21 1659    sodium chloride      dextrose       PRN Meds:sodium chloride, acetaminophen, perflutren lipid microspheres, glucose, dextrose, glucagon (rDNA), dextrose, sodium chloride flush, promethazine **OR** ondansetron, polyethylene glycol, labetalol    OBJECTIVE:  BP (!) 144/73   Pulse 72   Temp 97.8 °F (36.6 °C) (Infrared)   Resp 16   Ht 5' (1.524 m)   Wt 117 lb (53.1 kg)   SpO2 96%   BMI 22.85 kg/m²   Temp  Av.4 °F (36.9 °C)  Min: 97.8 °F (36.6 °C)  Max: 99 °F (37.2 °C)  Constitutional:  The patient is awake, alert, and oriented. Skin:    Warm and dry. No rashes were noted. HEENT:   Round and reactive pupils. AT/NC  Neck:    Supple to movements. Chest:   No use of accessory muscles to breathe. Symmetrical expansion. Cardiovascular:  S1 and S2 are rhythmic and regular. No murmurs appreciated. Abdomen:   Positive bowel sounds to auscultation. Benign to palpation. Extremities:   No clubbing, no cyanosis, no edema.   CNS    AAxO   Lines: pIV    Radiology:  Laboratory and Tests Review:  Lab Results   Component Value Date    WBC 15.9 (H) 03/15/2021    WBC 18.5 (H) 03/12/2021    WBC 17.9 (H) 03/11/2021    HGB 7.6 (L) 03/15/2021    HCT 23.6 (L) 03/15/2021    MCV 84.9 03/15/2021     03/15/2021     No results found for: CRPHS  Lab Results   Component Value Date    ALT 29 03/15/2021    AST 47 (H) 03/15/2021    ALKPHOS 68 03/15/2021    BILITOT <0.2 03/15/2021     Lab Results   Component Value Date     03/15/2021    K 3.6 03/15/2021    K 4.2 03/09/2021     03/15/2021    CO2 22 03/15/2021    BUN 88 03/15/2021    CREATININE 4.4 03/15/2021    CREATININE 4.5 03/14/2021    CREATININE 4.6 03/13/2021    GFRAA 12 03/15/2021    LABGLOM 12 03/15/2021    GLUCOSE 258 03/15/2021    GLUCOSE 419 03/21/2011    PROT 5.0 03/15/2021    LABALBU 2.5 03/15/2021    LABALBU 4.1 03/21/2011    CALCIUM 7.9 03/15/2021    BILITOT <0.2 03/15/2021    ALKPHOS 68 03/15/2021    AST 47 03/15/2021    ALT 29 03/15/2021     Lab Results   Component Value Date    CRP 10.8 (H) 03/11/2021     Lab Results   Component Value Date    SEDRATE 44 (H) 03/11/2021     Recent Labs     03/15/21  0543   AST 47*   ALT 29     Lab Results   Component Value Date    CHOL 168 08/05/2020    TRIG 182 08/05/2020    HDL 45 08/05/2020    LDLCALC 87 08/05/2020    LABVLDL 36 08/05/2020     Lab Results   Component Value Date/Time    VITD25 27 (L) 03/11/2021 06:45 AM

## 2021-03-15 NOTE — PROGRESS NOTES
Comprehensive Nutrition Assessment    Type and Reason for Visit:  Initial, RD Nutrition Re-Screen/LOS    Nutrition Recommendations/Plan: Continue with diet and start ONS BID Glucerna    Nutrition Assessment:  Pt admit d/t hypertensive emergency & declining renal fxn. +COVID 19. Pt reports loss of appetite, avg ~50% meals likely d/t no taste 2/2 COVID. Will start on ONS. Malnutrition Assessment:  Malnutrition Status:  No malnutrition    Context:  Acute Illness     Findings of the 6 clinical characteristics of malnutrition:  Energy Intake:  7 - 50% or less of estimated energy requirements for 5 or more days  Weight Loss:  No significant weight loss     Body Fat Loss:  No significant body fat loss     Muscle Mass Loss:  No significant muscle mass loss    Fluid Accumulation:  No significant fluid accumulation     Strength:  Not Performed    Estimated Daily Nutrient Needs:  Energy (kcal):  5433-6050(MSJ X 1.2 SF); Weight Used for Energy Requirements:  Current     Protein (g):  65-75g(1.2-1.4g/kg CBW); Weight Used for Protein Requirements:  Current        Fluid (ml/day):   ; Method Used for Fluid Requirements:  Standard Renal(Due to elevated renal labs, Mg+, Phosphorous)      Nutrition Related Findings:  I&Os WNL, A&Ox4, elevated BG and renal labs active BS, no noted edema. Wounds:  None       Current Nutrition Therapies:    DIET CARB CONTROL; Dietary Nutrition Supplements: Diabetic Oral Supplement    Anthropometric Measures:  · Height: 5' (152.4 cm)  · Current Body Weight: 117 lb (53.1 kg)(3/8, no method)   · Admission Body Weight: 117 lb (53.1 kg)(3/8, no method)    · Usual Body Weight: (1/2021 130#, no method)     · Ideal Body Weight: 100 lbs; % Ideal Body Weight 117 %   · BMI: 22.9  · BMI Categories: Normal Weight (BMI 18.5-24. 9)       Nutrition Diagnosis:   · Inadequate oral intake related to altered taste perception as evidenced by intake 26-50%, intake 51-75%      Nutrition Interventions:   Food and/or Nutrient Delivery:  Continue Current Diet, Start Oral Nutrition Supplement  Nutrition Education/Counseling:  Education not indicated   Coordination of Nutrition Care:  Continue to monitor while inpatient    Goals:  Pt will consume >50-75% most meals/ONS       Nutrition Monitoring and Evaluation:   Behavioral-Environmental Outcomes:      Food/Nutrient Intake Outcomes:  Diet Advancement/Tolerance, Supplement Intake  Physical Signs/Symptoms Outcomes:  Biochemical Data, Fluid Status or Edema, Nutrition Focused Physical Findings, Skin, Weight     Discharge Planning:     Too soon to determine     Electronically signed by Valery Keys RD, LD on 3/15/21 at 12:33 PM EDT    Contact: Bartolome Marin

## 2021-03-15 NOTE — PROGRESS NOTES
Physical Therapy Treatment Note    Room #:  0734/2247-96  Patient Name: Papo Phelps  YOB: 1960  MRN: [id number]    Referring Provider:   Kelly Escalante MD     Date of Service: 3/15/2021    Evaluating Physical Therapist: Carissa Parikh, PT #[id number]       Diagnosis:   Hypertensive urgency [I16.0]     worsening blood pressures despite treatment. Patient Active Problem List   Diagnosis    Diabetes mellitus type 2, insulin dependent ([facility] [address]    Hypothyroid    Hypertension    Hyperlipidemia    Hypertensive urgency        Tentative placement recommendation: Home    Equipment recommendation:  To be determined  possibly cane for balance     Prior Level of Function: Patient ambulated independently    Rehab Potential: good  - for baseline    Past medical history:   Past Medical History:   Diagnosis Date    Acid reflux     Hyperlipidemia     Hypertension     Hypothyroidism     S/P appendectomy     Type II or unspecified type diabetes mellitus without mention of complication, not stated as uncontrolled      Past Surgical History:   Procedure Laterality Date    APPENDECTOMY      HYSTERECTOMY         Precautions: Up as tolerated, falls ,      SUBJECTIVE:    Social history: Patient lives alone   in a apartment  with No steps  to enter Tub shower grab bars    Equipment owned: [address], [address] and Shower chair,  All unused    AM-PAC Basic Mobility        AM-[facility] Mobility Inpatient   How much difficulty turning over in bed?: None  How much difficulty sitting down on / standing up from a chair with arms?: A Little  How much difficulty moving from lying on back to sitting on side of bed?: None  How much help from another person moving to and from a bed to a chair?: A Little  How much help from another person needed to walk in hospital room?: A Little  How much help from another person for climbing 3-5 steps with a railing?: A Little  AM-PAC Inpatient Mobility Raw Score : 20  AM-PAC Inpatient T-Scale Score : educated on role of Physical Therapy, risks of immobility, safety and plan of care and  importance of mobility while in hospital       Patient response to education:   Pt verbalized understanding Pt demonstrated skill Pt requires further education in this area   Yes Partial Yes      Treatment:  Patient practiced and was instructed/facilitated in the following treatment: Patient transferred to edge of bed  Sat edge of bed 7 minutes with Supervision  to increase dynamic sitting balance and activity tolerance. Educated patient on pursed lip breathing, using the spirometer to increase lung capacity, pacing, and energy conservation. Pt stood, ambulated to the restroom, and back to the bed. Returned to bed per patient request.      Therapeutic Exercises:  not performed       At end of session, patient in bed with   call light and phone within reach,   all lines and tubes intact, nursing notified. Patient would benefit from continued skilled Physical Therapy to improve functional independence and quality of life. Patient's/ family goals   home        ASSESSMENT: Patient exhibits decreased strength, balance and endurance impairing functional mobility, transfers and gait  patient needing extra time throughout tx session due to feeling tired and to sit to get her bearings. Pt was unsteady during ambulation due to impaired strength/endurance. Pt at risk for falls. Patient c/o stomach pains and gas which limited her progress with therapy.     Plan of Care:     -Standing Balance: Perform strengthening exercises in standing to promote motor control with or without upper extremity support   -Transfers: Cues for hand placement, technique and safety  -Gait: Gait training and Standing activities to improve: base of support, weight shift, weight bearing    -Endurance: Utilize Supervised activities to increase level of endurance to allow for safe functional mobility including transfers and gait     Patient and or family understand(s) diagnosis, prognosis, and plan of care. Frequency of treatments: Patient will be seen  daily. Time in 10:09  Time out  10:24    Total Treatment Time  15 minutes        CPT codes:    Therapeutic activities (75739)   15 minutes  1 unit(s)   Linh Mai  [facility]  LIC # [id number]

## 2021-03-15 NOTE — PROGRESS NOTES
Department of Internal Medicine  General Internal Medicine  Attending Progress Note      SUBJECTIVE:    Doing bit better without fevers chills or chest pain nausea or vomiting.   Eating better did not mention more diarrhea but is tired of being so fatigued and dependent on others for care       OBJECTIVE      Medications    Current Facility-Administered Medications: 0.9 % sodium chloride infusion, , Intravenous, Continuous  vancomycin (VANCOCIN) intermittent dosing (placeholder), , Other, RX Placeholder  levoFLOXacin (LEVAQUIN) 250 MG/50ML infusion 250 mg, 250 mg, Intravenous, Q48H  amLODIPine (NORVASC) tablet 5 mg, 5 mg, Oral, BID  albuterol-ipratropium (COMBIVENT RESPIMAT)  MCG/ACT inhaler 1 puff, 1 puff, Inhalation, Q6H  0.9 % sodium chloride infusion, , Intravenous, PRN  insulin glargine (LANTUS) injection vial 20 Units, 20 Units, Subcutaneous, QAM  acetaminophen (TYLENOL) tablet 1,000 mg, 1,000 mg, Oral, Q6H PRN  carvedilol (COREG) tablet 25 mg, 25 mg, Oral, BID WC  heparin (porcine) injection 5,000 Units, 5,000 Units, Subcutaneous, 3 times per day  calcitRIOL (ROCALTROL) capsule 0.25 mcg, 0.25 mcg, Oral, Daily  dexamethasone (DECADRON) injection 4 mg, 4 mg, Intravenous, Q8H  perflutren lipid microspheres (DEFINITY) injection 1.65 mg, 1.5 mL, Intravenous, ONCE PRN  pentoxifylline (TRENTAL) extended release tablet 400 mg, 400 mg, Oral, BID  insulin lispro (HUMALOG) injection vial 0-12 Units, 0-12 Units, Subcutaneous, TID WC  insulin lispro (HUMALOG) injection vial 0-6 Units, 0-6 Units, Subcutaneous, Nightly  glucose (GLUTOSE) 40 % oral gel 15 g, 15 g, Oral, PRN  dextrose 50 % IV solution, 12.5 g, Intravenous, PRN  glucagon (rDNA) injection 1 mg, 1 mg, Intramuscular, PRN  dextrose 5 % solution, 100 mL/hr, Intravenous, PRN  aspirin chewable tablet 81 mg, 81 mg, Oral, Daily  atorvastatin (LIPITOR) tablet 80 mg, 80 mg, Oral, Daily  clopidogrel (PLAVIX) tablet 75 mg, 75 mg, Oral, Daily  gabapentin (NEURONTIN) capsule 300 mg, 300 mg, Oral, Nightly  levothyroxine (SYNTHROID) tablet 50 mcg, 50 mcg, Oral, Daily  famotidine (PEPCID) tablet 20 mg, 20 mg, Oral, Daily  sodium chloride flush 0.9 % injection 10 mL, 10 mL, Intravenous, 2 times per day  sodium chloride flush 0.9 % injection 10 mL, 10 mL, Intravenous, PRN  promethazine (PHENERGAN) tablet 12.5 mg, 12.5 mg, Oral, Q6H PRN **OR** ondansetron (ZOFRAN) injection 4 mg, 4 mg, Intravenous, Q6H PRN  polyethylene glycol (GLYCOLAX) packet 17 g, 17 g, Oral, Daily PRN  labetalol (NORMODYNE;TRANDATE) injection 20 mg, 20 mg, Intravenous, Q6H PRN  Physical    VITALS:  /67   Pulse 67   Temp 98.8 °F (37.1 °C) (Infrared)   Resp 18   Ht 5' (1.524 m)   Wt 117 lb (53.1 kg)   SpO2 96%   BMI 22.85 kg/m²   CONSTITUTIONAL:  awake, alert, cooperative, no apparent distress, and appears stated age  EYES:  Lids and lashes normal, pupils equal, round and reactive to light, extra ocular muscles intact, sclera clear, conjunctiva normal  ENT:  Normocephalic, without obvious abnormality, atraumatic, sinuses nontender on palpation, external ears without lesions, oral pharynx with moist mucus membranes, tonsils without erythema or exudates, gums normal and good dentition. NECK:  Supple, symmetrical, trachea midline, no adenopathy, thyroid symmetric, not enlarged and no tenderness, skin normal  HEMATOLOGIC/LYMPHATICS:  no cervical lymphadenopathy  BACK:  Symmetric, no curvature, spinous processes are non-tender on palpation, paraspinous muscles are non-tender on palpation, no costal vertebral tenderness  LUNGS:  Improved Crackles  CARDIOVASCULAR:  Normal apical impulse, regular rate and rhythm, normal S1 and S2, no S3 or S4, and no murmur noted  ABDOMEN:  No scars, normal bowel sounds, soft, non-distended, non-tender, no masses palpated, no hepatosplenomegally  MUSCULOSKELETAL:  There is no redness, warmth, or swelling of the joints. Full range of motion noted.   Motor strength is 5 out of 5 all extremities bilaterally. Tone is normal.  NEUROLOGIC:  Awake, alert, oriented to name, place and time. SKIN:  no bruising or bleeding  Data    CBC:   Lab Results   Component Value Date    WBC 14.6 03/15/2021    RBC 2.67 03/15/2021    HGB 7.4 03/15/2021    HCT 22.4 03/15/2021    MCV 83.9 03/15/2021    MCH 27.7 03/15/2021    MCHC 33.0 03/15/2021    RDW 13.4 03/15/2021     03/15/2021    MPV 10.4 03/15/2021     CMP:    Lab Results   Component Value Date     03/15/2021    K 3.4 03/15/2021    K 4.2 03/09/2021     03/15/2021    CO2 22 03/15/2021    BUN 95 03/15/2021    CREATININE 4.3 03/15/2021    GFRAA 13 03/15/2021    LABGLOM 13 03/15/2021    GLUCOSE 186 03/15/2021    GLUCOSE 419 03/21/2011    PROT 4.9 03/15/2021    LABALBU 2.4 03/15/2021    LABALBU 4.1 03/21/2011    CALCIUM 8.0 03/15/2021    BILITOT <0.2 03/15/2021    ALKPHOS 66 03/15/2021    AST 48 03/15/2021    ALT 30 03/15/2021       ASSESSMENT AND PLAN      Brief Hx of stay:  Mrs. Felipa Villa was admitted due to abnormal/worsening renal function. Her Creat level used to be 2.3, but went up to 3.2. she was admitted for further evaluation. During hospitalization she was noted to have hypertensive urgency. Her BP medication was adjusted and now BP is better controlled. She tested positive for covid and was in respiratory failure. She received Decadron, completed remdesivir and 2 units of FFP. Symptoms is improving and she is being weaned off oxygen. Nephro, Pulm and ID are consulted. 1.  Hypertensive urgency:   Much controlled  Continue Antihypertensive agents as prescribed    2. Acute on Chronic Renal disease:  Seems like renal function has settled near baseline of 4.5  Continue to avoid nephrotoxic agents  Appreciate nephro recommendations    3. Acute Respiratory failure due to COVID 19 Pneumonia:  Breathing has improved  Continue decadron as prescribed  She's completed Remdesivir  Wean off oxygen as tolerated  Continue Vanco and Levaquin. 4. Diabetes mellitus type 2: episodes of hypoglycemia this morning  Hold morning lantus  Continue accucheck achs  Diabetic diet  Poor oral intake on 3/13 may have contributed to hypoglycemia    5. Hypothyroidism: On synthroid. 6. Anemia due to chronic renal disease:  hgb continues to be stable at 7.4  Will continue to monitor  Transfuse if needed.

## 2021-03-15 NOTE — PROGRESS NOTES
[facility] MD Ara Hines MD Winda Llanos, MD Fritzi Crosser, DO, Javier Bates MD .  Progress note   Patient's Name: Noble Cheatham  12:43 PM  3/15/2021          Found to have covid 19 Pna . Now in isolation . Breathing better o2/nc . Cr relatively stable over last 24 hours at 4.2 . No more reported diarrhea  BP better   D/w RN on floor . Overall stable but no new labs today we will draw CBC this and the CMP stat this morning. History of Present Ilness: The patient is a 57 y.o. female with significant past medical history of diabetes type 2, Essential hypertension who presents to the ER following being noticed to have progressively elevated cr numbers   In looking in records pt cr has been trending up rather fast over the last year to year and half . She does have around 7 g proteinuria based on spot urine /cr ratio . Cr on presentation was 3.2 . Cr today 3.5   She was also found to have bp of 200 . At home she is on hctz/lisinopril as part of her antihtn . I saw her in her room this am , she is alert oriented pleasant 56 y/o F . Past Medical History:   Diagnosis Date    Acid reflux     Hyperlipidemia     Hypertension     Hypothyroidism     S/P appendectomy     Type II or unspecified type diabetes mellitus without mention of complication, not stated as uncontrolled        Past Surgical History:   Procedure Laterality Date    APPENDECTOMY      HYSTERECTOMY         Family History   Problem Relation Age of Onset    Diabetes Mother     High Blood Pressure Mother     Diabetes Brother     Diabetes Sister         reports that she has never smoked. She has never used smokeless tobacco. She reports that she does not drink alcohol or use drugs.     Allergies:  Demerol and Toradol [ketorolac tromethamine]    Current Medications:    0.9 % sodium chloride infusion, Continuous  vancomycin (VANCOCIN) intermittent dosing (placeholder), RX Placeholder  levoFLOXacin (LEVAQUIN) 250 MG/50ML infusion 250 mg, Q48H  amLODIPine (NORVASC) tablet 5 mg, BID  albuterol-ipratropium (COMBIVENT RESPIMAT)  MCG/ACT inhaler 1 puff, Q6H  0.9 % sodium chloride infusion, PRN  insulin glargine (LANTUS) injection vial 20 Units, QAM  acetaminophen (TYLENOL) tablet 1,000 mg, Q6H PRN  carvedilol (COREG) tablet 25 mg, BID WC  heparin (porcine) injection 5,000 Units, 3 times per day  calcitRIOL (ROCALTROL) capsule 0.25 mcg, Daily  dexamethasone (DECADRON) injection 4 mg, Q8H  perflutren lipid microspheres (DEFINITY) injection 1.65 mg, ONCE PRN  pentoxifylline (TRENTAL) extended release tablet 400 mg, BID  insulin lispro (HUMALOG) injection vial 0-12 Units, TID WC  insulin lispro (HUMALOG) injection vial 0-6 Units, Nightly  glucose (GLUTOSE) 40 % oral gel 15 g, PRN  dextrose 50 % IV solution, PRN  glucagon (rDNA) injection 1 mg, PRN  dextrose 5 % solution, PRN  aspirin chewable tablet 81 mg, Daily  atorvastatin (LIPITOR) tablet 80 mg, Daily  clopidogrel (PLAVIX) tablet 75 mg, Daily  gabapentin (NEURONTIN) capsule 300 mg, Nightly  levothyroxine (SYNTHROID) tablet 50 mcg, Daily  famotidine (PEPCID) tablet 20 mg, Daily  sodium chloride flush 0.9 % injection 10 mL, 2 times per day  sodium chloride flush 0.9 % injection 10 mL, PRN  promethazine (PHENERGAN) tablet 12.5 mg, Q6H PRN    Or  ondansetron (ZOFRAN) injection 4 mg, Q6H PRN  polyethylene glycol (GLYCOLAX) packet 17 g, Daily PRN  labetalol (NORMODYNE;TRANDATE) injection 20 mg, Q6H PRN        Review of Systems:     No face to face encounter done as he in isolation for COVID 19 PNA . Case discuseed with RN including PE findings     Physical exam:   Vital signs BP (!) 144/73   Pulse 72   Temp 97.8 °F (36.6 °C) (Infrared)   Resp 16   Ht 5' (1.524 m)   Wt 117 lb (53.1 kg)   SpO2 96%   BMI 22.85 kg/m²     No face to face encounter done as he in isolation for COVID 19 PNA .  Case discuseed with RN including PE findings Data:   Labs:  CBC with Differential:    Lab Results   Component Value Date    WBC 15.9 03/15/2021    RBC 2.78 03/15/2021    HGB 7.6 03/15/2021    HCT 23.6 03/15/2021     03/15/2021    MCV 84.9 03/15/2021    MCH 27.3 03/15/2021    MCHC 32.2 03/15/2021    RDW 13.7 03/15/2021    SEGSPCT 59 02/27/2013    METASPCT 12.0 03/11/2021    LYMPHOPCT 5.1 03/15/2021    MONOPCT 4.9 03/15/2021    MYELOPCT 2.0 03/11/2021    BASOPCT 0.1 03/15/2021    MONOSABS 0.78 03/15/2021    LYMPHSABS 0.81 03/15/2021    EOSABS 0.00 03/15/2021    BASOSABS 0.02 03/15/2021     CMP:    Lab Results   Component Value Date     03/15/2021    K 3.6 03/15/2021    K 4.2 03/09/2021     03/15/2021    CO2 22 03/15/2021    BUN 88 03/15/2021    CREATININE 4.4 03/15/2021    GFRAA 12 03/15/2021    LABGLOM 12 03/15/2021    GLUCOSE 258 03/15/2021    GLUCOSE 419 03/21/2011    PROT 5.0 03/15/2021    LABALBU 2.5 03/15/2021    LABALBU 4.1 03/21/2011    CALCIUM 7.9 03/15/2021    BILITOT <0.2 03/15/2021    ALKPHOS 68 03/15/2021    AST 47 03/15/2021    ALT 29 03/15/2021     Ionized Calcium:  No results found for: IONCA  Magnesium:    Lab Results   Component Value Date    MG 2.2 03/11/2021     Phosphorus:    Lab Results   Component Value Date    PHOS 7.9 03/11/2021     U/A:    Lab Results   Component Value Date    COLORU Straw 03/08/2021    PHUR 5.5 03/08/2021    LABCAST RARE 03/27/2015    WBCUA 0-1 03/08/2021    WBCUA NONE 02/06/2012    RBCUA 1-3 03/08/2021    RBCUA 0-1 12/25/2012    YEAST FEW 04/04/2015    BACTERIA NONE SEEN 03/08/2021    CLARITYU Clear 03/08/2021    SPECGRAV 1.025 03/08/2021    LEUKOCYTESUR Negative 03/08/2021    UROBILINOGEN 0.2 03/08/2021    BILIRUBINUR Negative 03/08/2021    BILIRUBINUR NEGATIVE 02/06/2012    BLOODU SMALL 03/08/2021    GLUCOSEU 250 03/08/2021    GLUCOSEU >=1000 02/06/2012    AMORPHOUS FEW 03/08/2021     Microalbumen/Creatinine ratio:  No components found for: RUCREAT  Iron Saturation:  No components found for: PERCENTFE  TIBC:  No results found for: TIBC  FERRITIN:    Lab Results   Component Value Date    FERRITIN 646 03/11/2021        Imaging:  US DUP LOWER EXTREMITIES BILATERAL VENOUS   Final Result   Normal         XR CHEST PORTABLE   Final Result   Continued but improved bilateral pulmonary airspace opacities, left greater   than right         XR CHEST PORTABLE   Final Result   1. Rapid development of widespread bilateral parenchymal consolidation   predominant from the hilar regions to the periphery of the lung that   superimposes to previous left upper lobe peripheral focal consolidation. 2.  The differential diagnosis include acute pulmonary edema, pulmonary   hemorrhage, diffuse alveolar damage, bilateral pneumonia. Please correlate   clinically. XR CHEST PORTABLE   Final Result   Left upper lobe opacity worrisome for pneumonia. US RETROPERITONEAL LIMITED   Final Result   Mildly atrophic and echogenic kidneys, compatible with mild chronic renal   parenchymal disease. Unremarkable ultrasound of the bladder. Assessment    -subacute kidney injury vs progressive worsening of her CKD     -Nephrotic range proteinuria    -HTN urgency     -Anaemia of chronic disease     -Mineral bone disease     -Insulin depedent diabetes with complication     -COVID 19 PNA with respiratory failure       PLAN :    Ms Asia Collado has been having rather progressive elevation in cr over the last 1.5 years . Cr back on 3/2019 was 1 . Cr on 11/2019 1.6 . Cr back on 8/2020 was 2.1 and now cr is 3.2   The CKD is likley due to diabetic nephropathy thought the progression is rather fast .   Her current GAVIN is likley also exacerbated by her uncontrolled HTN on presentation .     -Creatinine relatively stable over last 48 hours  (plateu ??)  .    -with poor po intake , will start gentle iv fluids   -continue coreg to 25 mg bid   -increase amlodipine to 5 mg bid   -ISSA (negative ) ,ANCA(negative , SPEP (negative), UPEP (negative)  -no current need for RRT/HD .  Will f/u closely           Electronically signed by Theresa Gordon MD on 3/15/2021 at 12:43 PM

## 2021-03-15 NOTE — PROGRESS NOTES
Pharmacy Consultation Note  (Antibiotic Dosing and Monitoring)    Initial consult date: 3/13/21  Consulting physician: JOVANA Georges  Drug(s): Vancomycin  Indication: UTI    Ht Readings from Last 1 Encounters:   03/15/21 5' (1.524 m)     Wt Readings from Last 1 Encounters:   21 117 lb (53.1 kg)         Age/  Gender IBW DW  Allergy Information   57 y.o.     female 45.5 kg 53.1 kg  Demerol and Toradol [ketorolac tromethamine]                 Date  WBC BUN/CR UOP Drug/Dose Time   Given Level(s)   (Time) Comments   3/12  (#1) 18.5 80/4.3 -- Vancomycin 1,000 mg IV x 1 2015     3/13  (#2) -- -- -- No dose -- 19.6 mcg/mL @ 110 [facility] consulted   3/14  (#3) -- 85/4.5 -- Vancomycin 500 mg IV x 1 1659 13.5 mcg/mL @ 0635    3/15  (#4) 15.9 88/4.4 0.4 mL/kg/hr No dose -- 19.7 mcg/mL @ 0543      (#5)            (#6)            (#7)            Estimated Creatinine Clearance: 10 mL/min (A) (based on SCr of 4.4 mg/dL (H)).   UOP over the past 24 hours:       Intake/Output Summary (Last 24 hours) at 3/15/2021 1211  Last data filed at 3/15/2021 1008  Gross per 24 hour   Intake 1727.5 ml   Output 550 ml   Net 1177.5 ml       Temp max: Temp (24hrs), Av.4 °F (36.9 °C), Min:97.8 °F (36.6 °C), Max:99 °F (37.2 °C)      Antibiotic Regimen:  Antibiotic Dose Date Initiated   Levofloxacin 250 mg IV Q48H 3/11     Cultures:  available culture and sensitivity results were reviewed in EPIC  Culture Date Result    COVID-19 3/9 Detected   Blood x 2 3/8 NGTD   Urine 3/8 Mixed gram positive organisms (<10,000 cfu)   Legionella/strep pneumo urine ag 3/11 Negative     Assessment:  · Consulted by JOVANA Georges to dose/monitor vancomycin  · Goal trough level:  15-20 mcg/mL  · Pt is a 57 yof admitted with COVID-19 and GAVIN on CKD, initiated on vancomycin for a UTI  · Serum creatinine today: 4.4; CrCl ~ 10 mL/min; baseline Scr ~ 1.3-2.1  · 3/13: Patient received Vancomycin 1,000 mg IV x 1 yesterday, random AM level today = 19.6 mcg/mL  · 3/14: Random AM level = 13.5 mcg/mL  · 3/15: Random AM level = 19.7 mcg/mL    Plan:  · No dose vancomycin today  · Repeat random level tomorrow with morning labs  · Dose by levels  · Follow renal function  · Pharmacist will follow and monitor/adjust dosing as necessary      Thank you for the consult,    Samir Barragan, PharmD 3/15/2021 12:15 PM   [phone]

## 2021-03-16 NOTE — PROGRESS NOTES
ambulated with no device   Equipment owned: wheeled walker, cane, shower chair, grab bars  Cognition: A&O x 4; follows 3 step directions. good  Problem solving skills              good  Memory               good  Sequencing              good  Judgement/safety  Communication: intact   Visual perceptual skills: impaired, glaucoma and cataracts                 Glasses: yes   Edema: no     Sensation: intact   Hand Dominance:  Left     X Right       Left Right Comment   Passive range of motion [facility]      Active range of motion [facility]      Muscle Grade 4/5 4/5     /pinch Strength Intact  Intact         Functional Assessment:    Initial Evaluation Status Date:   3/9/2021 Treatment Status  Date:3/16/2021 STGs = LTGs  Time frame: 5 - 14 days   Feeding Independent   N/T Independent    Grooming Supervision  Min A ; pt able to comb hair with pick with min A to comb tangles from back of hair; pt able to wash face with set up assist Independent    UB Dressing Supervision   Min A to change gowns and thread monitor lines and IV and O2 lines through gown; pt able to place B UE's through sleeves Independent    LB Dressing Minimal Assist  Min A; pt able to doff B socks; pt able to don R sock, however d/t fatigue, pt required assist to don L sock Independent    Bathing Minimal Assist  Min A; pt able to wash UB and LE's when seated EOB; assist to wash back; pericare not completed d/t pt having incontinence garment donned and pt c/o fatigue Independent    Toileting Supervision   N/T Independent    Bed Mobility  Facilitated Supine to sit: Supervision   Scooting:Supervision  Sit to supine: Supervision    Rolling: Supervision Supervision for supine to sit; supervision for scooting; sit to supine N/T Supine to sit: Independent   Scooting:Independent  Sit to supine: Independent   Rolling: Independent   Functional Transfers Minimal Assist from EOB.   Minimal Assist for transfer to and from commode with minimal verbal cues to use grab bar for safe commode transfer. Transfer training with verbal cues for hand placement throughout session to improve safety. Min A with HHA for sit to stand to/from EOB Independent    Functional Mobility Minimal assist with IV pole to improve balance to and from bathroom and in hallway, verbal cues for walker sequence and safety. Two loss of balance with minimal assist to recover.   Min A with HHA to side step to [facility]  Modified [place]    Activity Tolerance Fair  Fair Good           Comments: Patient cleared by nursing staff. Upon arrival pt supine in bed. Pt agreeable to OT tx session. Pt educated with regards to bed mobility, hand placement, safety awareness, static sitting balance,  standing balance, transfer training, functional mobility, ADL retraining, grooming tasks, UE/LE bathing, LE/UE dressing, ECT's. At end of session pt seated EOB working with PT. all lines and tubes intact, call light within reach. Overall, pt demonstrated decreased independence and safety during completion of ADL/functional transfers/mobility tasks. Pt would benefit from continued skilled OT to increase safety and independence with completion of ADL/IADL tasks for functional independence and quality of life. Pt required cues and education as noted above for safe facilitation and completion of tasks. Therapist provided skilled monitoring of patient's response during treatment session. Prior to and at the end of session, environmental modifications/line management completed for patients safety and efficiency of treatment session. Overall, patient demonstrates minimal difficulties with completion of BADLs and IADLs. Factors contributing to these difficulties include fatigue, coughing, decreased endurance, and generalized weakness. As noted above, patient likely to benefit from further OT intervention to increase independence, safety, and overall quality of life.      Treatment:     ? Bed mobility:

## 2021-03-16 NOTE — PROGRESS NOTES
Pharmacy Consultation Note  (Antibiotic Dosing and Monitoring)    Initial consult date: 3/13/21  Consulting physician: JOVANA Coronado  Drug(s): Vancomycin  Indication: UTI    Ht Readings from Last 1 Encounters:   03/15/21 5' (1.524 m)     Wt Readings from Last 1 Encounters:   21 117 lb (53.1 kg)         Age/  Gender IBW DW  Allergy Information   57 y.o.     female 45.5 kg 53.1 kg  Demerol and Toradol [ketorolac tromethamine]                 Date  WBC BUN/CR UOP Drug/Dose Time   Given Level(s)   (Time) Comments   3/12  (#1) 18.5 80/4.3 -- Vancomycin 1,000 mg IV x 1 2015     3/13  (#2) -- -- -- No dose -- 19.6 mcg/mL @ 110 [facility] consulted   3/14  (#3) -- 85/4.5 -- Vancomycin 500 mg IV x 1 1659 13.5 mcg/mL @ 0635    3/15  (#4) 15.9 88/4.4 0.4 mL/kg/hr No dose -- 19.7 mcg/mL @ 0543    3/16  (#5) -- --  Vancomycin 500 mg IV x 1 <1300> 15.3 mcg/mL @ 1014      (#6)            (#7)            Estimated Creatinine Clearance: 10 mL/min (A) (based on SCr of 4.3 mg/dL (H)).   UOP over the past 24 hours:       Intake/Output Summary (Last 24 hours) at 3/16/2021 1135  Last data filed at 3/15/2021 2115  Gross per 24 hour   Intake 240 ml   Output 200 ml   Net 40 ml       Temp max: Temp (24hrs), Av.4 °F (36.9 °C), Min:97.9 °F (36.6 °C), Max:98.8 °F (37.1 °C)      Antibiotic Regimen:  Antibiotic Dose Date Initiated   Levofloxacin 250 mg IV Q48H 3/11     Cultures:  available culture and sensitivity results were reviewed in EPIC  Culture Date Result    COVID-19 3/9 Detected   Blood x 2 3/8 NGTD   Urine 3/8 Mixed gram positive organisms (<10,000 cfu)   Legionella/strep pneumo urine ag 3/11 Negative   Legionella antigen 3/12 Negative     Assessment:  · Consulted by JOVANA Coronado to dose/monitor vancomycin  · Goal trough level:  15-20 mcg/mL  · Pt is a 57 yof admitted with COVID-19 and GAVIN on CKD, initiated on vancomycin for a UTI  · Serum creatinine yesterday: 4.4; CrCl ~ 10 mL/min; baseline Scr ~ 1.3-2.1  · 3/13: Patient received Vancomycin 1,000 mg IV x 1 yesterday, random AM level today = 19.6 mcg/mL  · 3/14: Random AM level = 13.5 mcg/mL  · 3/15: Random AM level = 19.7 mcg/mL  · 3/16: Random AM level = 15.3 mcg/mL    Plan:  · Vancomycin 500 mg IV x 1  · Dose by levels  · Follow renal function  · Pharmacist will follow and monitor/adjust dosing as necessary      Thank you for the consult,    Cristiano Mars, PharmD 3/16/2021 11:35 AM   [phone]

## 2021-03-16 NOTE — PLAN OF CARE
treatment will be avoided or minimized  Outcome: Met This Shift  Goal: Ability to maintain a body temperature in the normal range will improve  Description: Ability to maintain a body temperature in the normal range will improve  Outcome: Met This Shift  Goal: Will regain or maintain usual level of consciousness  Description: Will regain or maintain usual level of consciousness  Outcome: Met This Shift     Problem: Serum Glucose Level - Abnormal:  Goal: Ability to maintain appropriate glucose levels has stabilized  Description: Ability to maintain appropriate glucose levels has stabilized  Outcome: Met This Shift     Problem: Airway Clearance - Ineffective  Goal: Achieve or maintain patent airway  Outcome: Met This Shift     Problem: Gas Exchange - Impaired  Goal: Absence of hypoxia  Outcome: Met This Shift  Goal: Promote optimal lung function  Outcome: Met This Shift     Problem: Breathing Pattern - Ineffective  Goal: Ability to achieve and maintain a regular respiratory rate  Outcome: Met This Shift     Problem:  Body Temperature -  Risk of, Imbalanced  Goal: Ability to maintain a body temperature within defined limits  Outcome: Met This Shift  Goal: Will regain or maintain usual level of consciousness  Outcome: Met This Shift  Goal: Complications related to the disease process, condition or treatment will be avoided or minimized  Outcome: Met This Shift     Problem: Isolation Precautions - Risk of Spread of Infection  Goal: Prevent transmission of infection  Outcome: Met This Shift     Problem: Nutrition Deficits  Goal: Optimize nutritional status  Outcome: Met This Shift     Problem: Risk for Fluid Volume Deficit  Goal: Maintain normal heart rhythm  Outcome: Met This Shift  Goal: Maintain absence of muscle cramping  Outcome: Met This Shift  Goal: Maintain normal serum potassium, sodium, calcium, phosphorus, and pH  Outcome: Met This Shift     Problem: Loneliness or Risk for Loneliness  Goal: Demonstrate positive

## 2021-03-16 NOTE — PROGRESS NOTES
Department of Internal Medicine  General Internal Medicine  Attending Progress Note      SUBJECTIVE:    I saw her with the . The patient is being correctly and firmly encouraged to get up out of bed and sit in the chair which she is somewhat resistant and aggravated about. She says she is dizzy when she sits up but then upon sitting up says that she sits up for a while then she is no longer dizzy. I encouraged her to comply with nursing care. During my visit I saw that her O2 had been weaned off although the patient might not have realized it at that moment.   Since the incidence of rebound need for O2 and clinical worsening is happens too often during Covid cases I will continue to observe her     OBJECTIVE      Medications    Current Facility-Administered Medications: vancomycin (VANCOCIN) 500 mg in sodium chloride 0.9 % 100 mL IVPB (mini-bag), 500 mg, Intravenous, Once  0.9 % sodium chloride infusion, , Intravenous, Continuous  vancomycin (VANCOCIN) intermittent dosing (placeholder), , Other, RX Placeholder  levoFLOXacin (LEVAQUIN) 250 MG/50ML infusion 250 mg, 250 mg, Intravenous, Q48H  amLODIPine (NORVASC) tablet 5 mg, 5 mg, Oral, BID  albuterol-ipratropium (COMBIVENT RESPIMAT)  MCG/ACT inhaler 1 puff, 1 puff, Inhalation, Q6H  0.9 % sodium chloride infusion, , Intravenous, PRN  insulin glargine (LANTUS) injection vial 20 Units, 20 Units, Subcutaneous, QAM  acetaminophen (TYLENOL) tablet 1,000 mg, 1,000 mg, Oral, Q6H PRN  carvedilol (COREG) tablet 25 mg, 25 mg, Oral, BID WC  heparin (porcine) injection 5,000 Units, 5,000 Units, Subcutaneous, 3 times per day  calcitRIOL (ROCALTROL) capsule 0.25 mcg, 0.25 mcg, Oral, Daily  dexamethasone (DECADRON) injection 4 mg, 4 mg, Intravenous, Q8H  perflutren lipid microspheres (DEFINITY) injection 1.65 mg, 1.5 mL, Intravenous, ONCE PRN  pentoxifylline (TRENTAL) extended release tablet 400 mg, 400 mg, Oral, BID  insulin lispro (HUMALOG) injection vial 0-12 Units, 0-12 Units, Subcutaneous, TID WC  insulin lispro (HUMALOG) injection vial 0-6 Units, 0-6 Units, Subcutaneous, Nightly  glucose (GLUTOSE) 40 % oral gel 15 g, 15 g, Oral, PRN  dextrose 50 % IV solution, 12.5 g, Intravenous, PRN  glucagon (rDNA) injection 1 mg, 1 mg, Intramuscular, PRN  dextrose 5 % solution, 100 mL/hr, Intravenous, PRN  aspirin chewable tablet 81 mg, 81 mg, Oral, Daily  atorvastatin (LIPITOR) tablet 80 mg, 80 mg, Oral, Daily  clopidogrel (PLAVIX) tablet 75 mg, 75 mg, Oral, Daily  gabapentin (NEURONTIN) capsule 300 mg, 300 mg, Oral, Nightly  levothyroxine (SYNTHROID) tablet 50 mcg, 50 mcg, Oral, Daily  famotidine (PEPCID) tablet 20 mg, 20 mg, Oral, Daily  sodium chloride flush 0.9 % injection 10 mL, 10 mL, Intravenous, 2 times per day  sodium chloride flush 0.9 % injection 10 mL, 10 mL, Intravenous, PRN  promethazine (PHENERGAN) tablet 12.5 mg, 12.5 mg, Oral, Q6H PRN **OR** ondansetron (ZOFRAN) injection 4 mg, 4 mg, Intravenous, Q6H PRN  polyethylene glycol (GLYCOLAX) packet 17 g, 17 g, Oral, Daily PRN  labetalol (NORMODYNE;TRANDATE) injection 20 mg, 20 mg, Intravenous, Q6H PRN  Physical    VITALS:  /68   Pulse 62   Temp 97.9 °F (36.6 °C)   Resp 16   Ht 5' (1.524 m)   Wt 117 lb (53.1 kg)   SpO2 96%   BMI 22.85 kg/m²   CONSTITUTIONAL:  awake, alert, cooperative, no apparent distress, and appears stated age  EYES:  Lids and lashes normal, pupils equal, round and reactive to light, extra ocular muscles intact, sclera clear, conjunctiva normal  ENT:  Normocephalic, without obvious abnormality, atraumatic, sinuses nontender on palpation, external ears without lesions, oral pharynx with moist mucus membranes, tonsils without erythema or exudates, gums normal and good dentition.   NECK:  Supple, symmetrical, trachea midline, no adenopathy, thyroid symmetric, not enlarged and no tenderness, skin normal  HEMATOLOGIC/LYMPHATICS:  no cervical lymphadenopathy  BACK:  Symmetric, no curvature, spinous processes are non-tender on palpation, paraspinous muscles are non-tender on palpation, no costal vertebral tenderness  LUNGS:  Improved Crackles  CARDIOVASCULAR:  Normal apical impulse, regular rate and rhythm, normal S1 and S2, no S3 or S4, and no murmur noted  ABDOMEN:  No scars, normal bowel sounds, soft, non-distended, non-tender, no masses palpated, no hepatosplenomegally  MUSCULOSKELETAL:  There is no redness, warmth, or swelling of the joints. Full range of motion noted. Motor strength is 5 out of 5 all extremities bilaterally. Tone is normal.  NEUROLOGIC:  Awake, alert, oriented to name, place and time. SKIN:  no bruising or bleeding  Data    CBC:   Lab Results   Component Value Date    WBC 14.6 03/15/2021    RBC 2.67 03/15/2021    HGB 7.4 03/15/2021    HCT 22.4 03/15/2021    MCV 83.9 03/15/2021    MCH 27.7 03/15/2021    MCHC 33.0 03/15/2021    RDW 13.4 03/15/2021     03/15/2021    MPV 10.4 03/15/2021     CMP:    Lab Results   Component Value Date     03/15/2021    K 3.4 03/15/2021    K 4.2 03/09/2021     03/15/2021    CO2 22 03/15/2021    BUN 95 03/15/2021    CREATININE 4.3 03/15/2021    GFRAA 13 03/15/2021    LABGLOM 13 03/15/2021    GLUCOSE 186 03/15/2021    GLUCOSE 419 03/21/2011    PROT 4.9 03/15/2021    LABALBU 2.4 03/15/2021    LABALBU 4.1 03/21/2011    CALCIUM 8.0 03/15/2021    BILITOT <0.2 03/15/2021    ALKPHOS 66 03/15/2021    AST 48 03/15/2021    ALT 30 03/15/2021       ASSESSMENT AND PLAN      Brief Hx of stay:  Mrs. Malu Nation was admitted due to abnormal/worsening renal function. Her Creat level used to be 2.3, but went up to 3.2. she was admitted for further evaluation. During hospitalization she was noted to have hypertensive urgency. Her BP medication was adjusted and now BP is better controlled. She tested positive for covid and was in respiratory failure. She received Decadron, completed remdesivir and 2 units of FFP.  Symptoms is improving and she is being weaned off oxygen. Nephro, Pulm and ID are consulted. 1.  Hypertensive urgency: controlled  Continue Antihypertensive agents as prescribed    2. Acute on Chronic Renal disease:  Seems like renal function has settled near baseline of 4.5  Continue to avoid nephrotoxic agents  Appreciate nephro recommendations    3. Acute Respiratory failure due to COVID 19 Pneumonia:  Weaned to room air by mid afternoon on 3/16. Complete 10 days of decadron as prescribed  She's completed Remdesivir  Continue Vanco and Levaquin. Per ID    4. Diabetes mellitus type 2: episodes of hypoglycemia this morning  Hed morning lantus after the poor oral intake on 3/13 contributed to her hypoglycemia/0  .  316 11 AM sugar 223  Continue accucheck achs  Diabetic diet    5. Hypothyroidism: On synthroid. TSH WNL    6. Anemia due to chronic renal disease:  hgb continues to be stable at 7.4  Will continue to monitor  Transfuse if needed. 7.  Full code. 8.  DVT prophylaxis with heparin 5000 3 times daily  9.   Disposition some debility so will get PT OT watch her overnight and send her home on 3/17 unless she needs to be placed but it seems that she would rather go home and be with her grandson who lives with her

## 2021-03-16 NOTE — PROGRESS NOTES
(36.8 °C)  Min: 97.9 °F (36.6 °C)  Max: 98.8 °F (37.1 °C)  Constitutional:  The patient is awake, alert, and oriented. THIN   Skin:    Warm and dry. No rashes were noted. HEENT:     AT/NC  Neck:    Supple to movements. Chest:   No use of accessory muscles to breathe. Symmetrical expansion. DEC BS ANT /POST  Cardiovascular:  S1 and S2 are rhythmic and regular. Abdomen:   Positive bowel sounds to auscultation. Benign to palpation. DISTENDED  Extremities:    no edema.   CNS    AAxO   Lines: pIV    Radiology:  Laboratory and Tests Review:  Lab Results   Component Value Date    WBC 14.6 (H) 03/15/2021    WBC 15.9 (H) 03/15/2021    WBC 18.5 (H) 03/12/2021    HGB 7.4 (L) 03/15/2021    HCT 22.4 (L) 03/15/2021    MCV 83.9 03/15/2021     03/15/2021     No results found for: [facility]  Lab Results   Component Value Date    ALT 30 03/15/2021    AST 48 (H) 03/15/2021    ALKPHOS 66 03/15/2021    BILITOT <0.2 03/15/2021     Lab Results   Component Value Date     03/15/2021    K 3.4 03/15/2021    K 4.2 03/09/2021     03/15/2021    CO2 22 03/15/2021    BUN 95 03/15/2021    CREATININE 4.3 03/15/2021    CREATININE 4.4 03/15/2021    CREATININE 4.5 03/14/2021    GFRAA 13 03/15/2021    LABGLOM 13 03/15/2021    GLUCOSE 186 03/15/2021    GLUCOSE 419 03/21/2011    PROT 4.9 03/15/2021    LABALBU 2.4 03/15/2021    LABALBU 4.1 03/21/2011    CALCIUM 8.0 03/15/2021    BILITOT <0.2 03/15/2021    ALKPHOS 66 03/15/2021    AST 48 03/15/2021    ALT 30 03/15/2021     Lab Results   Component Value Date    CRP 0.9 (H) 03/16/2021    CRP 10.8 (H) 03/11/2021     Lab Results   Component Value Date    SEDRATE 40 (H) 03/16/2021    SEDRATE 44 (H) 03/11/2021     Recent Labs     03/15/21  0543 03/15/21  1447 03/16/21  1014   CRP  --   --  0.9*   PROCAL  --   --  5.43*   FERRITIN  --   --  613   LDH  --   --  530*   TROPONINI  --   --  0.05*   DDIMER  --   --  571   FIBRINOGEN  --   --  349   INR  --   --  0.9   PROTIME  --   --  10.6   AST 47* 48*  --    ALT 29 30  --      Lab Results   Component Value Date    CHOL 168 08/05/2020    TRIG 182 08/05/2020    HDL 45 08/05/2020    LDLCALC 87 08/05/2020    LABVLDL 36 08/05/2020     Lab Results   Component Value Date/Time    VITD25 27 (L) 03/11/2021 06:45 AM       Microbiology:   No results for input(s): COVID19 in the last 72 hours. Lab Results   Component Value Date    BC 5 Days no growth 03/08/2021    BLOODCULT2 5 Days no growth 03/08/2021        ASSESSMENT/PLAN:    S/P SEPSIS/FEVERS  COVID PNEUMONIA  LEUKOCYTOSIS DEC  GAVIN/CKD CR4.3     vancomycin (VANCOCIN) intermittent dosing (placeholder), RX Placeholder ONE MORE DOSE VR 19.7  levoFLOXacin (LEVAQUIN) 250 MG/50ML infusion 250 mg, Q48H WILL STOP AFTER TODAY      S/P TOCI   S/P PLASMA  CAN D/C FROM ID POV    · Monitor labs    Imaging and labs were reviewed per medical records. The patient was educated about the diagnosis, prognosis, indications, risks and benefits of treatment. An opportunity to ask questions was given to the patient/FAMILY. Thank you for involving me in the care of [place] 37 I will continue to follow. Please do not hesitate to call for any questions or concerns.     Electronically signed by Ramonita Toussaint MD on 3/16/2021 at 5:13 PM

## 2021-03-16 NOTE — CARE COORDINATION
KARI Note: 3/16/2021 at 2:12pm: COVID POSITIVE - test done on 3/9. KARI called and talked to the patient on the phone in her room. She continues on oxygen at 2L NC. Discussed the possible need for home oxygen and DME choices for oxygen companies. She states if she would need home oxygen, she would like to use [employer]. (Phone: [phone] / Fax: [phone]). Mrs. Jonathan Rodriguez did give me permission to call referral to [address] so that they can have her information on file in case home O2 is needed. [place] [facility] ( Phone: [phone]) and requested I fax demographics and notes with diagnosis etc. States they will follow. Would need ambulatory pulse ox testing and if patient qualifies - would need orders for home O2. If patient would qualify, would need to call [facility] [email or web address] and inform them as well as fax the testing and the order. If she qualifies, a tank would need to be brought to the hospital prior to discharge and patient would need to call [address] when she gets home so that the oxygen and equipment can be delivered to her home. [facility] is following and orders are in Epic. States when she is discharged, daughter will provide transportation.  Cherie Toledo RN

## 2021-03-16 NOTE — PROGRESS NOTES
Physical Therapy Treatment Note    Room #:  1578/5805-45  Patient Name: Augusto Patrick  YOB: 1960  MRN: [id number]    Referring Provider:   Paulina Herman MD     Date of Service: 3/16/2021    Evaluating Physical Therapist: Martínez Tamez, PT #[id number]       Diagnosis:   Hypertensive urgency [I16.0]     worsening blood pressures despite treatment. Patient Active Problem List   Diagnosis    Diabetes mellitus type 2, insulin dependent ([facility] [address]    Hypothyroid    Hypertension    Hyperlipidemia    Hypertensive urgency        Tentative placement recommendation: Home    Equipment recommendation:  To be determined  possibly cane for balance     Prior Level of Function: Patient ambulated independently    Rehab Potential: good  - for baseline    Past medical history:   Past Medical History:   Diagnosis Date    Acid reflux     Hyperlipidemia     Hypertension     Hypothyroidism     S/P appendectomy     Type II or unspecified type diabetes mellitus without mention of complication, not stated as uncontrolled      Past Surgical History:   Procedure Laterality Date    APPENDECTOMY      HYSTERECTOMY         Precautions: Up as tolerated, falls ,      SUBJECTIVE:    Social history: Patient lives alone   in a apartment  with No steps  to enter Tub shower grab bars    Equipment owned: [address] and Shower chair,  All unused    AM-PAC Basic Mobility        AM-[facility] Mobility Inpatient   How much difficulty turning over in bed?: None  How much difficulty sitting down on / standing up from a chair with arms?: A Little  How much difficulty moving from lying on back to sitting on side of bed?: None  How much help from another person moving to and from a bed to a chair?: A Little  How much help from another person needed to walk in hospital room?: A Little  How much help from another person for climbing 3-5 steps with a railing?: A Little  AM-[facility] Inpatient Mobility Raw Score : 20  AM-PAC Inpatient T-Scale Score : 47.67  Mobility Inpatient CMS 0-100% Score: 35.83  Mobility Inpatient CMS G-Code Modifier : [address] cleared patient for PT treatment. Pt c/o being tired. OBJECTIVE;   Initial Evaluation  Date: 3/9/2021 Treatment Date:  3/16/2021       Short Term/ Long Term   Goals   Was pt agreeable to Eval/treatment? Yes   yes To be met in 2 days   Pain level   0/10    No number given  C/o stomach pains and gas    Bed Mobility    Rolling: Independent    Supine to sit: Independent    Sit to supine: Independent    Scooting: Independent   Rolling: Not assessed    Supine to sit: Not assessed    Sit to supine: Independent   Scooting: Not assessed     Rolling: Independent    Supine to sit: Independent    Sit to supine: Independent    Scooting: Independent     Transfers Sit to stand: Minimal assist of 1   Sit to stand: Minimal assist of 1      Sit to stand: Independent     Ambulation    2 x 130 feet using  no device with Minimal assist of 1   Loss of balance x 2 minimal assist to recover   3 feet using  hand held assist with Minimal assist of 1   V/C for upright posture and safety.     150 feet using  least restrictive device versus no device with Independent    Stair negotiation: ascended and descended   Not assessed            ROM Within functional limits        Strength BUE:  4/5  RLE:  4/5  LLE:  4/5   Increase strength in affected mm groups by 1/3 grade   Balance Sitting EOB:  good    Dynamic Standing:  fair   Sitting EOB: good   Dynamic Standing: fair hand held assist   Sitting EOB:  good    Dynamic Standing: good       Patient is Alert & Oriented x person, place, time and situation and follows directions    Sensation:  Patient  denies numbness and tingling     Edema:  no    Endurance: fair  +    Vitals: 1 1/2 liters nasal cannula    Blood Pressure at rest   Blood Pressure during session     Heart Rate at rest  Heart Rate during session     SPO2 at rest 96%  SPO2 during session  93-96%     Patient education  Patient educated on role of Physical Therapy, risks of immobility, safety and plan of care and  importance of mobility while in hospital       Patient response to education:   Pt verbalized understanding Pt demonstrated skill Pt requires further education in this area   Yes Partial Yes      Treatment:  Patient practiced and was instructed/facilitated in the following treatment: Patient   sitting on the edge of the bed at start of tx session. Pt sat for 15 minutes to increase dynamic sitting balance and activity tolerance. Pt stood, side-stepped towards [facility] and back to the bed. Returned to bed per patient request. Pt performed supine exercises. Therapeutic Exercises:  ankle pumps, quad sets, heel slide, hip abduction/adduction and straight leg raise, x 10 reps, AROM. V/C for technique. At end of session, patient in bed with   call light and phone within reach,   all lines and tubes intact, nursing notified. Patient would benefit from continued skilled Physical Therapy to improve functional independence and quality of life. Patient's/ family goals   home        ASSESSMENT: Patient exhibits decreased strength, balance and endurance impairing functional mobility, transfers and gait  patient was finishing up with working with [facility] when I walked in the room. Patient stated she was feeling better than yesterday but continues to be tired.  Patient's progress with therapy limited by impaired endurance and strength as well as working with OT prior to my arrival.    Plan of Care:     -Standing Balance: Perform strengthening exercises in standing to promote motor control with or without upper extremity support   -Transfers: Cues for hand placement, technique and safety  -Gait: Gait training and Standing activities to improve: base of support, weight shift, weight bearing    -Endurance: Utilize Supervised activities to increase level of endurance to allow for safe functional mobility including transfers and gait Patient and or family understand(s) diagnosis, prognosis, and plan of care. Frequency of treatments: Patient will be seen  daily. Time in 10:36  Time out  11:01    Total Treatment Time  25 minutes        CPT codes:    Therapeutic activities (85266)   15 minutes  1 unit(s)  Therapeutic exercises (18403)   10 minutes  1 unit(s)   Kervin Mai  [facility]  LIC # [id number]

## 2021-03-17 NOTE — PROGRESS NOTES
[facility] MD Leyda Foreman MD Parker Rogue, MD Kerwin Salinas DO, [address] .  Progress note   Patient's Name: Michelle Ivey  1:03 PM  3/17/2021          Found to have covid 19 Pna . Now in isolation . Breathing better weaned to room air as of 3-16-21  Cr relatively stable over last 24 hours at 4.2 with BUN of 95  No more reported diarrhea  BP better   D/w RN on floor . She reports pt is comfortable with no reported issues       History of Present Ilness: The patient is a 57 y.o. female with significant past medical history of diabetes type 2, Essential hypertension who presents to the ER following being noticed to have progressively elevated cr numbers   In looking in records pt cr has been trending up rather fast over the last year to year and half . She does have around 7 g proteinuria based on spot urine /cr ratio . Cr on presentation was 3.2 . Cr today 3.5   She was also found to have bp of 200 . At home she is on hctz/lisinopril as part of her antihtn . I saw her in her room this am , she is alert oriented pleasant 58 y/o F . Past Medical History:   Diagnosis Date    Acid reflux     Hyperlipidemia     Hypertension     Hypothyroidism     S/P appendectomy     Type II or unspecified type diabetes mellitus without mention of complication, not stated as uncontrolled        Past Surgical History:   Procedure Laterality Date    APPENDECTOMY      HYSTERECTOMY         Family History   Problem Relation Age of Onset    Diabetes Mother     High Blood Pressure Mother     Diabetes Brother     Diabetes Sister         reports that she has never smoked. She has never used smokeless tobacco. She reports that she does not drink alcohol or use drugs.     Allergies:  Demerol and Toradol [ketorolac tromethamine]    Current Medications:    0.9 % sodium chloride infusion, Continuous  vancomycin (VANCOCIN) intermittent dosing (placeholder), RX Placeholder  levoFLOXacin (LEVAQUIN) 250 MG/50ML infusion 250 mg, Q48H  amLODIPine (NORVASC) tablet 5 mg, BID  albuterol-ipratropium (COMBIVENT RESPIMAT)  MCG/ACT inhaler 1 puff, Q6H  0.9 % sodium chloride infusion, PRN  insulin glargine (LANTUS) injection vial 20 Units, QAM  acetaminophen (TYLENOL) tablet 1,000 mg, Q6H PRN  carvedilol (COREG) tablet 25 mg, BID WC  heparin (porcine) injection 5,000 Units, 3 times per day  calcitRIOL (ROCALTROL) capsule 0.25 mcg, Daily  dexamethasone (DECADRON) injection 4 mg, Q8H  perflutren lipid microspheres (DEFINITY) injection 1.65 mg, ONCE PRN  pentoxifylline (TRENTAL) extended release tablet 400 mg, BID  insulin lispro (HUMALOG) injection vial 0-12 Units, TID WC  insulin lispro (HUMALOG) injection vial 0-6 Units, Nightly  glucose (GLUTOSE) 40 % oral gel 15 g, PRN  dextrose 50 % IV solution, PRN  glucagon (rDNA) injection 1 mg, PRN  dextrose 5 % solution, PRN  aspirin chewable tablet 81 mg, Daily  atorvastatin (LIPITOR) tablet 80 mg, Daily  clopidogrel (PLAVIX) tablet 75 mg, Daily  gabapentin (NEURONTIN) capsule 300 mg, Nightly  levothyroxine (SYNTHROID) tablet 50 mcg, Daily  famotidine (PEPCID) tablet 20 mg, Daily  sodium chloride flush 0.9 % injection 10 mL, 2 times per day  sodium chloride flush 0.9 % injection 10 mL, PRN  promethazine (PHENERGAN) tablet 12.5 mg, Q6H PRN    Or  ondansetron (ZOFRAN) injection 4 mg, Q6H PRN  polyethylene glycol (GLYCOLAX) packet 17 g, Daily PRN  labetalol (NORMODYNE;TRANDATE) injection 20 mg, Q6H PRN        Review of Systems:     No face to face encounter done as he in isolation for COVID 19 PNA . Case discuseed with RN including PE findings     Physical exam:   Vital signs /62   Pulse 70   Temp 98.1 °F (36.7 °C)   Resp 16   Ht 5' (1.524 m)   Wt 117 lb (53.1 kg)   SpO2 91%   BMI 22.85 kg/m²     No face to face encounter done as he in isolation for COVID 19 PNA .  Case discuseed with RN including PE findings     Data:   Labs: CBC with Differential:    Lab Results   Component Value Date    WBC 14.9 03/17/2021    RBC 2.28 03/17/2021    HGB 6.4 03/17/2021    HCT 19.3 03/17/2021     03/17/2021    MCV 84.6 03/17/2021    MCH 28.1 03/17/2021    MCHC 33.2 03/17/2021    RDW 13.4 03/17/2021    NRBC 0.9 03/17/2021    SEGSPCT 59 02/27/2013    METASPCT 12.0 03/11/2021    LYMPHOPCT 7.8 03/17/2021    MONOPCT 2.6 03/17/2021    MYELOPCT 2.0 03/11/2021    BASOPCT 0.1 03/17/2021    MONOSABS 0.45 03/17/2021    LYMPHSABS 1.19 03/17/2021    EOSABS 0.00 03/17/2021    BASOSABS 0.00 03/17/2021     CMP:    Lab Results   Component Value Date     03/17/2021    K 3.7 03/17/2021    K 4.2 03/09/2021     03/17/2021    CO2 20 03/17/2021    BUN 95 03/17/2021    CREATININE 4.2 03/17/2021    GFRAA 13 03/17/2021    LABGLOM 13 03/17/2021    GLUCOSE 230 03/17/2021    GLUCOSE 419 03/21/2011    PROT 4.9 03/15/2021    LABALBU 2.4 03/15/2021    LABALBU 4.1 03/21/2011    CALCIUM 7.9 03/17/2021    BILITOT <0.2 03/15/2021    ALKPHOS 66 03/15/2021    AST 48 03/15/2021    ALT 30 03/15/2021     Ionized Calcium:  No results found for: IONCA  Magnesium:    Lab Results   Component Value Date    MG 2.2 03/11/2021     Phosphorus:    Lab Results   Component Value Date    PHOS 7.9 03/11/2021     U/A:    Lab Results   Component Value Date    COLORU Straw 03/08/2021    PHUR 5.5 03/08/2021    LABCAST RARE 03/27/2015    WBCUA 0-1 03/08/2021    WBCUA NONE 02/06/2012    RBCUA 1-3 03/08/2021    RBCUA 0-1 12/25/2012    YEAST FEW 04/04/2015    BACTERIA NONE SEEN 03/08/2021    CLARITYU Clear 03/08/2021    SPECGRAV 1.025 03/08/2021    LEUKOCYTESUR Negative 03/08/2021    UROBILINOGEN 0.2 03/08/2021    BILIRUBINUR Negative 03/08/2021    BILIRUBINUR NEGATIVE 02/06/2012    BLOODU SMALL 03/08/2021    GLUCOSEU 250 03/08/2021    GLUCOSEU >=1000 02/06/2012    AMORPHOUS FEW 03/08/2021     Microalbumen/Creatinine ratio:  No components found for: RUCREAT  Iron Saturation:  No components found for: PERCENTFE  TIBC:  No results found for: TIBC  FERRITIN:    Lab Results   Component Value Date    FERRITIN 613 03/16/2021        Imaging:  US DUP LOWER EXTREMITIES BILATERAL VENOUS   Final Result   Normal         XR CHEST PORTABLE   Final Result   Continued but improved bilateral pulmonary airspace opacities, left greater   than right         XR CHEST PORTABLE   Final Result   1. Rapid development of widespread bilateral parenchymal consolidation   predominant from the hilar regions to the periphery of the lung that   superimposes to previous left upper lobe peripheral focal consolidation. 2.  The differential diagnosis include acute pulmonary edema, pulmonary   hemorrhage, diffuse alveolar damage, bilateral pneumonia. Please correlate   clinically. XR CHEST PORTABLE   Final Result   Left upper lobe opacity worrisome for pneumonia. US RETROPERITONEAL LIMITED   Final Result   Mildly atrophic and echogenic kidneys, compatible with mild chronic renal   parenchymal disease. Unremarkable ultrasound of the bladder. Assessment    -subacute kidney injury vs progressive worsening of her CKD     -Nephrotic range proteinuria    -HTN urgency     -Anaemia of chronic disease     -Mineral bone disease     -Insulin depedent diabetes with complication     -COVID 19 PNA with respiratory failure       PLAN :    Ms Karis Cullen has been having rather progressive elevation in cr over the last 1.5 years . Cr back on 3/2019 was 1 . Cr on 11/2019 1.6 . Cr back on 8/2020 was 2.1 and now cr is 4.2   The CKD is likley due to diabetic nephropathy thought the progression is rather fast .   Her current GVAIN is likley also exacerbated by her uncontrolled HTN on presentation .     -Creatinine relatively stable over last 48 hours  (plateu ??)  .    -with poor po intake , will start gentle iv fluids   -continue coreg to 25 mg bid   -increase amlodipine to 5 mg bid   -ISSA (negative ) ,ANCA(negative , SPEP (negative), UPEP (negative)  -no current need for RRT/HD .  Will f/u closely           Electronically signed by Layton Garcia MD on 3/17/2021 at 1:03 PM

## 2021-03-17 NOTE — CARE COORDINATION
KARI Note: 3/17/2021 at 3:14pm: COVID POSITIVE - test done on 3/9. CM attempted to call patient in her room several times, but no answer. Currently on 1L NC. Yesterday, KARI discussed the possible need for home oxygen and DME choices for oxygen companies. She states if she would need home oxygen, she would like to use [employer]. (Phone: [phone] / Fax: [phone]). Mrs. Clarice Andre did give me permission to call referral to [address] so that they can have her information on file in case home O2 is needed. Shay yesterday - [address] Rep ( Phone: [phone]) and requested I fax demographics and notes with diagnosis etc - done. States they will follow. Would need ambulatory pulse ox testing and if patient qualifies - would need orders for home O2. If patient would qualify, [facility] to [facility] and inform them as well as fax the testing and the order. If she qualifies, a tank would need to be brought to the hospital prior to discharge and patient would need to call [address] when she gets home so that the oxygen and equipment can be delivered to her home. [facility] is following and orders are in Epic. States when she is discharged, daughter will provide transportation.  Lucy Woods RN

## 2021-03-17 NOTE — PROGRESS NOTES
[facility] MD Raymond Ortega MD Simeon Back, MD Gwen Parcel, DO, [address] .  Progress note   Patient's Name: Christal Edwards  9:37 PM  3/16/2021          Found to have covid 19 Pna . Now in isolation . Breathing better o2/nc . Cr relatively stable over last 24 hours at 4.2 . No more reported diarrhea  BP better   D/w RN on floor . She reports pt is comfortable with no reported issues       History of Present Ilness: The patient is a 57 y.o. female with significant past medical history of diabetes type 2, Essential hypertension who presents to the ER following being noticed to have progressively elevated cr numbers   In looking in records pt cr has been trending up rather fast over the last year to year and half . She does have around 7 g proteinuria based on spot urine /cr ratio . Cr on presentation was 3.2 . Cr today 3.5   She was also found to have bp of 200 . At home she is on hctz/lisinopril as part of her antihtn . I saw her in her room this am , she is alert oriented pleasant 58 y/o F . Past Medical History:   Diagnosis Date    Acid reflux     Hyperlipidemia     Hypertension     Hypothyroidism     S/P appendectomy     Type II or unspecified type diabetes mellitus without mention of complication, not stated as uncontrolled        Past Surgical History:   Procedure Laterality Date    APPENDECTOMY      HYSTERECTOMY         Family History   Problem Relation Age of Onset    Diabetes Mother     High Blood Pressure Mother     Diabetes Brother     Diabetes Sister         reports that she has never smoked. She has never used smokeless tobacco. She reports that she does not drink alcohol or use drugs.     Allergies:  Demerol and Toradol [ketorolac tromethamine]    Current Medications:    0.9 % sodium chloride infusion, Continuous  vancomycin (VANCOCIN) intermittent dosing (placeholder), RX Placeholder  levoFLOXacin (LEVAQUIN) 250 MG/50ML infusion 250 mg, Q48H  amLODIPine (NORVASC) tablet 5 mg, BID  albuterol-ipratropium (COMBIVENT RESPIMAT)  MCG/ACT inhaler 1 puff, Q6H  0.9 % sodium chloride infusion, PRN  insulin glargine (LANTUS) injection vial 20 Units, QAM  acetaminophen (TYLENOL) tablet 1,000 mg, Q6H PRN  carvedilol (COREG) tablet 25 mg, BID WC  heparin (porcine) injection 5,000 Units, 3 times per day  calcitRIOL (ROCALTROL) capsule 0.25 mcg, Daily  dexamethasone (DECADRON) injection 4 mg, Q8H  perflutren lipid microspheres (DEFINITY) injection 1.65 mg, ONCE PRN  pentoxifylline (TRENTAL) extended release tablet 400 mg, BID  insulin lispro (HUMALOG) injection vial 0-12 Units, TID WC  insulin lispro (HUMALOG) injection vial 0-6 Units, Nightly  glucose (GLUTOSE) 40 % oral gel 15 g, PRN  dextrose 50 % IV solution, PRN  glucagon (rDNA) injection 1 mg, PRN  dextrose 5 % solution, PRN  aspirin chewable tablet 81 mg, Daily  atorvastatin (LIPITOR) tablet 80 mg, Daily  clopidogrel (PLAVIX) tablet 75 mg, Daily  gabapentin (NEURONTIN) capsule 300 mg, Nightly  levothyroxine (SYNTHROID) tablet 50 mcg, Daily  famotidine (PEPCID) tablet 20 mg, Daily  sodium chloride flush 0.9 % injection 10 mL, 2 times per day  sodium chloride flush 0.9 % injection 10 mL, PRN  promethazine (PHENERGAN) tablet 12.5 mg, Q6H PRN    Or  ondansetron (ZOFRAN) injection 4 mg, Q6H PRN  polyethylene glycol (GLYCOLAX) packet 17 g, Daily PRN  labetalol (NORMODYNE;TRANDATE) injection 20 mg, Q6H PRN        Review of Systems:     No face to face encounter done as he in isolation for COVID 19 PNA . Case discuseed with RN including PE findings     Physical exam:   Vital signs /68   Pulse 66   Temp 98.7 °F (37.1 °C)   Resp 14   Ht 5' (1.524 m)   Wt 117 lb (53.1 kg)   SpO2 92%   BMI 22.85 kg/m²     No face to face encounter done as he in isolation for COVID 19 PNA .  Case discuseed with RN including PE findings     Data:   Labs:  CBC with Differential:    Lab Results   Component Value Date    WBC 14.6 03/15/2021    RBC 2.67 03/15/2021    HGB 7.4 03/15/2021    HCT 22.4 03/15/2021     03/15/2021    MCV 83.9 03/15/2021    MCH 27.7 03/15/2021    MCHC 33.0 03/15/2021    RDW 13.4 03/15/2021    SEGSPCT 59 02/27/2013    METASPCT 12.0 03/11/2021    LYMPHOPCT 6.2 03/15/2021    MONOPCT 5.2 03/15/2021    MYELOPCT 2.0 03/11/2021    BASOPCT 0.1 03/15/2021    MONOSABS 0.75 03/15/2021    LYMPHSABS 0.90 03/15/2021    EOSABS 0.00 03/15/2021    BASOSABS 0.02 03/15/2021     CMP:    Lab Results   Component Value Date     03/15/2021    K 3.4 03/15/2021    K 4.2 03/09/2021     03/15/2021    CO2 22 03/15/2021    BUN 95 03/15/2021    CREATININE 4.3 03/15/2021    GFRAA 13 03/15/2021    LABGLOM 13 03/15/2021    GLUCOSE 186 03/15/2021    GLUCOSE 419 03/21/2011    PROT 4.9 03/15/2021    LABALBU 2.4 03/15/2021    LABALBU 4.1 03/21/2011    CALCIUM 8.0 03/15/2021    BILITOT <0.2 03/15/2021    ALKPHOS 66 03/15/2021    AST 48 03/15/2021    ALT 30 03/15/2021     Ionized Calcium:  No results found for: IONCA  Magnesium:    Lab Results   Component Value Date    MG 2.2 03/11/2021     Phosphorus:    Lab Results   Component Value Date    PHOS 7.9 03/11/2021     U/A:    Lab Results   Component Value Date    COLORU Straw 03/08/2021    PHUR 5.5 03/08/2021    LABCAST RARE 03/27/2015    WBCUA 0-1 03/08/2021    WBCUA NONE 02/06/2012    RBCUA 1-3 03/08/2021    RBCUA 0-1 12/25/2012    YEAST FEW 04/04/2015    BACTERIA NONE SEEN 03/08/2021    CLARITYU Clear 03/08/2021    SPECGRAV 1.025 03/08/2021    LEUKOCYTESUR Negative 03/08/2021    UROBILINOGEN 0.2 03/08/2021    BILIRUBINUR Negative 03/08/2021    BILIRUBINUR NEGATIVE 02/06/2012    BLOODU SMALL 03/08/2021    GLUCOSEU 250 03/08/2021    GLUCOSEU >=1000 02/06/2012    AMORPHOUS FEW 03/08/2021     Microalbumen/Creatinine ratio:  No components found for: RUCREAT  Iron Saturation:  No components found for: PERCENTFE  TIBC:  No results found for: TIBC FERRITIN:    Lab Results   Component Value Date    FERRITIN 613 03/16/2021        Imaging:  US DUP LOWER EXTREMITIES BILATERAL VENOUS   Final Result   Normal         XR CHEST PORTABLE   Final Result   Continued but improved bilateral pulmonary airspace opacities, left greater   than right         XR CHEST PORTABLE   Final Result   1. Rapid development of widespread bilateral parenchymal consolidation   predominant from the hilar regions to the periphery of the lung that   superimposes to previous left upper lobe peripheral focal consolidation. 2.  The differential diagnosis include acute pulmonary edema, pulmonary   hemorrhage, diffuse alveolar damage, bilateral pneumonia. Please correlate   clinically. XR CHEST PORTABLE   Final Result   Left upper lobe opacity worrisome for pneumonia. US RETROPERITONEAL LIMITED   Final Result   Mildly atrophic and echogenic kidneys, compatible with mild chronic renal   parenchymal disease. Unremarkable ultrasound of the bladder. Assessment    -subacute kidney injury vs progressive worsening of her CKD     -Nephrotic range proteinuria    -HTN urgency     -Anaemia of chronic disease     -Mineral bone disease     -Insulin depedent diabetes with complication     -COVID 19 PNA with respiratory failure       PLAN :    Ms Sayra Dodson has been having rather progressive elevation in cr over the last 1.5 years . Cr back on 3/2019 was 1 . Cr on 11/2019 1.6 . Cr back on 8/2020 was 2.1 and now cr is 3.2   The CKD is likley due to diabetic nephropathy thought the progression is rather fast .   Her current GAVIN is likley also exacerbated by her uncontrolled HTN on presentation .     -Creatinine relatively stable over last 48 hours  (plateu ??)  . -with poor po intake , will start gentle iv fluids   -continue coreg to 25 mg bid   -increase amlodipine to 5 mg bid   -ISSA (negative ) ,ANCA(negative , SPEP (negative), UPEP (negative)  -no current need for RRT/HD . Will f/u closely           Electronically signed by Karrie Esteves MD on 3/16/2021 at 9:37 PM

## 2021-03-17 NOTE — PROGRESS NOTES
°C)  Min: 97.9 °F (36.6 °C)  Max: 98.8 °F (37.1 °C)  Constitutional:  The patient is awake, alert, and oriented. THIN   Skin:    Warm and dry. No rashes were noted. HEENT:     AT/NC   Chest:   Symmetrical expansion. DEC BS ANT /POST  Cardiovascular:  S1 and S2 are rhythmic and regular. Abdomen:    Benign to palpation. DISTENDED  Extremities:    no edema.   CNS    AAxO   Lines: pIV    Radiology:  Laboratory and Tests Review:  Lab Results   Component Value Date    WBC 14.9 (H) 03/17/2021    WBC 14.6 (H) 03/15/2021    WBC 15.9 (H) 03/15/2021    HGB 6.4 (L) 03/17/2021    HCT 19.3 (L) 03/17/2021    MCV 84.6 03/17/2021     03/17/2021     No results found for: [facility]  Lab Results   Component Value Date    ALT 30 03/15/2021    AST 48 (H) 03/15/2021    ALKPHOS 66 03/15/2021    BILITOT <0.2 03/15/2021     Lab Results   Component Value Date     03/17/2021    K 3.7 03/17/2021    K 4.2 03/09/2021     03/17/2021    CO2 20 03/17/2021    BUN 95 03/17/2021    CREATININE 4.2 03/17/2021    CREATININE 4.3 03/15/2021    CREATININE 4.4 03/15/2021    GFRAA 13 03/17/2021    LABGLOM 13 03/17/2021    GLUCOSE 230 03/17/2021    GLUCOSE 419 03/21/2011    PROT 4.9 03/15/2021    LABALBU 2.4 03/15/2021    LABALBU 4.1 03/21/2011    CALCIUM 7.9 03/17/2021    BILITOT <0.2 03/15/2021    ALKPHOS 66 03/15/2021    AST 48 03/15/2021    ALT 30 03/15/2021     Lab Results   Component Value Date    CRP 0.9 (H) 03/16/2021    CRP 10.8 (H) 03/11/2021     Lab Results   Component Value Date    SEDRATE 40 (H) 03/16/2021    SEDRATE 44 (H) 03/11/2021     Recent Labs     03/15/21  0543 03/15/21  1447 03/16/21  1014   CRP  --   --  0.9*   PROCAL  --   --  5.43*   FERRITIN  --   --  613   LDH  --   --  530*   TROPONINI  --   --  0.05*   DDIMER  --   --  571   FIBRINOGEN  --   --  349   INR  --   --  0.9   PROTIME  --   --  10.6   AST 47* 48*  --    ALT 29 30  --      Lab Results   Component Value Date    CHOL 168 08/05/2020    TRIG 182 08/05/2020 HDL 45 08/05/2020    LDLCALC 87 08/05/2020    LABVLDL 36 08/05/2020     Lab Results   Component Value Date/Time    VITD25 27 (L) 03/11/2021 06:45 AM       Microbiology:   No results for input(s): COVID19 in the last 72 hours. Lab Results   Component Value Date    BC 5 Days no growth 03/08/2021    BLOODCULT2 5 Days no growth 03/08/2021        ASSESSMENT/PLAN:    S/P SEPSIS/FEVERS  COVID PNEUMONIA  LEUKOCYTOSIS    GAVIN/CKD CR4.2     vancomycin (VANCOCIN) intermittent dosing (placeholder), RX Placeholder ONE MORE DOSE VR 19.7  levoFLOXacin (LEVAQUIN) 250 MG/50ML infusion 250 mg, Q48H WILL STOP AFTER TODAY      S/P TOCI   S/P convalescent ASMA  CAN D/C FROM ID POV    · Monitor labs    Imaging and labs were reviewed per medical records. The patient was educated about the diagnosis, prognosis, indications, risks and benefits of treatment. An opportunity to ask questions was given to the patient/FAMILY. Thank you for involving me in the care of [facility] I will continue to follow. Please do not hesitate to call for any questions or concerns.     Electronically signed by Sara Aggarwal MD on 3/17/2021 at 8:29 AM

## 2021-03-17 NOTE — PROGRESS NOTES
baseline Scr ~ 1.3-2.1  · 3/13: Patient received Vancomycin 1,000 mg IV x 1 yesterday, random AM level today = 19.6 mcg/mL  · 3/14: Random AM level = 13.5 mcg/mL  · 3/15: Random AM level = 19.7 mcg/mL  · 3/16: Random AM level = 15.3 mcg/mL    Plan:  · No dose vancomycin today  · Anticipated discharge today  · Dose by levels  · Follow renal function  · Pharmacist will follow and monitor/adjust dosing as necessary      Thank you for the consult,    Jerrod Peters PharmD 3/17/2021 11:36 AM   [phone]

## 2021-03-17 NOTE — PROGRESS NOTES
Physical Therapy Treatment Note    Room #:  5321/3746-80  Patient Name: María Cobian  YOB: 1960  MRN: [id number]    Referring Provider:   Martha Lockett MD     Date of Service: 3/17/2021    Evaluating Physical Therapist: Chema Kimbrough, PT #[id number]       Diagnosis:   Hypertensive urgency [I16.0]     worsening blood pressures despite treatment. Patient Active Problem List   Diagnosis    Diabetes mellitus type 2, insulin dependent ([facility] [address]    Hypothyroid    Hypertension    Hyperlipidemia    Hypertensive urgency        Tentative placement recommendation: Home    Equipment recommendation:  To be determined  possibly cane for balance     Prior Level of Function: Patient ambulated independently    Rehab Potential: good  - for baseline    Past medical history:   Past Medical History:   Diagnosis Date    Acid reflux     Hyperlipidemia     Hypertension     Hypothyroidism     S/P appendectomy     Type II or unspecified type diabetes mellitus without mention of complication, not stated as uncontrolled      Past Surgical History:   Procedure Laterality Date    APPENDECTOMY      HYSTERECTOMY         Precautions: Up as tolerated, falls ,      SUBJECTIVE:    Social history: Patient lives alone   in a apartment  with No steps  to enter Tub shower grab bars    Equipment owned: [address] [place] [address], Ne, [address] and Shower chair,  All unused    AM-PAC Basic Mobility        AM-[facility] Mobility Inpatient   How much difficulty turning over in bed?: None  How much difficulty sitting down on / standing up from a chair with arms?: A Little  How much difficulty moving from lying on back to sitting on side of bed?: A Little  How much help from another person moving to and from a bed to a chair?: A Little  How much help from another person needed to walk in hospital room?: A Little  How much help from another person for climbing 3-5 steps with a railing?: A Lot  AM-PAC Inpatient Mobility Raw Score : 18  AM-PAC Inpatient T-Scale Score : 43.63  Mobility Inpatient CMS 0-100% Score: 46.58  Mobility Inpatient CMS G-Code Modifier : CK    Nursing cleared patient for PT treatment. Pt c/o being tired. OBJECTIVE;   Initial Evaluation  Date: 3/9/2021 Treatment Date:  3/17/2021       Short Term/ Long Term   Goals   Was pt agreeable to Eval/treatment? Yes   yes To be met in 2 days   Pain level   0/10    0/10      Bed Mobility    Rolling: Independent    Supine to sit: Independent    Sit to supine: Independent    Scooting: Independent   Rolling: Supervision    Supine to sit: Minimal assist of 1   Sit to supine: Not assessed    Scooting: Minimal assist of 1    Rolling: Independent    Supine to sit: Independent    Sit to supine: Independent    Scooting: Independent     Transfers Sit to stand: Minimal assist of 1   Sit to stand: Minimal assist of 1      Sit to stand: Independent     Ambulation    2 x 130 feet using  no device with Minimal assist of 1   Loss of balance x 2 minimal assist to recover   2 x 15 feet using  IV pole with Minimal assist of 1   V/C for upright posture, pacing, and safety.     150 feet using  least restrictive device versus no device with Independent    Stair negotiation: ascended and descended   Not assessed            ROM Within functional limits        Strength BUE:  4/5  RLE:  4/5  LLE:  4/5   Increase strength in affected mm groups by 1/3 grade   Balance Sitting EOB:  good    Dynamic Standing:  fair   Sitting EOB: good   Dynamic Standing: fair IV pole   Sitting EOB:  good    Dynamic Standing: good       Patient is Alert & Oriented x person, place, time and situation and follows directions    Sensation:  Patient  denies numbness and tingling     Edema:  no    Endurance: fair  +    Vitals: 2 liters nasal cannula    Blood Pressure at rest   Blood Pressure during session     Heart Rate at rest  Heart Rate during session     SPO2 at rest 96%  SPO2 during session  93-96%     Patient education  Patient educated on role of Physical Therapy, out  11:21    Total Treatment Time  38 minutes        CPT codes:    Therapeutic activities (56183)   38 minutes  3 unit(s)   Flavia Morataya.  Sergei  [facility]  LIC # [id number]

## 2021-03-17 NOTE — PROGRESS NOTES
[facility] Roberto A. Buell Cullen, MD Hettie Goodpasture, MD Reford Cabal, MD Clista Hack, DO, [address] [place] [address] Evelyn SALEH .  Progress note   Patient's Name: Kenyetta Oreilly  2:35 PM  3/17/2021          Found to have covid 19 Pna . Now in isolation . Breathing better o2/nc . More energy . Working with PT   Cr starting to trend down   No more reported diarrhea  BP better   D/w RN on floor . She reports pt is comfortable with no reported issues       History of Present Ilness: The patient is a 57 y.o. female with significant past medical history of diabetes type 2, Essential hypertension who presents to the ER following being noticed to have progressively elevated cr numbers   In looking in records pt cr has been trending up rather fast over the last year to year and half . She does have around 7 g proteinuria based on spot urine /cr ratio . Cr on presentation was 3.2 . Cr today 3.5   She was also found to have bp of 200 . At home she is on hctz/lisinopril as part of her antihtn . I saw her in her room this am , she is alert oriented pleasant 56 y/o F . Past Medical History:   Diagnosis Date    Acid reflux     Hyperlipidemia     Hypertension     Hypothyroidism     S/P appendectomy     Type II or unspecified type diabetes mellitus without mention of complication, not stated as uncontrolled        Past Surgical History:   Procedure Laterality Date    APPENDECTOMY      HYSTERECTOMY         Family History   Problem Relation Age of Onset    Diabetes Mother     High Blood Pressure Mother     Diabetes Brother     Diabetes Sister         reports that she has never smoked. She has never used smokeless tobacco. She reports that she does not drink alcohol or use drugs.     Allergies:  Demerol and Toradol [ketorolac tromethamine]    Current Medications:    0.9 % sodium chloride infusion, Continuous  vancomycin (VANCOCIN) intermittent dosing (placeholder), RX Placeholder  levoFLOXacin (LEVAQUIN) 250 MG/50ML infusion 250 mg, Q48H  amLODIPine (NORVASC) tablet 5 mg, BID  albuterol-ipratropium (COMBIVENT RESPIMAT)  MCG/ACT inhaler 1 puff, Q6H  0.9 % sodium chloride infusion, PRN  insulin glargine (LANTUS) injection vial 20 Units, QAM  acetaminophen (TYLENOL) tablet 1,000 mg, Q6H PRN  carvedilol (COREG) tablet 25 mg, BID WC  heparin (porcine) injection 5,000 Units, 3 times per day  calcitRIOL (ROCALTROL) capsule 0.25 mcg, Daily  dexamethasone (DECADRON) injection 4 mg, Q8H  perflutren lipid microspheres (DEFINITY) injection 1.65 mg, ONCE PRN  pentoxifylline (TRENTAL) extended release tablet 400 mg, BID  insulin lispro (HUMALOG) injection vial 0-12 Units, TID WC  insulin lispro (HUMALOG) injection vial 0-6 Units, Nightly  glucose (GLUTOSE) 40 % oral gel 15 g, PRN  dextrose 50 % IV solution, PRN  glucagon (rDNA) injection 1 mg, PRN  dextrose 5 % solution, PRN  aspirin chewable tablet 81 mg, Daily  atorvastatin (LIPITOR) tablet 80 mg, Daily  clopidogrel (PLAVIX) tablet 75 mg, Daily  gabapentin (NEURONTIN) capsule 300 mg, Nightly  levothyroxine (SYNTHROID) tablet 50 mcg, Daily  famotidine (PEPCID) tablet 20 mg, Daily  sodium chloride flush 0.9 % injection 10 mL, 2 times per day  sodium chloride flush 0.9 % injection 10 mL, PRN  promethazine (PHENERGAN) tablet 12.5 mg, Q6H PRN    Or  ondansetron (ZOFRAN) injection 4 mg, Q6H PRN  polyethylene glycol (GLYCOLAX) packet 17 g, Daily PRN  labetalol (NORMODYNE;TRANDATE) injection 20 mg, Q6H PRN        Review of Systems:     No face to face encounter done as he in isolation for COVID 19 PNA . Case discuseed with RN including PE findings     Physical exam:   Vital signs /62   Pulse 70   Temp 98.1 °F (36.7 °C)   Resp 16   Ht 5' (1.524 m)   Wt 117 lb (53.1 kg)   SpO2 91%   BMI 22.85 kg/m²     No face to face encounter done as he in isolation for COVID 19 PNA .  Case discuseed with RN including PE findings     Data:   Labs:  CBC with Differential: Lab Results   Component Value Date    WBC 14.9 03/17/2021    RBC 2.28 03/17/2021    HGB 6.4 03/17/2021    HCT 19.3 03/17/2021     03/17/2021    MCV 84.6 03/17/2021    MCH 28.1 03/17/2021    MCHC 33.2 03/17/2021    RDW 13.4 03/17/2021    NRBC 0.9 03/17/2021    SEGSPCT 59 02/27/2013    METASPCT 12.0 03/11/2021    LYMPHOPCT 7.8 03/17/2021    MONOPCT 2.6 03/17/2021    MYELOPCT 2.0 03/11/2021    BASOPCT 0.1 03/17/2021    MONOSABS 0.45 03/17/2021    LYMPHSABS 1.19 03/17/2021    EOSABS 0.00 03/17/2021    BASOSABS 0.00 03/17/2021     CMP:    Lab Results   Component Value Date     03/17/2021    K 3.7 03/17/2021    K 4.2 03/09/2021     03/17/2021    CO2 20 03/17/2021    BUN 95 03/17/2021    CREATININE 4.2 03/17/2021    GFRAA 13 03/17/2021    LABGLOM 13 03/17/2021    GLUCOSE 230 03/17/2021    GLUCOSE 419 03/21/2011    PROT 4.9 03/15/2021    LABALBU 2.4 03/15/2021    LABALBU 4.1 03/21/2011    CALCIUM 7.9 03/17/2021    BILITOT <0.2 03/15/2021    ALKPHOS 66 03/15/2021    AST 48 03/15/2021    ALT 30 03/15/2021     Ionized Calcium:  No results found for: IONCA  Magnesium:    Lab Results   Component Value Date    MG 2.2 03/11/2021     Phosphorus:    Lab Results   Component Value Date    PHOS 7.9 03/11/2021     U/A:    Lab Results   Component Value Date    COLORU Straw 03/08/2021    PHUR 5.5 03/08/2021    LABCAST RARE 03/27/2015    WBCUA 0-1 03/08/2021    WBCUA NONE 02/06/2012    RBCUA 1-3 03/08/2021    RBCUA 0-1 12/25/2012    YEAST FEW 04/04/2015    BACTERIA NONE SEEN 03/08/2021    CLARITYU Clear 03/08/2021    SPECGRAV 1.025 03/08/2021    LEUKOCYTESUR Negative 03/08/2021    UROBILINOGEN 0.2 03/08/2021    BILIRUBINUR Negative 03/08/2021    BILIRUBINUR NEGATIVE 02/06/2012    BLOODU SMALL 03/08/2021    GLUCOSEU 250 03/08/2021    GLUCOSEU >=1000 02/06/2012    AMORPHOUS FEW 03/08/2021     Microalbumen/Creatinine ratio:  No components found for: RUCREAT  Iron Saturation:  No components found for: PERCENTFE  TIBC:  No Electronically signed by Babak Sanches MD on 3/17/2021 at 2:35 PM

## 2021-03-17 NOTE — PROGRESS NOTES
Department of Internal Medicine  General Internal Medicine  Attending Progress Note      SUBJECTIVE:    She feels more energetic overall better especially by sitting up. We discussed how she really did not want to initially sit up yesterday.     OBJECTIVE      Medications    Current Facility-Administered Medications: 0.9 % sodium chloride infusion, , Intravenous, Continuous  vancomycin (VANCOCIN) intermittent dosing (placeholder), , Other, RX Placeholder  levoFLOXacin (LEVAQUIN) 250 MG/50ML infusion 250 mg, 250 mg, Intravenous, Q48H  amLODIPine (NORVASC) tablet 5 mg, 5 mg, Oral, BID  albuterol-ipratropium (COMBIVENT RESPIMAT)  MCG/ACT inhaler 1 puff, 1 puff, Inhalation, Q6H  0.9 % sodium chloride infusion, , Intravenous, PRN  insulin glargine (LANTUS) injection vial 20 Units, 20 Units, Subcutaneous, QAM  acetaminophen (TYLENOL) tablet 1,000 mg, 1,000 mg, Oral, Q6H PRN  carvedilol (COREG) tablet 25 mg, 25 mg, Oral, BID WC  heparin (porcine) injection 5,000 Units, 5,000 Units, Subcutaneous, 3 times per day  calcitRIOL (ROCALTROL) capsule 0.25 mcg, 0.25 mcg, Oral, Daily  dexamethasone (DECADRON) injection 4 mg, 4 mg, Intravenous, Q8H  perflutren lipid microspheres (DEFINITY) injection 1.65 mg, 1.5 mL, Intravenous, ONCE PRN  pentoxifylline (TRENTAL) extended release tablet 400 mg, 400 mg, Oral, BID  insulin lispro (HUMALOG) injection vial 0-12 Units, 0-12 Units, Subcutaneous, TID WC  insulin lispro (HUMALOG) injection vial 0-6 Units, 0-6 Units, Subcutaneous, Nightly  glucose (GLUTOSE) 40 % oral gel 15 g, 15 g, Oral, PRN  dextrose 50 % IV solution, 12.5 g, Intravenous, PRN  glucagon (rDNA) injection 1 mg, 1 mg, Intramuscular, PRN  dextrose 5 % solution, 100 mL/hr, Intravenous, PRN  aspirin chewable tablet 81 mg, 81 mg, Oral, Daily  atorvastatin (LIPITOR) tablet 80 mg, 80 mg, Oral, Daily  clopidogrel (PLAVIX) tablet 75 mg, 75 mg, Oral, Daily  gabapentin (NEURONTIN) capsule 300 mg, 300 mg, Oral, Nightly levothyroxine (SYNTHROID) tablet 50 mcg, 50 mcg, Oral, Daily  famotidine (PEPCID) tablet 20 mg, 20 mg, Oral, Daily  sodium chloride flush 0.9 % injection 10 mL, 10 mL, Intravenous, 2 times per day  sodium chloride flush 0.9 % injection 10 mL, 10 mL, Intravenous, PRN  promethazine (PHENERGAN) tablet 12.5 mg, 12.5 mg, Oral, Q6H PRN **OR** ondansetron (ZOFRAN) injection 4 mg, 4 mg, Intravenous, Q6H PRN  polyethylene glycol (GLYCOLAX) packet 17 g, 17 g, Oral, Daily PRN  labetalol (NORMODYNE;TRANDATE) injection 20 mg, 20 mg, Intravenous, Q6H PRN  Physical    VITALS:  /62   Pulse 70   Temp 98.1 °F (36.7 °C)   Resp 16   Ht 5' (1.524 m)   Wt 117 lb (53.1 kg)   SpO2 91%   BMI 22.85 kg/m²   CONSTITUTIONAL:  awake, alert, cooperative, no apparent distress, and appears stated age  EYES:  Lids and lashes normal, pupils equal, round and reactive to light, extra ocular muscles intact, sclera clear, conjunctiva normal  ENT:  Normocephalic, without obvious abnormality, atraumatic, sinuses nontender on palpation, external ears without lesions, oral pharynx with moist mucus membranes, tonsils without erythema or exudates, gums normal and good dentition. NECK:  Supple, symmetrical, trachea midline, no adenopathy, thyroid symmetric, not enlarged and no tenderness, skin normal  HEMATOLOGIC/LYMPHATICS:  no cervical lymphadenopathy  BACK:  Symmetric, no curvature, spinous processes are non-tender on palpation, paraspinous muscles are non-tender on palpation, no costal vertebral tenderness  LUNGS:  Improved Crackles  CARDIOVASCULAR:  Normal apical impulse, regular rate and rhythm, normal S1 and S2, no S3 or S4, and no murmur noted  ABDOMEN:  No scars, normal bowel sounds, soft, non-distended, non-tender, no masses palpated, no hepatosplenomegally  MUSCULOSKELETAL:  There is no redness, warmth, or swelling of the joints. Full range of motion noted. Motor strength is 5 out of 5 all extremities bilaterally.   Tone is normal. NEUROLOGIC:  Awake, alert, oriented to name, place and time. SKIN:  no bruising or bleeding  Data    CBC:   Lab Results   Component Value Date    WBC 14.9 03/17/2021    RBC 2.28 03/17/2021    HGB 6.4 03/17/2021    HCT 19.3 03/17/2021    MCV 84.6 03/17/2021    MCH 28.1 03/17/2021    MCHC 33.2 03/17/2021    RDW 13.4 03/17/2021     03/17/2021    MPV 10.2 03/17/2021     CMP:    Lab Results   Component Value Date     03/17/2021    K 3.7 03/17/2021    K 4.2 03/09/2021     03/17/2021    CO2 20 03/17/2021    BUN 95 03/17/2021    CREATININE 4.2 03/17/2021    GFRAA 13 03/17/2021    LABGLOM 13 03/17/2021    GLUCOSE 230 03/17/2021    GLUCOSE 419 03/21/2011    PROT 4.9 03/15/2021    LABALBU 2.4 03/15/2021    LABALBU 4.1 03/21/2011    CALCIUM 7.9 03/17/2021    BILITOT <0.2 03/15/2021    ALKPHOS 66 03/15/2021    AST 48 03/15/2021    ALT 30 03/15/2021       ASSESSMENT AND PLAN      Brief Hx of stay:  Mrs. Anderson was admitted due to abnormal/worsening renal function. Her Creat level used to be 2.3, but went up to 3.2. she was admitted for further evaluation. During hospitalization she was noted to have hypertensive urgency. Her BP medication was adjusted and now BP is better controlled. She tested positive for covid and was in respiratory failure. She received Decadron, completed remdesivir and 2 units of FFP. Symptoms is improving and she is being weaned off oxygen. Nephro, Pulm and ID are consulted. 1.  Hypertensive urgency: controlled  Continue Antihypertensive agents as prescribed    2. Acute on Chronic Renal disease:  Seems like renal function has settled near baseline of 4.5  Continue to avoid nephrotoxic agents  Appreciate nephro recommendations    3. Acute Respiratory failure due to COVID 19 Pneumonia:  Weaned to room air by mid afternoon on 3/16. Complete 10 days of decadron as prescribed  She's completed Remdesivir  Continue Vanco and Levaquin. Per ID    4.  Diabetes mellitus type 2: episodes of hypoglycemia this morning  Hed morning lantus after the poor oral intake on 3/13 contributed to her hypoglycemia/0  .  316 11 AM sugar 223  Continue accucheck achs  Diabetic diet    5. Hypothyroidism: On synthroid. TSH WNL    6. Anemia due to chronic renal disease: hgb dec so transfuse 2 units pRBC 3/17    7. Anemia w/u: since she never had c-scope I recommended that she f/u as outpatient for this. .  8. DVT prophylaxis with heparin 5000 3 times daily  9. Disposition some debility. PT OT. Hope for home on 3/18 where she prefers since she can get help from her grandson who lives with her  8.   Full code

## 2021-03-17 NOTE — PLAN OF CARE
Problem: Falls - Risk of:  Goal: Will remain free from falls  Description: Will remain free from falls  Outcome: Met This Shift  Goal: Absence of physical injury  Description: Absence of physical injury  Outcome: Met This Shift     Problem:  Activity:  Goal: Ability to tolerate increased activity will improve  Description: Ability to tolerate increased activity will improve  Outcome: Met This Shift     Problem: Cardiac:  Goal: Hemodynamic stability will improve  Description: Hemodynamic stability will improve  Outcome: Met This Shift  Goal: Complications related to the disease process, condition or treatment will be avoided or minimized  Description: Complications related to the disease process, condition or treatment will be avoided or minimized  Outcome: Met This Shift  Goal: Cerebral tissue perfusion will improve  Description: Cerebral tissue perfusion will improve  Outcome: Met This Shift     Problem: Coping:  Goal: Ability to identify and develop effective coping behavior will improve  Description: Ability to identify and develop effective coping behavior will improve  Outcome: Met This Shift     Problem: Health Behavior:  Goal: Identification of resources available to assist in meeting health care needs will improve  Description: Identification of resources available to assist in meeting health care needs will improve  Outcome: Met This Shift     Problem: Nutritional:  Goal: Ability to identify appropriate dietary choices will improve  Description: Ability to identify appropriate dietary choices will improve  Outcome: Met This Shift     Problem: Fluid Volume:  Goal: Maintenance of adequate hydration will improve  Description: Maintenance of adequate hydration will improve  Outcome: Met This Shift     Problem: Physical Regulation:  Goal: Complications related to the disease process, condition or treatment will be avoided or minimized  Description: Complications related to the disease process, condition or treatment will be avoided or minimized  Outcome: Met This Shift  Goal: Ability to maintain a body temperature in the normal range will improve  Description: Ability to maintain a body temperature in the normal range will improve  Outcome: Met This Shift  Goal: Will regain or maintain usual level of consciousness  Description: Will regain or maintain usual level of consciousness  Outcome: Met This Shift     Problem: Serum Glucose Level - Abnormal:  Goal: Ability to maintain appropriate glucose levels has stabilized  Description: Ability to maintain appropriate glucose levels has stabilized  Outcome: Met This Shift     Problem: Airway Clearance - Ineffective  Goal: Achieve or maintain patent airway  Outcome: Met This Shift     Problem: Gas Exchange - Impaired  Goal: Absence of hypoxia  Outcome: Met This Shift  Goal: Promote optimal lung function  Outcome: Met This Shift     Problem: Breathing Pattern - Ineffective  Goal: Ability to achieve and maintain a regular respiratory rate  Outcome: Met This Shift     Problem:  Body Temperature -  Risk of, Imbalanced  Goal: Ability to maintain a body temperature within defined limits  Outcome: Met This Shift  Goal: Will regain or maintain usual level of consciousness  Outcome: Met This Shift  Goal: Complications related to the disease process, condition or treatment will be avoided or minimized  Outcome: Met This Shift     Problem: Isolation Precautions - Risk of Spread of Infection  Goal: Prevent transmission of infection  Outcome: Met This Shift     Problem: Nutrition Deficits  Goal: Optimize nutritional status  Outcome: Met This Shift     Problem: Risk for Fluid Volume Deficit  Goal: Maintain normal heart rhythm  Outcome: Met This Shift  Goal: Maintain absence of muscle cramping  Outcome: Met This Shift  Goal: Maintain normal serum potassium, sodium, calcium, phosphorus, and pH  Outcome: Met This Shift     Problem: Loneliness or Risk for Loneliness  Goal: Demonstrate positive use of time alone when socialization is not possible  Outcome: Met This Shift     Problem: Fatigue  Goal: Verbalize increase energy and improved vitality  Outcome: Met This Shift     Problem: Patient Education: Go to Patient Education Activity  Goal: Patient/Family Education  Outcome: Met This Shift     Problem: Skin Integrity:  Goal: Will show no infection signs and symptoms  Description: Will show no infection signs and symptoms  Outcome: Met This Shift  Goal: Absence of new skin breakdown  Description: Absence of new skin breakdown  Outcome: Met This Shift

## 2021-03-18 NOTE — PROGRESS NOTES
Physical Therapy    [id number]    Patient unavailable for physical therapy treatment due to stomach pain and gas. Patrick Billings.  [facility] [place]  LIC # [id number]

## 2021-03-18 NOTE — PROGRESS NOTES
[facility] MD Ryann Staples MD Ronie Holster, MD Bennye Botts, DO, [facility] PATIENT’S [facility] MD .  Progress note   Patient's Name: Beryle Ferri  11:59 AM  3/18/2021          Found to have covid 19 Pna . Now in isolation . Breathing better o2/nc . More energy . Working with PT   Cr starting to trend down   No more reported diarrhea  BP better   Blood work pending from today  D/w RN on floor . She reports pt is comfortable with no reported issues       History of Present Ilness: The patient is a 57 y.o. female with significant past medical history of diabetes type 2, Essential hypertension who presents to the ER following being noticed to have progressively elevated cr numbers   In looking in records pt cr has been trending up rather fast over the last year to year and half . She does have around 7 g proteinuria based on spot urine /cr ratio . Cr on presentation was 3.2 . Cr today 3.5   She was also found to have bp of 200 . At home she is on hctz/lisinopril as part of her antihtn . I saw her in her room this am , she is alert oriented pleasant 56 y/o F . Past Medical History:   Diagnosis Date    Acid reflux     Hyperlipidemia     Hypertension     Hypothyroidism     S/P appendectomy     Type II or unspecified type diabetes mellitus without mention of complication, not stated as uncontrolled        Past Surgical History:   Procedure Laterality Date    APPENDECTOMY      HYSTERECTOMY         Family History   Problem Relation Age of Onset    Diabetes Mother     High Blood Pressure Mother     Diabetes Brother     Diabetes Sister         reports that she has never smoked. She has never used smokeless tobacco. She reports that she does not drink alcohol or use drugs.     Allergies:  Demerol and Toradol [ketorolac tromethamine]    Current Medications:    0.9 % sodium chloride infusion, Continuous  vancomycin (VANCOCIN) intermittent dosing (placeholder), Differential:    Lab Results   Component Value Date    WBC 14.9 03/17/2021    RBC 2.28 03/17/2021    HGB 10.1 03/18/2021    HCT 29.2 03/18/2021     03/17/2021    MCV 84.6 03/17/2021    MCH 28.1 03/17/2021    MCHC 33.2 03/17/2021    RDW 13.4 03/17/2021    NRBC 0.9 03/17/2021    SEGSPCT 59 02/27/2013    METASPCT 12.0 03/11/2021    LYMPHOPCT 7.8 03/17/2021    MONOPCT 2.6 03/17/2021    MYELOPCT 2.0 03/11/2021    BASOPCT 0.1 03/17/2021    MONOSABS 0.45 03/17/2021    LYMPHSABS 1.19 03/17/2021    EOSABS 0.00 03/17/2021    BASOSABS 0.00 03/17/2021     CMP:    Lab Results   Component Value Date     03/17/2021    K 3.7 03/17/2021    K 4.2 03/09/2021     03/17/2021    CO2 20 03/17/2021    BUN 95 03/17/2021    CREATININE 4.2 03/17/2021    GFRAA 13 03/17/2021    LABGLOM 13 03/17/2021    GLUCOSE 230 03/17/2021    GLUCOSE 419 03/21/2011    PROT 4.9 03/15/2021    LABALBU 2.4 03/15/2021    LABALBU 4.1 03/21/2011    CALCIUM 7.9 03/17/2021    BILITOT <0.2 03/15/2021    ALKPHOS 66 03/15/2021    AST 48 03/15/2021    ALT 30 03/15/2021     Ionized Calcium:  No results found for: IONCA  Magnesium:    Lab Results   Component Value Date    MG 2.2 03/11/2021     Phosphorus:    Lab Results   Component Value Date    PHOS 7.9 03/11/2021     U/A:    Lab Results   Component Value Date    COLORU Straw 03/08/2021    PHUR 5.5 03/08/2021    LABCAST RARE 03/27/2015    WBCUA 0-1 03/08/2021    WBCUA NONE 02/06/2012    RBCUA 1-3 03/08/2021    RBCUA 0-1 12/25/2012    YEAST FEW 04/04/2015    BACTERIA NONE SEEN 03/08/2021    CLARITYU Clear 03/08/2021    SPECGRAV 1.025 03/08/2021    LEUKOCYTESUR Negative 03/08/2021    UROBILINOGEN 0.2 03/08/2021    BILIRUBINUR Negative 03/08/2021    BILIRUBINUR NEGATIVE 02/06/2012    BLOODU SMALL 03/08/2021    GLUCOSEU 250 03/08/2021    GLUCOSEU >=1000 02/06/2012    AMORPHOUS FEW 03/08/2021     Microalbumen/Creatinine ratio:  No components found for: RUCREAT  Iron Saturation:  No components found for: PERCENTFE  TIBC:  No results found for: TIBC  FERRITIN:    Lab Results   Component Value Date    FERRITIN 613 03/16/2021        Imaging:  US DUP LOWER EXTREMITIES BILATERAL VENOUS   Final Result   Normal         XR CHEST PORTABLE   Final Result   Continued but improved bilateral pulmonary airspace opacities, left greater   than right         XR CHEST PORTABLE   Final Result   1. Rapid development of widespread bilateral parenchymal consolidation   predominant from the hilar regions to the periphery of the lung that   superimposes to previous left upper lobe peripheral focal consolidation. 2.  The differential diagnosis include acute pulmonary edema, pulmonary   hemorrhage, diffuse alveolar damage, bilateral pneumonia. Please correlate   clinically. XR CHEST PORTABLE   Final Result   Left upper lobe opacity worrisome for pneumonia. US RETROPERITONEAL LIMITED   Final Result   Mildly atrophic and echogenic kidneys, compatible with mild chronic renal   parenchymal disease. Unremarkable ultrasound of the bladder. Assessment    -subacute kidney injury vs progressive worsening of her CKD     -Nephrotic range proteinuria    -HTN urgency     -Anaemia of chronic disease     -Mineral bone disease     -Insulin depedent diabetes with complication     -COVID 19 PNA with respiratory failure       PLAN :    Ms Sara Shearer has been having rather progressive elevation in cr over the last 1.5 years . Cr back on 3/2019 was 1 . Cr on 11/2019 1.6 . Cr back on 8/2020 was 2.1 and now cr is 3.2   The CKD is likley due to diabetic nephropathy thought the progression is rather fast .   Her current GAVIN is likley also exacerbated by her uncontrolled HTN on presentation .     -Creatinine relatively stable over last 48 hours  (plateu ? ?  )  Await blood work from today hopefully it is trending down  -continue coreg to 25 mg bid   -increase amlodipine to 5 mg bid   -ISSA (negative ) ,ANCA(negative , SPEP (negative), UPEP (negative)  -no current need for RRT/HD .  Will f/u closely           Electronically signed by Carl Harrison MD on 3/18/2021 at 11:59 AM

## 2021-03-18 NOTE — PROGRESS NOTES
[facility]  [facility]  Progress Note    NAME: Beth Quintana  MR:  [id number]  :   1960  DATE OF SERVICE:21    This is a face to face encounter with Beth Quintana 57 y.o. female on 21  ID following for   Chief Complaint   Patient presents with    Abnormal Lab     creatnine level high per doctor    Hypertension     SUBJECTIVE:  In bed c/o fatigue  Does not want to do pt/oob  On ra        Patient is tolerating medications. No reported adverse drug reactions. Review of systems:  As stated above in the chief complaint, otherwise negative.     Medications:  Scheduled Meds:   vancomycin (VANCOCIN) intermittent dosing (placeholder)   Other RX Placeholder    levofloxacin  250 mg Intravenous Q48H    amLODIPine  5 mg Oral BID    albuterol-ipratropium  1 puff Inhalation Q6H    insulin glargine  20 Units Subcutaneous QAM    carvedilol  25 mg Oral BID WC    heparin (porcine)  5,000 Units Subcutaneous 3 times per day    calcitRIOL  0.25 mcg Oral Daily    dexamethasone  4 mg Intravenous Q8H    pentoxifylline  400 mg Oral BID    insulin lispro  0-12 Units Subcutaneous TID WC    insulin lispro  0-6 Units Subcutaneous Nightly    aspirin  81 mg Oral Daily    atorvastatin  80 mg Oral Daily    clopidogrel  75 mg Oral Daily    gabapentin  300 mg Oral Nightly    levothyroxine  50 mcg Oral Daily    famotidine  20 mg Oral Daily    sodium chloride flush  10 mL Intravenous 2 times per day     Continuous Infusions:   sodium chloride 50 mL/hr at 21 1232    dextrose       PRN Meds:acetaminophen, perflutren lipid microspheres, glucose, dextrose, glucagon (rDNA), dextrose, sodium chloride flush, promethazine **OR** ondansetron, polyethylene glycol, labetalol    OBJECTIVE:  BP (!) 173/80   Pulse 78   Temp 98.3 °F (36.8 °C) (Infrared)   Resp 18   Ht 5' (1.524 m)   Wt 117 lb (53.1 kg)   SpO2 91%   BMI 22.85 kg/m²   Temp  Av.1 °F (36.7 °C)  Min: 97.8 °F (36.6 °C)  Max: 98.3 °F (36.8 °C)  Constitutional:  The patient is awake  THIN   Skin:    Warm and dry. No rashes were noted. HEENT:     AT/NC   Chest:   Symmetrical expansion. DEC BS post   Cardiovascular:  S1 and S2 are rhythmic and regular. Abdomen:   Soft  nt  Extremities:    no edema.   CNS    AAxO   Lines: pIV    Radiology:  Laboratory and Tests Review:  Lab Results   Component Value Date    WBC 14.9 (H) 03/17/2021    WBC 14.6 (H) 03/15/2021    WBC 15.9 (H) 03/15/2021    HGB 10.1 (L) 03/18/2021    HCT 29.2 (L) 03/18/2021    MCV 84.6 03/17/2021     03/17/2021     No results found for: CRP  Lab Results   Component Value Date    ALT 30 03/15/2021    AST 48 (H) 03/15/2021    ALKPHOS 66 03/15/2021    BILITOT <0.2 03/15/2021     Lab Results   Component Value Date     03/17/2021    K 3.7 03/17/2021    K 4.2 03/09/2021     03/17/2021    CO2 20 03/17/2021    BUN 95 03/17/2021    CREATININE 4.2 03/17/2021    CREATININE 4.3 03/15/2021    CREATININE 4.4 03/15/2021    GFRAA 13 03/17/2021    LABGLOM 13 03/17/2021    GLUCOSE 230 03/17/2021    GLUCOSE 419 03/21/2011    PROT 4.9 03/15/2021    LABALBU 2.4 03/15/2021    LABALBU 4.1 03/21/2011    CALCIUM 7.9 03/17/2021    BILITOT <0.2 03/15/2021    ALKPHOS 66 03/15/2021    AST 48 03/15/2021    ALT 30 03/15/2021     Lab Results   Component Value Date    CRP 0.9 (H) 03/16/2021    CRP 10.8 (H) 03/11/2021     Lab Results   Component Value Date    SEDRATE 40 (H) 03/16/2021    SEDRATE 44 (H) 03/11/2021     Recent Labs     03/15/21  1447 03/16/21  1014   CRP  --  0.9*   PROCAL  --  5.43*   FERRITIN  --  613   LDH  --  530*   TROPONINI  --  0.05*   DDIMER  --  571   FIBRINOGEN  --  349   INR  --  0.9   PROTIME  --  10.6   AST 48*  --    ALT 30  --      Lab Results   Component Value Date    CHOL 168 08/05/2020    TRIG 182 08/05/2020    HDL 45 08/05/2020    LDLCALC 87 08/05/2020    LABVLDL 36 08/05/2020     Lab Results   Component Value Date/Time    VITD25 27 (L) 03/11/2021 06:45 AM       Microbiology:   No results for input(s): COVID19 in the last 72 hours. Lab Results   Component Value Date    BC 5 Days no growth 03/08/2021    BLOODCULT2 5 Days no growth 03/08/2021        ASSESSMENT/PLAN:    S/P SEPSIS/FEVERS  COVID PNEUMONIA  LEUKOCYTOSIS    GAVIN/CKD CR4.2        S/P TOCI   S/P convalescent ASMA  CAN D/C FROM ID POV     vancomycin (VANCOCIN) intermittent dosing (placeholder), RX Placeholder  levoFLOXacin (LEVAQUIN) 250 MG/50ML infusion 250 mg, Q48H     dexamethasone (DECADRON) injection 4 mg, Q8H     Stop atbx    · Monitor labs    Imaging and labs were reviewed per medical records. The patient was educated about the diagnosis, prognosis, indications, risks and benefits of treatment. An opportunity to ask questions was given to the patient/FAMILY. Thank you for involving me in the care of [place] 37 I will continue to follow. Please do not hesitate to call for any questions or concerns.     Electronically signed by Piter Farah MD on 3/18/2021 at 12:14 PM

## 2021-03-18 NOTE — PROGRESS NOTES
Physical Therapy    [id number]    Patient unavailable for physical therapy treatment due to stomach pain and gas. Eulalio Mai  [facility]  LIC # [id number]

## 2021-03-18 NOTE — PROGRESS NOTES
Department of Internal Medicine  General Internal Medicine  Attending Progress Note      SUBJECTIVE:    Consitipated. No bm x 3 days. Ab pain 8/10. Toshia Salm   miralx given will add mg citrat le like she takes at home    OBJECTIVE      Medications    Current Facility-Administered Medications: 0.9 % sodium chloride infusion, , Intravenous, Continuous  amLODIPine (NORVASC) tablet 5 mg, 5 mg, Oral, BID  albuterol-ipratropium (COMBIVENT RESPIMAT)  MCG/ACT inhaler 1 puff, 1 puff, Inhalation, Q6H  insulin glargine (LANTUS) injection vial 20 Units, 20 Units, Subcutaneous, QAM  acetaminophen (TYLENOL) tablet 1,000 mg, 1,000 mg, Oral, Q6H PRN  carvedilol (COREG) tablet 25 mg, 25 mg, Oral, BID WC  heparin (porcine) injection 5,000 Units, 5,000 Units, Subcutaneous, 3 times per day  calcitRIOL (ROCALTROL) capsule 0.25 mcg, 0.25 mcg, Oral, Daily  dexamethasone (DECADRON) injection 4 mg, 4 mg, Intravenous, Q8H  perflutren lipid microspheres (DEFINITY) injection 1.65 mg, 1.5 mL, Intravenous, ONCE PRN  pentoxifylline (TRENTAL) extended release tablet 400 mg, 400 mg, Oral, BID  insulin lispro (HUMALOG) injection vial 0-12 Units, 0-12 Units, Subcutaneous, TID WC  insulin lispro (HUMALOG) injection vial 0-6 Units, 0-6 Units, Subcutaneous, Nightly  glucose (GLUTOSE) 40 % oral gel 15 g, 15 g, Oral, PRN  dextrose 50 % IV solution, 12.5 g, Intravenous, PRN  glucagon (rDNA) injection 1 mg, 1 mg, Intramuscular, PRN  dextrose 5 % solution, 100 mL/hr, Intravenous, PRN  aspirin chewable tablet 81 mg, 81 mg, Oral, Daily  atorvastatin (LIPITOR) tablet 80 mg, 80 mg, Oral, Daily  clopidogrel (PLAVIX) tablet 75 mg, 75 mg, Oral, Daily  gabapentin (NEURONTIN) capsule 300 mg, 300 mg, Oral, Nightly  levothyroxine (SYNTHROID) tablet 50 mcg, 50 mcg, Oral, Daily  famotidine (PEPCID) tablet 20 mg, 20 mg, Oral, Daily  sodium chloride flush 0.9 % injection 10 mL, 10 mL, Intravenous, 2 times per day  sodium chloride flush 0.9 % injection 10 mL, 10 mL, Intravenous, PRN  promethazine (PHENERGAN) tablet 12.5 mg, 12.5 mg, Oral, Q6H PRN **OR** ondansetron (ZOFRAN) injection 4 mg, 4 mg, Intravenous, Q6H PRN  polyethylene glycol (GLYCOLAX) packet 17 g, 17 g, Oral, Daily PRN  labetalol (NORMODYNE;TRANDATE) injection 20 mg, 20 mg, Intravenous, Q6H PRN  Physical    VITALS:  /61   Pulse 71   Temp 99.1 °F (37.3 °C) (Infrared)   Resp 18   Ht 5' (1.524 m)   Wt 117 lb (53.1 kg)   SpO2 91%   BMI 22.85 kg/m²   CONSTITUTIONAL:  awake, alert, cooperative, no apparent distress, and appears stated age  EYES:  Lids and lashes normal, pupils equal, round and reactive to light, extra ocular muscles intact, sclera clear, conjunctiva normal  ENT:  Normocephalic, without obvious abnormality, atraumatic, sinuses nontender on palpation, external ears without lesions, oral pharynx with moist mucus membranes, tonsils without erythema or exudates, gums normal and good dentition. NECK:  Supple, symmetrical, trachea midline, no adenopathy, thyroid symmetric, not enlarged and no tenderness, skin normal  HEMATOLOGIC/LYMPHATICS:  no cervical lymphadenopathy  BACK:  Symmetric, no curvature, spinous processes are non-tender on palpation, paraspinous muscles are non-tender on palpation, no costal vertebral tenderness  LUNGS:  Improved Crackles  CARDIOVASCULAR:  Normal apical impulse, regular rate and rhythm, normal S1 and S2, no S3 or S4, and no murmur noted  ABDOMEN:  No scars, normal bowel sounds, soft, non-distended, non-tender, no masses palpated, no hepatosplenomegally  MUSCULOSKELETAL:  There is no redness, warmth, or swelling of the joints. Full range of motion noted. Motor strength is 5 out of 5 all extremities bilaterally. Tone is normal.  NEUROLOGIC:  Awake, alert, oriented to name, place and time.     SKIN:  no bruising or bleeding  Data    CBC:   Lab Results   Component Value Date    WBC 14.9 03/17/2021    RBC 2.28 03/17/2021    HGB 10.1 03/18/2021    HCT 29.2 03/18/2021 to chronic renal disease: hgb dec so transfuse 2 units pRBC 3/17    7. Anemia w/u: since she never had c-scope I recommended that she f/u as outpatient for this. .  8. Constitpation: as above. I am afraid that if she is discharged with this much ab pain she will come back to ed later  9. Disposition some debility. PT OT. [facility] for home on 3/19 where she prefers since she can get help from her grandson who lives with her  8. Full code  11.   DVT prophylaxis with heparin 5000 3 times daily

## 2021-03-19 NOTE — PROGRESS NOTES
Patient refuses to lay prone. Lays on each side.  Patient encouraged to use IS and educated on benefits

## 2021-03-19 NOTE — PROGRESS NOTES
Physical Therapy Treatment Note    Room #:  5239/1899-16  Patient Name: Kim Orona  YOB: 1960  MRN: [id number]    Referring Provider:   Arash Pelaez MD     Date of Service: 3/19/2021    Evaluating Physical Therapist: Brianda Gupta, PT #[id number]       Diagnosis:   Hypertensive urgency [I16.0]     worsening blood pressures despite treatment. Patient Active Problem List   Diagnosis    Diabetes mellitus type 2, insulin dependent ([facility] [address]    Hypothyroid    Hypertension    Hyperlipidemia    Hypertensive urgency        Tentative placement recommendation: Home    Equipment recommendation:  To be determined  possibly cane for balance     Prior Level of Function: Patient ambulated independently    Rehab Potential: good  - for baseline    Past medical history:   Past Medical History:   Diagnosis Date    Acid reflux     Hyperlipidemia     Hypertension     Hypothyroidism     S/P appendectomy     Type II or unspecified type diabetes mellitus without mention of complication, not stated as uncontrolled      Past Surgical History:   Procedure Laterality Date    APPENDECTOMY      HYSTERECTOMY         Precautions: Up as tolerated, falls ,      SUBJECTIVE:    Social history: Patient lives alone   in a apartment  with No steps  to enter Tub shower grab bars    Equipment owned: [address] [facility] [address], [address] and Shower chair,  All unused    AM-PAC Basic Mobility        AM-[facility] Mobility Inpatient   How much difficulty turning over in bed?: None  How much difficulty sitting down on / standing up from a chair with arms?: A Little  How much difficulty moving from lying on back to sitting on side of bed?: None  How much help from another person moving to and from a bed to a chair?: A Little  How much help from another person needed to walk in hospital room?: A Little  How much help from another person for climbing 3-5 steps with a railing?: A Lot  AM-PAC Inpatient Mobility Raw Score : 19  AM-PAC Inpatient T-Scale Score : 45.44 Mobility Inpatient CMS 0-100% Score: 41.77  Mobility Inpatient CMS G-Code Modifier : CK    Nursing cleared patient for PT treatment. OBJECTIVE;   Initial Evaluation  Date: 3/9/2021 Treatment Date:  3/19/2021       Short Term/ Long Term   Goals   Was pt agreeable to Eval/treatment? Yes   yes To be met in 2 days   Pain level   0/10    0/10      Bed Mobility    Rolling: Independent    Supine to sit: Independent    Sit to supine: Independent    Scooting: Independent   Rolling: Independent   Supine to sit: Independent   Sit to supine: Independent   Scooting: Independent    Rolling: Independent    Supine to sit:  Independent    Sit to supine: Independent    Scooting: Independent     Transfers Sit to stand: Minimal assist of 1   Sit to stand: Not assessed       Sit to stand: Independent     Ambulation    2 x 130 feet using  no device with Minimal assist of 1   Loss of balance x 2 minimal assist to recover   not assessed     150 feet using  least restrictive device versus no device with Independent    Stair negotiation: ascended and descended   Not assessed            ROM Within functional limits        Strength BUE:  4/5  RLE:  4/5  LLE:  4/5   Increase strength in affected mm groups by 1/3 grade   Balance Sitting EOB:  good    Dynamic Standing:  fair   Sitting EOB: good   Dynamic Standing: not assessed    Sitting EOB:  good    Dynamic Standing: good       Patient is Alert & Oriented x person, place, time and situation and follows directions    Sensation:  Patient  denies numbness and tingling     Edema:  no    Endurance: fair  +    Vitals: room air    Blood Pressure at rest   Blood Pressure during session     Heart Rate at rest  Heart Rate during session     SPO2 at rest 92%  SPO2 during session  89-92%     Patient education  Patient educated on role of Physical Therapy, risks of immobility, safety and plan of care and  importance of mobility while in hospital       Patient response to education:   Pt verbalized understanding Pt demonstrated skill Pt requires further education in this area   Yes Partial Yes      Treatment:  Patient practiced and was instructed/facilitated in the following treatment: Patient transferred to edge of the bed. Pt  Sat edge of bed 10 minutes with Independent to increase dynamic sitting balance and activity tolerance. Pt laid back down in the bed independently. Therapeutic Exercises:  not performed    At end of session, patient in bed with   call light and phone within reach,   all lines and tubes intact, nursing notified. Patient would benefit from continued skilled Physical Therapy to improve functional independence and quality of life. Patient's/ family goals   home        ASSESSMENT: Patient exhibits decreased strength, balance and endurance impairing functional mobility, transfers and gait   Patient able to perform bed mobility independently. Pt stated she is being D/C to home today and did not want to get up and walk or do any other therapy this am.    Plan of Care:     -Standing Balance: Perform strengthening exercises in standing to promote motor control with or without upper extremity support   -Transfers: Cues for hand placement, technique and safety  -Gait: Gait training and Standing activities to improve: base of support, weight shift, weight bearing    -Endurance: Utilize Supervised activities to increase level of endurance to allow for safe functional mobility including transfers and gait     Patient and or family understand(s) diagnosis, prognosis, and plan of care. Frequency of treatments: Patient will be seen  daily. Time in 9:30  Time out  9:40    Total Treatment Time  10 minutes        CPT codes:    Therapeutic activities (70498)   10 minutes  1 unit(s)   Daphney Mai  [facility]  LIC # [id number]

## 2021-03-19 NOTE — PROGRESS NOTES
[facility]  [facility]  Progress Note    NAME: Luis Beltran  MR:  [id number]  :   1960  DATE OF SERVICE:21    This is a face to face encounter with Luis Beltran 57 y.o. female on 21  ID following for   Chief Complaint   Patient presents with    Abnormal Lab     creatnine level high per doctor    Hypertension     SUBJECTIVE:  Pt ready to be d/c  On ra  DOES NOT WANT TO GET OOB        Patient is tolerating medications. No reported adverse drug reactions. Review of systems:  As stated above in the chief complaint, otherwise negative. Medications:  Scheduled Meds:   amLODIPine  5 mg Oral BID    albuterol-ipratropium  1 puff Inhalation Q6H    insulin glargine  20 Units Subcutaneous QAM    carvedilol  25 mg Oral BID WC    heparin (porcine)  5,000 Units Subcutaneous 3 times per day    calcitRIOL  0.25 mcg Oral Daily    dexamethasone  4 mg Intravenous Q8H    pentoxifylline  400 mg Oral BID    insulin lispro  0-12 Units Subcutaneous TID WC    insulin lispro  0-6 Units Subcutaneous Nightly    aspirin  81 mg Oral Daily    atorvastatin  80 mg Oral Daily    clopidogrel  75 mg Oral Daily    gabapentin  300 mg Oral Nightly    levothyroxine  50 mcg Oral Daily    famotidine  20 mg Oral Daily    sodium chloride flush  10 mL Intravenous 2 times per day     Continuous Infusions:   sodium chloride 50 mL/hr at 21 1751    dextrose       PRN Meds:acetaminophen, perflutren lipid microspheres, glucose, dextrose, glucagon (rDNA), dextrose, sodium chloride flush, promethazine **OR** ondansetron, polyethylene glycol, labetalol    OBJECTIVE:  /66   Pulse 68   Temp 98.4 °F (36.9 °C) (Infrared)   Resp 18   Ht 5' (1.524 m)   Wt 117 lb (53.1 kg)   SpO2 91%   BMI 22.85 kg/m²   Temp  Av.5 °F (36.9 °C)  Min: 98.1 °F (36.7 °C)  Max: 99.1 °F (37.3 °C)  Constitutional:  The patient is awake  THIN   Skin:    No rashes were noted.    HEENT:     AT/NC LEUKOCYTOSIS    GAVIN/CKD CR3.8        S/P TOCI   S/P convalescent ASMA  CAN D/C FROM ID POV     ID SIGN OFF        Stop atbx    · Monitor labs    Imaging and labs were reviewed per medical records. The patient was educated about the diagnosis, prognosis, indications, risks and benefits of treatment. An opportunity to ask questions was given to the patient/FAMILY. Thank you for involving me in the care of [facility] I will continue to follow. Please do not hesitate to call for any questions or concerns.     Electronically signed by Aleksandr Olea MD on 3/19/2021 at 10:55 AM

## 2021-03-19 NOTE — PROGRESS NOTES
DC paperwork explained with verbal understanding noted.  IV acces and telemetry removed without difficultly

## 2021-03-19 NOTE — PLAN OF CARE
Problem: Falls - Risk of:  Goal: Will remain free from falls  Description: Will remain free from falls  Outcome: Met This Shift  Goal: Absence of physical injury  Description: Absence of physical injury  Outcome: Met This Shift     Problem:  Activity:  Goal: Ability to tolerate increased activity will improve  Description: Ability to tolerate increased activity will improve  Outcome: Met This Shift     Problem: Cardiac:  Goal: Hemodynamic stability will improve  Description: Hemodynamic stability will improve  Outcome: Met This Shift  Goal: Complications related to the disease process, condition or treatment will be avoided or minimized  Description: Complications related to the disease process, condition or treatment will be avoided or minimized  Outcome: Met This Shift  Goal: Cerebral tissue perfusion will improve  Description: Cerebral tissue perfusion will improve  Outcome: Met This Shift     Problem: Coping:  Goal: Ability to identify and develop effective coping behavior will improve  Description: Ability to identify and develop effective coping behavior will improve  Outcome: Met This Shift     Problem: Health Behavior:  Goal: Identification of resources available to assist in meeting health care needs will improve  Description: Identification of resources available to assist in meeting health care needs will improve  Outcome: Met This Shift     Problem: Nutritional:  Goal: Ability to identify appropriate dietary choices will improve  Description: Ability to identify appropriate dietary choices will improve  Outcome: Met This Shift     Problem: Fluid Volume:  Goal: Maintenance of adequate hydration will improve  Description: Maintenance of adequate hydration will improve  Outcome: Met This Shift     Problem: Physical Regulation:  Goal: Complications related to the disease process, condition or treatment will be avoided or minimized  Description: Complications related to the disease process, condition or treatment will be avoided or minimized  Outcome: Met This Shift  Goal: Ability to maintain a body temperature in the normal range will improve  Description: Ability to maintain a body temperature in the normal range will improve  Outcome: Met This Shift  Goal: Will regain or maintain usual level of consciousness  Description: Will regain or maintain usual level of consciousness  Outcome: Met This Shift     Problem: Serum Glucose Level - Abnormal:  Goal: Ability to maintain appropriate glucose levels has stabilized  Description: Ability to maintain appropriate glucose levels has stabilized  Outcome: Met This Shift     Problem: Airway Clearance - Ineffective  Goal: Achieve or maintain patent airway  Outcome: Met This Shift     Problem: Gas Exchange - Impaired  Goal: Absence of hypoxia  Outcome: Met This Shift  Goal: Promote optimal lung function  Outcome: Met This Shift     Problem: Breathing Pattern - Ineffective  Goal: Ability to achieve and maintain a regular respiratory rate  Outcome: Met This Shift     Problem:  Body Temperature -  Risk of, Imbalanced  Goal: Ability to maintain a body temperature within defined limits  Outcome: Met This Shift  Goal: Will regain or maintain usual level of consciousness  Outcome: Met This Shift  Goal: Complications related to the disease process, condition or treatment will be avoided or minimized  Outcome: Met This Shift     Problem: Isolation Precautions - Risk of Spread of Infection  Goal: Prevent transmission of infection  Outcome: Met This Shift     Problem: Nutrition Deficits  Goal: Optimize nutritional status  Outcome: Met This Shift     Problem: Risk for Fluid Volume Deficit  Goal: Maintain normal heart rhythm  Outcome: Met This Shift  Goal: Maintain absence of muscle cramping  Outcome: Met This Shift  Goal: Maintain normal serum potassium, sodium, calcium, phosphorus, and pH  Outcome: Met This Shift     Problem: Loneliness or Risk for Loneliness  Goal: Demonstrate positive use of time alone when socialization is not possible  Outcome: Met This Shift     Problem: Fatigue  Goal: Verbalize increase energy and improved vitality  Outcome: Met This Shift     Problem: Patient Education: Go to Patient Education Activity  Goal: Patient/Family Education  Outcome: Met This Shift     Problem: Skin Integrity:  Goal: Will show no infection signs and symptoms  Description: Will show no infection signs and symptoms  Outcome: Met This Shift  Goal: Absence of new skin breakdown  Description: Absence of new skin breakdown  Outcome: Met This Shift     Problem: Pain:  Goal: Pain level will decrease  Description: Pain level will decrease  Outcome: Met This Shift  Goal: Control of acute pain  Description: Control of acute pain  Outcome: Met This Shift  Goal: Control of chronic pain  Description: Control of chronic pain  Outcome: Met This Shift

## 2021-03-19 NOTE — PROGRESS NOTES
[facility] MD Shaun Ga MD Annamaria Huntsman MD Ian Current MD   Progress note   Patient's Name: Moriah Thornton  5:11 PM  3/19/2021          Found to have covid 19 Pna . Now in isolation . Breathing better o2/nc . More energy . Working with PT   Cr trending down   No more reported diarrhea  BP better         History of Present Ilness: The patient is a 57 y.o. female with significant past medical history of diabetes type 2, Essential hypertension who presents to the ER following being noticed to have progressively elevated cr numbers   In looking in records pt cr has been trending up rather fast over the last year to year and half . She does have around 7 g proteinuria based on spot urine /cr ratio . Cr on presentation was 3.2 . Cr today 3.5   She was also found to have bp of 200 . At home she is on hctz/lisinopril as part of her antihtn . I saw her in her room this am , she is alert oriented pleasant 58 y/o F . Past Medical History:   Diagnosis Date    Acid reflux     Hyperlipidemia     Hypertension     Hypothyroidism     S/P appendectomy     Type II or unspecified type diabetes mellitus without mention of complication, not stated as uncontrolled        Past Surgical History:   Procedure Laterality Date    APPENDECTOMY      HYSTERECTOMY         Family History   Problem Relation Age of Onset    Diabetes Mother     High Blood Pressure Mother     Diabetes Brother     Diabetes Sister         reports that she has never smoked. She has never used smokeless tobacco. She reports that she does not drink alcohol or use drugs.     Allergies:  Demerol and Toradol [ketorolac tromethamine]    Current Medications:    0.9 % sodium chloride infusion, Continuous  amLODIPine (NORVASC) tablet 5 mg, BID  albuterol-ipratropium (COMBIVENT RESPIMAT)  MCG/ACT inhaler 1 puff, Q6H  insulin glargine (LANTUS) injection vial 20 Units, QAM  acetaminophen (TYLENOL) tablet 1,000 mg, Q6H PRN  carvedilol (COREG) tablet 25 mg, BID WC  heparin (porcine) injection 5,000 Units, 3 times per day  calcitRIOL (ROCALTROL) capsule 0.25 mcg, Daily  dexamethasone (DECADRON) injection 4 mg, Q8H  perflutren lipid microspheres (DEFINITY) injection 1.65 mg, ONCE PRN  pentoxifylline (TRENTAL) extended release tablet 400 mg, BID  insulin lispro (HUMALOG) injection vial 0-12 Units, TID WC  insulin lispro (HUMALOG) injection vial 0-6 Units, Nightly  glucose (GLUTOSE) 40 % oral gel 15 g, PRN  dextrose 50 % IV solution, PRN  glucagon (rDNA) injection 1 mg, PRN  dextrose 5 % solution, PRN  aspirin chewable tablet 81 mg, Daily  atorvastatin (LIPITOR) tablet 80 mg, Daily  clopidogrel (PLAVIX) tablet 75 mg, Daily  gabapentin (NEURONTIN) capsule 300 mg, Nightly  levothyroxine (SYNTHROID) tablet 50 mcg, Daily  famotidine (PEPCID) tablet 20 mg, Daily  sodium chloride flush 0.9 % injection 10 mL, 2 times per day  sodium chloride flush 0.9 % injection 10 mL, PRN  promethazine (PHENERGAN) tablet 12.5 mg, Q6H PRN    Or  ondansetron (ZOFRAN) injection 4 mg, Q6H PRN  polyethylene glycol (GLYCOLAX) packet 17 g, Daily PRN  labetalol (NORMODYNE;TRANDATE) injection 20 mg, Q6H PRN        Review of Systems:     No face to face encounter done as he in isolation for COVID 19 PNA . Case discuseed with RN including PE findings     Physical exam:   Vital signs /66   Pulse 68   Temp 98.4 °F (36.9 °C) (Infrared)   Resp 18   Ht 5' (1.524 m)   Wt 117 lb (53.1 kg)   SpO2 91%   BMI 22.85 kg/m²     No face to face encounter done as he in isolation for COVID 19 PNA .  Case discuseed with RN including PE findings     Data:   Labs:  CBC with Differential:    Lab Results   Component Value Date    WBC 14.9 03/17/2021    RBC 2.28 03/17/2021    HGB 10.1 03/18/2021    HCT 29.2 03/18/2021     03/17/2021    MCV 84.6 03/17/2021    MCH 28.1 03/17/2021    MCHC 33.2 03/17/2021    RDW 13.4 03/17/2021    NRBC 0.9 03/17/2021    SEGSPCT 59 02/27/2013    METASPCT 12.0 03/11/2021    LYMPHOPCT 7.8 03/17/2021    MONOPCT 2.6 03/17/2021    MYELOPCT 2.0 03/11/2021    BASOPCT 0.1 03/17/2021    MONOSABS 0.45 03/17/2021    LYMPHSABS 1.19 03/17/2021    EOSABS 0.00 03/17/2021    BASOSABS 0.00 03/17/2021     CMP:    Lab Results   Component Value Date     03/19/2021    K 3.9 03/19/2021    K 4.2 03/09/2021     03/19/2021    CO2 19 03/19/2021    BUN 95 03/19/2021    CREATININE 3.7 03/19/2021    GFRAA 15 03/19/2021    LABGLOM 15 03/19/2021    GLUCOSE 238 03/19/2021    GLUCOSE 419 03/21/2011    PROT 4.9 03/15/2021    LABALBU 2.4 03/15/2021    LABALBU 4.1 03/21/2011    CALCIUM 8.1 03/19/2021    BILITOT <0.2 03/15/2021    ALKPHOS 66 03/15/2021    AST 48 03/15/2021    ALT 30 03/15/2021     Ionized Calcium:  No results found for: IONCA  Magnesium:    Lab Results   Component Value Date    MG 2.2 03/11/2021     Phosphorus:    Lab Results   Component Value Date    PHOS 7.9 03/11/2021     U/A:    Lab Results   Component Value Date    COLORU Straw 03/08/2021    PHUR 5.5 03/08/2021    LABCAST RARE 03/27/2015    WBCUA 0-1 03/08/2021    WBCUA NONE 02/06/2012    RBCUA 1-3 03/08/2021    RBCUA 0-1 12/25/2012    YEAST FEW 04/04/2015    BACTERIA NONE SEEN 03/08/2021    CLARITYU Clear 03/08/2021    SPECGRAV 1.025 03/08/2021    LEUKOCYTESUR Negative 03/08/2021    UROBILINOGEN 0.2 03/08/2021    BILIRUBINUR Negative 03/08/2021    BILIRUBINUR NEGATIVE 02/06/2012    BLOODU SMALL 03/08/2021    GLUCOSEU 250 03/08/2021    GLUCOSEU >=1000 02/06/2012    AMORPHOUS FEW 03/08/2021     Microalbumen/Creatinine ratio:  No components found for: RUCREAT  Iron Saturation:  No components found for: PERCENTFE  TIBC:  No results found for: TIBC  FERRITIN:    Lab Results   Component Value Date    FERRITIN 613 03/16/2021        Imaging:  US DUP LOWER EXTREMITIES BILATERAL VENOUS   Final Result   Normal         XR CHEST PORTABLE   Final Result   Continued but improved bilateral pulmonary airspace opacities, left greater   than right         XR CHEST PORTABLE   Final Result   1. Rapid development of widespread bilateral parenchymal consolidation   predominant from the hilar regions to the periphery of the lung that   superimposes to previous left upper lobe peripheral focal consolidation. 2.  The differential diagnosis include acute pulmonary edema, pulmonary   hemorrhage, diffuse alveolar damage, bilateral pneumonia. Please correlate   clinically. XR CHEST PORTABLE   Final Result   Left upper lobe opacity worrisome for pneumonia. US RETROPERITONEAL LIMITED   Final Result   Mildly atrophic and echogenic kidneys, compatible with mild chronic renal   parenchymal disease. Unremarkable ultrasound of the bladder. Assessment    -subacute kidney injury vs progressive worsening of her CKD   Ms Ivis Mckeon has been having rather progressive elevation in cr over the last 1.5 years . Cr back on 3/2019 was 1 . Cr on 11/2019 1.6 .  Cr back on 8/2020 was 2.1 and now cr is 3.2   The CKD is likley due to diabetic nephropathy thought the progression is rather fast .   Her current GAVIN is likley also exacerbated by her uncontrolled HTN on presentation .     -Nephrotic range proteinuria    -HTN urgency     -Anaemia of chronic disease     -Mineral bone disease     -Insulin depedent diabetes with complication     -COVID 19 PNA with respiratory failure       PLAN :    -Creatinine trending down   -continue coreg to 25 mg bid   -increase amlodipine to 5 mg bid   -ISSA (negative ) ,ANCA(negative , SPEP (negative), UPEP (negative)  -will need bmp in 3-5 days with close f/u in office           Electronically signed by David Puri MD on 3/19/2021 at 5:11 PM

## 2021-03-19 NOTE — DISCHARGE SUMMARY
10. 2  --        No results for input(s): POCGLU in the last 72 hours. Imaging:  Xr Chest Portable    Result Date: 3/8/2021  EXAMINATION: ONE XRAY VIEW OF THE CHEST 3/8/2021 8:27 pm COMPARISON: 03/13/2019 HISTORY: ORDERING SYSTEM PROVIDED HISTORY: fever TECHNOLOGIST PROVIDED HISTORY: Reason for exam:->fever FINDINGS: Left upper lobe opacity worrisome for pneumonia. There is no effusion or pneumothorax. The cardiomediastinal silhouette is without acute process. The osseous structures are without acute process. Left upper lobe opacity worrisome for pneumonia. Us Retroperitoneal Limited    Result Date: 3/8/2021  EXAMINATION: ULTRASOUND OF THE KIDNEYS AND BLADDER 3/8/2021 8:20 pm COMPARISON: None. HISTORY: ORDERING SYSTEM PROVIDED HISTORY: raquel TECHNOLOGIST PROVIDED HISTORY: Reason for exam:->raquel What reading provider will be dictating this exam?->CRC FINDINGS: The right kidney measures 8.3 cm in length and the left kidney measures 8.5 cm in length. Kidneys demonstrate mildly increased cortical echogenicity. No hydronephrosis or intrarenal stones. No focal lesions. Bladder is unremarkable in appearance. Mildly atrophic and echogenic kidneys, compatible with mild chronic renal parenchymal disease. Unremarkable ultrasound of the bladder.          Patient Instructions:   Current Discharge Medication List      START taking these medications    Details   dexamethasone (DECADRON) 0.5 MG tablet Take 8 tablets by mouth daily (with breakfast) for 2 days  Qty: 2 tablet, Refills: 0      carvedilol (COREG) 25 MG tablet Take 1 tablet by mouth 2 times daily (with meals)  Qty: 60 tablet, Refills: 3      amLODIPine (NORVASC) 5 MG tablet Take 1 tablet by mouth 2 times daily  Qty: 30 tablet, Refills: 3         CONTINUE these medications which have NOT CHANGED    Details   insulin lispro, 1 Unit Dial, 100 UNIT/ML SOPN 6 units with breakfast and lunch and 7 units with supper  Max 50 units per day  Qty: 5 pen, Refills: 5 Associated Diagnoses: Type 2 diabetes mellitus with other specified complication, with long-term current use of insulin (HCC)      acetaminophen (TYLENOL) 500 MG tablet Take 1 tablet by mouth 4 times daily as needed for Pain  Qty: 40 tablet, Refills: 0      insulin glargine (LANTUS SOLOSTAR) 100 UNIT/ML injection pen Inject 18 Units into the skin every morning  Qty: 5 pen, Refills: 3      atorvastatin (LIPITOR) 80 MG tablet Take 80 mg by mouth daily      raNITIdine (ZANTAC) 150 MG tablet Take 150 mg by mouth daily      levothyroxine (SYNTHROID) 50 MCG tablet Take 1 tablet by mouth daily  Qty: 90 tablet, Refills: 2    Associated Diagnoses: Hypothyroidism, unspecified type      gabapentin (NEURONTIN) 300 MG capsule Take 300 mg by mouth nightly. clopidogrel (PLAVIX) 75 MG tablet Take 75 mg by mouth daily      aspirin 81 MG tablet Take 81 mg by mouth daily. STOP taking these medications       blood glucose test strips (ONETOUCH ULTRA) strip Comments:   Reason for Stopping:         Continuous Blood Gluc Sensor (DEXCOM G6 SENSOR) MISC Comments:   Reason for Stopping:         Continuous Blood Gluc Transmit (DEXCOM G6 TRANSMITTER) MISC Comments:   Reason for Stopping:         hydrALAZINE (APRESOLINE) 50 MG tablet Comments:   Reason for Stopping:         indapamide (LOZOL) 2.5 MG tablet Comments:   Reason for Stopping:         lisinopril-hydrochlorothiazide (PRINZIDE) 20-12.5 MG per tablet Comments:   Reason for Stopping:         Lancets Misc. MISC Comments:   Reason for Stopping:                 Note that more than 30 minutes was spent in preparing discharge papers, discussing discharge with patient, medication review, etc.    NOTE: This report was transcribed using voice recognition software. Every effort was made to ensure accuracy; however, inadvertent computerized transcription errors may be present.      Signed:  Electronically signed by Pratima Regalado MD on 3/19/2021 at 2:32 PM

## 2021-03-20 NOTE — CARE COORDINATION
[facility] Transitions Initial Follow Up Call    Call within 2 business days of discharge: Yes    Patient: Michelle Ivey Patient : 1960   MRN: [id number]  Reason for Admission: HTN urgency  Discharge Date: 3/19/21 RARS: Readmission Risk Score: 22      Last Discharge [facility]       Complaint Diagnosis Description Type Department Provider    3/8/21 Abnormal Lab; Hypertension Hypertensive urgency . .. ED to Hosp-Admission (Discharged) (ADMITTED) Vernon Ayala MD; Sushil Pert. ..        -First attempt to reach the patient for COVID-19 monitoring Care Transition call post hospital discharge. Message left with [facility]'s contact information requesting return phone call.     Non-face-to-face services provided:  Communication with home health agencies or other community services the patient is currently using-Visit scheduled for tomorrow(3/21/21)per Faith at [facility]        Follow Up  Future Appointments   Date Time Provider Henri Aldridge   3/25/2021  2:20 Lam Vicente MD [facility]       Izzy France RN

## 2021-03-21 PROBLEM — J96.01 ACUTE RESPIRATORY FAILURE WITH HYPOXIA (HCC): Status: ACTIVE | Noted: 2021-01-01

## 2021-03-21 PROBLEM — U07.1 COVID-19: Status: ACTIVE | Noted: 2021-01-01

## 2021-03-21 NOTE — ED NOTES
Pt SPO2 79% on 6L NC. Pt placed on 15L hi flow NC O2. SPO2 rebounded and WNL.       Gita Ross RN  03/21/21 5242

## 2021-03-21 NOTE — Clinical Note
Patient Class: Inpatient [101]   REQUIRED: Diagnosis: Acute respiratory failure with hypoxia ([facility] [address] [206669]   Estimated Length of Stay: Estimated stay of more than 2 midnights   Telemetry Bed Required?: Yes

## 2021-03-21 NOTE — ED PROVIDER NOTES
Patient is 59-year-old male presents emergency department with worsening shortness of breath at home. Per EMS patient was 60% on room air when they arrived. Upon arrival to the emergency department patient was 78% on room air. She was placed on 15 L via nonrebreather mask and was saturating at 94%. Patient with oxygen on was had good respiratory effort was not tachypneic or tachycardic. Denies any chest pain states she has become more more short of breath since redraping over the hospital 2 days ago. Patient has been in the hospital for the last week due to Covid infection. The history is provided by the patient. No  was used. Shortness of Breath  Severity:  Severe  Onset quality:  Gradual  Timing:  Constant  Progression:  Worsening  Chronicity:  New  Context: activity and URI    Relieved by:  Oxygen  Worsened by: Activity  Ineffective treatments:  None tried  Associated symptoms: fever and wheezing    Associated symptoms: no abdominal pain, no chest pain, no ear pain, no headaches, no neck pain, no rash, no sore throat and no vomiting         Review of Systems   Constitutional: Positive for activity change, appetite change, chills, fatigue and fever. HENT: Negative for ear pain and sore throat. Eyes: Negative for pain. Respiratory: Positive for shortness of breath and wheezing. Cardiovascular: Negative for chest pain. Gastrointestinal: Negative for abdominal pain, nausea and vomiting. Genitourinary: Negative for flank pain and pelvic pain. Musculoskeletal: Negative for back pain and neck pain. Skin: Negative for rash. Neurological: Negative for headaches. Physical Exam  Vitals signs and nursing note reviewed. Constitutional:       General: She is not in acute distress. Appearance: She is well-developed. She is ill-appearing. HENT:      Head: Normocephalic and atraumatic.       Right Ear: External ear normal.      Left Ear: External ear normal. Nose: Nose normal.      Mouth/Throat:      Mouth: Mucous membranes are moist.      Pharynx: Oropharynx is clear. Eyes:      Pupils: Pupils are equal, round, and reactive to light. Neck:      Musculoskeletal: Normal range of motion and neck supple. Cardiovascular:      Rate and Rhythm: Normal rate and regular rhythm. Heart sounds: Normal heart sounds. Pulmonary:      Effort: Pulmonary effort is normal. Tachypnea present. No respiratory distress. Breath sounds: Examination of the right-upper field reveals wheezing. Examination of the left-upper field reveals wheezing. Wheezing present. Abdominal:      Palpations: Abdomen is soft. Tenderness: There is no abdominal tenderness. Musculoskeletal:         General: No swelling or tenderness. Right lower leg: No edema. Left lower leg: No edema. Skin:     General: Skin is warm and dry. Findings: No rash. Neurological:      Mental Status: She is alert and oriented to person, place, and time. Cranial Nerves: No cranial nerve deficit. Procedures     EKG: This EKG is signed and interpreted by me. Rate: 81  Rhythm: Sinus  Interpretation: no acute changes  Comparison: stable as compared to patient's most recent EKG       MDM  Number of Diagnoses or Management Options  Acute respiratory failure with hypoxia (HCC)  Chronic kidney disease, unspecified CKD stage  NSTEMI (non-ST elevated myocardial infarction) ([facility] [address]  Pneumonia due to COVID-19 virus  Diagnosis management comments: Patient is a 59-year-old female presents emergency department due to generalized fatigue and acute respiratory failure with hypoxia. She is found to be 78% room air upon arrival to emergency department. She placed on nonrebreather which improved her oxygenation she did need to be progressed to high flow nasal cannula in order to improve oxygenation. D-dimer was elevated. However due to poor kidney function the patient not candidate for CT.   Due to this patient will receive a perfusion scan and patient and placed on heparin for assumed DVT versus PE. Amount and/or Complexity of Data Reviewed  Clinical lab tests: reviewed  Tests in the radiology section of CPT®: reviewed  Tests in the medicine section of CPT®: reviewed  Decide to obtain previous medical records or to obtain history from someone other than the patient: yes            --------------------------------------------- PAST HISTORY ---------------------------------------------  Past Medical History:  has a past medical history of Acid reflux, Hyperlipidemia, Hypertension, Hypothyroidism, S/P appendectomy, and Type II or unspecified type diabetes mellitus without mention of complication, not stated as uncontrolled. Past Surgical History:  has a past surgical history that includes Appendectomy and Hysterectomy. Social History:  reports that she has never smoked. She has never used smokeless tobacco. She reports that she does not drink alcohol or use drugs. Family History: family history includes Diabetes in her brother, mother, and sister; High Blood Pressure in her mother. The patients home medications have been reviewed.     Allergies: Demerol and Toradol [ketorolac tromethamine]    -------------------------------------------------- RESULTS -------------------------------------------------    LABS:  Results for orders placed or performed during the hospital encounter of 03/21/21   CBC Auto Differential   Result Value Ref Range    WBC 13.9 (H) 4.5 - 11.5 E9/L    RBC 3.52 3.50 - 5.50 E12/L    Hemoglobin 10.0 (L) 11.5 - 15.5 g/dL    Hematocrit 30.3 (L) 34.0 - 48.0 %    MCV 86.1 80.0 - 99.9 fL    MCH 28.4 26.0 - 35.0 pg    MCHC 33.0 32.0 - 34.5 %    RDW 14.1 11.5 - 15.0 fL    Platelets 530 (H) 960 - 450 E9/L    MPV 9.2 7.0 - 12.0 fL    Neutrophils % 83.2 (H) 43.0 - 80.0 %    Immature Granulocytes % 1.2 0.0 - 5.0 %    Lymphocytes % 8.6 (L) 20.0 - 42.0 %    Monocytes % 5.7 2.0 - 12.0 % Eosinophils % 1.2 0.0 - 6.0 %    Basophils % 0.1 0.0 - 2.0 %    Neutrophils Absolute 11.58 (H) 1.80 - 7.30 E9/L    Immature Granulocytes # 0.16 E9/L    Lymphocytes Absolute 1.20 (L) 1.50 - 4.00 E9/L    Monocytes Absolute 0.79 0.10 - 0.95 E9/L    Eosinophils Absolute 0.16 0.05 - 0.50 E9/L    Basophils Absolute 0.01 0.00 - 0.20 E9/L   Comprehensive Metabolic Panel w/ Reflex to MG   Result Value Ref Range    Sodium 141 132 - 146 mmol/L    Potassium reflex Magnesium 4.2 3.5 - 5.0 mmol/L    Chloride 110 (H) 98 - 107 mmol/L    CO2 21 (L) 22 - 29 mmol/L    Anion Gap 10 7 - 16 mmol/L    Glucose 175 (H) 74 - 99 mg/dL    BUN 94 (H) 8 - 23 mg/dL    CREATININE 3.7 (H) 0.5 - 1.0 mg/dL    GFR Non-African American 15 >=60 mL/min/1.73    GFR African American 15     Calcium 8.3 (L) 8.6 - 10.2 mg/dL    Total Protein 5.1 (L) 6.4 - 8.3 g/dL    Albumin 2.5 (L) 3.5 - 5.2 g/dL    Total Bilirubin 0.3 0.0 - 1.2 mg/dL    Alkaline Phosphatase 89 35 - 104 U/L    ALT 71 (H) 0 - 32 U/L    AST 50 (H) 0 - 31 U/L   Troponin   Result Value Ref Range    Troponin 0.12 (H) 0.00 - 0.03 ng/mL   Brain Natriuretic Peptide   Result Value Ref Range    Pro-BNP 1,968 (H) 0 - 125 pg/mL   Lactic Acid, Plasma   Result Value Ref Range    Lactic Acid 1.4 0.5 - 2.2 mmol/L   Blood Gas, Arterial   Result Value Ref Range    Date Analyzed 20210321     Time Analyzed 1615     Source: Blood Arterial     pH, Blood Gas 7.442 7.350 - 7.450    PCO2 27.5 (L) 35.0 - 45.0 mmHg    PO2 53.8 (L) 75.0 - 100.0 mmHg    HCO3 18.3 (L) 22.0 - 26.0 mmol/L    B.E. -4.7 (L) -3.0 - 3.0 mmol/L    O2 Sat 87.4 (L) 92.0 - 98.5 %    O2Hb 86.8 (L) 94.0 - 97.0 %    COHb 0.3 0.0 - 1.5 %    MetHb 0.4 0.0 - 1.5 %    O2 Content 12.8 mL/dL    HHb 12.5 (H) 0.0 - 5.0 %    tHb (est) 10.5 (L) 11.5 - 16.5 g/dL    Mode NC-6 L/M     Date Of Collection      Time Collected      Pt Temp 37.0 C     ID [id number]     Lab [id number]     Critical(s) Notified .  No Critical Values    D-Dimer, Quantitative   Result 16 92 %     03/21/21 1526 (!) 149/70 100.6 °F (38.1 °C) 82 18 93 % 5' (1.524 m) 125 lb (56.7 kg)       Oxygen Saturation Interpretation: Abnormal    ------------------------------------------ PROGRESS NOTES ------------------------------------------  Re-evaluation(s):  Time: 1800  Patients symptoms show no change  Repeat physical examination is not changed    Counseling:  I have spoken with the patient and discussed todays results, in addition to providing specific details for the plan of care and counseling regarding the diagnosis and prognosis. Their questions are answered at this time and they are agreeable with the plan of admission.    --------------------------------- ADDITIONAL PROVIDER NOTES ---------------------------------  Consultations:   Spoke with Dr. Amadeo Parry. Discussed case. They will admit the patient. This patient's ED course included: a personal history and physicial examination, re-evaluation prior to disposition, multiple bedside re-evaluations, IV medications, cardiac monitoring, continuous pulse oximetry and complex medical decision making and emergency management    This patient has remained hemodynamically stable during their ED course. Diagnosis:  1. Acute respiratory failure with hypoxia ([address]    2. Pneumonia due to COVID-19 virus    3. Chronic kidney disease, unspecified CKD stage    4. NSTEMI (non-ST elevated myocardial infarction) ([facility] [address]        Disposition:  Patient's disposition: Admit to telemetry  Patient's condition is fair.                     Farida Peraza DO  Resident  03/21/21 5679

## 2021-03-21 NOTE — H&P
reports that she does not drink alcohol or use drugs. Family History:   family history includes Diabetes in her brother, mother, and sister; High Blood Pressure in her mother. PHYSICAL EXAM:  Vitals:  BP (!) 149/70   Pulse 80   Temp 100.6 °F (38.1 °C)   Resp 15   Ht 5' (1.524 m)   Wt 125 lb (56.7 kg)   SpO2 93%   BMI 24.41 kg/m²     General Appearance: alert and oriented to person, place and time and in no acute distress  Skin: warm and dry  Head: normocephalic and atraumatic  Eyes: pupils equal, round, and reactive to light, extraocular eye movements intact, conjunctivae normal  Neck: neck supple and non tender without mass   Pulmonary/Chest: Coarse breath sounds bilateral, normal air movement, no respiratory distress  Cardiovascular: normal rate, normal S1 and S2 and no carotid bruits  Abdomen: soft, non-tender, non-distended, normal bowel sounds, no masses or organomegaly  Extremities: no cyanosis, no clubbing and no edema  Neurologic: no cranial nerve deficit and speech normal    LABS:  Recent Labs     03/19/21  1320 03/21/21  1539    141   K 3.9 4.2   * 110*   CO2 19* 21*   BUN 95* 94*   CREATININE 3.7* 3.7*   GLUCOSE 238* 175*   CALCIUM 8.1* 8.3*       Recent Labs     03/21/21  1539   WBC 13.9*   RBC 3.52   HGB 10.0*   HCT 30.3*   MCV 86.1   MCH 28.4   MCHC 33.0   RDW 14.1   *   MPV 9.2       No results for input(s): POCGLU in the last 72 hours. Radiology: Ct Head Wo Contrast    Result Date: 3/21/2021  EXAMINATION: CT OF THE HEAD WITHOUT CONTRAST  3/21/2021 5:42 pm TECHNIQUE: CT of the head was performed without the administration of intravenous contrast. Dose modulation, iterative reconstruction, and/or weight based adjustment of the mA/kV was utilized to reduce the radiation dose to as low as reasonably achievable. COMPARISON: CT of the head without contrast, March 13, 2019.  HISTORY: ORDERING SYSTEM PROVIDED HISTORY: fall TECHNOLOGIST PROVIDED HISTORY: Reason for exam:->fall Has a \"code stroke\" or \"stroke alert\" been called? ->No Decision Support Exception->Emergency Medical Condition (MA) FINDINGS: BRAIN/VENTRICLES: There is no acute intracranial hemorrhage, mass effect or midline shift. No abnormal extra-axial fluid collection. The gray-white differentiation is maintained without evidence of an acute infarct. There is no evidence of hydrocephalus. Calcification is seen along the left tentorium cerebelli. ORBITS: The visualized portion of the orbits demonstrate no acute abnormality. SINUSES: Mild mucosal thickening is seen in the paranasal sinuses. The mastoids are clear. SOFT TISSUES/SKULL:  No acute abnormality of the visualized skull or soft tissues. No skull fracture or acute intracranial abnormality. Ct Chest Wo Contrast    Result Date: 3/21/2021  EXAMINATION: CT OF THE CHEST WITHOUT CONTRAST 3/21/2021 5:42 pm TECHNIQUE: CT of the chest was performed without the administration of intravenous contrast. Multiplanar reformatted images are provided for review. Dose modulation, iterative reconstruction, and/or weight based adjustment of the mA/kV was utilized to reduce the radiation dose to as low as reasonably achievable. COMPARISON: None. HISTORY: ORDERING SYSTEM PROVIDED HISTORY: SOB TECHNOLOGIST PROVIDED HISTORY: Reason for exam:->SOB Decision Support Exception->Emergency Medical Condition (MA) FINDINGS: Mediastinum: There is mild calcified plaque throughout the aorta which is normal caliber and unchanged. The pulmonary vessels are engorged centrally and more prominent. There is no mediastinal mass or adenopathy. There is a small pericardial effusion. Lungs/pleura: There are extensive airspace opacities along the apices extending inferiorly into both upper lobes and perihilar regions and scattered along both lung bases with air bronchograms throughout. There is a tiny right pleural effusion posteriorly. No pulmonary nodule or mass is seen.   There is no pneumothorax. No fracture is seen. Upper Abdomen: The adrenals are normal.  There is minimal ascites around the liver and spleen otherwise, unremarkable upper abdomen. Soft Tissues/Bones: The bones are intact     Extensive airspace opacities throughout both lungs which is most prominent along the upper lobes and perihilar regions and could pulmonary edema and/or pneumonia vs pulmonary hemorrhage. Recommend short-term follow-up. Prominent central pulmonary vessels suggestive of pulmonary congestion or pulmonary artery hypertension. Small pericardial effusion with no mediastinal mass or adenopathy. Tiny right pleural effusion. Questionable mild ascites along the upper abdomen     Xr Chest Portable    Result Date: 3/21/2021  EXAMINATION: ONE XRAY VIEW OF THE CHEST 3/21/2021 4:18 pm COMPARISON: 03/12/2021 HISTORY: ORDERING SYSTEM PROVIDED HISTORY: SOB TECHNOLOGIST PROVIDED HISTORY: Reason for exam:->SOB FINDINGS: The heart is mildly enlarged and more prominent. The pulmonary vessels are engorged and ill-defined centrally and more prominent. There are hazy perihilar and bibasilar opacities which is more apparent. No obvious effusion is seen. Mild cardiomegaly and mild pulmonary edema which is more prominent. Hazy perihilar and bibasilar opacities which have increased and could be due to pulmonary edema and/or pneumonia. Recommend follow-up. Resolving left upper lobe opacity. EKG: Sinus rhythm AV block first-degree no ischemia    ASSESSMENT:      Active Problems:    Acute respiratory failure with hypoxia ([address]    COVID-19  Resolved Problems:    * No resolved hospital problems. *      PLAN:    Multi-factorial acute hypoxic respiratory failure due to recent COVID-19 and some degree of volume overload due to acute on chronic kidney disease. I will consult nephrology regarding the kidney disease.   When ER first told me about her return I was concerned about a new development of a pulmonary embolism so D-dimer was requested by me to the ER doctor. This came back as much higher than her previous. Overall I will need to treat her for PE with heparin protocol that the pharmacist in ER is helping me order. Furthermore we will stop her IV fluids since a significant portion of this is the volume overload from her acute kidney injury. I will order a VQ scan for tomorrow but this will have to be interpreted in light of the recent Covid and pneumonia. Also note that she had a 2D echo on March 8 which showed normal ejection fraction mild pulmonary hypertension overall normal with mild left ventricular concentric hypertrophy  Presumptive PE anticoagulated check ultrasounds of legs and V/Q  Diabetes diabetic diet sliding scale coverage. Anemia monitor  Hypertension continue current regimen and monitor and adjust  Code Status: Full  DVT prophylaxis: Systemic anticoagulation  NOTE: This report was transcribed using voice recognition software.  Every effort was made to ensure accuracy; however, inadvertent computerized transcription errors may be present.     Electronically signed by Violet Curling, MD on 3/21/2021 at 6:32 PM

## 2021-03-22 NOTE — PLAN OF CARE
Problem: Airway Clearance - Ineffective  Goal: Achieve or maintain patent airway  Outcome: Met This Shift     Problem: Gas Exchange - Impaired  Goal: Absence of hypoxia  Outcome: Met This Shift  Goal: Promote optimal lung function  Outcome: Met This Shift     Problem: Breathing Pattern - Ineffective  Goal: Ability to achieve and maintain a regular respiratory rate  Outcome: Met This Shift     Problem:  Body Temperature -  Risk of, Imbalanced  Goal: Ability to maintain a body temperature within defined limits  Outcome: Met This Shift  Goal: Will regain or maintain usual level of consciousness  Outcome: Met This Shift  Goal: Complications related to the disease process, condition or treatment will be avoided or minimized  Outcome: Met This Shift

## 2021-03-22 NOTE — ED NOTES
Heparin has infused for over six hours , aptt has been drawnx2 and lab could not result x2. Dr Cassidy Stubbs advised.  orders heparin discontinued. Lovenox ordered at 1 mg/kg after 1 hour.       Gladys Santana RN  03/22/21 [id number]

## 2021-03-22 NOTE — ED NOTES
Bed: 15  Expected date:   Expected time:   Means of arrival:   Comments:  Terminal clean      Karey Thomson RN  03/22/21 9539

## 2021-03-22 NOTE — PROGRESS NOTES
supplemental oxygen  Continue to monitor  Consult Pulm     2. COVID-19 Infection:  Completed treatment with Decadron, remdesivir during last hospitalization  Will check procalcitonin, if elevated, may start abx. Also, she was treated with abx and completed course during last hospitalization    2. Hypocalcemia: continue to monitor  BMP in am, and if still low will replace  Suspects that this low ca is due to hypoalbumin    4. DM type 2:  Continue home insulin dosing    5. CKD: Renal function is stable  ckd is stage 3.    6. ? PE: V/Q scan show low Probability for PE. Elevated D-dimer is due to covid  Since patient is still at high risk of blood clot based on hx of covid and elevated D- Dimer, will allow current lovenox dosing for now. Will re-evaluate and possibly decrease to DVT prophylaxis dosing    7. Metabolic Acidosis due to CKD: will monitor    8. Hypertension Essential:  Resume home medications  Hold of on amlodipine for concern of lower extremity swelling  Continue coreg and PRN Hydralazine.

## 2021-03-22 NOTE — ED NOTES
Patient assisted with Bedpan and given ginger ale. No further needs at this time.       Patt Kaye RN  03/22/21 5911

## 2021-03-22 NOTE — HOME CARE
Patient is an active referral with [facility] for skilled nursing, pt, ot. Will need new home care orders at discharge.  Evelyn Ribeiro lpn

## 2021-03-22 NOTE — ED NOTES
Patient asleep. Patient remains on cardiac, SPO2 monitors. No distress noted. Vapotherm settings-  35 LPM, 85%, 35 deg.  Vivi Corbett RN  03/22/21 4315

## 2021-03-23 NOTE — CONSULTS
Pulmonary/Critical Care Consult Note    CHIEF COMPLAINT: Cough and shortness of breath    HISTORY OF PRESENT ILLNESS: 69-year-old -American female presented with increasing  Shortness of breath at home. Patient presented to the emergency room her sats were 70% on room air she was placed on a 15 L nonrebreather mask and sats improved to 94%. Patient denied any chest pains 1 week prior to this admission for Covid I believe    Patient had some underlying chronic renal failure with a creatinine of 3.1 baseline, type 2 diabetes,. Hypothyroidism, hyperlipidemia, reflux and hypertension.     ALLERGY:  Demerol, Peanut-containing drug products, and Toradol [ketorolac tromethamine]    FAMILY HISTORY:  Family History   Problem Relation Age of Onset    Diabetes Mother     High Blood Pressure Mother     Diabetes Brother     Diabetes Sister        SOCIAL HISTORY:  Social History     Socioeconomic History    Marital status: Single     Spouse name: Not on file    Number of children: Not on file    Years of education: Not on file    Highest education level: Not on file   Occupational History    Not on file   Social Needs    Financial resource strain: Not on file    Food insecurity     Worry: Not on file     Inability: Not on file    Transportation needs     Medical: Not on file     Non-medical: Not on file   Tobacco Use    Smoking status: Never Smoker    Smokeless tobacco: Never Used   Substance and Sexual Activity    Alcohol use: No    Drug use: No    Sexual activity: Not Currently   Lifestyle    Physical activity     Days per week: Not on file     Minutes per session: Not on file    Stress: Not on file   Relationships    Social connections     Talks on phone: Not on file     Gets together: Not on file     Attends Congregational service: Not on file     Active member of club or organization: Not on file     Attends meetings of clubs or organizations: Not on file     Relationship status: Not on file   Damien Becerra Intimate partner violence     Fear of current or ex partner: Not on file     Emotionally abused: Not on file     Physically abused: Not on file     Forced sexual activity: Not on file   Other Topics Concern    Not on file   Social History Narrative    Not on file       MEDICAL HISTORY:  Past Medical History:   Diagnosis Date    Acid reflux     Hyperlipidemia     Hypertension     Hypothyroidism     S/P appendectomy     Type II or unspecified type diabetes mellitus without mention of complication, not stated as uncontrolled        MEDICATIONS:   amLODIPine  5 mg Oral BID    [START ON 3/24/2021] enoxaparin  30 mg Subcutaneous Daily    piperacillin-tazobactam  3,375 mg Intravenous Q12H    vancomycin  1,000 mg Intravenous Once    dexamethasone  6 mg Intravenous Q24H    aspirin  81 mg Oral Daily    atorvastatin  80 mg Oral Daily    carvedilol  25 mg Oral BID WC    clopidogrel  75 mg Oral Daily    famotidine  20 mg Oral BID    gabapentin  300 mg Oral Nightly    insulin glargine  18 Units Subcutaneous QAM    levothyroxine  88 mcg Oral Daily    insulin lispro  0-6 Units Subcutaneous TID WC    insulin lispro  0-3 Units Subcutaneous Nightly    sodium bicarbonate  1,300 mg Oral BID    sodium chloride flush  10 mL Intravenous 2 times per day      sodium chloride       acetaminophen, sodium chloride flush, promethazine **OR** ondansetron, polyethylene glycol, [DISCONTINUED] acetaminophen **OR** acetaminophen    Review of Systems   Constitutional: Negative. HENT: Positive for congestion. Eyes: Negative. Respiratory: Positive for cough and shortness of breath. Cardiovascular: Negative. Gastrointestinal: Negative. Endocrine: Negative. Genitourinary: Negative. Musculoskeletal: Negative. Skin: Negative. Allergic/Immunologic: Negative. Neurological: Negative. Hematological: Negative. Psychiatric/Behavioral: Negative.         PHYSICAL EXAM:  Vitals:    03/23/21 1430   BP: (!) 160/71   Pulse: 80   Resp: 22   Temp: 97.4 °F (36.3 °C)   SpO2: 100%     FiO2 : 95 %  O2 Flow Rate (L/min): 35 L/min  O2 Device: PAP (positive airway pressure)(12/6)    Constitutional: Pleasant Afro-American female appears younger than stated age by appearance but she is weak cooperative  Skin: No skin breakdown or cyanosis  HEENT: Normocephalic neck is supple some early thrush no sinus tenderness external nose without lesion PERRLA EOMI  Neck: Supple no JVD no trach deviation  Cardiovascular: Rate and rhythm regular no gallop no murmur  Respiratory: Few crackles at bases decreased breath sounds bases bilaterally  Gastrointestinal: Soft bowel sounds present no peritoneal signs  Genitourinary: Deferred as well as breast exam deferred  Extremities: No signs of DVT or ischemia  Neurological: Patient is arousable follows my commands she is however very weak  Psychological: Difficult to assess with patient being on the BiPAP    LABS:  WBC   Date Value Ref Range Status   03/23/2021 15.8 (H) 4.5 - 11.5 E9/L Final   03/22/2021 13.1 (H) 4.5 - 11.5 E9/L Final   03/21/2021 13.9 (H) 4.5 - 11.5 E9/L Final     Hemoglobin   Date Value Ref Range Status   03/23/2021 8.4 (L) 11.5 - 15.5 g/dL Final   03/22/2021 9.8 (L) 11.5 - 15.5 g/dL Final   03/21/2021 10.0 (L) 11.5 - 15.5 g/dL Final     Hematocrit   Date Value Ref Range Status   03/23/2021 26.1 (L) 34.0 - 48.0 % Final   03/22/2021 30.9 (L) 34.0 - 48.0 % Final   03/21/2021 30.3 (L) 34.0 - 48.0 % Final     MCV   Date Value Ref Range Status   03/23/2021 87.3 80.0 - 99.9 fL Final   03/22/2021 87.5 80.0 - 99.9 fL Final   03/21/2021 86.1 80.0 - 99.9 fL Final     Platelets   Date Value Ref Range Status   03/23/2021 517 (H) 130 - 450 E9/L Final   03/22/2021 540 (H) 130 - 450 E9/L Final   03/21/2021 549 (H) 130 - 450 E9/L Final     Sodium   Date Value Ref Range Status   03/23/2021 142 132 - 146 mmol/L Final   03/22/2021 143 132 - 146 mmol/L Final   03/21/2021 141 132 - 146 mmol/L Final Potassium   Date Value Ref Range Status   03/23/2021 4.5 3.5 - 5.0 mmol/L Final   03/22/2021 4.3 3.5 - 5.0 mmol/L Final   03/19/2021 3.9 3.5 - 5.0 mmol/L Final     Potassium reflex Magnesium   Date Value Ref Range Status   03/21/2021 4.2 3.5 - 5.0 mmol/L Final   03/09/2021 4.2 3.5 - 5.0 mmol/L Final     Chloride   Date Value Ref Range Status   03/23/2021 110 (H) 98 - 107 mmol/L Final   03/22/2021 116 (H) 98 - 107 mmol/L Final   03/21/2021 110 (H) 98 - 107 mmol/L Final     CO2   Date Value Ref Range Status   03/23/2021 20 (L) 22 - 29 mmol/L Final   03/22/2021 15 (L) 22 - 29 mmol/L Final   03/21/2021 21 (L) 22 - 29 mmol/L Final     BUN   Date Value Ref Range Status   03/23/2021 94 (H) 8 - 23 mg/dL Final   03/22/2021 82 (H) 8 - 23 mg/dL Final   03/21/2021 94 (H) 8 - 23 mg/dL Final     CREATININE   Date Value Ref Range Status   03/23/2021 3.6 (H) 0.5 - 1.0 mg/dL Final   03/22/2021 3.1 (H) 0.5 - 1.0 mg/dL Final   03/21/2021 3.7 (H) 0.5 - 1.0 mg/dL Final     Glucose   Date Value Ref Range Status   03/23/2021 252 (H) 74 - 99 mg/dL Final   03/22/2021 220 (H) 74 - 99 mg/dL Final   03/21/2021 175 (H) 74 - 99 mg/dL Final   03/21/2011 419 (H) 70 - 110 mg/dL Final     Calcium   Date Value Ref Range Status   03/23/2021 8.0 (L) 8.6 - 10.2 mg/dL Final   03/22/2021 6.9 (L) 8.6 - 10.2 mg/dL Final   03/21/2021 8.3 (L) 8.6 - 10.2 mg/dL Final     Total Protein   Date Value Ref Range Status   03/21/2021 5.1 (L) 6.4 - 8.3 g/dL Final   03/15/2021 4.9 (L) 6.4 - 8.3 g/dL Final   03/15/2021 5.0 (L) 6.4 - 8.3 g/dL Final     Albumin   Date Value Ref Range Status   03/21/2021 2.5 (L) 3.5 - 5.2 g/dL Final   03/15/2021 2.4 (L) 3.5 - 5.2 g/dL Final   03/15/2021 2.5 (L) 3.5 - 5.2 g/dL Final   03/21/2011 4.1 3.2 - 4.8 g/dL Final     Total Bilirubin   Date Value Ref Range Status   03/21/2021 0.3 0.0 - 1.2 mg/dL Final   03/15/2021 <0.2 0.0 - 1.2 mg/dL Final   03/15/2021 <0.2 0.0 - 1.2 mg/dL Final     Alkaline Phosphatase   Date Value Ref Range Status   03/21/2021 89 35 - 104 U/L Final   03/15/2021 66 35 - 104 U/L Final   03/15/2021 68 35 - 104 U/L Final     AST   Date Value Ref Range Status   03/21/2021 50 (H) 0 - 31 U/L Final   03/15/2021 48 (H) 0 - 31 U/L Final   03/15/2021 47 (H) 0 - 31 U/L Final     Comment:     Specimen is slightly Hemolyzed. Result may be artificially increased. ALT   Date Value Ref Range Status   03/21/2021 71 (H) 0 - 32 U/L Final   03/15/2021 30 0 - 32 U/L Final   03/15/2021 29 0 - 32 U/L Final     GFR Non-   Date Value Ref Range Status   03/23/2021 15 >=60 mL/min/1.73 Final     Comment:     Chronic Kidney Disease: less than 60 ml/min/1.73 sq.m. Kidney Failure: less than 15 ml/min/1.73 sq.m. Results valid for patients 18 years and older. 03/22/2021 18 >=60 mL/min/1.73 Final     Comment:     Chronic Kidney Disease: less than 60 ml/min/1.73 sq.m. Kidney Failure: less than 15 ml/min/1.73 sq.m. Results valid for patients 18 years and older. 03/21/2021 15 >=60 mL/min/1.73 Final     Comment:     Chronic Kidney Disease: less than 60 ml/min/1.73 sq.m. Kidney Failure: less than 15 ml/min/1.73 sq.m. Results valid for patients 18 years and older. GFR    Date Value Ref Range Status   03/23/2021 15  Final   03/22/2021 18  Final   03/21/2021 15  Final     Magnesium   Date Value Ref Range Status   03/11/2021 2.2 1.6 - 2.6 mg/dL Final     Phosphorus   Date Value Ref Range Status   03/11/2021 7.9 (H) 2.5 - 4.5 mg/dL Final   03/06/2021 4.5 2.5 - 4.5 mg/dL Final   08/05/2020 3.8 2.5 - 4.5 mg/dL Final     Recent Labs     03/23/21  1658   PH 7.427   PO2 99.5   PCO2 33.4*   HCO3 21.5*   BE -2.4   O2SAT 96.9       RADIOLOGY:  XR CHEST PORTABLE   Final Result   Diffuse and bilateral airspace opacities. Findings consistent with extensive   pulmonary edema versus pneumonia. NM LUNG SCAN PERFUSION ONLY   Final Result   Low probability for pulmonary embolism.   Small perfusion defects correspond   to the opacities in the mid lungs bilaterally. US DUP LOWER EXTREMITIES BILATERAL VENOUS   Final Result   No evidence of DVT in either lower extremity. CT Head WO Contrast   Final Result   No skull fracture or acute intracranial abnormality. CT CHEST WO CONTRAST   Final Result   Extensive airspace opacities throughout both lungs which is most prominent   along the upper lobes and perihilar regions and could pulmonary edema and/or   pneumonia vs pulmonary hemorrhage. Recommend short-term follow-up. Prominent central pulmonary vessels suggestive of pulmonary congestion or   pulmonary artery hypertension. Small pericardial effusion with no mediastinal mass or adenopathy. Tiny right pleural effusion. Questionable mild ascites along the upper abdomen         XR CHEST PORTABLE   Final Result   Mild cardiomegaly and mild pulmonary edema which is more prominent. Hazy perihilar and bibasilar opacities which have increased and could be due   to pulmonary edema and/or pneumonia. Recommend follow-up. Resolving left upper lobe opacity. IMPRESSION:  1. Bilateral Covid pneumonia with ARDS concern for hospital-acquired pneumonia with a recent hospitalization  2. Hypoxic respiratory failure  3. Chronic kidney disease  4. Thrush    PLAN:  1. Agree with Dr. Kathe Estrella for coverage for hospital-acquired pneumonia  2. Steroids  3. Respiratory treatments and judicious oxygen with high flow and NIV ABGs reviewed  4. Aggressive DVT prophylaxis to include possibly full anticoagulation as patient cannot have PE studies by CT pending D-dimer  5. Protonix  6.  Vitamin D and Trental renal adjusted    ATTESTATION:  ICU Staff Physician note of personal involvement in Care  As the attending physician, I certify that I personally reviewed the patients history and personnally examined the patient to confirm the physical findings described above,  And that I reviewed the relevant imaging studies and available reports. I also discussed the differential diagnosis and all of the proposed management plans with the patient and individuals accompanying the patient to this visit. They had the opportunity to ask questions about the proposed management plans and to have those questions answered. This patient has a high probability of sudden, clinically significant deterioration, which requires the highest level of physician preparedness to intervene urgently. I managed/supervised life or organ supporting interventions that required frequent physician assessment. I devoted my full attention to the direct care of this patient for the amount of time indicated below. Time I spent with the family or surrogate(s) is included only if the patient was incapable of providing the necessary information or participating in medical decisions  Time devoted to teaching and to any procedures I billed separately is not included.     CRITICAL CARE TIME:  40 minutes    Electronically signed by Cam Velez DO on 3/23/2021 at 6:26 PM

## 2021-03-23 NOTE — PROGRESS NOTES
[facility] MD Pedro Skelton MD Andrey Quail MD Nona Musca MD   Progress note   Patient's Name: Sri Zee  9:59 AM  3/23/2021          Pt known to our service . She was discharged few days back . She is back with cc of sob . She had recent admission with COVID PNA   CXR done with concern of mild chf   Cr stable at 3.1           History of Present Ilness: The patient is a 57 y.o. female with significant past medical history of diabetes type 2, Essential hypertension who presents to the ER following being noticed to have progressively elevated cr numbers   In looking in records pt cr has been trending up rather fast over the last year to year and half . She does have around 7 g proteinuria based on spot urine /cr ratio . Cr on presentation was 3.2 . Cr today 3.5   She was also found to have bp of 200 . At home she is on hctz/lisinopril as part of her antihtn . I saw her in her room this am , she is alert oriented pleasant 58 y/o F . Past Medical History:   Diagnosis Date    Acid reflux     Hyperlipidemia     Hypertension     Hypothyroidism     S/P appendectomy     Type II or unspecified type diabetes mellitus without mention of complication, not stated as uncontrolled        Past Surgical History:   Procedure Laterality Date    APPENDECTOMY      HYSTERECTOMY         Family History   Problem Relation Age of Onset    Diabetes Mother     High Blood Pressure Mother     Diabetes Brother     Diabetes Sister         reports that she has never smoked. She has never used smokeless tobacco. She reports that she does not drink alcohol or use drugs.     Allergies:  Demerol, Peanut-containing drug products, and Toradol [ketorolac tromethamine]    Current Medications:    enoxaparin (LOVENOX) injection 60 mg, Daily  acetaminophen (TYLENOL) tablet 500 mg, 4x Daily PRN  aspirin chewable tablet 81 mg, Daily  atorvastatin (LIPITOR) tablet 80 mg, Daily  carvedilol (COREG) tablet 25 mg, BID WC  clopidogrel (PLAVIX) tablet 75 mg, Daily  famotidine (PEPCID) tablet 20 mg, BID  gabapentin (NEURONTIN) capsule 300 mg, Nightly  insulin glargine (LANTUS) injection vial 18 Units, QAM  levothyroxine (SYNTHROID) tablet 88 mcg, Daily  insulin lispro (HUMALOG) injection vial 0-6 Units, TID WC  insulin lispro (HUMALOG) injection vial 0-3 Units, Nightly  sodium bicarbonate tablet 1,300 mg, BID  sodium chloride flush 0.9 % injection 10 mL, 2 times per day  sodium chloride flush 0.9 % injection 10 mL, PRN  promethazine (PHENERGAN) tablet 12.5 mg, Q6H PRN    Or  ondansetron (ZOFRAN) injection 4 mg, Q6H PRN  polyethylene glycol (GLYCOLAX) packet 17 g, Daily PRN  acetaminophen (TYLENOL) suppository 650 mg, Q6H PRN        Review of Systems:     No face to face encounter done as he in isolation for COVID 19 PNA . Case discuseed with RN including PE findings     Physical exam:   Vital signs BP (!) 162/70   Pulse 89   Temp 98.2 °F (36.8 °C) (Infrared)   Resp 26   Ht 5' (1.524 m)   Wt 125 lb (56.7 kg)   SpO2 100%   BMI 24.41 kg/m²     No face to face encounter done as he in isolation for COVID 19 PNA .  Case discuseed with RN including PE findings     Data:   Labs:  CBC with Differential:    Lab Results   Component Value Date    WBC 15.8 03/23/2021    RBC 2.99 03/23/2021    HGB 8.4 03/23/2021    HCT 26.1 03/23/2021     03/23/2021    MCV 87.3 03/23/2021    MCH 28.1 03/23/2021    MCHC 32.2 03/23/2021    RDW 14.5 03/23/2021    NRBC 0.9 03/17/2021    SEGSPCT 59 02/27/2013    METASPCT 12.0 03/11/2021    LYMPHOPCT 8.6 03/21/2021    MONOPCT 5.7 03/21/2021    MYELOPCT 2.0 03/11/2021    BASOPCT 0.1 03/21/2021    MONOSABS 0.79 03/21/2021    LYMPHSABS 1.20 03/21/2021    EOSABS 0.16 03/21/2021    BASOSABS 0.01 03/21/2021     CMP:    Lab Results   Component Value Date     03/23/2021    K 4.5 03/23/2021    K 4.2 03/21/2021     03/23/2021    CO2 20 03/23/2021    BUN 94 03/23/2021    CREATININE 3.6 03/23/2021    GFRAA 15 03/23/2021    LABGLOM 15 03/23/2021    GLUCOSE 252 03/23/2021    GLUCOSE 419 03/21/2011    PROT 5.1 03/21/2021    LABALBU 2.5 03/21/2021    LABALBU 4.1 03/21/2011    CALCIUM 8.0 03/23/2021    BILITOT 0.3 03/21/2021    ALKPHOS 89 03/21/2021    AST 50 03/21/2021    ALT 71 03/21/2021     Ionized Calcium:  No results found for: IONCA  Magnesium:    Lab Results   Component Value Date    MG 2.2 03/11/2021     Phosphorus:    Lab Results   Component Value Date    PHOS 7.9 03/11/2021     U/A:    Lab Results   Component Value Date    COLORU Straw 03/08/2021    PHUR 5.5 03/08/2021    LABCAST RARE 03/27/2015    WBCUA 0-1 03/08/2021    WBCUA NONE 02/06/2012    RBCUA 1-3 03/08/2021    RBCUA 0-1 12/25/2012    YEAST FEW 04/04/2015    BACTERIA NONE SEEN 03/08/2021    CLARITYU Clear 03/08/2021    SPECGRAV 1.025 03/08/2021    LEUKOCYTESUR Negative 03/08/2021    UROBILINOGEN 0.2 03/08/2021    BILIRUBINUR Negative 03/08/2021    BILIRUBINUR NEGATIVE 02/06/2012    BLOODU SMALL 03/08/2021    GLUCOSEU 250 03/08/2021    GLUCOSEU >=1000 02/06/2012    AMORPHOUS FEW 03/08/2021     Microalbumen/Creatinine ratio:  No components found for: RUCREAT  Iron Saturation:  No components found for: PERCENTFE  TIBC:  No results found for: TIBC  FERRITIN:    Lab Results   Component Value Date    FERRITIN 613 03/16/2021        Imaging:  NM LUNG SCAN PERFUSION ONLY   Final Result   Low probability for pulmonary embolism. Small perfusion defects correspond   to the opacities in the mid lungs bilaterally. US DUP LOWER EXTREMITIES BILATERAL VENOUS   Final Result   No evidence of DVT in either lower extremity. CT Head WO Contrast   Final Result   No skull fracture or acute intracranial abnormality.          CT CHEST WO CONTRAST   Final Result   Extensive airspace opacities throughout both lungs which is most prominent   along the upper lobes and perihilar regions and could pulmonary edema and/or pneumonia vs pulmonary hemorrhage. Recommend short-term follow-up. Prominent central pulmonary vessels suggestive of pulmonary congestion or   pulmonary artery hypertension. Small pericardial effusion with no mediastinal mass or adenopathy. Tiny right pleural effusion. Questionable mild ascites along the upper abdomen         XR CHEST PORTABLE   Final Result   Mild cardiomegaly and mild pulmonary edema which is more prominent. Hazy perihilar and bibasilar opacities which have increased and could be due   to pulmonary edema and/or pneumonia. Recommend follow-up. Resolving left upper lobe opacity. Assessment    -chronic kidney disease stage IV   Ms Latonya Hamilton has been having rather progressive elevation in cr over the last 1.5 years . Cr back on 3/2019 was 1 . Cr on 11/2019 1.6 .  Cr back on 8/2020 was 2.1 and now cr is 3.2   The CKD is likley due to diabetic nephropathy thought the progression is rather fast .   Negative immunologic w/u ISSA (negative ) ,ANCA(negative , SPEP (negative), UPEP (negative)    -Nephrotic range proteinuria    -HTN     -Anaemia of chronic disease     -Metabolic acidosis     -Mineral bone disease     -Insulin depedent diabetes with complication     -COVID 19 PNA with respiratory failure       PLAN :    Give lasix 40 iv once dose   Start nahco3 tablets 1300 mg bid   Am labs           Electronically signed by Kisha Bianchi MD on 3/23/2021 at 9:59 AM

## 2021-03-23 NOTE — PROGRESS NOTES
Department of Internal Medicine  General Internal Medicine  Attending Progress Note    Chief Complaint   Patient presents with    Concern For COVID-19     positive covid, shortness of breath, EMS states 60% on room air. 94% on 15LPM NRB    Shortness of Breath       SUBJECTIVE:    Patient is more lethargic today. She is able to follow command. Moves all extremity when prompted. She is on Bipap and saturating at 100%.  Bipap setting 12/6, rate of 12  OBJECTIVE      Medications    Current Facility-Administered Medications: amLODIPine (NORVASC) tablet 5 mg, 5 mg, Oral, BID  [START ON 3/24/2021] enoxaparin (LOVENOX) injection 30 mg, 30 mg, Subcutaneous, Daily  acetaminophen (TYLENOL) tablet 500 mg, 500 mg, Oral, 4x Daily PRN  aspirin chewable tablet 81 mg, 81 mg, Oral, Daily  atorvastatin (LIPITOR) tablet 80 mg, 80 mg, Oral, Daily  carvedilol (COREG) tablet 25 mg, 25 mg, Oral, BID WC  clopidogrel (PLAVIX) tablet 75 mg, 75 mg, Oral, Daily  famotidine (PEPCID) tablet 20 mg, 20 mg, Oral, BID  gabapentin (NEURONTIN) capsule 300 mg, 300 mg, Oral, Nightly  insulin glargine (LANTUS) injection vial 18 Units, 18 Units, Subcutaneous, QAM  levothyroxine (SYNTHROID) tablet 88 mcg, 88 mcg, Oral, Daily  insulin lispro (HUMALOG) injection vial 0-6 Units, 0-6 Units, Subcutaneous, TID WC  insulin lispro (HUMALOG) injection vial 0-3 Units, 0-3 Units, Subcutaneous, Nightly  sodium bicarbonate tablet 1,300 mg, 1,300 mg, Oral, BID  sodium chloride flush 0.9 % injection 10 mL, 10 mL, Intravenous, 2 times per day  sodium chloride flush 0.9 % injection 10 mL, 10 mL, Intravenous, PRN  promethazine (PHENERGAN) tablet 12.5 mg, 12.5 mg, Oral, Q6H PRN **OR** ondansetron (ZOFRAN) injection 4 mg, 4 mg, Intravenous, Q6H PRN  polyethylene glycol (GLYCOLAX) packet 17 g, 17 g, Oral, Daily PRN  [DISCONTINUED] acetaminophen (TYLENOL) tablet 650 mg, 650 mg, Oral, Q6H PRN **OR** acetaminophen (TYLENOL) suppository 650 mg, 650 mg, Rectal, Q6H PRN  Physical PLAN      1. Acute respiratory failure with hypoxia [facility])  Patient appears more lethargic today. Continue Bipap  Check ABG  Consult placed to Pulm    2. COVID-19 infection:  Was discharged last week. This is readmission  Received all treatment prior  ID consult for further input and recommendations    3. Hypothyroidism:  Check TSH  Continue synthroid    4. DM type 2:   Elevated BGL  Resume home dose insulin    5. Hx of CKD stage 3:  Worsened renal function may be due to lasix. However, overall this is stable  Will continue to monitor  Appreciate Nephro input  Combination of anemia and ESRD. Unsure if Vasculitis can explain this picture. Will discuss with renal.  Still more likely to be from     6. Hypertension Essential:   Started back on amlodipine  Continue to monitor BP    7. ? Congestive heart failure: suspects Diastolic dysfunction  Last ECHO is not supportive of this  However, chest X Ray, BNP is somewhat leaning in this direction  Will be cautious with Diuresis to avoid worsening renal damage    8. Leukocytosis: etiology is unknown    9. Anemia: chronic. Will monitor.  hgb continues to drop.

## 2021-03-23 NOTE — CONSULTS
[address] [facility]  Consult Note    [address] [facility] [address]  [place], [address]  Phone [phone]   [phone]      Admit Date: 3/21/2021  3:11 PM  Pt Name: Deidra Del Rio  MRN: [id number]  : 1960  Reason for Consult:    Chief Complaint   Patient presents with    Concern For COVID-19     positive covid, shortness of breath, EMS states 60% on room air. 94% on 15LPM NRB    Shortness of Breath     Requesting Physician:  Nallely Tidwell MD  PCP: Karel Cobian MD  History Obtained From:  patient, chart   ID consulted for Acute respiratory failure with hypoxia ([facility] [address] [J96.01]  COVID-19 [U07.1]  on hospital day [address]       Chief Complaint   Patient presents with    Concern For COVID-19     positive covid, shortness of breath, EMS states 60% on room air. 94% on 15LPM NRB    Shortness of Breath     HISTORYOF PRESENT ILLNESS   Lakia Segura is a 57 y.o. female who presents with significant past medical history of  has a past medical history of Acid reflux, Hyperlipidemia, Hypertension, Hypothyroidism, S/P appendectomy, and Type II or unspecified type diabetes mellitus without mention of complication, not stated as uncontrolled. ED TRIAGEVITALS  BP: (!) 160/71, Temp: 97.4 °F (36.3 °C), Pulse: 80, Resp: 22, SpO2: 100 %  HPI  Pt known to ID   She is s/p d/c on 3/19 for SEPSIS/FEVERS  COVID PNEUMONIA  LEUKOCYTOSIS    GAVIN/CKD  S/P TOCI   S/P convalescent PLASMA  Unable to give remdesvir due to GAVIN  She had a course of vanco/levaquin   Pt was noncompliant with rx plan and pt/ot prior to d/c  Pt was d/c to home   Pt comes in now on 3/21 with SOB and hypoxia   Wbc13.9 hgb10 [id number] cr3.7  US neg DVT   V/q scan neg   CT chest Extensive airspace opacities throughout both lungs which is most prominent   along the upper lobes and perihilar regions and could pulmonary edema and/or   pneumonia vs pulmonary hemorrhage.  Recommend short-term follow-up. Prominent central pulmonary vessels suggestive of pulmonary congestion or   pulmonary artery hypertension. Small pericardial effusion with no mediastinal mass or adenopathy. Tiny right pleural effusion. Questionable mild ascites along the upper abdomen   ID was asked to see for atbx mx  Wbc15.8 cr3.6   Pt on bipap  Poor historian due to lethargy     [START ON 3/24/2021] enoxaparin (LOVENOX) injection 30 mg, Daily  piperacillin-tazobactam (ZOSYN) 3,375 mg in dextrose 5 % 50 mL IVPB extended infusion (mini-bag), Q12H  vancomycin 1000 mg IVPB in 250 mL D5W addavial, Once  dexamethasone (PF) (DECADRON) injection 6 mg, Q24H       REVIEW OF SYSTEMS       REVIEW OFSYSTEMS:    CONSTITUTIONAL:   On bipap difficult to get     Medications Prior to Admission: levothyroxine (SYNTHROID) 88 MCG tablet, Take 88 mcg by mouth Daily  famotidine (PEPCID) 20 MG tablet, Take 20 mg by mouth 2 times daily  docusate sodium (COLACE) 100 MG capsule, Take 100 mg by mouth nightly  insulin lispro (HUMALOG) 100 UNIT/ML injection vial, Inject 0-8 Units into the skin 3 times daily (before meals) *Per Sliding Scale*  carvedilol (COREG) 25 MG tablet, Take 1 tablet by mouth 2 times daily (with meals)  amLODIPine (NORVASC) 5 MG tablet, Take 1 tablet by mouth 2 times daily  acetaminophen (TYLENOL) 500 MG tablet, Take 1 tablet by mouth 4 times daily as needed for Pain  insulin glargine (LANTUS SOLOSTAR) 100 UNIT/ML injection pen, Inject 18 Units into the skin every morning  atorvastatin (LIPITOR) 80 MG tablet, Take 80 mg by mouth daily  gabapentin (NEURONTIN) 300 MG capsule, Take 300 mg by mouth nightly. clopidogrel (PLAVIX) 75 MG tablet, Take 75 mg by mouth daily  aspirin 81 MG tablet, Take 81 mg by mouth daily.   CURRENT MEDICATIONS     Current Facility-Administered Medications:     amLODIPine (NORVASC) tablet 5 mg, 5 mg, Oral, BID, Jesus Alberto Ramírez MD    [START ON 3/24/2021] enoxaparin (LOVENOX) injection 30 mg, 30 mg, Subcutaneous, chloride flush 0.9 % injection 10 mL, 10 mL, Intravenous, PRN, Harini Herrera MD    promethazine (PHENERGAN) tablet 12.5 mg, 12.5 mg, Oral, Q6H PRN **OR** ondansetron (ZOFRAN) injection 4 mg, 4 mg, Intravenous, Q6H PRN, Harini Herrera MD    polyethylene glycol (GLYCOLAX) packet 17 g, 17 g, Oral, Daily PRN, Harini Herrera MD    [DISCONTINUED] acetaminophen (TYLENOL) tablet 650 mg, 650 mg, Oral, Q6H PRN **OR** acetaminophen (TYLENOL) suppository 650 mg, 650 mg, Rectal, Q6H PRN, Harini Herrera MD  ALLERGIES     Demerol, Peanut-containing drug products, and Toradol [ketorolac tromethamine]    There is no immunization history on file for this patient.   PAST MEDICAL HISTORY     Past Medical History:   Diagnosis Date    Acid reflux     Hyperlipidemia     Hypertension     Hypothyroidism     S/P appendectomy     Type II or unspecified type diabetes mellitus without mention of complication, not stated as uncontrolled      SURGICAL HISTORY       Past Surgical History:   Procedure Laterality Date    APPENDECTOMY      HYSTERECTOMY       FAMILY HISTORY       Family History   Problem Relation Age of Onset    Diabetes Mother     High Blood Pressure Mother     Diabetes Brother     Diabetes Sister      SOCIAL HISTORY       Social History     Socioeconomic History    Marital status: Single     Spouse name: None    Number of children: None    Years of education: None    Highest education level: None   Occupational History    None   Social Needs    Financial resource strain: None    Food insecurity     Worry: None     Inability: None    Transportation needs     Medical: None     Non-medical: None   Tobacco Use    Smoking status: Never Smoker    Smokeless tobacco: Never Used   Substance and Sexual Activity    Alcohol use: No    Drug use: No    Sexual activity: Not Currently   Lifestyle    Physical activity     Days per week: None     Minutes per session: None    Stress: None   Relationships    Social connections     Talks on phone: None     Gets together: None     Attends Restorationism service: None     Active member of club or organization: None     Attends meetings of clubs or organizations: None     Relationship status: None    Intimate partner violence     Fear of current or ex partner: None     Emotionally abused: None     Physically abused: None     Forced sexual activity: None   Other Topics Concern    None   Social History Narrative    None     ·      PHYSICAL EXAM       ED Triage Vitals   BP Temp Temp Source Pulse Resp SpO2 Height Weight   03/21/21 1526 03/21/21 1526 03/22/21 0848 03/21/21 1526 03/21/21 1526 03/21/21 1526 03/21/21 1526 03/21/21 1526   (!) 149/70 100.6 °F (38.1 °C) Oral 82 18 93 % 5' (1.524 m) 125 lb (56.7 kg)     Vitals:    Vitals:    03/23/21 0240 03/23/21 0915 03/23/21 1043 03/23/21 1430   BP:  (!) 162/70  (!) 160/71   Pulse:  89  80   Resp: 26 26 25 22   Temp:  98.2 °F (36.8 °C)  97.4 °F (36.3 °C)   TempSrc:  Infrared  Infrared   SpO2:  100%  100%   Weight:       Height:         Physical Exam   Constitutional/General: bipap NAD  Head: NC/AT  Eyes: PERRL, EOMI  Neck: Supple, full ROM,    Pulmonary: Lungs clear to auscultation bilaterally. Not in respiratory distress  Cardiovascular:  Regular rate and rhythm, no murmurs, gallops, or rubs. Abdomen: Soft, + BS.    distension. Nontender.     Extremities: thin    Warm and well perfused  Pulses:  Distal pulses intact  Skin: Warm and dry without rash  Neurologic:    does not follow commands briefly arouses  Psych: Normal Affect     DIAGNOSTIC RESULTS   RADIOLOGY:   Echo Complete    Result Date: 3/11/2021  Transthoracic Echocardiography Report (TTE)  Demographics   Patient Name        Karin Cruz Gender               Female   Medical Record      [id number]     Room Number          0034  Number   Account #           [de-identified]    Procedure Date       03/11/2021   Corporate ID                     Ordering Physician   Accession Number [id number]   Referring Physician  Robert Wilson MD   Date of Birth       1960   Sonographer          Lay Lancaster RDCS   Age                 57 year(s)   Interpreting         Jenifer Leal DO                                   Physician                                    Any Other  Procedure Type of Study   TTE procedure:Echo Limited Study. Procedure Date Date: 03/11/2021 Start: 11:08 AM Study Location: Portable Technical Quality: Adequate visualization Indications:Pulmonary edema. Patient Status: Routine Height: 60 inches Weight: 117 pounds BSA: 1.49 m^2 BMI: 22.85 kg/m^2 BP: 175/77 mmHg  Findings   Left Ventricle  Left ventricular size is grossly normal.  Mild left ventricular concentric hypertrophy noted. Ejection fraction is measured at 77%. No evidence of left ventricular mass or thrombus noted. No regional wall motion abnormalities seen. Right Ventricle  Normal right ventricular size and function. Left Atrium  Normal sized left atrium. Interatrial septum appears intact. No evidence of thrombus within left atrium. Right Atrium  Normal right atrium size. Mitral Valve  Physiologic and/or trace mitral regurgitation is present. No evidence of mitral valve stenosis. Tricuspid Valve  Physiologic and/or trace tricuspid regurgitation. RVSP is 32.98 mmHg. Pulmonary hypertension is mild . Aortic Valve  Aortic valve opens well. The aortic valve is trileaflet. No evidence of aortic valve regurgitation. No hemodynamically significant aortic stenosis is present. Pulmonic Valve  Pulmonic valve is structurally normal.   Pericardial Effusion  No evidence of pericardial effusion. Aorta  The aorta is within normal limits. Miscellaneous  Regular rhythm. Limited due to Covid   Conclusions   Summary  Left ventricular size is grossly normal.  Mild left ventricular concentric hypertrophy noted. Ejection fraction is measured at 77%. No evidence of left ventricular mass or thrombus noted.   No regional wall motion abnormalities seen. Normal sized left atrium. Interatrial septum appears intact. No evidence of thrombus within left atrium. Physiologic and/or trace mitral regurgitation is present. No evidence of mitral valve stenosis. Physiologic and/or trace tricuspid regurgitation. RVSP is 32.98 mmHg. Pulmonary hypertension is mild . Regular rhythm.   Limited due to [employer]   ----------------------------------------------------------------  Electronically signed by Stacie Infante DO(Interpreting  physician) on 03/11/2021 05:59 PM  ----------------------------------------------------------------  M-Mode/2D Measurements & Calculations   LV Diastolic    LV Systolic Dimension: 2.2   AV Cusp Separation: 1.9 cmLA  Dimension: 3.9  cm                           Dimension: 2.4 cmAO Root  cm              LV Volume Diastolic: 62.5 ml Dimension: 3.4 cm  LV FS:43.6 %    LV Volume Systolic: 27.5 ml  LV PW           LV EDV/LV EDV Index: 52.5  Diastolic: 1.4  XL/75 EC/H^6PN ESV/LV ESV  cm              Index: 15.6 ml/10ml/ m^2     RV Diastolic Dimension: 2.6  LV PW Systolic: EF Calculated: 22.9 %        cm  1.8 cm          LV Mass Index: 143 l/min*m^2 RV Systolic Dimension: 2 cm  Septum                                       LA/Aorta: 1.49  Diastolic: 1.5  cm              LVOT: 2 cm  Septum  Systolic: 2.2  cm   LV Mass: 212.85  g  Doppler Measurements & Calculations   MV Peak E-Wave: 0.79 m/s   AV Peak Velocity:     LVOT Peak Velocity: 0.93  MV Peak A-Wave: 0.97 m/s   1.13 m/s              m/s  MV E/A Ratio: 0.81         AV Peak Gradient: 5.1 LVOT Peak Gradient: 3.5  MV Peak Gradient: 5.1 mmHg mmHg                  mmHg  MV Mean Gradient: 2.3 mmHg                       Estimated RVSP: 33 mmHg  MV Mean Velocity: 0.71 m/s                       Estimated RAP:10 mmHg  MV Deceleration Time:  173.2 msec  MV P1/2t: 37.2 msec        Estimated PASP: 32.98 TR Velocity:2.4 m/s  MVA by PHT:5.91 cm^2       mmHg TR Gradient:22.98 mmHg                                                   PV Peak Velocity: 1.1 m/s                                                   PV Peak Gradient: 4.87                                                   mmHg                                                   PV Mean Velocity: 0.77                                                   m/s                                                   PV Mean Gradient: 2.5                                                   mmHg  [email or web address] [id number]    Ct Head Wo Contrast    Result Date: 3/21/2021  EXAMINATION: CT OF THE HEAD WITHOUT CONTRAST  3/21/2021 5:42 pm TECHNIQUE: CT of the head was performed without the administration of intravenous contrast. Dose modulation, iterative reconstruction, and/or weight based adjustment of the mA/kV was utilized to reduce the radiation dose to as low as reasonably achievable. COMPARISON: CT of the head without contrast, March 13, 2019. HISTORY: ORDERING SYSTEM PROVIDED HISTORY: fall TECHNOLOGIST PROVIDED HISTORY: Reason for exam:->fall Has a \"code stroke\" or \"stroke alert\" been called? ->No Decision Support Exception->Emergency Medical Condition (MA) FINDINGS: BRAIN/VENTRICLES: There is no acute intracranial hemorrhage, mass effect or midline shift. No abnormal extra-axial fluid collection. The gray-white differentiation is maintained without evidence of an acute infarct. There is no evidence of hydrocephalus. Calcification is seen along the left tentorium cerebelli. ORBITS: The visualized portion of the orbits demonstrate no acute abnormality. SINUSES: Mild mucosal thickening is seen in the paranasal sinuses. The mastoids are clear. SOFT TISSUES/SKULL:  No acute abnormality of the visualized skull or soft tissues. No skull fracture or acute intracranial abnormality.      Ct Chest Wo Contrast    Result Date: 3/21/2021  EXAMINATION: CT OF THE CHEST WITHOUT CONTRAST 3/21/2021 5:42 pm TECHNIQUE: CT of the chest was performed without the administration of intravenous contrast. Multiplanar reformatted images are provided for review. Dose modulation, iterative reconstruction, and/or weight based adjustment of the mA/kV was utilized to reduce the radiation dose to as low as reasonably achievable. COMPARISON: None. HISTORY: ORDERING SYSTEM PROVIDED HISTORY: SOB TECHNOLOGIST PROVIDED HISTORY: Reason for exam:->SOB Decision Support Exception->Emergency Medical Condition (MA) FINDINGS: Mediastinum: There is mild calcified plaque throughout the aorta which is normal caliber and unchanged. The pulmonary vessels are engorged centrally and more prominent. There is no mediastinal mass or adenopathy. There is a small pericardial effusion. Lungs/pleura: There are extensive airspace opacities along the apices extending inferiorly into both upper lobes and perihilar regions and scattered along both lung bases with air bronchograms throughout. There is a tiny right pleural effusion posteriorly. No pulmonary nodule or mass is seen. There is no pneumothorax. No fracture is seen. Upper Abdomen: The adrenals are normal.  There is minimal ascites around the liver and spleen otherwise, unremarkable upper abdomen. Soft Tissues/Bones: The bones are intact     Extensive airspace opacities throughout both lungs which is most prominent along the upper lobes and perihilar regions and could pulmonary edema and/or pneumonia vs pulmonary hemorrhage. Recommend short-term follow-up. Prominent central pulmonary vessels suggestive of pulmonary congestion or pulmonary artery hypertension. Small pericardial effusion with no mediastinal mass or adenopathy. Tiny right pleural effusion. Questionable mild ascites along the upper abdomen     Nm Lung Scan Perfusion Only    Result Date: 3/22/2021  EXAMINATION: NUCLEAR MEDICINE PERFUSION SCAN. 3/22/2021 TECHNIQUE: Ventilation scan not performed due to facility constraints. 3.9 millicuries of Tc 15R MAA was administered intravenously prior to planar imaging of the lungs in multiple projections. COMPARISON: Chest radiograph 03/21/2021. HISTORY: ORDERING SYSTEM PROVIDED HISTORY: pe TECHNOLOGIST PROVIDED HISTORY: Reason for exam:->pe What reading provider will be dictating this exam?->CRC FINDINGS: PERFUSION: Heterogeneous distribution of radiotracer. There are small perfusion defects in the mid lungs that correspond to the location of opacities on the chest x-ray. CHEST RADIOGRAPH: Bilateral patchy airspace opacities. Low probability for pulmonary embolism. Small perfusion defects correspond to the opacities in the mid lungs bilaterally. Xr Chest Portable    Result Date: 3/21/2021  EXAMINATION: ONE XRAY VIEW OF THE CHEST 3/21/2021 4:18 pm COMPARISON: 03/12/2021 HISTORY: ORDERING SYSTEM PROVIDED HISTORY: SOB TECHNOLOGIST PROVIDED HISTORY: Reason for exam:->SOB FINDINGS: The heart is mildly enlarged and more prominent. The pulmonary vessels are engorged and ill-defined centrally and more prominent. There are hazy perihilar and bibasilar opacities which is more apparent. No obvious effusion is seen. Mild cardiomegaly and mild pulmonary edema which is more prominent. Hazy perihilar and bibasilar opacities which have increased and could be due to pulmonary edema and/or pneumonia. Recommend follow-up. Resolving left upper lobe opacity. Xr Chest Portable    Result Date: 3/12/2021  EXAMINATION: ONE XRAY VIEW OF THE CHEST 3/12/2021 5:39 am COMPARISON: 03/10/2021 the HISTORY: ORDERING SYSTEM PROVIDED HISTORY: SOB TECHNOLOGIST PROVIDED HISTORY: Reason for exam:->SOB FINDINGS: Stable cardiomediastinal silhouette. There is improved aeration of the lungs, however with persistent bilateral patchy airspace opacities, left greater than right. No effusion or pneumothorax.   Osseous structures are stable. Continued but improved bilateral pulmonary airspace opacities, left greater than right     Xr Chest Portable    Result Date: 3/10/2021  EXAMINATION: ONE XRAY VIEW OF THE CHEST 3/10/2021 12:41 pm COMPARISON: September 4, 2014, March 13, 2019, March 8, 2021 HISTORY: ORDERING SYSTEM PROVIDED HISTORY: dyspnea TECHNOLOGIST PROVIDED HISTORY: Reason for exam:->dyspnea FINDINGS: Rapid development of developmental widespread pulmonary parenchymal opacities with some a air bronchogram throughout both lungs. The heart has normal size. There is a minimal pleural effusion bilaterally. 1.  Rapid development of widespread bilateral parenchymal consolidation predominant from the hilar regions to the periphery of the lung that superimposes to previous left upper lobe peripheral focal consolidation. 2.  The differential diagnosis include acute pulmonary edema, pulmonary hemorrhage, diffuse alveolar damage, bilateral pneumonia. Please correlate clinically. Xr Chest Portable    Result Date: 3/8/2021  EXAMINATION: ONE XRAY VIEW OF THE CHEST 3/8/2021 8:27 pm COMPARISON: 03/13/2019 HISTORY: ORDERING SYSTEM PROVIDED HISTORY: fever TECHNOLOGIST PROVIDED HISTORY: Reason for exam:->fever FINDINGS: Left upper lobe opacity worrisome for pneumonia. There is no effusion or pneumothorax. The cardiomediastinal silhouette is without acute process. The osseous structures are without acute process. Left upper lobe opacity worrisome for pneumonia. Us Retroperitoneal Limited    Result Date: 3/8/2021  EXAMINATION: ULTRASOUND OF THE KIDNEYS AND BLADDER 3/8/2021 8:20 pm COMPARISON: None. HISTORY: ORDERING SYSTEM PROVIDED HISTORY: raquel TECHNOLOGIST PROVIDED HISTORY: Reason for exam:->raquel What reading provider will be dictating this exam?->CRC FINDINGS: The right kidney measures 8.3 cm in length and the left kidney measures 8.5 cm in length. Kidneys demonstrate mildly increased cortical echogenicity.   No hydronephrosis or intrarenal stones. No focal lesions. Bladder is unremarkable in appearance. Mildly atrophic and echogenic kidneys, compatible with mild chronic renal parenchymal disease. Unremarkable ultrasound of the bladder. Us Dup Lower Extremities Bilateral Venous    Result Date: 3/21/2021  EXAMINATION: DUPLEX VENOUS ULTRASOUND OF THE BILATERAL LOWER EXTREMITIES, 3/21/2021 6:29 pm TECHNIQUE: Duplex ultrasound using B-mode/gray scaled imaging and Doppler spectral analysis and color flow was obtained of the bilateral lower extremities. COMPARISON: None. HISTORY: ORDERING SYSTEM PROVIDED HISTORY: dvt TECHNOLOGIST PROVIDED HISTORY: Reason for exam:->dvt What reading provider will be dictating this exam?->CRC FINDINGS: The visualized veins of the bilateral lower extremities are patent and free of echogenic thrombus. The veins demonstrate good compressibility with normal color flow study and spectral analysis. No evidence of DVT in either lower extremity. Us Dup Lower Extremities Bilateral Venous    Result Date: 3/12/2021  EXAMINATION: DUPLEX VENOUS ULTRASOUND OF THE BILATERAL LOWER EXTREMITIES, 3/11/2021 8:07 pm TECHNIQUE: Duplex ultrasound using B-mode/gray scaled imaging and Doppler spectral analysis and color flow was obtained of the bilateral lower extremities. COMPARISON: None. HISTORY: ORDERING SYSTEM PROVIDED HISTORY: d-dimer positive and covid TECHNOLOGIST PROVIDED HISTORY: Reason for exam:->d-dimer positive and covid What reading provider will be dictating this exam?->CRC FINDINGS: The common femoral, superficial femoral, and popliteal veins of the lower extremities are patent and free of echogenic thrombus. The veins are compressible and have normal intraluminal color Doppler activity, waveform, and augmentation.  The deep veins of the calves are grossly normal.     Normal     LABS  Recent Labs     03/21/21  1539 03/22/21  0520 03/23/21  0831   WBC 13.9* 13.1* 15.8*   HGB 10.0* 9.8* 8.4*   HCT 30.3* 30.9* 26.1*   MCV 86.1 87.5 87.3   * 540* 517*     Recent Labs     03/21/21  1539 03/21/21  1539 03/22/21  0520 03/23/21  0831     --  143 142   K 4.2   < > 4.3 4.5   *  --  116* 110*   CO2 21*  --  15* 20*   BUN 94*  --  82* 94*   CREATININE 3.7*  --  3.1* 3.6*   GFRAA 15  --  18 15   LABGLOM 15  --  18 15   GLUCOSE 175*  --  220* 252*   PROT 5.1*  --   --   --    LABALBU 2.5*  --   --   --    CALCIUM 8.3*  --  6.9* 8.0*   BILITOT 0.3  --   --   --    ALKPHOS 89  --   --   --    AST 50*  --   --   --    ALT 71*  --   --   --     < > = values in this interval not displayed. Recent Labs     03/23/21  0831   PROCAL 1.01*     Lab Results   Component Value Date    CRP 0.9 (H) 03/16/2021    CRP 10.8 (H) 03/11/2021     Lab Results   Component Value Date    SEDRATE 40 (H) 03/16/2021    SEDRATE 44 (H) 03/11/2021     No results found for: [id number]  Lab Results   Component Value Date    COVID19 Non-Reactive 03/10/2021    COVID19 DETECTED 03/09/2021     COVID-19/LUDY-COV2 LABS  Recent Labs     03/21/21  1539 03/21/21  1755 03/23/21  0831   PROCAL  --   --  1.01*   TROPONINI 0.12*  --   --    DDIMER  --  1849  --    AST 50*  --   --    ALT 71*  --   --      Lab Results   Component Value Date    CHOL 168 08/05/2020    TRIG 182 08/05/2020    HDL 45 08/05/2020    LDLCALC 87 08/05/2020    LABVLDL 36 08/05/2020        MICROBIOLOGY:     Cultures :   Lab Results   Component Value Date    BC 5 Days no growth 03/08/2021     Lab Results   Component Value Date    BLOODCULT2 5 Days no growth 03/08/2021        Urine Culture, Routine   Date Value Ref Range Status   03/08/2021 <10,000 CFU/mL  Mixed gram positive organisms    Final   11/30/2015 <10,000 CFU/mL  Mixed gram positive organisms    Final   04/04/2015   Final    ,000 CFU/mL  Mixed ulisses isolated. Further workup and sensitivity testing  is not routinely indicated and will not be performed.   Mixed ulisses isolated includes:  Mixed gram positive organisms Patient is a 57 y.o. female who presented with   Chief Complaint   Patient presents with    Concern For COVID-19     positive covid, shortness of breath, EMS states 60% on room air. 94% on 15LPM NRB    Shortness of Breath        FINAL IMPRESSION    Pt know to ID s/p RX covid   D/c to home return due to SOB/hypoxia  Leukocytosis  R/o hcap  ckd check for urinary retention   1. Acute respiratory failure with hypoxia ([facility] [address]    2. Pneumonia due to COVID-19 virus    3. Chronic kidney disease, unspecified CKD stage    4. NSTEMI (non-ST elevated myocardial infarction) ([facility] [address]        -check labs-biomarkers/cx/bladder scan/atbx        [START ON 3/24/2021] enoxaparin (LOVENOX) injection 30 mg, Daily  piperacillin-tazobactam (ZOSYN) 3,375 mg in dextrose 5 % 50 mL IVPB extended infusion (mini-bag), Q12H  vancomycin 1000 mg IVPB in 250 mL D5W addavial, Once  dexamethasone (PF) (DECADRON) injection 6 mg, Q24H           Imaging and labs were reviewed per medical records and any ID pertinent labs were addressed with the patient. An opportunity to ask questions was given to the patient/FAMILY and questions were answered. Thank you for involving me in the care of [facility]. Please do not hesitate to call for any questions or concerns.          Electronically signed by Faustino Gonzalez MD on 3/23/2021 at 6:12 PM

## 2021-03-23 NOTE — PROGRESS NOTES
[facility] MD Олег Marquez MD Lunda Millet MD Bonnell Lauth MD   Progress note   Patient's Name: Freddy Paul  7:50 PM  3/23/2021          Pt known to our service . She was discharged few days back . She is back with cc of sob . She had recent admission with COVID PNA   CXR done with concern of mild chf   Cr is 3.6 today   She is being initiated on abx fo CAP           History of Present Ilness: The patient is a 57 y.o. female with significant past medical history of diabetes type 2, Essential hypertension who presents to the ER following being noticed to have progressively elevated cr numbers   In looking in records pt cr has been trending up rather fast over the last year to year and half . She does have around 7 g proteinuria based on spot urine /cr ratio . Cr on presentation was 3.2 . Cr today 3.5   She was also found to have bp of 200 . At home she is on hctz/lisinopril as part of her antihtn . I saw her in her room this am , she is alert oriented pleasant 56 y/o F . Past Medical History:   Diagnosis Date    Acid reflux     Hyperlipidemia     Hypertension     Hypothyroidism     S/P appendectomy     Type II or unspecified type diabetes mellitus without mention of complication, not stated as uncontrolled        Past Surgical History:   Procedure Laterality Date    APPENDECTOMY      HYSTERECTOMY         Family History   Problem Relation Age of Onset    Diabetes Mother     High Blood Pressure Mother     Diabetes Brother     Diabetes Sister         reports that she has never smoked. She has never used smokeless tobacco. She reports that she does not drink alcohol or use drugs.     Allergies:  Demerol, Peanut-containing drug products, and Toradol [ketorolac tromethamine]    Current Medications:    amLODIPine (NORVASC) tablet 5 mg, BID  [START ON 3/24/2021] enoxaparin (LOVENOX) injection 30 mg, Daily  piperacillin-tazobactam (ZOSYN) 3,375 mg in dextrose 5 % 50 mL IVPB extended infusion (mini-bag), Q12H  dexamethasone (PF) (DECADRON) injection 6 mg, Q24H  0.9 % sodium chloride infusion, Q12H  pentoxifylline (TRENTAL) extended release tablet 400 mg, BID  ipratropium-albuterol (DUONEB) nebulizer solution 1 ampule, 4x daily  nystatin (MYCOSTATIN) 047891 UNIT/ML suspension 500,000 Units, 4x Daily  glucose (GLUTOSE) 40 % oral gel 15 g, PRN  dextrose 50 % IV solution, PRN  glucagon (rDNA) injection 1 mg, PRN  dextrose 5 % solution, PRN  acetaminophen (TYLENOL) tablet 500 mg, 4x Daily PRN  aspirin chewable tablet 81 mg, Daily  atorvastatin (LIPITOR) tablet 80 mg, Daily  carvedilol (COREG) tablet 25 mg, BID WC  clopidogrel (PLAVIX) tablet 75 mg, Daily  famotidine (PEPCID) tablet 20 mg, BID  gabapentin (NEURONTIN) capsule 300 mg, Nightly  insulin glargine (LANTUS) injection vial 18 Units, QAM  levothyroxine (SYNTHROID) tablet 88 mcg, Daily  insulin lispro (HUMALOG) injection vial 0-6 Units, TID WC  insulin lispro (HUMALOG) injection vial 0-3 Units, Nightly  sodium bicarbonate tablet 1,300 mg, BID  sodium chloride flush 0.9 % injection 10 mL, 2 times per day  sodium chloride flush 0.9 % injection 10 mL, PRN  promethazine (PHENERGAN) tablet 12.5 mg, Q6H PRN    Or  ondansetron (ZOFRAN) injection 4 mg, Q6H PRN  polyethylene glycol (GLYCOLAX) packet 17 g, Daily PRN  acetaminophen (TYLENOL) suppository 650 mg, Q6H PRN        Review of Systems:     No face to face encounter done as he in isolation for COVID 19 PNA . Case discuseed with RN including PE findings     Physical exam:   Vital signs BP (!) 144/68   Pulse 79   Temp 97.8 °F (36.6 °C) (Infrared)   Resp 19   Ht 5' (1.524 m)   Wt 125 lb (56.7 kg)   SpO2 100%   BMI 24.41 kg/m²     No face to face encounter done as he in isolation for COVID 19 PNA .  Case discuseed with RN including PE findings     Data:   Labs:  CBC with Differential:    Lab Results   Component Value Date    WBC 15.8 03/23/2021    RBC 2.99 03/23/2021    HGB 8.4 03/23/2021    HCT 26.1 03/23/2021     03/23/2021    MCV 87.3 03/23/2021    MCH 28.1 03/23/2021    MCHC 32.2 03/23/2021    RDW 14.5 03/23/2021    NRBC 0.9 03/17/2021    SEGSPCT 59 02/27/2013    METASPCT 12.0 03/11/2021    LYMPHOPCT 8.6 03/21/2021    MONOPCT 5.7 03/21/2021    MYELOPCT 2.0 03/11/2021    BASOPCT 0.1 03/21/2021    MONOSABS 0.79 03/21/2021    LYMPHSABS 1.20 03/21/2021    EOSABS 0.16 03/21/2021    BASOSABS 0.01 03/21/2021     CMP:    Lab Results   Component Value Date     03/23/2021    K 4.5 03/23/2021    K 4.2 03/21/2021     03/23/2021    CO2 20 03/23/2021    BUN 94 03/23/2021    CREATININE 3.6 03/23/2021    GFRAA 15 03/23/2021    LABGLOM 15 03/23/2021    GLUCOSE 252 03/23/2021    GLUCOSE 419 03/21/2011    PROT 5.1 03/21/2021    LABALBU 2.5 03/21/2021    LABALBU 4.1 03/21/2011    CALCIUM 8.0 03/23/2021    BILITOT 0.3 03/21/2021    ALKPHOS 89 03/21/2021    AST 50 03/21/2021    ALT 71 03/21/2021     Ionized Calcium:  No results found for: IONCA  Magnesium:    Lab Results   Component Value Date    MG 2.2 03/11/2021     Phosphorus:    Lab Results   Component Value Date    PHOS 7.9 03/11/2021     U/A:    Lab Results   Component Value Date    COLORU Yellow 03/23/2021    PHUR 5.0 03/23/2021    LABCAST RARE 03/27/2015    WBCUA 0-1 03/23/2021    WBCUA NONE 02/06/2012    RBCUA NONE 03/23/2021    RBCUA 0-1 12/25/2012    YEAST FEW 04/04/2015    BACTERIA RARE 03/23/2021    CLARITYU SLCLOUDY 03/23/2021    SPECGRAV 1.025 03/23/2021    LEUKOCYTESUR Negative 03/23/2021    UROBILINOGEN 0.2 03/23/2021    BILIRUBINUR Negative 03/23/2021    BILIRUBINUR NEGATIVE 02/06/2012    BLOODU MODERATE 03/23/2021    GLUCOSEU Negative 03/23/2021    GLUCOSEU >=1000 02/06/2012    AMORPHOUS FEW 03/08/2021     Microalbumen/Creatinine ratio:  No components found for: RUCREAT  Iron Saturation:  No components found for: PERCENTFE  TIBC:  No results found for: TIBC  FERRITIN:    Lab Results Component Value Date    FERRITIN 613 03/16/2021        Imaging:  XR CHEST PORTABLE   Final Result   Diffuse and bilateral airspace opacities. Findings consistent with extensive   pulmonary edema versus pneumonia. NM LUNG SCAN PERFUSION ONLY   Final Result   Low probability for pulmonary embolism. Small perfusion defects correspond   to the opacities in the mid lungs bilaterally. US DUP LOWER EXTREMITIES BILATERAL VENOUS   Final Result   No evidence of DVT in either lower extremity. CT Head WO Contrast   Final Result   No skull fracture or acute intracranial abnormality. CT CHEST WO CONTRAST   Final Result   Extensive airspace opacities throughout both lungs which is most prominent   along the upper lobes and perihilar regions and could pulmonary edema and/or   pneumonia vs pulmonary hemorrhage. Recommend short-term follow-up. Prominent central pulmonary vessels suggestive of pulmonary congestion or   pulmonary artery hypertension. Small pericardial effusion with no mediastinal mass or adenopathy. Tiny right pleural effusion. Questionable mild ascites along the upper abdomen         XR CHEST PORTABLE   Final Result   Mild cardiomegaly and mild pulmonary edema which is more prominent. Hazy perihilar and bibasilar opacities which have increased and could be due   to pulmonary edema and/or pneumonia. Recommend follow-up. Resolving left upper lobe opacity. Assessment    -chronic kidney disease stage IV   Ms Portillo Hayes has been having rather progressive elevation in cr over the last 1.5 years . Cr back on 3/2019 was 1 . Cr on 11/2019 1.6 .  Cr back on 8/2020 was 2.1 and now cr is 3.2   The CKD is likley due to diabetic nephropathy thought the progression is rather fast .   Negative immunologic w/u ISSA (negative ) ,ANCA(negative , SPEP (negative), UPEP (negative)    -Nephrotic range proteinuria    -HTN     -Anaemia of chronic disease     -Metabolic acidosis     -Mineral bone disease     -Insulin depedent diabetes with complication     -COVID 19 PNA with respiratory failure       PLAN :    With creatinine creeping up with diuretics , would hold on more diuresis   Continue nahco3 tablets 1300 mg bid   Am labs           Electronically signed by Latasha Lazar MD on 3/23/2021 at 7:50 PM

## 2021-03-23 NOTE — PLAN OF CARE
Problem: Breathing Pattern - Ineffective  Goal: Ability to achieve and maintain a regular respiratory rate  3/22/2021 2353 by David Aixa  Outcome: Met This Shift     Problem: Breathing Pattern - Ineffective  Goal: Ability to achieve and maintain a regular respiratory rate  3/22/2021 2353 by David Silence  Outcome: Met This Shift     Problem: Body Temperature -  Risk of, Imbalanced  Goal: Ability to maintain a body temperature within defined limits  3/22/2021 2353 by David Silence  Outcome: Met This Shift     Problem: Body Temperature -  Risk of, Imbalanced  Goal: Will regain or maintain usual level of consciousness  3/22/2021 2353 by David Silence  Outcome: Met This Shift     Problem: Risk for Fluid Volume Deficit  Goal: Maintain normal heart rhythm  3/22/2021 2353 by David Silence  Outcome: Met This Shift     Problem: Risk for Fluid Volume Deficit  Goal: Maintain normal heart rhythm  3/22/2021 2353 by David Silence  Outcome: Met This Shift     Problem: Risk for Fluid Volume Deficit  Goal: Maintain absence of muscle cramping  3/22/2021 2353 by David Silence  Outcome: Met This Shift     Problem: Risk for Fluid Volume Deficit  Goal: Maintain normal serum potassium, sodium, calcium, phosphorus, and pH  3/22/2021 2353 by David Silence  Outcome: Met This Shift     Problem: Falls - Risk of:  Goal: Will remain free from falls  Description: Will remain free from falls  Outcome: Met This Shift     Problem: Falls - Risk of:  Goal: Absence of physical injury  Description: Absence of physical injury  Outcome: Met This Shift     Problem: Falls - Risk of:  Goal: Absence of physical injury  Description: Absence of physical injury  Outcome: Met This Shift     Problem: Gas Exchange - Impaired  Goal: Absence of hypoxia  3/22/2021 2353 by David Kruse  Outcome: Ongoing     Problem: Gas Exchange - Impaired  Goal: Promote optimal lung function  3/22/2021 2353 by David Kruse  Outcome: Ongoing     Problem:  Body Temperature -  Risk of, Imbalanced  Goal: Complications related to the disease process, condition or treatment will be avoided or minimized  3/22/2021 2353 by Katja Escalante  Outcome: Ongoing     Problem:  Body Temperature -  Risk of, Imbalanced  Goal: Complications related to the disease process, condition or treatment will be avoided or minimized  3/22/2021 2353 by Katja Escalante  Outcome: Ongoing     Problem: Isolation Precautions - Risk of Spread of Infection  Goal: Prevent transmission of infection  3/22/2021 2353 by Katja Escalante  Outcome: Ongoing     Problem: Isolation Precautions - Risk of Spread of Infection  Goal: Prevent transmission of infection  3/22/2021 2353 by Katja Escalante  Outcome: Ongoing     Problem: Fatigue  Goal: Verbalize increase energy and improved vitality  3/22/2021 2353 by Katja Escalante  Outcome: Ongoing     Problem: Loneliness or Risk for Loneliness  Goal: Demonstrate positive use of time alone when socialization is not possible  3/22/2021 2353 by Katja Escalante  Outcome: Ongoing

## 2021-03-23 NOTE — PLAN OF CARE
Problem: Airway Clearance - Ineffective  Goal: Achieve or maintain patent airway  Outcome: Met This Shift     Problem: Gas Exchange - Impaired  Goal: Absence of hypoxia  Outcome: Met This Shift  Goal: Promote optimal lung function  Outcome: Met This Shift     Problem: Breathing Pattern - Ineffective  Goal: Ability to achieve and maintain a regular respiratory rate  Outcome: Met This Shift     Problem:  Body Temperature -  Risk of, Imbalanced  Goal: Ability to maintain a body temperature within defined limits  Outcome: Met This Shift  Goal: Will regain or maintain usual level of consciousness  Outcome: Met This Shift  Goal: Complications related to the disease process, condition or treatment will be avoided or minimized  Outcome: Met This Shift     Problem: Isolation Precautions - Risk of Spread of Infection  Goal: Prevent transmission of infection  Outcome: Met This Shift     Problem: Nutrition Deficits  Goal: Optimize nutritional status  Outcome: Met This Shift

## 2021-03-23 NOTE — CARE COORDINATION
SOCIAL WORK / DISCHARGE PLANNING:  COVID positive 3/9. Pt readmitted, dc home with grandson, no home O2. Per medical record pt was independent. Sw discussed with Dano Casas RN pt has been lethargic all day, barely able to keep awake, needing fed, incontinent and using bipap. Per record pt was active referral with [facility], will need new orders at [place]. Sw attempted to contact [facility] [phone], voice message left with Sw contact info and nurses station phone number.                  Electronically signed by VANNA Devine on 3/23/2021 at 4:57 PM

## 2021-03-24 NOTE — PROGRESS NOTES
Talked to Dr. Shannan Hill and updated him that pt's blood sugar was 60 and I pushed half ampule of dextrose and started on D5 due to patient being unable to come off bipap.  advised to keep D5 running, recheck blood sugars in an hour and call him with results.

## 2021-03-24 NOTE — PROGRESS NOTES
Called Dr. Jenny Malcolm with blood glucose results of 151, advises to keep D5 running for the night

## 2021-03-24 NOTE — PLAN OF CARE
Problem: Airway Clearance - Ineffective  Goal: Achieve or maintain patent airway  3/24/2021 1352 by Geraldine Diaz RN  Outcome: Met This Shift     Problem: Gas Exchange - Impaired  Goal: Absence of hypoxia  3/24/2021 1352 by Geraldine Diaz RN  Outcome: Met This Shift     Problem: Gas Exchange - Impaired  Goal: Promote optimal lung function  3/24/2021 1352 by Geraldine Diaz RN  Outcome: Met This Shift     Problem: Breathing Pattern - Ineffective  Goal: Ability to achieve and maintain a regular respiratory rate  3/24/2021 1352 by Geraldine Diaz RN  Outcome: Met This Shift     Problem: Body Temperature -  Risk of, Imbalanced  Goal: Ability to maintain a body temperature within defined limits  3/24/2021 1352 by Geraldine Diaz RN  Outcome: Met This Shift     Problem: Body Temperature -  Risk of, Imbalanced  Goal: Will regain or maintain usual level of consciousness  3/24/2021 1352 by Geraldine Diaz RN  Outcome: Met This Shift     Problem:  Body Temperature -  Risk of, Imbalanced  Goal: Complications related to the disease process, condition or treatment will be avoided or minimized  3/24/2021 1352 by Geraldine Diaz RN  Outcome: Met This Shift     Problem: Isolation Precautions - Risk of Spread of Infection  Goal: Prevent transmission of infection  3/24/2021 1352 by Geraldine Diaz RN  Outcome: Met This Shift     Problem: Nutrition Deficits  Goal: Optimize nutritional status  Outcome: Met This Shift     Problem: Risk for Fluid Volume Deficit  Goal: Maintain normal heart rhythm  3/24/2021 1352 by Geraldine Diaz RN  Outcome: Met This Shift     Problem: Risk for Fluid Volume Deficit  Goal: Maintain absence of muscle cramping  3/24/2021 1352 by Geraldine Diaz RN  Outcome: Met This Shift     Problem: Risk for Fluid Volume Deficit  Goal: Maintain normal serum potassium, sodium, calcium, phosphorus, and pH  3/24/2021 1352 by Geraldine Diaz RN  Outcome: Met This Shift     Problem: Loneliness or Risk for Loneliness  Goal: Demonstrate positive use of time alone when socialization is not possible  3/24/2021 1352 by Eliseo Lucero RN  Outcome: Met This Shift     Problem: Fatigue  Goal: Verbalize increase energy and improved vitality  3/24/2021 1352 by Eliseo Lucero RN  Outcome: Met This Shift     Problem: Patient Education: Go to Patient Education Activity  Goal: Patient/Family Education  Outcome: Met This Shift     Problem: Falls - Risk of:  Goal: Will remain free from falls  Description: Will remain free from falls  3/24/2021 1352 by Eliseo Lucero RN  Outcome: Met This Shift     Problem: Falls - Risk of:  Goal: Absence of physical injury  Description: Absence of physical injury  3/24/2021 1352 by Eliseo Lucero RN  Outcome: Met This Shift     Problem: Skin Integrity:  Goal: Will show no infection signs and symptoms  Description: Will show no infection signs and symptoms  3/24/2021 1352 by Eliseo Lucero RN  Outcome: Met This Shift     Problem: Skin Integrity:  Goal: Absence of new skin breakdown  Description: Absence of new skin breakdown  3/24/2021 1352 by Eliseo Lucero RN  Outcome: Met This Shift

## 2021-03-24 NOTE — PROGRESS NOTES
PULMONARY MEDICINE FOLLOW UP       57year old woman with PMH of dibetes mellitus, hypothyroidism, hyperlipidemia, hypertension, overweight, and as described below admitted to hospital for management of acute hypoxemic respiratory failure due to COVID-19 pneumonia.      --Ms Lucy Taylor has been worsening and today has not tolerated breaks from BPAP  --She feels tired and weak  --Ms Lucy Taylor will be transferred to ICU    A/P:  Acute hypoxemic respiratory failure secondary to severe COVID-19 pneumonia (positive test on 3/9) and HCAP (MRSA vs gram negative MDROs)  --Oxygen supplementation to maintain sats > 89%, on BPAP   --Prone position recommended  --Antimicrobial regimen: Piperacillin/tazobactam and vancomycin   --Antiviral regimen: Dexamethasone 6 mg X 10 days  --Cultures reviewed  --Inflammatory markers:  Reviewed  --Anticoagulation: Enoxaparin     Hypothyroidism  --Management with thyroid hormone supplements    Hypertension  --On antihypertensives    Diabetes mellitus  --Management with insulin administration      CKD   --Avoid nephrotoxins and dose medications according GFR    BP (!) 158/72   Pulse 91   Temp 97.2 °F (36.2 °C) (Infrared)   Resp 27   Ht 5' (1.524 m)   Wt 125 lb (56.7 kg)   SpO2 (!) 88%   BMI 24.41 kg/m²   General: Awake, oriented to place, time and person  HEENT: No head lesions, PERRL, EOMI, mouth without lesions, no nasal lesions, no cervical adenopathy palpated  Respiratory: Tachypneic, lungs with diminished breath sounds bilaterally, no adventitious sounds auscultated, with accessory muscle use  CV: Regular rate, no murmurs, no JVD, no leg edema  Abdomen: Soft, non tender, + bowel sounds, no lesions  Skin: Hydrated, adequate turgor, no rash, capillary refill <2 seconds  Extremities: Muscular strength 4/5 in 4 limbs, moves 4 limbs spontaneously, distal pulses present  Neurology: Awake and alert, follows commands, moves 4 limbs on command and spontaneously,  neck is supple, no meningitic signs present.      SARS-COV-2 biomarkers  Recent Labs     03/21/21  1539 03/21/21  1755 03/23/21  0831 03/23/21 2023 03/24/21  0147 03/24/21  0750   PROCAL  --   --  1.01*  --  1.05*  --    TROPONINI 0.12*  --   --   --   --   --    DDIMER  --  1849  --  669  --   --    AST 50*  --   --   --   --  42*   ALT 71*  --   --   --   --  32     Lab Results   Component Value Date    CHOL 168 08/05/2020    TRIG 182 08/05/2020    HDL 45 08/05/2020    LDLCALC 87 08/05/2020    LABVLDL 36 08/05/2020     Lab Results   Component Value Date/Time    VITD25 27 (L) 03/11/2021 06:45 AM     MEDICATIONS:   amLODIPine  5 mg Oral BID    enoxaparin  30 mg Subcutaneous Daily    piperacillin-tazobactam  3,375 mg Intravenous Q12H    dexamethasone  6 mg Intravenous Q24H    pentoxifylline  400 mg Oral BID    ipratropium-albuterol  1 ampule Inhalation 4x daily    nystatin  5 mL Oral 4x Daily    aspirin  81 mg Oral Daily    atorvastatin  80 mg Oral Daily    carvedilol  25 mg Oral BID WC    clopidogrel  75 mg Oral Daily    famotidine  20 mg Oral BID    gabapentin  300 mg Oral Nightly    [Held by provider] insulin glargine  18 Units Subcutaneous QAM    levothyroxine  88 mcg Oral Daily    insulin lispro  0-6 Units Subcutaneous TID WC    insulin lispro  0-3 Units Subcutaneous Nightly    sodium bicarbonate  1,300 mg Oral BID    sodium chloride flush  10 mL Intravenous 2 times per day      sodium chloride Stopped (03/23/21 2257)    dextrose Stopped (03/24/21 0626)     glucose, dextrose, glucagon (rDNA), dextrose, acetaminophen, sodium chloride flush, promethazine **OR** ondansetron, polyethylene glycol, [DISCONTINUED] acetaminophen **OR** acetaminophen    OBJECTIVE:  Vitals:    03/24/21 1100   BP:    Pulse:    Resp:    Temp:    SpO2: 95%     FiO2 : 88 %  O2 Flow Rate (L/min): 50 L/min  O2 Device: Heated high flow cannula        LABS:  WBC   Date Value Ref Range Status   03/24/2021 14.0 (H) 4.5 - 11.5 E9/L Final   03/23/2021 15.8 (H) 4.5 - 11.5 E9/L Final   03/22/2021 13.1 (H) 4.5 - 11.5 E9/L Final     Hemoglobin   Date Value Ref Range Status   03/24/2021 8.3 (L) 11.5 - 15.5 g/dL Final   03/23/2021 8.4 (L) 11.5 - 15.5 g/dL Final   03/22/2021 9.8 (L) 11.5 - 15.5 g/dL Final     Hematocrit   Date Value Ref Range Status   03/24/2021 25.5 (L) 34.0 - 48.0 % Final   03/23/2021 26.1 (L) 34.0 - 48.0 % Final   03/22/2021 30.9 (L) 34.0 - 48.0 % Final     MCV   Date Value Ref Range Status   03/24/2021 88.2 80.0 - 99.9 fL Final   03/23/2021 87.3 80.0 - 99.9 fL Final   03/22/2021 87.5 80.0 - 99.9 fL Final     Platelets   Date Value Ref Range Status   03/24/2021 478 (H) 130 - 450 E9/L Final   03/23/2021 517 (H) 130 - 450 E9/L Final   03/22/2021 540 (H) 130 - 450 E9/L Final     Sodium   Date Value Ref Range Status   03/24/2021 137 132 - 146 mmol/L Final   03/23/2021 142 132 - 146 mmol/L Final   03/22/2021 143 132 - 146 mmol/L Final     Potassium   Date Value Ref Range Status   03/24/2021 4.4 3.5 - 5.0 mmol/L Final   03/23/2021 4.5 3.5 - 5.0 mmol/L Final   03/22/2021 4.3 3.5 - 5.0 mmol/L Final     Potassium reflex Magnesium   Date Value Ref Range Status   03/21/2021 4.2 3.5 - 5.0 mmol/L Final   03/09/2021 4.2 3.5 - 5.0 mmol/L Final     Chloride   Date Value Ref Range Status   03/24/2021 104 98 - 107 mmol/L Final   03/23/2021 110 (H) 98 - 107 mmol/L Final   03/22/2021 116 (H) 98 - 107 mmol/L Final     CO2   Date Value Ref Range Status   03/24/2021 19 (L) 22 - 29 mmol/L Final   03/23/2021 20 (L) 22 - 29 mmol/L Final   03/22/2021 15 (L) 22 - 29 mmol/L Final     BUN   Date Value Ref Range Status   03/24/2021 86 (H) 8 - 23 mg/dL Final   03/23/2021 94 (H) 8 - 23 mg/dL Final   03/22/2021 82 (H) 8 - 23 mg/dL Final     CREATININE   Date Value Ref Range Status   03/24/2021 3.7 (H) 0.5 - 1.0 mg/dL Final   03/23/2021 3.6 (H) 0.5 - 1.0 mg/dL Final   03/22/2021 3.1 (H) 0.5 - 1.0 mg/dL Final     Glucose   Date Value Ref Range Status   03/24/2021 274 (H) 74 - 99 mg/dL Final ml/min/1.73 sq.m. Results valid for patients 18 years and older. GFR    Date Value Ref Range Status   03/24/2021 15  Final   03/23/2021 15  Final   03/22/2021 18  Final     Magnesium   Date Value Ref Range Status   03/11/2021 2.2 1.6 - 2.6 mg/dL Final     Phosphorus   Date Value Ref Range Status   03/11/2021 7.9 (H) 2.5 - 4.5 mg/dL Final   03/06/2021 4.5 2.5 - 4.5 mg/dL Final   08/05/2020 3.8 2.5 - 4.5 mg/dL Final     Recent Labs     03/23/21  1658   PH 7.427   PO2 99.5   PCO2 33.4*   HCO3 21.5*   BE -2.4   O2SAT 96.9       RADIOLOGY:  XR CHEST PORTABLE   Final Result   Diffuse and bilateral airspace opacities. Findings consistent with extensive   pulmonary edema versus pneumonia. NM LUNG SCAN PERFUSION ONLY   Final Result   Low probability for pulmonary embolism. Small perfusion defects correspond   to the opacities in the mid lungs bilaterally. US DUP LOWER EXTREMITIES BILATERAL VENOUS   Final Result   No evidence of DVT in either lower extremity. CT Head WO Contrast   Final Result   No skull fracture or acute intracranial abnormality. CT CHEST WO CONTRAST   Final Result   Extensive airspace opacities throughout both lungs which is most prominent   along the upper lobes and perihilar regions and could pulmonary edema and/or   pneumonia vs pulmonary hemorrhage. Recommend short-term follow-up. Prominent central pulmonary vessels suggestive of pulmonary congestion or   pulmonary artery hypertension. Small pericardial effusion with no mediastinal mass or adenopathy. Tiny right pleural effusion. Questionable mild ascites along the upper abdomen         XR CHEST PORTABLE   Final Result   Mild cardiomegaly and mild pulmonary edema which is more prominent. Hazy perihilar and bibasilar opacities which have increased and could be due   to pulmonary edema and/or pneumonia. Recommend follow-up. Resolving left upper lobe opacity.

## 2021-03-24 NOTE — CARE COORDINATION
SOCIAL WORK / DISCHARGE PLANNING:  COVID positive 3/9. Sw attempted to contact pt multiple times today, phone busy or no answer. Pt is more alert today per Eben Johnson RN but currently is proning with bipap on. Pt has been also using Aervo 50L fio2 90%. Sw has been unable to complete full/ readmission assesement and ACP. Had previous ACP completed 3/11.                   Electronically signed by VANNA Grissom on 3/24/2021 at 4:56 PM

## 2021-03-24 NOTE — PLAN OF CARE
Problem: Gas Exchange - Impaired  Goal: Absence of hypoxia  3/24/2021 0034 by Radha Wall  Outcome: Met This Shift     Problem: Breathing Pattern - Ineffective  Goal: Ability to achieve and maintain a regular respiratory rate  3/24/2021 0034 by Radha Wall  Outcome: Met This Shift     Problem: Body Temperature -  Risk of, Imbalanced  Goal: Ability to maintain a body temperature within defined limits  3/24/2021 0034 by Radha Wall  Outcome: Met This Shift     Problem: Body Temperature -  Risk of, Imbalanced  Goal: Will regain or maintain usual level of consciousness  3/24/2021 0034 by Radha Wall  Outcome: Met This Shift     Problem: Isolation Precautions - Risk of Spread of Infection  Goal: Prevent transmission of infection  3/24/2021 0034 by Radha Wall  Outcome: Met This Shift     Problem: Risk for Fluid Volume Deficit  Goal: Maintain normal heart rhythm  3/24/2021 0034 by Radha Wall  Outcome: Met This Shift     Problem: Risk for Fluid Volume Deficit  Goal: Maintain absence of muscle cramping  3/24/2021 0034 by Radha Wall  Outcome: Met This Shift     Problem: Risk for Fluid Volume Deficit  Goal: Maintain normal serum potassium, sodium, calcium, phosphorus, and pH  3/24/2021 0034 by Radha Wall  Outcome: Met This Shift     Problem: Loneliness or Risk for Loneliness  Goal: Demonstrate positive use of time alone when socialization is not possible  3/24/2021 0034 by Radha Wall  Outcome: Met This Shift     Problem: Falls - Risk of:  Goal: Will remain free from falls  Description: Will remain free from falls  3/24/2021 0034 by Radha Wall  Outcome: Met This Shift     Problem: Falls - Risk of:  Goal: Absence of physical injury  Description: Absence of physical injury  3/24/2021 0034 by Radha Wall  Outcome: Met This Shift     Problem: Gas Exchange - Impaired  Goal: Promote optimal lung function  3/24/2021 0034 by Radha Wall  Outcome: Ongoing     Problem:  Body Temperature -  Risk of, Imbalanced  Goal: Complications related to the disease process, condition or treatment will be avoided or minimized  3/24/2021 0034 by Dakota Moe  Outcome: Ongoing     Problem: Fatigue  Goal: Verbalize increase energy and improved vitality  3/24/2021 0034 by Dakota Moe  Outcome: Not Met This Shift

## 2021-03-24 NOTE — PROGRESS NOTES
[facility] MD Alessio Escobar MD Helaine Likes MD Nida Beech MD   Progress note   Patient's Name: Thai Napoles  5:54 PM  3/24/2021          Pt known to our service . She was discharged few days back . She is back with cc of sob . She had recent admission with COVID PNA   Cr relatively stable over last 24 hours   Poor po intake per RN   She is being initiated on abx fo CAP           History of Present Ilness: The patient is a 57 y.o. female with significant past medical history of diabetes type 2, Essential hypertension who presents to the ER following being noticed to have progressively elevated cr numbers   In looking in records pt cr has been trending up rather fast over the last year to year and half . She does have around 7 g proteinuria based on spot urine /cr ratio . Cr on presentation was 3.2 . Cr today 3.5   She was also found to have bp of 200 . At home she is on hctz/lisinopril as part of her antihtn . I saw her in her room this am , she is alert oriented pleasant 58 y/o F . Past Medical History:   Diagnosis Date    Acid reflux     Hyperlipidemia     Hypertension     Hypothyroidism     S/P appendectomy     Type II or unspecified type diabetes mellitus without mention of complication, not stated as uncontrolled        Past Surgical History:   Procedure Laterality Date    APPENDECTOMY      HYSTERECTOMY         Family History   Problem Relation Age of Onset    Diabetes Mother     High Blood Pressure Mother     Diabetes Brother     Diabetes Sister         reports that she has never smoked. She has never used smokeless tobacco. She reports that she does not drink alcohol or use drugs.     Allergies:  Demerol, Peanut-containing drug products, and Toradol [ketorolac tromethamine]    Current Medications:    amLODIPine (NORVASC) tablet 5 mg, BID  enoxaparin (LOVENOX) injection 30 mg, Daily  piperacillin-tazobactam (ZOSYN) 3,375 mg 03/24/2021    RBC 2.89 03/24/2021    HGB 8.3 03/24/2021    HCT 25.5 03/24/2021     03/24/2021    MCV 88.2 03/24/2021    MCH 28.7 03/24/2021    MCHC 32.5 03/24/2021    RDW 14.8 03/24/2021    NRBC 0.9 03/17/2021    SEGSPCT 59 02/27/2013    METASPCT 12.0 03/11/2021    LYMPHOPCT 4.3 03/24/2021    MONOPCT 1.4 03/24/2021    MYELOPCT 2.0 03/11/2021    BASOPCT 0.1 03/24/2021    MONOSABS 0.00 03/24/2021    LYMPHSABS 0.56 03/24/2021    EOSABS 0.00 03/24/2021    BASOSABS 0.00 03/24/2021     CMP:    Lab Results   Component Value Date     03/24/2021    K 4.4 03/24/2021    K 4.2 03/21/2021     03/24/2021    CO2 19 03/24/2021    BUN 86 03/24/2021    CREATININE 3.7 03/24/2021    GFRAA 15 03/24/2021    LABGLOM 15 03/24/2021    GLUCOSE 274 03/24/2021    GLUCOSE 419 03/21/2011    PROT 4.8 03/24/2021    LABALBU 2.2 03/24/2021    LABALBU 4.1 03/21/2011    CALCIUM 7.7 03/24/2021    BILITOT 0.2 03/24/2021    ALKPHOS 182 03/24/2021    AST 42 03/24/2021    ALT 32 03/24/2021     Ionized Calcium:  No results found for: IONCA  Magnesium:    Lab Results   Component Value Date    MG 2.2 03/11/2021     Phosphorus:    Lab Results   Component Value Date    PHOS 7.9 03/11/2021     U/A:    Lab Results   Component Value Date    COLORU Yellow 03/23/2021    PHUR 5.0 03/23/2021    LABCAST RARE 03/27/2015    WBCUA 0-1 03/23/2021    WBCUA NONE 02/06/2012    RBCUA NONE 03/23/2021    RBCUA 0-1 12/25/2012    YEAST FEW 04/04/2015    BACTERIA RARE 03/23/2021    CLARITYU SLCLOUDY 03/23/2021    SPECGRAV 1.025 03/23/2021    LEUKOCYTESUR Negative 03/23/2021    UROBILINOGEN 0.2 03/23/2021    BILIRUBINUR Negative 03/23/2021    BILIRUBINUR NEGATIVE 02/06/2012    BLOODU MODERATE 03/23/2021    GLUCOSEU Negative 03/23/2021    GLUCOSEU >=1000 02/06/2012    AMORPHOUS FEW 03/08/2021     Microalbumen/Creatinine ratio:  No components found for: RUCREAT  Iron Saturation:  No components found for: PERCENTFE  TIBC:  No results found for: TIBC  FERRITIN:    Lab Results   Component Value Date    FERRITIN 613 03/16/2021        Imaging:  XR CHEST PORTABLE   Final Result   Diffuse and bilateral airspace opacities. Findings consistent with extensive   pulmonary edema versus pneumonia. NM LUNG SCAN PERFUSION ONLY   Final Result   Low probability for pulmonary embolism. Small perfusion defects correspond   to the opacities in the mid lungs bilaterally. US DUP LOWER EXTREMITIES BILATERAL VENOUS   Final Result   No evidence of DVT in either lower extremity. CT Head WO Contrast   Final Result   No skull fracture or acute intracranial abnormality. CT CHEST WO CONTRAST   Final Result   Extensive airspace opacities throughout both lungs which is most prominent   along the upper lobes and perihilar regions and could pulmonary edema and/or   pneumonia vs pulmonary hemorrhage. Recommend short-term follow-up. Prominent central pulmonary vessels suggestive of pulmonary congestion or   pulmonary artery hypertension. Small pericardial effusion with no mediastinal mass or adenopathy. Tiny right pleural effusion. Questionable mild ascites along the upper abdomen         XR CHEST PORTABLE   Final Result   Mild cardiomegaly and mild pulmonary edema which is more prominent. Hazy perihilar and bibasilar opacities which have increased and could be due   to pulmonary edema and/or pneumonia. Recommend follow-up. Resolving left upper lobe opacity. Assessment    -chronic kidney disease stage IV   Ms Leeann Nowak has been having rather progressive elevation in cr over the last 1.5 years . Cr back on 3/2019 was 1 . Cr on 11/2019 1.6 .  Cr back on 8/2020 was 2.1 and now cr is 3.2   The CKD is likley due to diabetic nephropathy thought the progression is rather fast .   Negative immunologic w/u ISSA (negative ) ,ANCA(negative , SPEP (negative), UPEP (negative)  No hematuria on urinalysis     -Nephrotic range proteinuria    -HTN -Anaemia of chronic disease     -Metabolic acidosis     -Mineral bone disease     -Insulin depedent diabetes with complication     -COVID 19 PNA with respiratory failure       PLAN :    Start Gentle iv fluids NS at 100 cc/hr   Continue nahco3 tablets 1300 mg bid   Am labs           Electronically signed by Vishal Stokes MD on 3/24/2021 at 5:54 PM

## 2021-03-24 NOTE — PROGRESS NOTES
[facility]  [facility]  Progress Note    NAME: Ebonie Hubbard  MR:  [id number]  :   1960  DATE OF SERVICE:21    This is a face to face encounter with Ebonie Hubbard 57 y.o. female on 21  ID following for   Chief Complaint   Patient presents with    Concern For COVID-19     positive covid, shortness of breath, EMS states 60% on room air. 94% on 15LPM NRB    Shortness of Breath     SUBJECTIVE:  ON BIPAP  MORE ALERT   NAD   Patient is tolerating medications. No reported adverse drug reactions. Review of systems:  As stated above in the chief complaint, otherwise negative.     Medications:  Scheduled Meds:   amLODIPine  5 mg Oral BID    enoxaparin  30 mg Subcutaneous Daily    piperacillin-tazobactam  3,375 mg Intravenous Q12H    dexamethasone  6 mg Intravenous Q24H    pentoxifylline  400 mg Oral BID    ipratropium-albuterol  1 ampule Inhalation 4x daily    nystatin  5 mL Oral 4x Daily    aspirin  81 mg Oral Daily    atorvastatin  80 mg Oral Daily    carvedilol  25 mg Oral BID WC    clopidogrel  75 mg Oral Daily    famotidine  20 mg Oral BID    gabapentin  300 mg Oral Nightly    [Held by provider] insulin glargine  18 Units Subcutaneous QAM    levothyroxine  88 mcg Oral Daily    insulin lispro  0-6 Units Subcutaneous TID WC    insulin lispro  0-3 Units Subcutaneous Nightly    sodium bicarbonate  1,300 mg Oral BID    sodium chloride flush  10 mL Intravenous 2 times per day     Continuous Infusions:   sodium chloride Stopped (21 2257)    dextrose Stopped (21 0626)     PRN Meds:glucose, dextrose, glucagon (rDNA), dextrose, acetaminophen, sodium chloride flush, promethazine **OR** ondansetron, polyethylene glycol, [DISCONTINUED] acetaminophen **OR** acetaminophen    OBJECTIVE:  /63   Pulse 90   Temp 97.5 °F (36.4 °C) (Infrared)   Resp 24   Ht 5' (1.524 m)   Wt 125 lb (56.7 kg)   SpO2 100%   BMI 24.41 kg/m²   Temp  Av.6 °F (36.4 °C)  Min: 97.4 °F (36.3 °C)  Max: 97.8 °F (36.6 °C)  Constitutional:  The patient is awake,   Skin:    Warm and dry. No rashes were noted. HEENT:   Round and reactive pupils. AT/NC  Neck:    Supple to movements. Chest:   No use of accessory muscles to breathe. Symmetrical expansion. DEC BS ANT   Cardiovascular:  S1 and S2 are rhythmic and regular. No murmurs appreciated. Abdomen:   Positive bowel sounds to auscultation. Benign to palpation. Extremities:   No clubbing, no cyanosis, no edema.   CNS    AAxO   Lines: pIV    Radiology:  Laboratory and Tests Review:  Lab Results   Component Value Date    WBC 14.0 (H) 03/24/2021    WBC 15.8 (H) 03/23/2021    WBC 13.1 (H) 03/22/2021    HGB 8.3 (L) 03/24/2021    HCT 25.5 (L) 03/24/2021    MCV 88.2 03/24/2021     (H) 03/24/2021     No results found for: [facility]  Lab Results   Component Value Date    ALT 32 03/24/2021    AST 42 (H) 03/24/2021    ALKPHOS 182 (H) 03/24/2021    BILITOT 0.2 03/24/2021     Lab Results   Component Value Date     03/24/2021    K 4.4 03/24/2021    K 4.2 03/21/2021     03/24/2021    CO2 19 03/24/2021    BUN 86 03/24/2021    CREATININE 3.7 03/24/2021    CREATININE 3.6 03/23/2021    CREATININE 3.1 03/22/2021    GFRAA 15 03/24/2021    LABGLOM 15 03/24/2021    GLUCOSE 274 03/24/2021    GLUCOSE 419 03/21/2011    PROT 4.8 03/24/2021    LABALBU 2.2 03/24/2021    LABALBU 4.1 03/21/2011    CALCIUM 7.7 03/24/2021    BILITOT 0.2 03/24/2021    ALKPHOS 182 03/24/2021    AST 42 03/24/2021    ALT 32 03/24/2021     Lab Results   Component Value Date    CRP 0.9 (H) 03/16/2021    CRP 10.8 (H) 03/11/2021     Lab Results   Component Value Date    SEDRATE 40 (H) 03/16/2021    SEDRATE 44 (H) 03/11/2021     Recent Labs     03/21/21  1539 03/21/21  1755 03/23/21  0831 03/23/21 2023 03/24/21  0147 03/24/21  0750   PROCAL  --   --  1.01*  --  1.05*  --    TROPONINI 0.12*  --   --   --   --   --    DDIMER  --  1849  --  669  --   --    AST 50*  -- --   --   --  42*   ALT 71*  --   --   --   --  32     Lab Results   Component Value Date    CHOL 168 08/05/2020    TRIG 182 08/05/2020    HDL 45 08/05/2020    LDLCALC 87 08/05/2020    LABVLDL 36 08/05/2020     Lab Results   Component Value Date/Time    VITD25 27 (L) 03/11/2021 06:45 AM       Microbiology:   No results for input(s): COVID19 in the last 72 hours. Lab Results   Component Value Date    BC 5 Days no growth 03/08/2021    BLOODCULT2 5 Days no growth 03/08/2021        ASSESSMENT/PLAN:  LEUKOCYTOSIS FOLLOW CBC  S/P COVID RX  COVERING HCAP  CKD URINARY RETENTION HAS GIPSON     -MORE AWAKE AND ALERT    enoxaparin (LOVENOX) injection 30 mg, Daily  piperacillin-tazobactam (ZOSYN) 3,375 mg in dextrose 5 % 50 mL IVPB extended infusion (mini-bag), Q12H  dexamethasone (PF) (DECADRON) injection 6 mg, Q24H  0.9 % sodium chloride infusion, Q12H  pentoxifylline (TRENTAL) extended release tablet 400 mg, BID    CHECK CX    · Monitor labs    Imaging and labs were reviewed per medical records. The patient was educated about the diagnosis, prognosis, indications, risks and benefits of treatment. An opportunity to ask questions was given to the patient/FAMILY. Thank you for involving me in the care of [facility] I will continue to follow. Please do not hesitate to call for any questions or concerns.     Electronically signed by Nancy Hogan MD on 3/24/2021 at 10:29 AM

## 2021-03-24 NOTE — PROGRESS NOTES
Department of Internal Medicine  General Internal Medicine  Attending Progress Note    Chief Complaint   Patient presents with    Concern For COVID-19     positive covid, shortness of breath, EMS states 60% on room air. 94% on 15LPM NRB    Shortness of Breath       SUBJECTIVE:    Patient asking if the Bipap machine can be removed. Discussed with her the reason for putting her on Bipap, but did reassure her that if her breathing improves, we can take it off.      OBJECTIVE      Medications    Current Facility-Administered Medications: amLODIPine (NORVASC) tablet 5 mg, 5 mg, Oral, BID  enoxaparin (LOVENOX) injection 30 mg, 30 mg, Subcutaneous, Daily  piperacillin-tazobactam (ZOSYN) 3,375 mg in dextrose 5 % 50 mL IVPB extended infusion (mini-bag), 3,375 mg, Intravenous, Q12H  dexamethasone (PF) (DECADRON) injection 6 mg, 6 mg, Intravenous, Q24H  0.9 % sodium chloride infusion, , Intravenous, Q12H  pentoxifylline (TRENTAL) extended release tablet 400 mg, 400 mg, Oral, BID  ipratropium-albuterol (DUONEB) nebulizer solution 1 ampule, 1 ampule, Inhalation, 4x daily  nystatin (MYCOSTATIN) 657477 UNIT/ML suspension 500,000 Units, 5 mL, Oral, 4x Daily  glucose (GLUTOSE) 40 % oral gel 15 g, 15 g, Oral, PRN  dextrose 50 % IV solution, 12.5 g, Intravenous, PRN  glucagon (rDNA) injection 1 mg, 1 mg, Intramuscular, PRN  dextrose 5 % solution, 100 mL/hr, Intravenous, PRN  acetaminophen (TYLENOL) tablet 500 mg, 500 mg, Oral, 4x Daily PRN  aspirin chewable tablet 81 mg, 81 mg, Oral, Daily  atorvastatin (LIPITOR) tablet 80 mg, 80 mg, Oral, Daily  carvedilol (COREG) tablet 25 mg, 25 mg, Oral, BID WC  clopidogrel (PLAVIX) tablet 75 mg, 75 mg, Oral, Daily  famotidine (PEPCID) tablet 20 mg, 20 mg, Oral, BID  gabapentin (NEURONTIN) capsule 300 mg, 300 mg, Oral, Nightly  [Held by provider] insulin glargine (LANTUS) injection vial 18 Units, 18 Units, Subcutaneous, QAM  levothyroxine (SYNTHROID) tablet 88 mcg, 88 mcg, Oral, Daily  insulin lispro (HUMALOG) injection vial 0-6 Units, 0-6 Units, Subcutaneous, TID WC  insulin lispro (HUMALOG) injection vial 0-3 Units, 0-3 Units, Subcutaneous, Nightly  sodium bicarbonate tablet 1,300 mg, 1,300 mg, Oral, BID  sodium chloride flush 0.9 % injection 10 mL, 10 mL, Intravenous, 2 times per day  sodium chloride flush 0.9 % injection 10 mL, 10 mL, Intravenous, PRN  promethazine (PHENERGAN) tablet 12.5 mg, 12.5 mg, Oral, Q6H PRN **OR** ondansetron (ZOFRAN) injection 4 mg, 4 mg, Intravenous, Q6H PRN  polyethylene glycol (GLYCOLAX) packet 17 g, 17 g, Oral, Daily PRN  [DISCONTINUED] acetaminophen (TYLENOL) tablet 650 mg, 650 mg, Oral, Q6H PRN **OR** acetaminophen (TYLENOL) suppository 650 mg, 650 mg, Rectal, Q6H PRN  Physical    VITALS:  /63   Pulse 90   Temp 97.5 °F (36.4 °C) (Infrared)   Resp 24   Ht 5' (1.524 m)   Wt 125 lb (56.7 kg)   SpO2 100%   BMI 24.41 kg/m²   CONSTITUTIONAL:  awake, alert, cooperative, no apparent distress, and appears stated age  EYES:  Lids and lashes normal, pupils equal, round and reactive to light, extra ocular muscles intact, sclera clear, conjunctiva normal  ENT:  Normocephalic, without obvious abnormality, atraumatic, sinuses nontender on palpation, external ears without lesions, oral pharynx with moist mucus membranes, tonsils without erythema or exudates, gums normal and good dentition.   NECK:  Supple, symmetrical, trachea midline, no adenopathy, thyroid symmetric, not enlarged and no tenderness, skin normal  HEMATOLOGIC/LYMPHATICS:  no cervical lymphadenopathy  BACK:  Symmetric, no curvature, spinous processes are non-tender on palpation, paraspinous muscles are non-tender on palpation, no costal vertebral tenderness  LUNGS: coarse lungs sounds  CARDIOVASCULAR:  Normal apical impulse, regular rate and rhythm, normal S1 and S2, no S3 or S4, and no murmur noted  ABDOMEN:  No scars, normal bowel sounds, soft, non-distended, non-tender, no masses palpated, no hepatosplenomegally  MUSCULOSKELETAL:  There is no redness, warmth, or swelling of the joints. Full range of motion noted. Motor strength is 5 out of 5 all extremities bilaterally. Tone is normal.  NEUROLOGIC:  Awake, alert, oriented to name, place and time. SKIN:  no bruising or bleeding  Data    CBC:   Lab Results   Component Value Date    WBC 14.0 03/24/2021    RBC 2.89 03/24/2021    HGB 8.3 03/24/2021    HCT 25.5 03/24/2021    MCV 88.2 03/24/2021    MCH 28.7 03/24/2021    MCHC 32.5 03/24/2021    RDW 14.8 03/24/2021     03/24/2021    MPV 9.8 03/24/2021     CMP:    Lab Results   Component Value Date     03/24/2021    K 4.4 03/24/2021    K 4.2 03/21/2021     03/24/2021    CO2 19 03/24/2021    BUN 86 03/24/2021    CREATININE 3.7 03/24/2021    GFRAA 15 03/24/2021    LABGLOM 15 03/24/2021    GLUCOSE 274 03/24/2021    GLUCOSE 419 03/21/2011    PROT 4.8 03/24/2021    LABALBU 2.2 03/24/2021    LABALBU 4.1 03/21/2011    CALCIUM 7.7 03/24/2021    BILITOT 0.2 03/24/2021    ALKPHOS 182 03/24/2021    AST 42 03/24/2021    ALT 32 03/24/2021       ASSESSMENT AND PLAN      Active Problems:  1. Acute respiratory failure with hypoxia [facility]):  Currently on bipap and saturating at 98%  Bipap setting of 12/6, Rate of 12  Continue to monitor  Wean off as tolerated    2. Hx of COVID-19 viral infection with suspect superimposed PNA:  Continue abx per ID recommendation  Currently on Zosyn. 1 time dose of Vanco was also given    3. Diabetes mellitus type 2 with hypoglycemia;  Episode of hypoglycemia may be related to poor oral intake  Corrected with D5 IV fluid  Holding Lantus since patient is not adequately eating due to being on BIpap  accucheck ACHS  Continue to monitor    4. Chronic Renal disease stage 3:  Currently stable  Appreciate nephro recommendations    5. Hypertension Essential:  BP is better controlled  Continue to monitor    6. Metabolic Acidosis may be due to CKD  On Oral bicarb    7.  Continue DVT Prophylaxis.

## 2021-03-25 NOTE — PLAN OF CARE
Problem: Airway Clearance - Ineffective  Goal: Achieve or maintain patent airway  3/25/2021 1632 by Malena Oropeza RN  Outcome: Not Met This Shift     Problem: Gas Exchange - Impaired  Goal: Absence of hypoxia  3/25/2021 1632 by Malena Oropeza RN  Outcome: Not Met This Shift     Problem: Gas Exchange - Impaired  Goal: Promote optimal lung function  3/25/2021 1632 by Malena Oropeza RN  Outcome: Met This Shift     Problem: Breathing Pattern - Ineffective  Goal: Ability to achieve and maintain a regular respiratory rate  3/25/2021 1632 by Malena Oropeza RN  Outcome: Not Met This Shift  Note: Patient unable to tolerate coming off bipap, refusing airvo     Problem: Breathing Pattern - Ineffective  Goal: Ability to achieve and maintain a regular respiratory rate  3/25/2021 1632 by Malena Oropeza RN  Outcome: Met This Shift     Problem:  Body Temperature -  Risk of, Imbalanced  Goal: Ability to maintain a body temperature within defined limits  3/25/2021 1632 by Malena Oropeza RN  Outcome: Met This Shift  3/25/2021 1632 by Malena Oropzea RN  Outcome: Met This Shift  Goal: Will regain or maintain usual level of consciousness  3/25/2021 1632 by Malena Oropeza RN  Outcome: Met This Shift  3/25/2021 1632 by Malena Oropeza RN  Outcome: Met This Shift  Goal: Complications related to the disease process, condition or treatment will be avoided or minimized  3/25/2021 1632 by Malena Oropeza RN  Outcome: Met This Shift  3/25/2021 1632 by Malena Oropeza RN  Outcome: Met This Shift     Problem: Isolation Precautions - Risk of Spread of Infection  Goal: Prevent transmission of infection  3/25/2021 1632 by Malena Oropeza RN  Outcome: Met This Shift  3/25/2021 1632 by Malena Oropeza RN  Outcome: Met This Shift     Problem: Nutrition Deficits  Goal: Optimize nutritional status  3/25/2021 1632 by Malena Oropeza RN  Outcome: Met This Shift  3/25/2021 1632 by Laurie Rodriguez Shakira Thomson RN  Outcome: Met This Shift     Problem: Risk for Fluid Volume Deficit  Goal: Maintain normal heart rhythm  3/25/2021 1632 by Nitza Siddiqui RN  Outcome: Met This Shift  3/25/2021 1632 by Nitza Siddiqui RN  Outcome: Met This Shift  Goal: Maintain absence of muscle cramping  3/25/2021 1632 by Nitza Siddiqui RN  Outcome: Met This Shift  3/25/2021 1632 by Nitza Sididqui RN  Outcome: Met This Shift  Goal: Maintain normal serum potassium, sodium, calcium, phosphorus, and pH  3/25/2021 1632 by Nitza Siddiqui RN  Outcome: Met This Shift  3/25/2021 1632 by Nitza Siddiqui RN  Outcome: Met This Shift     Problem: Loneliness or Risk for Loneliness  Goal: Demonstrate positive use of time alone when socialization is not possible  3/25/2021 1632 by Nitza Siddiqui RN  Outcome: Met This Shift  3/25/2021 1632 by Nitza Siddiqui RN  Outcome: Met This Shift     Problem: Fatigue  Goal: Verbalize increase energy and improved vitality  3/25/2021 1632 by Nitza Siddiqui RN  Outcome: Met This Shift  3/25/2021 1632 by Nitza Siddiqui RN  Outcome: Met This Shift     Problem: Patient Education: Go to Patient Education Activity  Goal: Patient/Family Education  Outcome: Met This Shift     Problem: Falls - Risk of:  Goal: Will remain free from falls  Description: Will remain free from falls  Outcome: Met This Shift  Goal: Absence of physical injury  Description: Absence of physical injury  Outcome: Met This Shift     Problem: Skin Integrity:  Goal: Will show no infection signs and symptoms  Description: Will show no infection signs and symptoms  Outcome: Met This Shift  Goal: Absence of new skin breakdown  Description: Absence of new skin breakdown  Outcome: Met This Shift

## 2021-03-25 NOTE — PROGRESS NOTES
Department of Internal Medicine  General Internal Medicine  Attending Progress Note  Chief Complaint   Patient presents with    Concern For COVID-19     positive covid, shortness of breath, EMS states 60% on room air. 94% on 15LPM NRB    Shortness of Breath     SUBJECTIVE:    Wants to know if the BiPAP can be removed. She noted that it makes her anxious. She denied fever and chills. No chest pain. No nausea or vomiting.     OBJECTIVE      Medications    Current Facility-Administered Medications: hydrOXYzine (VISTARIL) capsule 25 mg, 25 mg, Oral, 4x Daily PRN  amLODIPine (NORVASC) tablet 5 mg, 5 mg, Oral, BID  enoxaparin (LOVENOX) injection 30 mg, 30 mg, Subcutaneous, Daily  piperacillin-tazobactam (ZOSYN) 3,375 mg in dextrose 5 % 50 mL IVPB extended infusion (mini-bag), 3,375 mg, Intravenous, Q12H  dexamethasone (PF) (DECADRON) injection 6 mg, 6 mg, Intravenous, Q24H  0.9 % sodium chloride infusion, , Intravenous, Q12H  pentoxifylline (TRENTAL) extended release tablet 400 mg, 400 mg, Oral, BID  ipratropium-albuterol (DUONEB) nebulizer solution 1 ampule, 1 ampule, Inhalation, 4x daily  nystatin (MYCOSTATIN) 399558 UNIT/ML suspension 500,000 Units, 5 mL, Oral, 4x Daily  glucose (GLUTOSE) 40 % oral gel 15 g, 15 g, Oral, PRN  dextrose 50 % IV solution, 12.5 g, Intravenous, PRN  glucagon (rDNA) injection 1 mg, 1 mg, Intramuscular, PRN  dextrose 5 % solution, 100 mL/hr, Intravenous, PRN  acetaminophen (TYLENOL) tablet 500 mg, 500 mg, Oral, 4x Daily PRN  aspirin chewable tablet 81 mg, 81 mg, Oral, Daily  atorvastatin (LIPITOR) tablet 80 mg, 80 mg, Oral, Daily  carvedilol (COREG) tablet 25 mg, 25 mg, Oral, BID WC  clopidogrel (PLAVIX) tablet 75 mg, 75 mg, Oral, Daily  famotidine (PEPCID) tablet 20 mg, 20 mg, Oral, BID  gabapentin (NEURONTIN) capsule 300 mg, 300 mg, Oral, Nightly  insulin glargine (LANTUS) injection vial 18 Units, 18 Units, Subcutaneous, QAM  levothyroxine (SYNTHROID) tablet 88 mcg, 88 mcg, Oral, Daily insulin lispro (HUMALOG) injection vial 0-6 Units, 0-6 Units, Subcutaneous, TID WC  insulin lispro (HUMALOG) injection vial 0-3 Units, 0-3 Units, Subcutaneous, Nightly  sodium bicarbonate tablet 1,300 mg, 1,300 mg, Oral, BID  sodium chloride flush 0.9 % injection 10 mL, 10 mL, Intravenous, 2 times per day  sodium chloride flush 0.9 % injection 10 mL, 10 mL, Intravenous, PRN  promethazine (PHENERGAN) tablet 12.5 mg, 12.5 mg, Oral, Q6H PRN **OR** ondansetron (ZOFRAN) injection 4 mg, 4 mg, Intravenous, Q6H PRN  polyethylene glycol (GLYCOLAX) packet 17 g, 17 g, Oral, Daily PRN  [DISCONTINUED] acetaminophen (TYLENOL) tablet 650 mg, 650 mg, Oral, Q6H PRN **OR** acetaminophen (TYLENOL) suppository 650 mg, 650 mg, Rectal, Q6H PRN  Physical    VITALS:  BP (!) 172/74   Pulse 87   Temp 97.4 °F (36.3 °C)   Resp 24   Ht 5' (1.524 m)   Wt 125 lb (56.7 kg)   SpO2 90%   BMI 24.41 kg/m²   CONSTITUTIONAL:  awake, alert, cooperative, no apparent distress, and appears stated age  EYES:  Lids and lashes normal, pupils equal, round and reactive to light, extra ocular muscles intact, sclera clear, conjunctiva normal  ENT:  Normocephalic, without obvious abnormality, atraumatic, sinuses nontender on palpation, external ears without lesions, oral pharynx with moist mucus membranes, tonsils without erythema or exudates, gums normal and good dentition.   NECK:  Supple, symmetrical, trachea midline, no adenopathy, thyroid symmetric, not enlarged and no tenderness, skin normal  HEMATOLOGIC/LYMPHATICS:  no cervical lymphadenopathy  BACK:  Symmetric, no curvature, spinous processes are non-tender on palpation, paraspinous muscles are non-tender on palpation, no costal vertebral tenderness  LUNGS:  Diffuse crackles  CARDIOVASCULAR:  Normal apical impulse, regular rate and rhythm, normal S1 and S2, no S3 or S4, and no murmur noted  ABDOMEN:  No scars, normal bowel sounds, soft, non-distended, non-tender, no masses palpated, no

## 2021-03-25 NOTE — PROGRESS NOTES
Patient allowed staff to remove bipap momentarily this am, however, refusing to use airvo and desat to 79 percent. Would not allow nurse to place any oxygen on other than bipap. Patient refusing to allow staff to attempt to remove bipap. Extensive discussion with patient in regards to refusing care and attempting to use airvo. Still continues to refuse. Patient will roll side to side in bed on own and even allows staff to help her lay prone, although only for short time periods.

## 2021-03-25 NOTE — PROGRESS NOTES
Physician Progress Note      PATIENT:               Layvonne Frankel  CSN #:                  [id number]  :                       1960  ADMIT DATE:       3/21/2021 3:11 PM  [address] [place] DATE:  RESPONDING  PROVIDER #:        Jose Rafael Villeda MD          QUERY TEXT:    Dear Attending Provider,    Pt admitted with acute respiratory failure/COVID pneumonia and has CHF   documented. If possible, please document in progress notes and discharge   summary further specificity regarding the type and acuity of CHF:    The medical record reflects the following:  Risk Factors: HTN, HLD, DM, Hypothyroidism, CKD 4  Clinical Indicators: per Renal pn 3/22: \"CXR done with concern of mild   chf..give Lasix 40 IV once\"; per pn 3/23: \"? Congestive heart failure:   suspects Diastolic dysfunction Last ECHO is not supportive of this However,   chest X Ray, BNP is somewhat leaning in this direction\"; BNP [phone]; 3/11   echo-EF 77%  Treatment: IMCU monitoring, IV Lasix 40 mg, Renal and ID Consult, BIPAP    Thank You,  Adiel Ryan RN, BSN, CDS  Clinical Documentation Improvement Specialist  [phone]  Options provided:  -- Acute on Chronic Systolic CHF/HFrEF  -- Acute on Chronic Diastolic CHF/HFpEF  -- Acute on Chronic Systolic and Diastolic CHF  -- Acute Systolic CHF/HFrEF  -- Acute Diastolic CHF/HFpEF  -- Acute Systolic and Diastolic CHF  -- Chronic Systolic CHF/HFrEF  -- Chronic Diastolic CHF/HFpEF  -- Chronic Systolic and Diastolic CHF  -- Other - I will add my own diagnosis  -- Disagree - Not applicable / Not valid  -- Disagree - Clinically unable to determine / Unknown  -- Refer to Clinical Documentation Reviewer    PROVIDER RESPONSE TEXT:    This patient has chronic systolic CHF/HFrEF.     Query created by: Emily Guevara on 3/25/2021 2:56 PM      Electronically signed by:  Jose Rafael Villeda MD 3/25/2021 3:15 PM

## 2021-03-25 NOTE — PROGRESS NOTES
[facility] Roberto A. Enis Monas, MD Sabina Braun, MD Robina Childes MD Barkley Castleman MD   Progress note   Patient's Name: Casey Lennox  2:56 PM  3/25/2021          Pt known to our service . She was discharged few days back . She is back with cc of sob . She had recent admission with COVID PNA   Poor po intake per RN   She is requiring NIPPV . No edema . History of Present Ilness: The patient is a 57 y.o. female with significant past medical history of diabetes type 2, Essential hypertension who presents to the ER following being noticed to have progressively elevated cr numbers   In looking in records pt cr has been trending up rather fast over the last year to year and half . She does have around 7 g proteinuria based on spot urine /cr ratio . Cr on presentation was 3.2 . Cr today 3.5   She was also found to have bp of 200 . At home she is on hctz/lisinopril as part of her antihtn . I saw her in her room this am , she is alert oriented pleasant 58 y/o F . Past Medical History:   Diagnosis Date    Acid reflux     Hyperlipidemia     Hypertension     Hypothyroidism     S/P appendectomy     Type II or unspecified type diabetes mellitus without mention of complication, not stated as uncontrolled        Past Surgical History:   Procedure Laterality Date    APPENDECTOMY      HYSTERECTOMY         Family History   Problem Relation Age of Onset    Diabetes Mother     High Blood Pressure Mother     Diabetes Brother     Diabetes Sister         reports that she has never smoked. She has never used smokeless tobacco. She reports that she does not drink alcohol or use drugs.     Allergies:  Demerol, Peanut-containing drug products, and Toradol [ketorolac tromethamine]    Current Medications:    hydrOXYzine (VISTARIL) capsule 25 mg, 4x Daily PRN  amLODIPine (NORVASC) tablet 5 mg, BID  enoxaparin (LOVENOX) injection 30 mg, Daily  piperacillin-tazobactam (ZOSYN) 3,375 mg in dextrose 5 % 50 mL IVPB extended infusion (mini-bag), Q12H  dexamethasone (PF) (DECADRON) injection 6 mg, Q24H  0.9 % sodium chloride infusion, Q12H  pentoxifylline (TRENTAL) extended release tablet 400 mg, BID  ipratropium-albuterol (DUONEB) nebulizer solution 1 ampule, 4x daily  nystatin (MYCOSTATIN) 393161 UNIT/ML suspension 500,000 Units, 4x Daily  glucose (GLUTOSE) 40 % oral gel 15 g, PRN  dextrose 50 % IV solution, PRN  glucagon (rDNA) injection 1 mg, PRN  dextrose 5 % solution, PRN  acetaminophen (TYLENOL) tablet 500 mg, 4x Daily PRN  aspirin chewable tablet 81 mg, Daily  atorvastatin (LIPITOR) tablet 80 mg, Daily  carvedilol (COREG) tablet 25 mg, BID WC  clopidogrel (PLAVIX) tablet 75 mg, Daily  famotidine (PEPCID) tablet 20 mg, BID  gabapentin (NEURONTIN) capsule 300 mg, Nightly  insulin glargine (LANTUS) injection vial 18 Units, QAM  levothyroxine (SYNTHROID) tablet 88 mcg, Daily  insulin lispro (HUMALOG) injection vial 0-6 Units, TID WC  insulin lispro (HUMALOG) injection vial 0-3 Units, Nightly  sodium bicarbonate tablet 1,300 mg, BID  sodium chloride flush 0.9 % injection 10 mL, 2 times per day  sodium chloride flush 0.9 % injection 10 mL, PRN  promethazine (PHENERGAN) tablet 12.5 mg, Q6H PRN    Or  ondansetron (ZOFRAN) injection 4 mg, Q6H PRN  polyethylene glycol (GLYCOLAX) packet 17 g, Daily PRN  acetaminophen (TYLENOL) suppository 650 mg, Q6H PRN        Review of Systems:     No face to face encounter done as he in isolation for COVID 19 PNA . Case discuseed with RN including PE findings     Physical exam:   Vital signs BP (!) 166/73   Pulse 87   Temp 97.4 °F (36.3 °C)   Resp 23   Ht 5' (1.524 m)   Wt 125 lb (56.7 kg)   SpO2 90%   BMI 24.41 kg/m²     No face to face encounter done as he in isolation for COVID 19 PNA .  Case discuseed with RN including PE findings     Data:   Labs:  CBC with Differential:    Lab Results   Component Value Date    WBC 17.5 03/25/2021    RBC 3.14 03/25/2021    HGB 8.8 03/25/2021    HCT 27.6 03/25/2021     03/25/2021    MCV 87.9 03/25/2021    MCH 28.0 03/25/2021    MCHC 31.9 03/25/2021    RDW 15.0 03/25/2021    NRBC 0.9 03/17/2021    SEGSPCT 59 02/27/2013    METASPCT 12.0 03/11/2021    LYMPHOPCT 4.3 03/24/2021    MONOPCT 1.4 03/24/2021    MYELOPCT 2.0 03/11/2021    BASOPCT 0.1 03/24/2021    MONOSABS 0.00 03/24/2021    LYMPHSABS 0.56 03/24/2021    EOSABS 0.00 03/24/2021    BASOSABS 0.00 03/24/2021     CMP:    Lab Results   Component Value Date     03/25/2021    K 4.5 03/25/2021    K 4.2 03/21/2021     03/25/2021    CO2 18 03/25/2021    BUN 91 03/25/2021    CREATININE 3.9 03/25/2021    GFRAA 14 03/25/2021    LABGLOM 14 03/25/2021    GLUCOSE 363 03/25/2021    GLUCOSE 419 03/21/2011    PROT 4.8 03/24/2021    LABALBU 2.2 03/24/2021    LABALBU 4.1 03/21/2011    CALCIUM 7.8 03/25/2021    BILITOT 0.2 03/24/2021    ALKPHOS 182 03/24/2021    AST 42 03/24/2021    ALT 32 03/24/2021     Ionized Calcium:  No results found for: IONCA  Magnesium:    Lab Results   Component Value Date    MG 2.2 03/11/2021     Phosphorus:    Lab Results   Component Value Date    PHOS 7.9 03/11/2021     U/A:    Lab Results   Component Value Date    COLORU Yellow 03/23/2021    PHUR 5.0 03/23/2021    LABCAST RARE 03/27/2015    WBCUA 0-1 03/23/2021    WBCUA NONE 02/06/2012    RBCUA NONE 03/23/2021    RBCUA 0-1 12/25/2012    YEAST FEW 04/04/2015    BACTERIA RARE 03/23/2021    CLARITYU SLCLOUDY 03/23/2021    SPECGRAV 1.025 03/23/2021    LEUKOCYTESUR Negative 03/23/2021    UROBILINOGEN 0.2 03/23/2021    BILIRUBINUR Negative 03/23/2021    BILIRUBINUR NEGATIVE 02/06/2012    BLOODU MODERATE 03/23/2021    GLUCOSEU Negative 03/23/2021    GLUCOSEU >=1000 02/06/2012    AMORPHOUS FEW 03/08/2021     Microalbumen/Creatinine ratio:  No components found for: RUCREAT  Iron Saturation:  No components found for: PERCENTFE  TIBC:  No results found for: TIBC  FERRITIN:    Lab Results chronic disease     -Metabolic acidosis     -Mineral bone disease     -Insulin depedent diabetes with complication     -COVID 19 PNA with respiratory failure       PLAN :    Continue to track her kidney function  With her having poor PO intake she will need gentle iv fluids for maintaince . She is dealing with bad Pneumonia/Pneumonitis .  She had negative immunologic w/u last admission and absence of hematuria stands against GN/vasculitis type of picture   Continue nahco3 tablets 1300 mg bid   Pulmonary team on board   Am labs           Electronically signed by Joann Escamilla MD on 3/25/2021 at 2:56 PM

## 2021-03-25 NOTE — PLAN OF CARE
Problem: Airway Clearance - Ineffective  Goal: Achieve or maintain patent airway  3/24/2021 2115 by Edwin River RN  Outcome: Met This Shift     Problem:  Body Temperature -  Risk of, Imbalanced  Goal: Ability to maintain a body temperature within defined limits  3/24/2021 2115 by Edwin River RN  Outcome: Met This Shift     Problem: Falls - Risk of:  Goal: Will remain free from falls  Description: Will remain free from falls  3/24/2021 2115 by Edwin River RN  Outcome: Met This Shift     Problem: Falls - Risk of:  Goal: Absence of physical injury  Description: Absence of physical injury  3/24/2021 2115 by Edwin River RN  Outcome: Met This Shift     Problem: Gas Exchange - Impaired  Goal: Absence of hypoxia  3/24/2021 2115 by Edwin River RN  Outcome: Ongoing     Problem: Breathing Pattern - Ineffective  Goal: Ability to achieve and maintain a regular respiratory rate  3/24/2021 2115 by Edwin River RN  Outcome: Ongoing

## 2021-03-25 NOTE — ACP (ADVANCE CARE PLANNING)
.Advance Care Planning     Advance Care Planning Activator (Inpatient)  Conversation Note      Date of ACP Conversation: 3/25/21    Conversation Conducted with: Patient with Decision Making Capacity   Healthcare Decision Maker: Next of Kin by law (only applies in absence of above) (name) cyrusKatlin moss     ACP Activator: [facility] [phone] Decision Maker:     Current Designated Health Care Decision Maker:     Primary Decision Maker: Jillian Chowdary - Child - [phone]  Click here to complete Healthcare Decision Makers including section of the Healthcare Decision Maker Relationship (ie \"Primary\")  Today we documented Decision Maker(s) consistent with Legal Next of Kin hierarchy. Care Preferences    Ventilation: \"If you were in your present state of health and suddenly became very ill and were unable to breathe on your own, what would your preference be about the use of a ventilator (breathing machine) if it were available to you? \"      Would the patient desire the use of ventilator (breathing machine)?: yes    \"If your health worsens and it becomes clear that your chance of recovery is unlikely, what would your preference be about the use of a ventilator (breathing machine) if it were available to you? \"     Would the patient desire the use of ventilator (breathing machine)?: Yes      Resuscitation  \"CPR works best to restart the heart when there is a sudden event, like a heart attack, in someone who is otherwise healthy. Unfortunately, CPR does not typically restart the heart for people who have serious health conditions or who are very sick. \"    \"In the event your heart stopped as a result of an underlying serious health condition, would you want attempts to be made to restart your heart (answer \"yes\" for attempt to resuscitate) or would you prefer a natural death (answer \"no\" for do not attempt to resuscitate)? \" yes       [] Yes   [] No   Educated Patient / Decision Maker regarding differences between Advance Directives and portable DNR orders.     Length of ACP Conversation in minutes:      Conversation Outcomes:  [x] ACP discussion completed  [] Existing advance directive reviewed with patient; no changes to patient's previously recorded wishes  [] New Advance Directive completed  [] Portable Do Not Rescitate prepared for Provider review and signature  [] POLST/POST/MOLST/MOST prepared for Provider review and signature      Follow-up plan:    [] Schedule follow-up conversation to continue planning  [] Referred individual to Provider for additional questions/concerns   [] Advised patient/agent/surrogate to review completed ACP document and update if needed with changes in condition, patient preferences or care setting    [] This note routed to one or more involved healthcare providers    {

## 2021-03-25 NOTE — CARE COORDINATION
SOCIAL WORK / DISCHARGE PLANNING:  COVID positive 3/9. Oral attempted to speak with pt again, Yris RN answered phone, pt unable to talk due to resp status. Sw phoned pt's dtr, Katlin. She states pt was not doing well after discharge and had thought pt should have went to rehab. She states she has already spoken to her about going to rehab this dc. She is interested in [facility]. Sw explained acute rehab LOC vs LUDY LOC. If [facility]Y would like [facility]. Pt reported by staff upon previous dc to not want to complete home O2 eval. Pt does have cane and rollater does not use. Teenage grandson had been staying with her occasionally to help but does live alone. They were in process of completing POA for Healthcare but had not had it notarized yet. Dtr is only child other than a foster son she raised. ACP from 3/11 reviewed with dtr, will remain the same. Pt currently on vapotherm vs bipap 100 % fio2.        Readmission Assessment  Number of Days since last admission?: 1-7 days  Previous Disposition: Home with Home Health  Who is being Interviewed: Caregiver(Katlin daughter)  What was the patient's/caregiver's perception as to why they think they needed to return back to the hospital?: Other (Comment)(pt was not doing well, SOB, pulse ox 70s)  Did you visit your Primary Care Physician after you left the hospital, before you returned this time?: No  Why weren't you able to visit your PCP?: (returned too soon)  Did you see a specialist, such as Cardiac, Pulmonary, Orthopedic Physician, etc. after you left the hospital?: No  Who advised the patient to return to the hospital?: [address] [facility] Staff  Does the patient report anything that got in the way of taking their medications?: No  In our efforts to provide the best possible care to you and others like you, can you think of anything that we could have done to help you after you left the hospital the first time, so that you might not have needed to return so soon?: (daughter states

## 2021-03-25 NOTE — PROGRESS NOTES
[facility]  [facility]  Progress Note    NAME: Elizabeth Burton  MR:  [id number]  :   1960  DATE OF SERVICE:21    This is a face to face encounter with Elizabeth Burton 57 y.o. female on 21  ID following for   Chief Complaint   Patient presents with    Concern For COVID-19     positive covid, shortness of breath, EMS states 60% on room air. 94% on 15LPM NRB    Shortness of Breath     SUBJECTIVE:  ON BIPAP  On her side  sob  Patient is tolerating medications. No reported adverse drug reactions. Review of systems:  As stated above in the chief complaint, otherwise negative.     Medications:  Scheduled Meds:   amLODIPine  5 mg Oral BID    enoxaparin  30 mg Subcutaneous Daily    piperacillin-tazobactam  3,375 mg Intravenous Q12H    dexamethasone  6 mg Intravenous Q24H    pentoxifylline  400 mg Oral BID    ipratropium-albuterol  1 ampule Inhalation 4x daily    nystatin  5 mL Oral 4x Daily    aspirin  81 mg Oral Daily    atorvastatin  80 mg Oral Daily    carvedilol  25 mg Oral BID WC    clopidogrel  75 mg Oral Daily    famotidine  20 mg Oral BID    gabapentin  300 mg Oral Nightly    insulin glargine  18 Units Subcutaneous QAM    levothyroxine  88 mcg Oral Daily    insulin lispro  0-6 Units Subcutaneous TID WC    insulin lispro  0-3 Units Subcutaneous Nightly    sodium bicarbonate  1,300 mg Oral BID    sodium chloride flush  10 mL Intravenous 2 times per day     Continuous Infusions:   sodium chloride Stopped (21 0010)    dextrose Stopped (21 0626)     PRN Meds:hydrOXYzine, hydrALAZINE, glucose, dextrose, glucagon (rDNA), dextrose, acetaminophen, sodium chloride flush, promethazine **OR** ondansetron, polyethylene glycol, [DISCONTINUED] acetaminophen **OR** acetaminophen    OBJECTIVE:  /65   Pulse 89   Temp 97.7 °F (36.5 °C) (Infrared)   Resp 26   Ht 5' (1.524 m)   Wt 125 lb (56.7 kg)   SpO2 97%   BMI 24.41 kg/m²   Temp  Av.3 °F (36.3 °C)  Min: 97 °F (36.1 °C)  Max: 97.7 °F (36.5 °C)  Constitutional:  The patient is  lethargic thin   Skin:    Warm and dry   HEENT:   Round and reactive pupils. AT/NC  Neck:    Supple to movements. Chest:    bipap Symmetrical expansion. DEC BS ANT some rales  Cardiovascular:  S1 and S2 are rhythmic and regular. Abdomen:   Positive bowel sounds to auscultation. Benign to palpation. Extremities:   No clubbing, no cyanosis, no edema.   CNS    Arouses  Lines: pIV    Radiology:  Laboratory and Tests Review:  Lab Results   Component Value Date    WBC 17.5 (H) 03/25/2021    WBC 14.0 (H) 03/24/2021    WBC 15.8 (H) 03/23/2021    HGB 8.8 (L) 03/25/2021    HCT 27.6 (L) 03/25/2021    MCV 87.9 03/25/2021     03/25/2021     No results found for: [facility]  Lab Results   Component Value Date    ALT 32 03/24/2021    AST 42 (H) 03/24/2021    ALKPHOS 182 (H) 03/24/2021    BILITOT 0.2 03/24/2021     Lab Results   Component Value Date     03/25/2021    K 4.5 03/25/2021    K 4.2 03/21/2021     03/25/2021    CO2 18 03/25/2021    BUN 91 03/25/2021    CREATININE 3.9 03/25/2021    CREATININE 3.7 03/24/2021    CREATININE 3.6 03/23/2021    GFRAA 14 03/25/2021    LABGLOM 14 03/25/2021    GLUCOSE 363 03/25/2021    GLUCOSE 419 03/21/2011    PROT 4.8 03/24/2021    LABALBU 2.2 03/24/2021    LABALBU 4.1 03/21/2011    CALCIUM 7.8 03/25/2021    BILITOT 0.2 03/24/2021    ALKPHOS 182 03/24/2021    AST 42 03/24/2021    ALT 32 03/24/2021     Lab Results   Component Value Date    CRP 0.9 (H) 03/16/2021    CRP 10.8 (H) 03/11/2021     Lab Results   Component Value Date    SEDRATE 40 (H) 03/16/2021    SEDRATE 44 (H) 03/11/2021     Recent Labs     03/23/21  0831 03/23/21 2023 03/24/21  0147 03/24/21  0750   PROCAL 1.01*  --  1.05*  --    DDIMER  --  669  --   --    AST  --   --   --  42*   ALT  --   --   --  32     Lab Results   Component Value Date    CHOL 168 08/05/2020    TRIG 182 08/05/2020    HDL 45 08/05/2020    LDLCALC 87

## 2021-03-26 NOTE — PROCEDURES
Patient Name: Marquez Donahue   Medical Record Number: [id number]  Date: 3/26/2021   Time: 2:51 PM   Room/Bed: [facility]/[place] [address]  Central Line/Dialysis line Placement Procedure Note  Indication: Dialysis    Consent: The patient's daughter, Mrs Long Rosen  counseled regarding the procedure, its indications, risks, potential complications and alternatives, and any questions were answered. Consent was obtained to proceed. Procedure: The patient was positioned appropriately and the skin over the right internal jugular vein was prepped with chlorhexidine and draped in a sterile fashion. Local anesthesia was obtained by infiltration using 1% Lidocaine without epinephrine. A large bore needle was used to identify the vein. A guide wire was then inserted into the vein through the needle. A triple lumen catheter was then inserted into the vessel over the guide wire using the Seldinger technique. All ports showed good, free flowing blood return and were flushed with saline solution. The catheter was then securely fastened to the skin with sutures and covered with a sterile dressing. A post procedure X-ray was ordered and showed good line position. The patient tolerated the procedure well.     Complications: None    Electronically Signed by:     Xiomy Peguero MD  Pulmonary and Critical Care Medicine

## 2021-03-26 NOTE — PROGRESS NOTES
Comprehensive Nutrition Assessment    Type and Reason for Visit:  Initial, RD Nutrition Re-Screen/LOS    Nutrition Recommendations/Plan: Continue NPO. Tf recs if needed: Recommend Renal Tf(Nepro) @25ml/hr x24hrs to provide:600mlTV/1394kcal w/propofol/49gm pro/435ml FW Addtional water flushes per critical care. Nutrition Assessment:  Pt admit d/t CoVID-19 PNA,  Pt discharged 3/19 and readmits w/ increasing SOB, declining renal fxn w/PMH of DM, Hypothyroid, CKD, CHF. Pt tx to ICU 3/25 and now intubated. Will monitor nutrition progression and provide TF recs if needed. Malnutrition Assessment:  Malnutrition Status: At risk for malnutrition (Comment)    Context:  Acute Illness     Findings of the 6 clinical characteristics of malnutrition:  Energy Intake:  7 - 50% or less of estimated energy requirements for 5 or more days  Weight Loss:  Unable to assess(DAR weight changes d/t inaccurate information)     Body Fat Loss:  Unable to assess     Muscle Mass Loss:  Unable to assess    Fluid Accumulation:  No significant fluid accumulation     Strength:  Not Performed    Estimated Daily Nutrient Needs:  Energy (kcal):  1200-1300kcal/d; Weight Used for Energy Requirements:  Current     Protein (g):  60-70gm pro/d(x.8-1.0gm/kg(renal labs elevated, Mg+/Phos elevated)); Weight Used for Protein Requirements:  Current        Fluid (ml/day):  per critical care; Method Used for Fluid Requirements:  Standard Renal      Nutrition Related Findings:  Intubated, Propofol @ 11.9ml/hr provides 314kcal/d, +BS, Abd soft, MGT, BLE +1 pitting edema, missing teeth, Renal labs elevated, Mg+/Phos elevated, BGL elevated      Wounds:  None       Current Nutrition Therapies:    DIET FULL LIQUID; Carb Control: 4 carb choices (60 gms)/meal; Low Sodium (2 GM);  Daily Fluid Restriction: 1800 ml    Anthropometric Measures:  · Height: 5' (152.4 cm)  · Current Body Weight: 126 lb (57.2 kg)(3/26)   · Admission Body Weight:      · Usual Body Weight: (DAR UBW d/t per EMR no wt methods: 117-130# range)     · Ideal Body Weight: 100 lbs; % Ideal Body Weight 126 %   · BMI: 24.6  · Adjusted Body Weight:  ; No Adjustment     · BMI Categories: Normal Weight (BMI 18.5-24. 9)       Nutrition Diagnosis:   · Inadequate energy intake related to impaired respiratory function as evidenced by NPO or clear liquid status due to medical condition, intubation      Nutrition Interventions:   Food and/or Nutrient Delivery:  Continue NPO(Tf recs if needed: Recommend Renal Tf(Nepro) @25ml/hr x24hrs to provide:600mlTV/1394kcal w/propofol/49gm pro/435ml FW Addtional water flushes per critical care)  Nutrition Education/Counseling:  No recommendation at this time   Coordination of Nutrition Care:  Continue to monitor while inpatient    Goals:  Monitor Nutrition Progression       Nutrition Monitoring and Evaluation:   Behavioral-Environmental Outcomes:  None Identified   Food/Nutrient Intake Outcomes:  Diet Advancement/Tolerance  Physical Signs/Symptoms Outcomes:  Biochemical Data, Chewing or Swallowing, Fluid Status or Edema, Hemodynamic Status, Nutrition Focused Physical Findings, Skin, Weight     Discharge Planning:     Too soon to determine     Electronically signed by Indiana Wells, RD, LD on 3/26/21 at 9:09 AM EDT    Contact: [phone]

## 2021-03-26 NOTE — PROCEDURES
Aysha Valle is a 57 y.o. female patient. 1. Acute respiratory failure with hypoxia ([address]    2. Pneumonia due to COVID-19 virus    3. Chronic kidney disease, unspecified CKD stage    4. NSTEMI (non-ST elevated myocardial infarction) [facility])      Past Medical History:   Diagnosis Date    Acid reflux     Hyperlipidemia     Hypertension     Hypothyroidism     S/P appendectomy     Type II or unspecified type diabetes mellitus without mention of complication, not stated as uncontrolled      Blood pressure 125/61, pulse 91, temperature 97.2 °F (36.2 °C), temperature source Infrared, resp. rate 27, height 5' (1.524 m), weight 125 lb (56.7 kg), SpO2 90 %, not currently breastfeeding. Intubation    Date/Time: 3/25/2021 10:23 PM  Performed by: Timo Vargas MD  Authorized by: Timo Vargas MD   Consent: The procedure was performed in an emergent situation. Indications: respiratory failure and  hypoxemia  Intubation method: video-assisted  Patient status: unconscious  Sedatives: diazepam  Tube size: 7.5 mm  Tube type: cuffed  Number of attempts: 1  Post-procedure assessment: CO2 detector  ETT to lip: 24 cm  Tube secured with: ETT bird  Chest x-ray interpreted by: pending.           Timo Vargas MD  3/25/2021

## 2021-03-26 NOTE — PROGRESS NOTES
Pharmacy Consultation Note  (Antibiotic Dosing and Monitoring)    Initial consult date: 3/26/21  Consulting physician: Dr. Filomena Cervantes  Drug(s): Vancomycin  Indication: Empiric    Ht Readings from Last 1 Encounters:   21 5' (1.524 m)     Wt Readings from Last 1 Encounters:   21 135 lb 12.8 oz (61.6 kg)       Age/  Gender IBW DW  Allergy Information   57 y.o.     female 45.5 kg 61.6 kg  Demerol, Peanut-containing drug products, and Toradol [ketorolac tromethamine]                 Date  WBC BUN/CR HD Drug/Dose Time   Given Level(s)   (Time) Comments   3/26  (#1) 13.2 90/3.9 HD today -- --     3/27  (#2)      <0600>      (#3)            (#4)            (#5)            (#6)            (#7)            Estimated Creatinine Clearance: 12 mL/min (A) (based on SCr of 3.9 mg/dL (H)). UOP over the past 24 hours:       Intake/Output Summary (Last 24 hours) at 3/26/2021 1607  Last data filed at 3/26/2021 1427  Gross per 24 hour   Intake 1536 ml   Output 220 ml   Net 1316 ml       Temp max: Temp (24hrs), Av.7 °F (36.5 °C), Min:97.2 °F (36.2 °C), Max:98 °F (36.7 °C)      Antibiotic Regimen:  Antibiotic Dose Date Initiated   Doxycycline 100 mg IV Q12H 3/26   Pip/tazo 3.375 g Q12H 3/23     Cultures:  available culture and sensitivity results were reviewed in EPIC  Cultures sent and are pending. Culture Date Result             Assessment:  · Consulted by Dr. Filomena Cervantes to dose/monitor vancomycin  · Goal trough level:  15-20 mcg/mL  · Pt is a 57 yof admitted to the hospital for acute respiratory failure 2/2 COVID-19 and CKD. Yuni Casillas was intubated on 3/25 and transferred to the ICU. A HD cath has been placed with plans to start dialysis today.   · Serum creatinine today: 3.9; CrCl < 20 mL/min; started dialysis on 3/26  · Patient received Vancomycin 1,000 mg IV x 1 yesterday @ 6351, dialysis today started @ ~1545      Plan:  · Random, post-HD level tomorrow with morning labs, re-dose if level is less than 15-20 mcg/mL  · Levels PRN  · Follow HD orders  · Pharmacist will follow and monitor/adjust dosing as necessary      Thank you for the consult,    Waqas Vazquez, PharmD, BCPS 3/26/2021 4:07 PM   [phone]

## 2021-03-26 NOTE — PLAN OF CARE
Problem: Non-Violent Restraints  Goal: Removal from restraints as soon as assessed to be safe  Outcome: Not Met This Shift     Problem: Non-Violent Restraints  Goal: No harm/injury to patient while restraints in use  Outcome: Met This Shift     Problem: Non-Violent Restraints  Goal: Patient's dignity will be maintained  Outcome: Met This Shift     Problem: Gas Exchange - Impaired  Goal: Absence of hypoxia  3/25/2021 2135 by Jamia Torrez RN  Outcome: Ongoing     Problem: Falls - Risk of:  Goal: Absence of physical injury  Description: Absence of physical injury  3/26/2021 0658 by Cynthia Carney RN  Outcome: Met This Shift  3/25/2021 2135 by Jamia Torrez RN  Outcome: Met This Shift

## 2021-03-26 NOTE — PROGRESS NOTES
Respiratory staff at bedside pulling back ETT tube 2cm per Dr Rogerio Steiner. New chest Xray ordered.

## 2021-03-26 NOTE — CARE COORDINATION
3/26/21 Positive covid 3/9/21 previous admission. Will likely need testing prior to discharge if considering SNF or Acute rehab. Cm transition of care: pt in icu- intubated/isolation/sedation- 100% fio2, peep 10. bp 97/57 currently. Legal nok daughter- primary contact. Discharge unclear at this time. CM/SS to follow.  Electronically signed by SIERRA Christiansen on 3/26/2021 at 11:54 AM     Addendum: pt current with [facility]- will need orders if discharge to home Electronically signed by SIERRA Christinasen on 3/26/2021 at 11:58 AM

## 2021-03-26 NOTE — PROGRESS NOTES
Daughter Mariana Falling updated on pt's status. All questions answered. Pt would like updated by ICU once pt is transferred and settled into ICU bed.

## 2021-03-26 NOTE — PLAN OF CARE
Problem: Inadequate energy intake (NI-1.4)  Goal: Food and/or Nutrient Delivery  Description: Individualized approach for food/nutrient provision.   Outcome: Ongoing

## 2021-03-26 NOTE — PROGRESS NOTES
PULMONARY MEDICINE FOLLOW UP       57year old woman with PMH of dibetes mellitus, hypothyroidism, hyperlipidemia, hypertension, overweight, and as described below admitted to hospital for management of acute hypoxemic respiratory failure due to COVID-19 pneumonia.      --Ms Milo Burgess was intubated and transferred to ICU, sevrely hypoxemic  --Renal failure worse, possibly fluid overloaded, will insert HD line and start HD today   --CXR with diffuse pulmonary infiltrates, very concerning      A/P:  Acute hypoxemic respiratory failure secondary to severe COVID-19 pneumonia (positive test on 3/9) and HCAP (MRSA vs gram negative MDROs)  --Mechanical ventilation with lung protective strategy  --Prone position recommended  --Antimicrobial regimen: Piperacillin/tazobactam, vancomycin and doxycyclin  --Antiviral regimen: Dexamethasone 6 mg X 10 days  --Cultures reviewed  --Inflammatory markers:  Reviewed  --Anticoagulation: heparin SQ    Hypothyroidism  --Management with thyroid hormone supplements    Hypertension  --On antihypertensives, stopped amlodipine, decreased dose of carvedilol    Diabetes mellitus  --Management with insulin administration      CKD   --Avoid nephrotoxins and dose medications according GFR    BP (!) 97/57   Pulse 53   Temp 97.6 °F (36.4 °C) (Infrared)   Resp 18   Ht 5' (1.524 m)   Wt 135 lb 12.8 oz (61.6 kg)   SpO2 100%   BMI 26.52 kg/m²     General: Lethargic, sedated, intubted, on mechanical ventilation   HEENT: No head lesions, PERRL, EOMI, mouth without lesions, no nasal lesions, no cervical adenopathy palpated  Respiratory: Lungs with decreased breath sounds bilaterally, no adventitious sounds auscultated, no accessory muscle use  CV: Regular rate, no murmurs, no JVD, no leg edema  Abdomen: Soft, non tender, + bowel sounds, no lesions  Skin: Hydrated, adequate turgor, no rash, capillary refill <2 seconds  Extremities: Moves 4 limbs spontaneously, distal pulses present  Neurology: Nika Serrano, moves 4 limbs when stimulated with pain, and spontaneously,  neck is supple, no meningitic signs present.      SARS-COV-2 biomarkers  Recent Labs     03/23/21 2023 03/24/21  0147 03/24/21  0750 03/26/21  0454   PROCAL  --  1.05*  --   --    DDIMER 669  --   --  2336   AST  --   --  42*  --    ALT  --   --  32  --      Lab Results   Component Value Date    CHOL 168 08/05/2020    TRIG 182 08/05/2020    HDL 45 08/05/2020    LDLCALC 87 08/05/2020    LABVLDL 36 08/05/2020     Lab Results   Component Value Date/Time    VITD25 27 (L) 03/11/2021 06:45 AM     MEDICATIONS:  Fabiola Devang [START ON 3/27/2021] famotidine  10 mg Oral Daily    heparin (porcine)  5,000 Units Subcutaneous 3 times per day    carvedilol  3.125 mg Oral BID WC    insulin lispro  0-6 Units Subcutaneous Q6H    sodium bicarbonate  1,300 mg Per NG tube TID    doxycycline (VIBRAMYCIN) IV  100 mg Intravenous Q12H    [Held by provider] amLODIPine  5 mg Oral BID    piperacillin-tazobactam  3,375 mg Intravenous Q12H    dexamethasone  6 mg Intravenous Q24H    [Held by provider] pentoxifylline  400 mg Oral BID    ipratropium-albuterol  1 ampule Inhalation 4x daily    nystatin  5 mL Oral 4x Daily    aspirin  81 mg Oral Daily    atorvastatin  80 mg Oral Daily    clopidogrel  75 mg Oral Daily    gabapentin  300 mg Oral Nightly    insulin glargine  18 Units Subcutaneous QAM    levothyroxine  88 mcg Oral Daily    sodium chloride flush  10 mL Intravenous 2 times per day      fentaNYL 5 mcg/ml in 0.9%  ml infusion 125 mcg/hr (03/26/21 0838)    propofol 35 mcg/kg/min (03/26/21 0800)    sodium chloride 100 mL/hr at 03/26/21 0505    sodium chloride Stopped (03/25/21 0010)    dextrose Stopped (03/24/21 0626)     [Held by provider] hydrOXYzine, hydrALAZINE, glucose, dextrose, glucagon (rDNA), dextrose, acetaminophen, sodium chloride flush, promethazine **OR** ondansetron, polyethylene glycol, [DISCONTINUED] acetaminophen **OR** acetaminophen    OBJECTIVE: Vitals:    03/26/21 1100   BP: (!) 97/57   Pulse: 53   Resp: 18   Temp:    SpO2: 100%     FiO2 : 100 %  O2 Flow Rate (L/min): 50 L/min  O2 Device: Ventilator        LABS:  WBC   Date Value Ref Range Status   03/26/2021 13.2 (H) 4.5 - 11.5 E9/L Final   03/25/2021 17.5 (H) 4.5 - 11.5 E9/L Final   03/24/2021 14.0 (H) 4.5 - 11.5 E9/L Final     Hemoglobin   Date Value Ref Range Status   03/26/2021 8.0 (L) 11.5 - 15.5 g/dL Final   03/25/2021 8.8 (L) 11.5 - 15.5 g/dL Final   03/24/2021 8.3 (L) 11.5 - 15.5 g/dL Final     Hematocrit   Date Value Ref Range Status   03/26/2021 24.8 (L) 34.0 - 48.0 % Final   03/25/2021 27.6 (L) 34.0 - 48.0 % Final   03/24/2021 25.5 (L) 34.0 - 48.0 % Final     MCV   Date Value Ref Range Status   03/26/2021 87.9 80.0 - 99.9 fL Final   03/25/2021 87.9 80.0 - 99.9 fL Final   03/24/2021 88.2 80.0 - 99.9 fL Final     Platelets   Date Value Ref Range Status   03/26/2021 344 130 - 450 E9/L Final   03/25/2021 444 130 - 450 E9/L Final   03/24/2021 478 (H) 130 - 450 E9/L Final     Sodium   Date Value Ref Range Status   03/26/2021 142 132 - 146 mmol/L Final   03/25/2021 139 132 - 146 mmol/L Final   03/24/2021 137 132 - 146 mmol/L Final     Potassium   Date Value Ref Range Status   03/26/2021 4.9 3.5 - 5.0 mmol/L Final   03/25/2021 4.5 3.5 - 5.0 mmol/L Final   03/24/2021 4.4 3.5 - 5.0 mmol/L Final     Potassium reflex Magnesium   Date Value Ref Range Status   03/21/2021 4.2 3.5 - 5.0 mmol/L Final   03/09/2021 4.2 3.5 - 5.0 mmol/L Final     Chloride   Date Value Ref Range Status   03/26/2021 111 (H) 98 - 107 mmol/L Final   03/25/2021 104 98 - 107 mmol/L Final   03/24/2021 104 98 - 107 mmol/L Final     CO2   Date Value Ref Range Status   03/26/2021 22 22 - 29 mmol/L Final   03/25/2021 18 (L) 22 - 29 mmol/L Final   03/24/2021 19 (L) 22 - 29 mmol/L Final     BUN   Date Value Ref Range Status   03/26/2021 90 (H) 8 - 23 mg/dL Final   03/25/2021 91 (H) 8 - 23 mg/dL Final   03/24/2021 86 (H) 8 - 23 mg/dL Final CREATININE   Date Value Ref Range Status   03/26/2021 3.9 (H) 0.5 - 1.0 mg/dL Final   03/25/2021 3.9 (H) 0.5 - 1.0 mg/dL Final   03/24/2021 3.7 (H) 0.5 - 1.0 mg/dL Final     Glucose   Date Value Ref Range Status   03/26/2021 175 (H) 74 - 99 mg/dL Final   03/25/2021 363 (H) 74 - 99 mg/dL Final   03/24/2021 274 (H) 74 - 99 mg/dL Final   03/21/2011 419 (H) 70 - 110 mg/dL Final     Calcium   Date Value Ref Range Status   03/26/2021 7.7 (L) 8.6 - 10.2 mg/dL Final   03/25/2021 7.8 (L) 8.6 - 10.2 mg/dL Final   03/24/2021 7.7 (L) 8.6 - 10.2 mg/dL Final     Total Protein   Date Value Ref Range Status   03/24/2021 4.8 (L) 6.4 - 8.3 g/dL Final   03/21/2021 5.1 (L) 6.4 - 8.3 g/dL Final   03/15/2021 4.9 (L) 6.4 - 8.3 g/dL Final     Albumin   Date Value Ref Range Status   03/24/2021 2.2 (L) 3.5 - 5.2 g/dL Final   03/21/2021 2.5 (L) 3.5 - 5.2 g/dL Final   03/15/2021 2.4 (L) 3.5 - 5.2 g/dL Final   03/21/2011 4.1 3.2 - 4.8 g/dL Final     Total Bilirubin   Date Value Ref Range Status   03/24/2021 0.2 0.0 - 1.2 mg/dL Final   03/21/2021 0.3 0.0 - 1.2 mg/dL Final   03/15/2021 <0.2 0.0 - 1.2 mg/dL Final     Alkaline Phosphatase   Date Value Ref Range Status   03/24/2021 182 (H) 35 - 104 U/L Final   03/21/2021 89 35 - 104 U/L Final   03/15/2021 66 35 - 104 U/L Final     AST   Date Value Ref Range Status   03/24/2021 42 (H) 0 - 31 U/L Final   03/21/2021 50 (H) 0 - 31 U/L Final   03/15/2021 48 (H) 0 - 31 U/L Final     ALT   Date Value Ref Range Status   03/24/2021 32 0 - 32 U/L Final   03/21/2021 71 (H) 0 - 32 U/L Final   03/15/2021 30 0 - 32 U/L Final     GFR Non-   Date Value Ref Range Status   03/26/2021 14 >=60 mL/min/1.73 Final     Comment:     Chronic Kidney Disease: less than 60 ml/min/1.73 sq.m. Kidney Failure: less than 15 ml/min/1.73 sq.m. Results valid for patients 18 years and older. 03/25/2021 14 >=60 mL/min/1.73 Final     Comment:     Chronic Kidney Disease: less than 60 ml/min/1.73 sq.m. Kidney Failure: less than 15 ml/min/1.73 sq.m. Results valid for patients 18 years and older. 03/24/2021 15 >=60 mL/min/1.73 Final     Comment:     Chronic Kidney Disease: less than 60 ml/min/1.73 sq.m. Kidney Failure: less than 15 ml/min/1.73 sq.m. Results valid for patients 18 years and older. GFR    Date Value Ref Range Status   03/26/2021 14  Final   03/25/2021 14  Final   03/24/2021 15  Final     Magnesium   Date Value Ref Range Status   03/26/2021 2.8 (H) 1.6 - 2.6 mg/dL Final   03/11/2021 2.2 1.6 - 2.6 mg/dL Final     Phosphorus   Date Value Ref Range Status   03/26/2021 6.3 (H) 2.5 - 4.5 mg/dL Final   03/11/2021 7.9 (H) 2.5 - 4.5 mg/dL Final   03/06/2021 4.5 2.5 - 4.5 mg/dL Final     Recent Labs     03/26/21  0507   PH 7.277*   PO2 69.7*   PCO2 47.9*   HCO3 21.9*   BE -4.8*   O2SAT 92.0       RADIOLOGY:  XR CHEST PORTABLE   Final Result   Extensive bilateral lung infiltrates are similar to subtly improved. Continued follow-up recommended. XR CHEST PORTABLE   Final Result   Endotracheal tube terminates approximately 1 cm above the sylvia and should   be retracted approximately 2 cm for more optimal positioning. Extensive bilateral diffuse lung infiltrates are similar to previous and may   represent severe edema, pneumonia or ARDS. XR CHEST PORTABLE   Final Result   ET tube tip at the entrance of the right mainstem bronchus and should be   retracted 2.0 cm. Diffuse bilateral infiltrates. Enteric tube tip in the distal body of the stomach. XR CHEST PORTABLE   Final Result   Stable diffuse and bilateral infiltrates. ET tube tip is at the entrance of the right mainstem bronchus and should be   retracted at least 2.0 cm. XR CHEST PORTABLE   Final Result   Diffuse and bilateral airspace opacities. Findings consistent with extensive   pulmonary edema versus pneumonia.          NM LUNG SCAN PERFUSION ONLY   Final Result   Low probability for pulmonary embolism. Small perfusion defects correspond   to the opacities in the mid lungs bilaterally. US DUP LOWER EXTREMITIES BILATERAL VENOUS   Final Result   No evidence of DVT in either lower extremity. CT Head WO Contrast   Final Result   No skull fracture or acute intracranial abnormality. CT CHEST WO CONTRAST   Final Result   Extensive airspace opacities throughout both lungs which is most prominent   along the upper lobes and perihilar regions and could pulmonary edema and/or   pneumonia vs pulmonary hemorrhage. Recommend short-term follow-up. Prominent central pulmonary vessels suggestive of pulmonary congestion or   pulmonary artery hypertension. Small pericardial effusion with no mediastinal mass or adenopathy. Tiny right pleural effusion. Questionable mild ascites along the upper abdomen         XR CHEST PORTABLE   Final Result   Mild cardiomegaly and mild pulmonary edema which is more prominent. Hazy perihilar and bibasilar opacities which have increased and could be due   to pulmonary edema and/or pneumonia. Recommend follow-up. Resolving left upper lobe opacity. PROBLEM LIST:  Active Problems:    Acute respiratory failure with hypoxia ([address]    COVID-19  Resolved Problems:    * No resolved hospital problems.  *    ATTESTATION:  ICU Staff Physician note of personal involvement in Care  As the attending physician, I certify that I personally reviewed the patients history and personnally examined the patient to confirm the physical findings described above,  And that I reviewed the relevant imaging studies and available reports.  I also discussed the differential diagnosis and all of the proposed management plans with the patient and individuals accompanying the patient to this visit.  They had the opportunity to ask questions about the proposed management plans and to have those questions answered.     This patient has a high probability of sudden, clinically significant deterioration, which requires the highest level of physician preparedness to intervene urgently.  I managed/supervised life or organ supporting interventions that required frequent physician assessment.   I devoted my full attention to the direct care of this patient for the amount of time indicated below.  Time I spent with the family or surrogate(s) is included only if the patient was incapable of providing the necessary information or participating in medical decisions  Time devoted to teaching and to any procedures I billed separately is not included.     CRITICAL CARE TIME:  30 minutes    Augie Clemente MD  Pulmonary and Critical Care Medicine

## 2021-03-26 NOTE — PLAN OF CARE
Problem:  Body Temperature -  Risk of, Imbalanced  Goal: Ability to maintain a body temperature within defined limits  3/25/2021 2135 by Kp Sexton RN  Outcome: Met This Shift     Problem: Risk for Fluid Volume Deficit  Goal: Maintain normal heart rhythm  3/25/2021 2135 by Kp Sexton RN  Outcome: Met This Shift     Problem: Falls - Risk of:  Goal: Will remain free from falls  Description: Will remain free from falls  3/25/2021 2135 by Kp Sexton RN  Outcome: Met This Shift     Problem: Falls - Risk of:  Goal: Absence of physical injury  Description: Absence of physical injury  3/25/2021 2135 by Kp Sexton RN  Outcome: Met This Shift     Problem: Gas Exchange - Impaired  Goal: Absence of hypoxia  3/25/2021 2135 by Kp Sexton RN  Outcome: Ongoing

## 2021-03-26 NOTE — SIGNIFICANT EVENT
[facility] RRT/Code Blue Note    Subjective:    Called to bedside for respiratory failure, patient with obtunded RRT was called. At bedside patient was not responsive, O2 sats was initially 90 instructed herself on the BiPAP, attempted to evaluate patient, initially she was not responding. We had to place oral airway were able to evaluate patient O2 sats of 99%, patient was still obtunded, decision was made to intubate the patient, intubation was successful bilateral breath sounds. We will transfer the patient to the intensive care unit       amLODIPine  5 mg Oral BID    enoxaparin  30 mg Subcutaneous Daily    piperacillin-tazobactam  3,375 mg Intravenous Q12H    dexamethasone  6 mg Intravenous Q24H    pentoxifylline  400 mg Oral BID    ipratropium-albuterol  1 ampule Inhalation 4x daily    nystatin  5 mL Oral 4x Daily    aspirin  81 mg Oral Daily    atorvastatin  80 mg Oral Daily    carvedilol  25 mg Oral BID WC    clopidogrel  75 mg Oral Daily    famotidine  20 mg Oral BID    gabapentin  300 mg Oral Nightly    insulin glargine  18 Units Subcutaneous QAM    levothyroxine  88 mcg Oral Daily    insulin lispro  0-6 Units Subcutaneous TID WC    insulin lispro  0-3 Units Subcutaneous Nightly    sodium bicarbonate  1,300 mg Oral BID    sodium chloride flush  10 mL Intravenous 2 times per day     hydrOXYzine, 25 mg, 4x Daily PRN  hydrALAZINE, 10 mg, Q4H PRN  glucose, 15 g, PRN  dextrose, 12.5 g, PRN  glucagon (rDNA), 1 mg, PRN  dextrose, 100 mL/hr, PRN  acetaminophen, 500 mg, 4x Daily PRN  sodium chloride flush, 10 mL, PRN  promethazine, 12.5 mg, Q6H PRN    Or  ondansetron, 4 mg, Q6H PRN  polyethylene glycol, 17 g, Daily PRN  acetaminophen, 650 mg, Q6H PRN         Objective:    /61   Pulse 91   Temp 97.2 °F (36.2 °C) (Infrared)   Resp 27   Ht 5' (1.524 m)   Wt 125 lb (56.7 kg)   SpO2 90%   BMI 24.41 kg/m²   Lungs  Clear bilaterally without crackles.   Heart S1-S2. Recent Labs     03/23/21  0831 03/24/21  0750 03/25/21  0847    137 139   K 4.5 4.4 4.5   * 104 104   CO2 20* 19* 18*   BUN 94* 86* 91*   CREATININE 3.6* 3.7* 3.9*   GLUCOSE 252* 274* 363*   CALCIUM 8.0* 7.7* 7.8*       Recent Labs     03/23/21  0831 03/24/21  0750 03/25/21  0847   WBC 15.8* 14.0* 17.5*   RBC 2.99* 2.89* 3.14*   HGB 8.4* 8.3* 8.8*   HCT 26.1* 25.5* 27.6*   MCV 87.3 88.2 87.9   MCH 28.1 28.7 28.0   MCHC 32.2 32.5 31.9*   RDW 14.5 14.8 15.0   * 478* 444   MPV 9.5 9.8 9.7         I/O last 3 completed shifts: In: 530 [P.O.:480; IV Piggyback:50]  Out: 350 [Urine:350]  No intake/output data recorded. Radiology: Pending XR    Assessment:    Active Problems:    Acute respiratory failure with hypoxia ([address]    COVID-19  Resolved Problems:    * No resolved hospital problems. *        Critical care time 32 minutes not including procedures.     Electronically signed by Juliette Thrasher MD on 3/25/2021 at 10:21 PM

## 2021-03-26 NOTE — PROGRESS NOTES
acetaminophen    OBJECTIVE:  BP (!) 93/55   Pulse 51   Temp 98 °F (36.7 °C) (Axillary)   Resp 16   Ht 5' (1.524 m)   Wt 135 lb 12.8 oz (61.6 kg)   SpO2 100%   BMI 26.52 kg/m²   Temp  Av.7 °F (36.5 °C)  Min: 97.2 °F (36.2 °C)  Max: 98 °F (36.7 °C)  Constitutional:  The patient is on vent swollen  Skin:    Warm and dry   HEENT:      AT/NC  Neck:    Supple to movements. Chest:    bipap Symmetrical expansion. DEC BS ANT some rales  Cardiovascular:  S1 and S2 are rhythmic and regular. Abdomen:   Positive bowel sounds to auscultation. Benign to palpation. ngt  Extremities:   No clubbing, no cyanosis, no edema.   CNS    sedated  Lines: pIV    Radiology:  Laboratory and Tests Review:  Lab Results   Component Value Date    WBC 13.2 (H) 2021    WBC 17.5 (H) 2021    WBC 14.0 (H) 2021    HGB 8.0 (L) 2021    HCT 24.8 (L) 2021    MCV 87.9 2021     2021     No results found for: [facility]  Lab Results   Component Value Date    ALT 32 2021    AST 42 (H) 2021    ALKPHOS 182 (H) 2021    BILITOT 0.2 2021     Lab Results   Component Value Date     2021    K 4.9 2021    K 4.2 2021     2021    CO2 22 2021    BUN 90 2021    CREATININE 3.9 2021    CREATININE 3.9 2021    CREATININE 3.7 2021    GFRAA 14 2021    LABGLOM 14 2021    GLUCOSE 175 2021    GLUCOSE 419 2011    PROT 4.8 2021    LABALBU 2.2 2021    LABALBU 4.1 2011    CALCIUM 7.7 2021    BILITOT 0.2 2021    ALKPHOS 182 2021    AST 42 2021    ALT 32 2021     Lab Results   Component Value Date    CRP 0.9 (H) 2021    CRP 10.8 (H) 2021     Lab Results   Component Value Date    SEDRATE 40 (H) 2021    SEDRATE 44 (H) 2021     Recent Labs     21  0147 21  0750 21  0454   PROCAL  --  1.05*  --   --    DDIMER 207  -- --  2336   AST  --   --  42*  --    ALT  --   --  32  --      Lab Results   Component Value Date    CHOL 168 08/05/2020    TRIG 182 08/05/2020    HDL 45 08/05/2020    LDLCALC 87 08/05/2020    LABVLDL 36 08/05/2020     Lab Results   Component Value Date/Time    VITD25 27 (L) 03/11/2021 06:45 AM       Microbiology:   No results for input(s): COVID19 in the last 72 hours. Lab Results   Component Value Date    BC 5 Days no growth 03/08/2021    BLOODCULT2 5 Days no growth 03/08/2021        ASSESSMENT/PLAN:  LEUKOCYTOSIS FOLLOW CBC dec  respiratory failure intubated  S/P COVID RX  COVERING HCAP  CKD URINARY RETENTION HAS GIPSON        doxycycline (VIBRAMYCIN) 100 mg in dextrose 5 % 100 mL IVPB, Q12H   piperacillin-tazobactam (ZOSYN) 3,375 mg in dextrose 5 % 50 mL IVPB extended infusion (mini-bag), Q12H  dexamethasone (PF) (DECADRON) injection 6 mg, Q24H     redose vanco   Consulted pharmacy     CHECK blood CX  resp cx pending     · Monitor labs    Imaging and labs were reviewed per medical records. Thank you for involving me in the care of [place] [address] I will continue to follow. Please do not hesitate to call for any questions or concerns.     Electronically signed by Chelsy Wynne MD on 3/26/2021 at 1:02 PM

## 2021-03-26 NOTE — PROGRESS NOTES
Department of Internal Medicine  General Internal Medicine  Attending Progress Note  Chief Complaint   Patient presents with    Concern For COVID-19     positive covid, shortness of breath, EMS states 60% on room air. 94% on 15LPM NRB    Shortness of Breath     SUBJECTIVE:    Patient is currently intubated.      OBJECTIVE      Medications    Current Facility-Administered Medications: [START ON 3/27/2021] famotidine (PEPCID) tablet 10 mg, 10 mg, Oral, Daily  carvedilol (COREG) tablet 3.125 mg, 3.125 mg, Oral, BID WC  insulin lispro (HUMALOG) injection vial 0-6 Units, 0-6 Units, Subcutaneous, Q6H  sodium bicarbonate tablet 1,300 mg, 1,300 mg, Per NG tube, TID  doxycycline (VIBRAMYCIN) 100 mg in dextrose 5 % 100 mL IVPB, 100 mg, Intravenous, Q12H  [START ON 3/27/2021] heparin (porcine) injection 5,000 Units, 5,000 Units, Subcutaneous, 3 times per day  [Held by provider] hydrOXYzine (VISTARIL) capsule 25 mg, 25 mg, Oral, 4x Daily PRN  hydrALAZINE (APRESOLINE) injection 10 mg, 10 mg, Intravenous, Q4H PRN  fentaNYL 5 mcg/ml in 0.9%  ml infusion, 12.5-200 mcg/hr, Intravenous, Continuous  propofol injection, 5-50 mcg/kg/min, Intravenous, Titrated  [Held by provider] amLODIPine (NORVASC) tablet 5 mg, 5 mg, Oral, BID  piperacillin-tazobactam (ZOSYN) 3,375 mg in dextrose 5 % 50 mL IVPB extended infusion (mini-bag), 3,375 mg, Intravenous, Q12H  dexamethasone (PF) (DECADRON) injection 6 mg, 6 mg, Intravenous, Q24H  0.9 % sodium chloride infusion, , Intravenous, Q12H  [Held by provider] pentoxifylline (TRENTAL) extended release tablet 400 mg, 400 mg, Oral, BID  ipratropium-albuterol (DUONEB) nebulizer solution 1 ampule, 1 ampule, Inhalation, 4x daily  nystatin (MYCOSTATIN) 481582 UNIT/ML suspension 500,000 Units, 5 mL, Oral, 4x Daily  glucose (GLUTOSE) 40 % oral gel 15 g, 15 g, Oral, PRN  dextrose 50 % IV solution, 12.5 g, Intravenous, PRN  glucagon (rDNA) injection 1 mg, 1 mg, Intramuscular, PRN  dextrose 5 % solution, 100 mL/hr, Intravenous, PRN  acetaminophen (TYLENOL) tablet 500 mg, 500 mg, Oral, 4x Daily PRN  aspirin chewable tablet 81 mg, 81 mg, Oral, Daily  atorvastatin (LIPITOR) tablet 80 mg, 80 mg, Oral, Daily  clopidogrel (PLAVIX) tablet 75 mg, 75 mg, Oral, Daily  gabapentin (NEURONTIN) capsule 300 mg, 300 mg, Oral, Nightly  insulin glargine (LANTUS) injection vial 18 Units, 18 Units, Subcutaneous, QAM  levothyroxine (SYNTHROID) tablet 88 mcg, 88 mcg, Oral, Daily  sodium chloride flush 0.9 % injection 10 mL, 10 mL, Intravenous, 2 times per day  sodium chloride flush 0.9 % injection 10 mL, 10 mL, Intravenous, PRN  promethazine (PHENERGAN) tablet 12.5 mg, 12.5 mg, Oral, Q6H PRN **OR** ondansetron (ZOFRAN) injection 4 mg, 4 mg, Intravenous, Q6H PRN  polyethylene glycol (GLYCOLAX) packet 17 g, 17 g, Oral, Daily PRN  [DISCONTINUED] acetaminophen (TYLENOL) tablet 650 mg, 650 mg, Oral, Q6H PRN **OR** acetaminophen (TYLENOL) suppository 650 mg, 650 mg, Rectal, Q6H PRN  Physical    VITALS:  /60   Pulse (!) 49   Temp 98 °F (36.7 °C) (Axillary)   Resp 16   Ht 5' (1.524 m)   Wt 135 lb 12.8 oz (61.6 kg)   SpO2 100%   BMI 26.52 kg/m²   CONSTITUTIONAL:  awake, alert, cooperative, no apparent distress, and appears stated age  EYES:  Lids and lashes normal, pupils equal, round and reactive to light, extra ocular muscles intact, sclera clear, conjunctiva normal  ENT:  Normocephalic, without obvious abnormality, atraumatic, sinuses nontender on palpation, external ears without lesions, oral pharynx with moist mucus membranes, tonsils without erythema or exudates, gums normal and good dentition.   NECK:  Supple, symmetrical, trachea midline, no adenopathy, thyroid symmetric, not enlarged and no tenderness, skin normal  HEMATOLOGIC/LYMPHATICS:  no cervical lymphadenopathy  BACK:  Symmetric, no curvature, spinous processes are non-tender on palpation, paraspinous muscles are non-tender on palpation, no costal vertebral tenderness LUNGS:  No increased work of breathing, good air exchange, clear to auscultation bilaterally, no crackles or wheezing  CARDIOVASCULAR:  Normal apical impulse, regular rate and rhythm, normal S1 and S2, no S3 or S4, and no murmur noted  ABDOMEN:  No scars, normal bowel sounds, soft, non-distended, non-tender, no masses palpated, no hepatosplenomegally  MUSCULOSKELETAL:  there is no redness, warmth, or swelling of the joints  NEUROLOGIC:  Unable to assess  SKIN:  no bruising or bleeding  Data    CBC:   Lab Results   Component Value Date    WBC 13.2 03/26/2021    RBC 2.82 03/26/2021    HGB 8.0 03/26/2021    HCT 24.8 03/26/2021    MCV 87.9 03/26/2021    MCH 28.4 03/26/2021    MCHC 32.3 03/26/2021    RDW 15.0 03/26/2021     03/26/2021    MPV 9.7 03/26/2021     CMP:    Lab Results   Component Value Date     03/26/2021    K 4.9 03/26/2021    K 4.2 03/21/2021     03/26/2021    CO2 22 03/26/2021    BUN 90 03/26/2021    CREATININE 3.9 03/26/2021    GFRAA 14 03/26/2021    LABGLOM 14 03/26/2021    GLUCOSE 175 03/26/2021    GLUCOSE 419 03/21/2011    PROT 4.8 03/24/2021    LABALBU 2.2 03/24/2021    LABALBU 4.1 03/21/2011    CALCIUM 7.7 03/26/2021    BILITOT 0.2 03/24/2021    ALKPHOS 182 03/24/2021    AST 42 03/24/2021    ALT 32 03/24/2021       ASSESSMENT AND PLAN      1. Acute respiratory failure with hypoxia: currently intubated  Continue vent support. 2.  COVID-19 viral PNA: completed treatment in the past  On Decadron  Currently on IV abx for concern for infection    3. Acute on chronic renal disease:  Now requiring dialysis  Management per Nephro    4. Hypertension Essential:    5. DM type 2: continue current insulin dosing  On 18 units of Lantus  Continue accucheck and sliding scale    6.  Anemia due to CKD:  Continue to monitor

## 2021-03-27 NOTE — PROGRESS NOTES
Pharmacy Consultation Note  (Antibiotic Dosing and Monitoring)    Initial consult date: 3/26/21  Consulting physician: Dr. Kody Burton  Drug(s): Vancomycin  Indication: Empiric    Ht Readings from Last 1 Encounters:   21 5' (1.524 m)     Wt Readings from Last 1 Encounters:   21 141 lb 5 oz (64.1 kg)       Age/  Gender IBW DW  Allergy Information   57 y.o.     female 45.5 kg 61.6 kg  Demerol, Peanut-containing drug products, and Toradol [ketorolac tromethamine]                 Date  WBC BUN/CR HD Drug/Dose Time   Given Level(s)   (Time) Comments   3/26  (#1) 13.2 90/3.9 HD x 3.75h -- --     3/27  (#2) 7.3 50/2.4 HD x 2h No dose -- 22.1 mcg/mL @ 0511  15.3 mcg/mL @ 1623      (#3)            (#4)            (#5)            (#6)            (#7)            Estimated Creatinine Clearance: 20 mL/min (A) (based on SCr of 2.4 mg/dL (H)). UOP over the past 24 hours:       Intake/Output Summary (Last 24 hours) at 3/27/2021 1235  Last data filed at 3/27/2021 1045  Gross per 24 hour   Intake 2256.68 ml   Output 1780 ml   Net 476.68 ml       Temp max: Temp (24hrs), Av.9 °F (36.6 °C), Min:96.7 °F (35.9 °C), Max:98.6 °F (37 °C)      Antibiotic Regimen:  Antibiotic Dose Date Initiated   Doxycycline 100 mg IV Q12H 3/26   Pip/tazo 3.375 g Q12H 3/23     Cultures:  available culture and sensitivity results were reviewed in Epic   Culture Date Result    Blood x 2 3/25 NGTD     Assessment:  · Consulted by Dr. Kody Burton to dose/monitor vancomycin  · Goal trough level:  15-20 mcg/mL  · Pt is a 57 yof admitted to the hospital for acute respiratory failure 2/2 COVID-19 and CKD. Christopher Bennett was intubated on 3/25 and transferred to the ICU. A HD cath has been placed with plans to start dialysis today.   · Serum creatinine today: 2.4; CrCl < 20 mL/min; started dialysis on 3/26  · 3/26: Patient received Vancomycin 1,000 mg IV x 1 yesterday @ 2886, dialysis today started @ ~1545  · 3/27: Post-HD level this morning @ 0511 = 22.1 mcg/mL, dialyzed again this morning x 2h & completed @ ~1045      Plan:  · Repeat random level today @ 1700, re-dose if level is less than 15-20 mcg/mL  · Levels PRN  · Follow HD orders  · Pharmacist will follow and monitor/adjust dosing as necessary      Thank you for the consult,    Mima Sahu, PharmD, BCPS 3/27/2021 12:35 PM   [phone]

## 2021-03-27 NOTE — PLAN OF CARE
Problem: Airway Clearance - Ineffective  Goal: Achieve or maintain patent airway  3/27/2021 0114 by Mendoza Naidu RN  Outcome: Met This Shift  3/26/2021 1847 by Leilani Burnette RN  Outcome: Met This Shift     Problem: Gas Exchange - Impaired  Goal: Absence of hypoxia  3/26/2021 1847 by Leilani Burnette RN  Outcome: Met This Shift     Problem: Breathing Pattern - Ineffective  Goal: Ability to achieve and maintain a regular respiratory rate  Outcome: Met This Shift     Problem:  Body Temperature -  Risk of, Imbalanced  Goal: Ability to maintain a body temperature within defined limits  Outcome: Met This Shift     Problem: Risk for Fluid Volume Deficit  Goal: Maintain normal heart rhythm  3/26/2021 1847 by Leilani Burnette RN  Outcome: Met This Shift     Problem: Non-Violent Restraints  Goal: No harm/injury to patient while restraints in use  3/27/2021 0114 by Mendoza Naidu RN  Outcome: Met This Shift  3/26/2021 1847 by Leilani Burnette RN  Outcome: Met This Shift  Goal: Patient's dignity will be maintained  3/27/2021 0114 by Mendoza Naidu RN  Outcome: Met This Shift  3/26/2021 1847 by Leilani Burnette RN  Outcome: Met This Shift     Problem: Non-Violent Restraints  Goal: Removal from restraints as soon as assessed to be safe  3/27/2021 0114 by Mendoza Naidu RN  Outcome: Not Met This Shift  3/26/2021 1847 by Leilani Burnette RN  Outcome: Not Met This Shift

## 2021-03-27 NOTE — FLOWSHEET NOTE
03/26/21 2000   Vital Signs   BP (!) 147/80   Temp 98.6 °F (37 °C)   Pulse 80   Resp 24   Weight Method Bed scale   Pain Assessment   Pain Assessment 0-10   Pain Level 0   Post-Hemodialysis Assessment   Rinseback Volume (ml) 300 ml   Total Liters Processed (l/min) 38.6 l/min   Dialyzer Clearance Lightly streaked   Duration of Treatment (minutes) 225 minutes   Hemodialysis Intake (ml) 600 ml   Hemodialysis Output (ml) 600 ml   NET Removed (ml) 0 ml   Tolerated Treatment Fair   Patient Response to Treatment hypotensive system clotted x 1 new system hung-   Bilateral Breath Sounds Diminished;Clear   Edema Generalized

## 2021-03-27 NOTE — PROGRESS NOTES
156/98   82 25 92 %    03/26/21 2100 (!) 138/92   81 20 97 %    03/26/21 2000 (!) 147/80 98.6 °F (37 °C)  80 24 97 %    03/26/21 1945 87/69   93      03/26/21 1932 94/70   92      03/26/21 1915 99/65   88      03/26/21 1900 111/72   85 25 98 %    03/26/21 1830 110/73   89      03/26/21 1815 110/70   88      03/26/21 1800 116/74   88 25 96 %    03/26/21 1744 100/70   91      03/26/21 1730 103/67   90      03/26/21 1715 101/71   88      03/26/21 1705 (!) 82/58         03/26/21 1700 (!) 82/57   81 20 90 %    03/26/21 1645 (!) 108/59   75      03/26/21 1630 120/65   72      03/26/21 1625 120/60         03/26/21 1620 (!) 98/56   50      03/26/21 1615 (!) 81/52   (!) 48      03/26/21 1612 (!) 87/52   (!) 46      03/26/21 1610 (!) 87/48   (!) 45 25 96 %    03/26/21 1605     16     03/26/21 1600 (!) 98/56 98.6 °F (37 °C) Axillary (!) 46 16 95 % 139 lb 1.8 oz (63.1 kg)   03/26/21 1500 (!) 82/49   50 16 96 %    03/26/21 1400 116/63   59 18 99 %           Intake/Output Summary (Last 24 hours) at 3/27/2021 1343  Last data filed at 3/27/2021 1156  Gross per 24 hour   Intake 2356.68 ml   Output 1780 ml   Net 576.68 ml       Wt Readings from Last 3 Encounters:   03/27/21 141 lb 5 oz (64.1 kg)   03/08/21 117 lb (53.1 kg)   01/10/21 130 lb (59 kg)       General:  in no acute distress  HEENT: NC/AT, EOMI, sclera and conjunctiva are clear and anicteric. Mucous membranes moist.  Cardiovascular: regular rate and rhythm, no murmurs, gallops, or rubs  Respiratory:  Clear, no rales, rhochi, or wheezes  Gastrointestinal: soft, nontender, nondistended, NABS  Ext: no cyanosis, clubbing, or edema bilateral lower extremities  Neuro: awake, alert, oriented x3. Moves all 4 extremities. Cranial nerves II through XII grossly intact.   Skin: dry, no rash      DATA:      Recent Labs     03/25/21  0847 03/26/21  0454 03/27/21  0511   WBC 17.5* 13.2* 7.3   HGB 8.8* 8.0* 8.4*   HCT 27.6* 24.8* 25.4*   MCV 87.9 87.9 87.0    344 325     Recent Labs     03/25/21  0847 03/26/21  0454 03/27/21  0511    142 137   K 4.5 4.9 4.3    111* 101   CO2 18* 22 25   BUN 91* 90* 50*   CREATININE 3.9* 3.9* 2.4*       Iron studies:  Lab Results   Component Value Date    FERRITIN 613 03/16/2021     Bone disease:  Lab Results   Component Value Date    PTH 42 08/05/2020    MG 2.8 (H) 03/26/2021    PHOS 6.3 (H) 03/26/2021     Nutrition:No results found for: ALB    Microbiology      ASSESSMENT / RECOMMENDATIONS:     1. GAVIN on top of chronic disease stage IV with hemodialysis initiation   cont diffusive IHD support for solute and volume clearance per orders  2. Anemia:   Cont. epo and iv ferrlecit per orders  3. Secondary Hyperparathyroidism:   Cont vitamin d analog  4. Hypotension which prevented us from proceeding forward with dialysis treatment today, case was discussed with ICU attending for future management.    Cont current tx, see orders    Melchor Dumont MD  3/27/2021OLOGY:[id number]}     DIALYSIS ORDERS:    Bath: Bicarb 35  UF goal: 2 L  Access: Acute dialysis access

## 2021-03-27 NOTE — FLOWSHEET NOTE
03/27/21 1045   Vital Signs   BP (!) 78/61   Pulse 76   Resp 16   SpO2 98 %   Weight 141 lb 5 oz (64.1 kg)   Weight Method Bedside scale   Percent Weight Change 2.18   Pain Assessment   Pain Assessment CPOT   Pain Level 0   Post-Hemodialysis Assessment   Post-Treatment Procedures Blood returned;Catheter capped, clamped and heparinized x 2 ports   Machine Disinfection Process Acid/Vinegar Clean;Bleach; Exterior Machine Disinfection   Rinseback Volume (ml) 300 ml   Total Liters Processed (l/min) 28.2 l/min   Dialyzer Clearance Lightly streaked   Duration of Treatment (minutes) 120 minutes   Hemodialysis Intake (ml) 300 ml   Hemodialysis Output (ml) 1000 ml   NET Removed (ml) 700 ml   Tolerated Treatment Poor   Bilateral Breath Sounds Diminished   Edema Right upper extremity; Left upper extremity;Right lower extremity; Left lower extremity;Generalized   Edema Generalized +1   RUE Edema +2;Pitting   LUE Edema +2;Pitting   RLE Edema +2;Pitting   LLE Edema +2;Pitting

## 2021-03-27 NOTE — PROGRESS NOTES
PULMONARY MEDICINE FOLLOW UP       57year old woman with PMH of dibetes mellitus, hypothyroidism, hyperlipidemia, hypertension, overweight, and as described below admitted to hospital for management of acute hypoxemic respiratory failure due to COVID-19 pneumonia. --Mrs Milo Burgess has pneumomediatinum, I do not appreciate a pneumothorax  --Worsening pulmonary infiltrates  --Tolerates SLEDD but unable to remove fluid  --In shock, requires dopamine to maintain MAP and HR >60  --Changed ventilator mode to PCV PIP 25/RR25/PEEP 7/100% FiO2, Pplat 29, airway compliance 12. Prior vent settings resulted in Pplat up to 39. ABG on PCV PIP 25/RR25/PEEP 7  Component      Latest Ref Rng & Units 3/27/2021          10:05 AM   pH, Blood Gas      7.350 - 7.450 7.350   PCO2      35.0 - 45.0 mmHg 46.9 (H)   pO2      75.0 - 100.0 mmHg 116.4 (H)   HCO3      22.0 - 26.0 mmol/L 25.3       A/P:  Acute hypoxemic respiratory failure secondary to severe COVID-19 pneumonia (positive test on 3/9) and HCAP (MRSA vs gram negative MDROs)  --Mechanical ventilation with lung protective strategy  --Prone position recommended  --Antimicrobial regimen: Piperacillin/tazobactam, vancomycin and doxycyclin  --Antiviral regimen: Dexamethasone 6 mg X 10 days  --Cultures reviewed  --Inflammatory markers:  Reviewed  --Anticoagulation: heparin SQ    Air leak syndrome with pneumomediastinum  --No evidence of pneumothorax, thankfully  --Tried to reduce PEEP but resulted in hypoxemia, increased to 10 with improvement of oxygenation. Changed vent settings to PCV with reduction of Pplat to 29. --Dialysis cath on right works properly.      Hypothyroidism  --Management with thyroid hormone supplements    Hypertension  --On antihypertensives, stopped amlodipine, decreased dose of carvedilol    Diabetes mellitus  --Management with insulin administration      CKD   --Avoid nephrotoxins and dose medications according GFR    BP (!) 108/58   Pulse 57   Temp 97.8 °F (36.6 °C) (Infrared)   Resp 28   Ht 5' (1.524 m)   Wt 138 lb 4.8 oz (62.7 kg)   SpO2 100%   BMI 27.01 kg/m²   General: Comatose, intubated, ventilated   HEENT: No head lesions, PERRL, EOMI, mouth without lesions, no nasal lesions, no cervical adenopathy palpated  Respiratory: Lungs with equal breath sounds bilaterally, no adventitious sounds auscultated, no accessory muscle use, with subcutaneous emphysema on upper chest and neck  CV: Regular rate, no murmurs, no JVD, trace no leg edema  Abdomen: Soft, non tender, + bowel sounds, no lesions  Skin: Hydrated, adequate turgor, no rash, capillary refill <2 seconds  Extremities: Withdraws to pain in 4 limbs, distal pulses present  Neurology: Comatose, neck is supple, no meningitic signs present.        SARS-COV-2 biomarkers  Recent Labs     03/26/21  0454   DDIMER 2336     Lab Results   Component Value Date    CHOL 168 08/05/2020    TRIG 182 08/05/2020    HDL 45 08/05/2020    LDLCALC 87 08/05/2020    LABVLDL 36 08/05/2020     Lab Results   Component Value Date/Time    VITD25 27 (L) 03/11/2021 06:45 AM     MEDICATIONS:   famotidine  10 mg Oral Daily    [Held by provider] carvedilol  3.125 mg Oral BID WC    insulin lispro  0-6 Units Subcutaneous Q6H    sodium bicarbonate  1,300 mg Per NG tube TID    doxycycline (VIBRAMYCIN) IV  100 mg Intravenous Q12H    heparin (porcine)  5,000 Units Subcutaneous 3 times per day    vancomycin (VANCOCIN) intermittent dosing (placeholder)   Other RX Placeholder    [Held by provider] amLODIPine  5 mg Oral BID    piperacillin-tazobactam  3,375 mg Intravenous Q12H    dexamethasone  6 mg Intravenous Q24H    [Held by provider] pentoxifylline  400 mg Oral BID    ipratropium-albuterol  1 ampule Inhalation 4x daily    nystatin  5 mL Oral 4x Daily    aspirin  81 mg Oral Daily    atorvastatin  80 mg Oral Daily    clopidogrel  75 mg Oral Daily    gabapentin  300 mg Oral Nightly    insulin glargine  18 Units Subcutaneous QAM - 5.0 mmol/L Final   03/25/2021 4.5 3.5 - 5.0 mmol/L Final     Potassium reflex Magnesium   Date Value Ref Range Status   03/21/2021 4.2 3.5 - 5.0 mmol/L Final   03/09/2021 4.2 3.5 - 5.0 mmol/L Final     Chloride   Date Value Ref Range Status   03/27/2021 101 98 - 107 mmol/L Final   03/26/2021 111 (H) 98 - 107 mmol/L Final   03/25/2021 104 98 - 107 mmol/L Final     CO2   Date Value Ref Range Status   03/27/2021 25 22 - 29 mmol/L Final   03/26/2021 22 22 - 29 mmol/L Final   03/25/2021 18 (L) 22 - 29 mmol/L Final     BUN   Date Value Ref Range Status   03/27/2021 50 (H) 8 - 23 mg/dL Final   03/26/2021 90 (H) 8 - 23 mg/dL Final   03/25/2021 91 (H) 8 - 23 mg/dL Final     CREATININE   Date Value Ref Range Status   03/27/2021 2.4 (H) 0.5 - 1.0 mg/dL Final   03/26/2021 3.9 (H) 0.5 - 1.0 mg/dL Final   03/25/2021 3.9 (H) 0.5 - 1.0 mg/dL Final     Glucose   Date Value Ref Range Status   03/27/2021 122 (H) 74 - 99 mg/dL Final   03/26/2021 175 (H) 74 - 99 mg/dL Final   03/25/2021 363 (H) 74 - 99 mg/dL Final   03/21/2011 419 (H) 70 - 110 mg/dL Final     Calcium   Date Value Ref Range Status   03/27/2021 7.7 (L) 8.6 - 10.2 mg/dL Final   03/26/2021 7.7 (L) 8.6 - 10.2 mg/dL Final   03/25/2021 7.8 (L) 8.6 - 10.2 mg/dL Final     Total Protein   Date Value Ref Range Status   03/24/2021 4.8 (L) 6.4 - 8.3 g/dL Final   03/21/2021 5.1 (L) 6.4 - 8.3 g/dL Final   03/15/2021 4.9 (L) 6.4 - 8.3 g/dL Final     Albumin   Date Value Ref Range Status   03/24/2021 2.2 (L) 3.5 - 5.2 g/dL Final   03/21/2021 2.5 (L) 3.5 - 5.2 g/dL Final   03/15/2021 2.4 (L) 3.5 - 5.2 g/dL Final   03/21/2011 4.1 3.2 - 4.8 g/dL Final     Total Bilirubin   Date Value Ref Range Status   03/24/2021 0.2 0.0 - 1.2 mg/dL Final   03/21/2021 0.3 0.0 - 1.2 mg/dL Final   03/15/2021 <0.2 0.0 - 1.2 mg/dL Final     Alkaline Phosphatase   Date Value Ref Range Status   03/24/2021 182 (H) 35 - 104 U/L Final   03/21/2021 89 35 - 104 U/L Final   03/15/2021 66 35 - 104 U/L Final     AST Date Value Ref Range Status   03/24/2021 42 (H) 0 - 31 U/L Final   03/21/2021 50 (H) 0 - 31 U/L Final   03/15/2021 48 (H) 0 - 31 U/L Final     ALT   Date Value Ref Range Status   03/24/2021 32 0 - 32 U/L Final   03/21/2021 71 (H) 0 - 32 U/L Final   03/15/2021 30 0 - 32 U/L Final     GFR Non-   Date Value Ref Range Status   03/27/2021 25 >=60 mL/min/1.73 Final     Comment:     Chronic Kidney Disease: less than 60 ml/min/1.73 sq.m. Kidney Failure: less than 15 ml/min/1.73 sq.m. Results valid for patients 18 years and older. 03/26/2021 14 >=60 mL/min/1.73 Final     Comment:     Chronic Kidney Disease: less than 60 ml/min/1.73 sq.m. Kidney Failure: less than 15 ml/min/1.73 sq.m. Results valid for patients 18 years and older. 03/25/2021 14 >=60 mL/min/1.73 Final     Comment:     Chronic Kidney Disease: less than 60 ml/min/1.73 sq.m. Kidney Failure: less than 15 ml/min/1.73 sq.m. Results valid for patients 18 years and older. GFR    Date Value Ref Range Status   03/27/2021 25  Final   03/26/2021 14  Final   03/25/2021 14  Final     Magnesium   Date Value Ref Range Status   03/26/2021 2.8 (H) 1.6 - 2.6 mg/dL Final   03/11/2021 2.2 1.6 - 2.6 mg/dL Final     Phosphorus   Date Value Ref Range Status   03/26/2021 6.3 (H) 2.5 - 4.5 mg/dL Final   03/11/2021 7.9 (H) 2.5 - 4.5 mg/dL Final   03/06/2021 4.5 2.5 - 4.5 mg/dL Final     Recent Labs     03/26/21  1601   PH 7.227*   PO2 60.5*   PCO2 45.1*   HCO3 18.3*   BE -8.7*   O2SAT 88.7*       RADIOLOGY:  XR CHEST PORTABLE   Final Result   Stable pattern of pneumomediastinum and emphysema in the lower neck. Supportive tubing is in normal position. Stable pattern of extensive bilateral airspace disease, pneumonia or ARDS. XR CHEST PORTABLE   Final Result   Unchanged extensive infiltrates and pneumomediastinum.          XR CHEST PORTABLE   Final Result   Right internal jugular line tip in the proximal SVC. No sizable pneumothorax. Stable diffuse and bilateral airspace opacities. Subcutaneous air involving both sides of the neck right greater than left. Probable pneumomediastinum. XR CHEST PORTABLE   Final Result   Right IJ catheter in satisfactory position, no complicating pneumothorax. Diffuse parenchymal edema unchanged         XR CHEST PORTABLE   Final Result   Extensive bilateral lung infiltrates are similar to subtly improved. Continued follow-up recommended. XR CHEST PORTABLE   Final Result   Endotracheal tube terminates approximately 1 cm above the sylvia and should   be retracted approximately 2 cm for more optimal positioning. Extensive bilateral diffuse lung infiltrates are similar to previous and may   represent severe edema, pneumonia or ARDS. XR CHEST PORTABLE   Final Result   ET tube tip at the entrance of the right mainstem bronchus and should be   retracted 2.0 cm. Diffuse bilateral infiltrates. Enteric tube tip in the distal body of the stomach. XR CHEST PORTABLE   Final Result   Stable diffuse and bilateral infiltrates. ET tube tip is at the entrance of the right mainstem bronchus and should be   retracted at least 2.0 cm. XR CHEST PORTABLE   Final Result   Diffuse and bilateral airspace opacities. Findings consistent with extensive   pulmonary edema versus pneumonia. NM LUNG SCAN PERFUSION ONLY   Final Result   Low probability for pulmonary embolism. Small perfusion defects correspond   to the opacities in the mid lungs bilaterally. US DUP LOWER EXTREMITIES BILATERAL VENOUS   Final Result   No evidence of DVT in either lower extremity. CT Head WO Contrast   Final Result   No skull fracture or acute intracranial abnormality.          CT CHEST WO CONTRAST   Final Result   Extensive airspace opacities throughout both lungs which is most prominent   along the upper lobes and perihilar or participating in medical decisions  Time devoted to teaching and to any procedures I billed separately is not included.     CRITICAL CARE TIME:  30 minutes    Yanelis Sheppard MD  Pulmonary and Critical Care Medicine

## 2021-03-27 NOTE — PROGRESS NOTES
CRITICAL CARE PROGRESS NOTE    I discussed the extremely critical condition of Mrs Ksenia Alexis with her daughter, recommended against chest compressions as this therapy would only cause pain and prologn her agony and dying process, she agrees. I entered an order for DNR-A.     Gagandeep Palomino MD  Pulmonary and Critical Care Medicine

## 2021-03-27 NOTE — PROGRESS NOTES
[address] [facility]  [facility]  Progress Note    Chief Complaint   Patient presents with    Concern For COVID-19     positive covid, shortness of breath, EMS states 60% on room air. 94% on 15LPM NRB    Shortness of Breath     SUBJECTIVE:    In icu   On vent sedated  Tolerating medications, no side effects       Review of systems:  Unable to obtain due to mentation     OBJECTIVE:  /81   Pulse 70   Temp 98.5 °F (36.9 °C) (Axillary)   Resp 13   Ht 5' (1.524 m)   Wt 141 lb 5 oz (64.1 kg)   SpO2 99%   BMI 27.60 kg/m²   Temp  Av.9 °F (36.6 °C)  Min: 96.7 °F (35.9 °C)  Max: 98.6 °F (37 °C)  Constitutional:  The patient is on vent sedated   Skin:    Warm and dry   HEENT:      AT/NC  Neck:    Supple to movements. Chest:    bipap Symmetrical expansion. DEC BS ANT some rales  Cardiovascular:  S1 and S2 are rhythmic and regular. Abdomen:   Positive bowel sounds to auscultation. Benign to palpation. ngt  Extremities:   No clubbing, no cyanosis, no edema.   CNS    sedated  Lines: pIV    Radiology:  Laboratory and Tests Review:  Lab Results   Component Value Date    WBC 7.3 2021    WBC 13.2 (H) 2021    WBC 17.5 (H) 2021    HGB 8.4 (L) 2021    HCT 25.4 (L) 2021    MCV 87.0 2021     2021     No results found for: [facility]  Lab Results   Component Value Date    ALT 32 2021    AST 42 (H) 2021    ALKPHOS 182 (H) 2021    BILITOT 0.2 2021     Lab Results   Component Value Date     2021    K 4.3 2021    K 4.2 2021     2021    CO2 25 2021    BUN 50 2021    CREATININE 2.4 2021    CREATININE 3.9 2021    CREATININE 3.9 2021    GFRAA 25 2021    LABGLOM 25 2021    GLUCOSE 122 2021    GLUCOSE 419 2011    PROT 4.8 2021    LABALBU 2.2 2021    LABALBU 4.1 2011    CALCIUM 7.7 2021    BILITOT 0.2 2021    ALKPHOS 182 03/24/2021    AST 42 03/24/2021    ALT 32 03/24/2021     Lab Results   Component Value Date    CRP 0.9 (H) 03/16/2021    CRP 10.8 (H) 03/11/2021     Lab Results   Component Value Date    SEDRATE 40 (H) 03/16/2021    SEDRATE 44 (H) 03/11/2021     Recent Labs     03/26/21  0454   DDIMER 2336     Lab Results   Component Value Date    CHOL 168 08/05/2020    TRIG 182 08/05/2020    HDL 45 08/05/2020    LDLCALC 87 08/05/2020    LABVLDL 36 08/05/2020     Lab Results   Component Value Date/Time    VITD25 27 (L) 03/11/2021 06:45 AM       Microbiology:   No results for input(s): COVID19 in the last 72 hours. Lab Results   Component Value Date    BC 24 Hours no growth 03/25/2021    BC 5 Days no growth 03/08/2021    BLOODCULT2 24 Hours no growth 03/25/2021    BLOODCULT2 5 Days no growth 03/08/2021        ASSESSMENT/PLAN:  LEUKOCYTOSIS FOLLOW CBC dec  respiratory failure intubated  S/P COVID RX  COVERING HCAP  CKD URINARY RETENTION HAS GIPSON      doxycycline (VIBRAMYCIN) 100 mg in dextrose 5 % 100 mL IVPB, Q12H   piperacillin-tazobactam (ZOSYN) 3,375 mg in dextrose 5 % 50 mL IVPB extended infusion (mini-bag), Q12H  dexamethasone (PF) (DECADRON) injection 6 mg, Q24H       Family has decided on [facility]     Follow cultures, monitor labs       Electronically signed by INDRA Michaud NP on 3/27/2021 at 4:12 PM     I have discussed the case, including pertinent history and physical  exam findings . I have seen and examined the patient and the key elements of the encounter have been performed by me. I agree with the assessment, plan and orders as documented.       Treatment plan as per my recommendation     Samanta Rodgers MD, FACP  3/27/2021  10:26 PM

## 2021-03-27 NOTE — PROGRESS NOTES
[facility] MD Brigette Arteaga MD Charolette Heinrich MD Sharyl Carney MD   Progress note   Patient's Name: Deidra Del Rio  8:28 PM  3/26/2021          Pt known to our service . She had recent admission with COVID PNA   She is presenting with respiratory distress that is felt to be due to COVID pneumoniitis   Over night she deteroiated and had to be intubated and transferred to icu   Case  ICU intensivist this am .             History of Present Ilness: The patient is a 57 y.o. female with significant past medical history of diabetes type 2, Essential hypertension who presents to the ER following being noticed to have progressively elevated cr numbers   In looking in records pt cr has been trending up rather fast over the last year to year and half . She does have around 7 g proteinuria based on spot urine /cr ratio . Cr on presentation was 3.2 . Cr today 3.5   She was also found to have bp of 200 . At home she is on hctz/lisinopril as part of her antihtn . I saw her in her room this am , she is alert oriented pleasant 56 y/o F . Past Medical History:   Diagnosis Date    Acid reflux     Hyperlipidemia     Hypertension     Hypothyroidism     S/P appendectomy     Type II or unspecified type diabetes mellitus without mention of complication, not stated as uncontrolled        Past Surgical History:   Procedure Laterality Date    APPENDECTOMY      HYSTERECTOMY         Family History   Problem Relation Age of Onset    Diabetes Mother     High Blood Pressure Mother     Diabetes Brother     Diabetes Sister         reports that she has never smoked. She has never used smokeless tobacco. She reports that she does not drink alcohol or use drugs.     Allergies:  Demerol, Peanut-containing drug products, and Toradol [ketorolac tromethamine]    Current Medications:    [START ON 3/27/2021] famotidine (PEPCID) tablet 10 mg, Daily  [Held by provider] carvedilol (COREG) tablet 3.125 mg, BID WC  insulin lispro (HUMALOG) injection vial 0-6 Units, Q6H  sodium bicarbonate tablet 1,300 mg, TID  doxycycline (VIBRAMYCIN) 100 mg in dextrose 5 % 100 mL IVPB, Q12H  [START ON 3/27/2021] heparin (porcine) injection 5,000 Units, 3 times per day  vancomycin (VANCOCIN) intermittent dosing (placeholder), RX Placeholder  DOPamine (INTROPIN) 800 mg in dextrose 5 % 250 mL infusion, Continuous  vasopressin 20 Units in dextrose 5 % 100 mL infusion, Continuous  [Held by provider] hydrOXYzine (VISTARIL) capsule 25 mg, 4x Daily PRN  [Held by provider] hydrALAZINE (APRESOLINE) injection 10 mg, Q4H PRN  fentaNYL 5 mcg/ml in 0.9%  ml infusion, Continuous  propofol injection, Titrated  [Held by provider] amLODIPine (NORVASC) tablet 5 mg, BID  piperacillin-tazobactam (ZOSYN) 3,375 mg in dextrose 5 % 50 mL IVPB extended infusion (mini-bag), Q12H  dexamethasone (PF) (DECADRON) injection 6 mg, Q24H  0.9 % sodium chloride infusion, Q12H  [Held by provider] pentoxifylline (TRENTAL) extended release tablet 400 mg, BID  ipratropium-albuterol (DUONEB) nebulizer solution 1 ampule, 4x daily  nystatin (MYCOSTATIN) 295399 UNIT/ML suspension 500,000 Units, 4x Daily  glucose (GLUTOSE) 40 % oral gel 15 g, PRN  dextrose 50 % IV solution, PRN  glucagon (rDNA) injection 1 mg, PRN  dextrose 5 % solution, PRN  acetaminophen (TYLENOL) tablet 500 mg, 4x Daily PRN  aspirin chewable tablet 81 mg, Daily  atorvastatin (LIPITOR) tablet 80 mg, Daily  clopidogrel (PLAVIX) tablet 75 mg, Daily  gabapentin (NEURONTIN) capsule 300 mg, Nightly  insulin glargine (LANTUS) injection vial 18 Units, QAM  levothyroxine (SYNTHROID) tablet 88 mcg, Daily  sodium chloride flush 0.9 % injection 10 mL, 2 times per day  sodium chloride flush 0.9 % injection 10 mL, PRN  promethazine (PHENERGAN) tablet 12.5 mg, Q6H PRN    Or  ondansetron (ZOFRAN) injection 4 mg, Q6H PRN  polyethylene glycol (GLYCOLAX) packet 17 g, Daily PRN  acetaminophen (TYLENOL) suppository 650 mg, Q6H PRN        Review of Systems:     No face to face encounter done as he in isolation for COVID 19 PNA . Case discuseed with RN including PE findings     Physical exam:   Vital signs BP 87/69   Pulse 93   Temp 98.6 °F (37 °C) (Axillary)   Resp 25   Ht 5' (1.524 m)   Wt 139 lb 1.8 oz (63.1 kg)   SpO2 98%   BMI 27.17 kg/m²     No face to face encounter done as he in isolation for COVID 19 PNA .  Case discuseed with RN including PE findings     Data:   Labs:  CBC with Differential:    Lab Results   Component Value Date    WBC 13.2 03/26/2021    RBC 2.82 03/26/2021    HGB 8.0 03/26/2021    HCT 24.8 03/26/2021     03/26/2021    MCV 87.9 03/26/2021    MCH 28.4 03/26/2021    MCHC 32.3 03/26/2021    RDW 15.0 03/26/2021    NRBC 0.9 03/17/2021    SEGSPCT 59 02/27/2013    METASPCT 12.0 03/11/2021    LYMPHOPCT 4.3 03/24/2021    MONOPCT 1.4 03/24/2021    MYELOPCT 2.0 03/11/2021    BASOPCT 0.1 03/24/2021    MONOSABS 0.00 03/24/2021    LYMPHSABS 0.56 03/24/2021    EOSABS 0.00 03/24/2021    BASOSABS 0.00 03/24/2021     CMP:    Lab Results   Component Value Date     03/26/2021    K 4.9 03/26/2021    K 4.2 03/21/2021     03/26/2021    CO2 22 03/26/2021    BUN 90 03/26/2021    CREATININE 3.9 03/26/2021    GFRAA 14 03/26/2021    LABGLOM 14 03/26/2021    GLUCOSE 175 03/26/2021    GLUCOSE 419 03/21/2011    PROT 4.8 03/24/2021    LABALBU 2.2 03/24/2021    LABALBU 4.1 03/21/2011    CALCIUM 7.7 03/26/2021    BILITOT 0.2 03/24/2021    ALKPHOS 182 03/24/2021    AST 42 03/24/2021    ALT 32 03/24/2021     Ionized Calcium:  No results found for: IONCA  Magnesium:    Lab Results   Component Value Date    MG 2.8 03/26/2021     Phosphorus:    Lab Results   Component Value Date    PHOS 6.3 03/26/2021     U/A:    Lab Results   Component Value Date    COLORU Yellow 03/23/2021    PHUR 5.0 03/23/2021    LABCAST RARE 03/27/2015    WBCUA 0-1 03/23/2021    WBCUA NONE 02/06/2012    RBCUA NONE 03/23/2021    RBCUA 0-1 12/25/2012    YEAST FEW 04/04/2015    BACTERIA RARE 03/23/2021    CLARITYU SLCLOUDY 03/23/2021    SPECGRAV 1.025 03/23/2021    LEUKOCYTESUR Negative 03/23/2021    UROBILINOGEN 0.2 03/23/2021    BILIRUBINUR Negative 03/23/2021    BILIRUBINUR NEGATIVE 02/06/2012    BLOODU MODERATE 03/23/2021    GLUCOSEU Negative 03/23/2021    GLUCOSEU >=1000 02/06/2012    AMORPHOUS FEW 03/08/2021     Microalbumen/Creatinine ratio:  No components found for: RUCREAT  Iron Saturation:  No components found for: PERCENTFE  TIBC:  No results found for: TIBC  FERRITIN:    Lab Results   Component Value Date    FERRITIN 613 03/16/2021        Imaging:  XR CHEST PORTABLE   Final Result   Right IJ catheter in satisfactory position, no complicating pneumothorax. Diffuse parenchymal edema unchanged         XR CHEST PORTABLE   Final Result   Extensive bilateral lung infiltrates are similar to subtly improved. Continued follow-up recommended. XR CHEST PORTABLE   Final Result   Endotracheal tube terminates approximately 1 cm above the sylvia and should   be retracted approximately 2 cm for more optimal positioning. Extensive bilateral diffuse lung infiltrates are similar to previous and may   represent severe edema, pneumonia or ARDS. XR CHEST PORTABLE   Final Result   ET tube tip at the entrance of the right mainstem bronchus and should be   retracted 2.0 cm. Diffuse bilateral infiltrates. Enteric tube tip in the distal body of the stomach. XR CHEST PORTABLE   Final Result   Stable diffuse and bilateral infiltrates. ET tube tip is at the entrance of the right mainstem bronchus and should be   retracted at least 2.0 cm. XR CHEST PORTABLE   Final Result   Diffuse and bilateral airspace opacities. Findings consistent with extensive   pulmonary edema versus pneumonia. NM LUNG SCAN PERFUSION ONLY   Final Result   Low probability for pulmonary embolism.   Small perfusion defects correspond   to the opacities in the mid lungs bilaterally. US DUP LOWER EXTREMITIES BILATERAL VENOUS   Final Result   No evidence of DVT in either lower extremity. CT Head WO Contrast   Final Result   No skull fracture or acute intracranial abnormality. CT CHEST WO CONTRAST   Final Result   Extensive airspace opacities throughout both lungs which is most prominent   along the upper lobes and perihilar regions and could pulmonary edema and/or   pneumonia vs pulmonary hemorrhage. Recommend short-term follow-up. Prominent central pulmonary vessels suggestive of pulmonary congestion or   pulmonary artery hypertension. Small pericardial effusion with no mediastinal mass or adenopathy. Tiny right pleural effusion. Questionable mild ascites along the upper abdomen         XR CHEST PORTABLE   Final Result   Mild cardiomegaly and mild pulmonary edema which is more prominent. Hazy perihilar and bibasilar opacities which have increased and could be due   to pulmonary edema and/or pneumonia. Recommend follow-up. Resolving left upper lobe opacity. Assessment    -chronic kidney disease stage IV   Ms Elliott Paez has been having rather progressive elevation in cr over the last 1.5 years . Cr back on 3/2019 was 1 . Cr on 11/2019 1.6 .  Cr back on 8/2020 was 2.1 and now cr is 3.2   The CKD is likley due to diabetic nephropathy thought the progression is rather fast .   Negative immunologic w/u ISSA (negative ) ,ANCA(negative , SPEP (negative), UPEP (negative)  No hematuria on urinalysis     -Nephrotic range proteinuria    -HTN     -Anaemia of chronic disease     -Metabolic acidosis     -Mineral bone disease     -Insulin depedent diabetes with complication     -COVID 19 PNA with respiratory failure       PLAN :      Pt deteroriated over night transferred to ciu and now intubated   This felt mainly to be due covid PNA with possible superimposed HCAP   Will

## 2021-03-27 NOTE — PROGRESS NOTES
(MYCOSTATIN) 995712 UNIT/ML suspension 500,000 Units, 5 mL, Oral, 4x Daily  glucose (GLUTOSE) 40 % oral gel 15 g, 15 g, Oral, PRN  dextrose 50 % IV solution, 12.5 g, Intravenous, PRN  glucagon (rDNA) injection 1 mg, 1 mg, Intramuscular, PRN  dextrose 5 % solution, 100 mL/hr, Intravenous, PRN  acetaminophen (TYLENOL) tablet 500 mg, 500 mg, Oral, 4x Daily PRN  aspirin chewable tablet 81 mg, 81 mg, Oral, Daily  atorvastatin (LIPITOR) tablet 80 mg, 80 mg, Oral, Daily  clopidogrel (PLAVIX) tablet 75 mg, 75 mg, Oral, Daily  gabapentin (NEURONTIN) capsule 300 mg, 300 mg, Oral, Nightly  insulin glargine (LANTUS) injection vial 18 Units, 18 Units, Subcutaneous, QAM  levothyroxine (SYNTHROID) tablet 88 mcg, 88 mcg, Oral, Daily  sodium chloride flush 0.9 % injection 10 mL, 10 mL, Intravenous, 2 times per day  sodium chloride flush 0.9 % injection 10 mL, 10 mL, Intravenous, PRN  promethazine (PHENERGAN) tablet 12.5 mg, 12.5 mg, Oral, Q6H PRN **OR** ondansetron (ZOFRAN) injection 4 mg, 4 mg, Intravenous, Q6H PRN  polyethylene glycol (GLYCOLAX) packet 17 g, 17 g, Oral, Daily PRN  [DISCONTINUED] acetaminophen (TYLENOL) tablet 650 mg, 650 mg, Oral, Q6H PRN **OR** acetaminophen (TYLENOL) suppository 650 mg, 650 mg, Rectal, Q6H PRN  Physical    VITALS:  BP (!) 140/75   Pulse 68   Temp 98.5 °F (36.9 °C) (Axillary)   Resp 12   Ht 5' (1.524 m)   Wt 141 lb 5 oz (64.1 kg)   SpO2 97%   BMI 27.60 kg/m²   CONSTITUTIONAL:  intubated  ENT:  normocepalic, without obvious abnormality  NECK:  Positive crepitus on the right side extending below the clavicles.  Dialysis line in place  HEMATOLOGIC/LYMPHATICS:  no cervical lymphadenopathy  LUNGS:  Coarse lung sound anteriorly  CARDIOVASCULAR:  Normal apical impulse, regular rate and rhythm, normal S1 and S2, no S3 or S4, and no murmur noted  ABDOMEN:  No scars, normal bowel sounds, soft, non-distended, non-tender, no masses palpated, no hepatosplenomegally  MUSCULOSKELETAL: positive edema on lower extremity  NEUROLOGIC:  Unable to assess  SKIN:  no bruising or bleeding  Data    CBC:   Lab Results   Component Value Date    WBC 7.3 03/27/2021    RBC 2.92 03/27/2021    HGB 8.4 03/27/2021    HCT 25.4 03/27/2021    MCV 87.0 03/27/2021    MCH 28.8 03/27/2021    MCHC 33.1 03/27/2021    RDW 14.6 03/27/2021     03/27/2021    MPV 9.7 03/27/2021     CMP:    Lab Results   Component Value Date     03/27/2021    K 4.3 03/27/2021    K 4.2 03/21/2021     03/27/2021    CO2 25 03/27/2021    BUN 50 03/27/2021    CREATININE 2.4 03/27/2021    GFRAA 25 03/27/2021    LABGLOM 25 03/27/2021    GLUCOSE 122 03/27/2021    GLUCOSE 419 03/21/2011    PROT 4.8 03/24/2021    LABALBU 2.2 03/24/2021    LABALBU 4.1 03/21/2011    CALCIUM 7.7 03/27/2021    BILITOT 0.2 03/24/2021    ALKPHOS 182 03/24/2021    AST 42 03/24/2021    ALT 32 03/24/2021       ASSESSMENT AND PLAN      1. Acute respiratory failure with hypoxia ([facility] [address]    2.  COVID-19 with suspected superimposed PNA    3. DM type 2    4. CKD now requiring dialysis:    5. Anemia:    6. Hypertension Essential    7. Hyperlipidemia:    8. Pneumomediastinum: spontaneous vs iatrogenic:    Plans:  Currently receiving dialysis.    Monitor BGL and possible NG tube if prolong intubation is needed  Will continue to adjust insulin  Continue abx

## 2021-03-28 NOTE — PROGRESS NOTES
0735 - bs 55 on am labs 51 by finger stick 1/2 amp d50 administered per prn order    0750 - bs 115 by finger stick    0805 - Pupils unequal no movement to painful stimuli.  Sedation stopped will reassess neuro status    0835- no change in neuro assessment dr. Sharan Cabezas notified new orders received  Daughter updated on patient's status    [phone] - sedation remains off starting to move all extremities right pupil 6 mm left pupil 2 mm    0855 - sedation restarted for ct

## 2021-03-28 NOTE — PROGRESS NOTES
CRITICAL CARE PROGRESS NOTE    Pt with hypoxemia and hypertension at MRI, msoving arms on purpose, legs withdraws to pain. Right eye deviated laterally, left eye central.   Pt brought back to ICU, restarted fentanyl and increased propofol drip; saturation and blood pressure improved after sedation. Reviewed MRI, remarkable with sphenoidal, maxillary sinusitis, on antibiotics. Daughter updated on the patient's condition.      Vamsi Snowden MD  Pulmonary and Critical Care Medicine

## 2021-03-28 NOTE — PROGRESS NOTES
Paged to transport patient from ICU to MRI using portable ventilator. Set-up and tested vent for proper operation. Set calculated Vt to 350 ml (discussed with Dr. Amarilys Langford) and set rate of 25 BPM. PEEP was set at 10 cmH2O. FiO2 was 100%. Emergency equipment was also taken. Patient had SpO2 in mid to upper 90s while being transported to MRI. During scans her SpO2 averaged 85% with dips to 81%. SpO2 returned to low to mid 90 while coming back to ICU. Dr. Amarilys Langford notified of events. Total time of set-up and transport was 2.5 hours.

## 2021-03-28 NOTE — PROGRESS NOTES
[facility] MD Lam Carrion MD Rodolfo Fowler, MD Wilfredo Kaplan, MD Lindsey Bard, NUNO Nascimento, RAZA  Progress Note  3/28/2021    SUBJECTIVE:  We are following this patient for CKD stage IV on hemodialysis.   Transferred to ICU bed 9 currently in prone position because of COVID-19 mechanical ventilation  Today with [facility] ICU attending Dr. Heriberto Brito assess to perform acute dialysis treatment patient be scheduled for MR venogram with no contrast.  PROBLEM LIST:    Patient Active Problem List   Diagnosis    Diabetes mellitus type 2, insulin dependent ([address]    Hypothyroid    Hypertension    Hyperlipidemia    Hypertensive urgency    Acute respiratory failure with hypoxia ([address]    COVID-19        PAST MEDICAL HISTORY:    Past Medical History:   Diagnosis Date    Acid reflux     Hyperlipidemia     Hypertension     Hypothyroidism     S/P appendectomy     Type II or unspecified type diabetes mellitus without mention of complication, not stated as uncontrolled        MEDS (scheduled):   insulin glargine  9 Units Subcutaneous QAM    famotidine  10 mg Oral Daily    [Held by provider] carvedilol  3.125 mg Oral BID WC    insulin lispro  0-6 Units Subcutaneous Q6H    sodium bicarbonate  1,300 mg Per NG tube TID    doxycycline (VIBRAMYCIN) IV  100 mg Intravenous Q12H    heparin (porcine)  5,000 Units Subcutaneous 3 times per day    vancomycin (VANCOCIN) intermittent dosing (placeholder)   Other RX Placeholder    [Held by provider] amLODIPine  5 mg Oral BID    piperacillin-tazobactam  3,375 mg Intravenous Q12H    dexamethasone  6 mg Intravenous Q24H    [Held by provider] pentoxifylline  400 mg Oral BID    ipratropium-albuterol  1 ampule Inhalation 4x daily    nystatin  5 mL Oral 4x Daily    aspirin  81 mg Oral Daily    atorvastatin  80 mg Oral Daily    clopidogrel  75 mg Oral Daily    gabapentin  300 mg Oral Nightly    levothyroxine  88 mcg Oral Daily    sodium chloride flush  10 mL Intravenous 2 times per day       MEDS (infusions):   dextrose 100 mL/hr at 03/28/21 0855    dextrose 150 mL/hr at 03/28/21 1037    vasopressin (Septic Shock) infusion Stopped (03/27/21 1105)    DOPamine 1 mcg/kg/min (03/28/21 1008)    fentaNYL 5 mcg/ml in 0.9%  ml infusion 125 mcg/hr (03/28/21 1158)    propofol 25 mcg/kg/min (03/28/21 1057)    sodium chloride Stopped (03/28/21 1205)    dextrose Stopped (03/24/21 0626)       MEDS (prn):  [Held by provider] hydrOXYzine, [Held by provider] hydrALAZINE, glucose, dextrose, glucagon (rDNA), dextrose, acetaminophen, sodium chloride flush, promethazine **OR** ondansetron, polyethylene glycol, [DISCONTINUED] acetaminophen **OR** acetaminophen    DIET:    DIET TUBE FEED CONTINUOUS/CYCLIC NPO; Renal Formula; Nasogastric; 10; 50      PHYSICAL EXAM:      Patient Vitals for the past 24 hrs:   BP Temp Temp src Pulse Resp SpO2   03/28/21 1200  97.5 °F (36.4 °C) Axillary      03/28/21 0800  97.8 °F (36.6 °C) Axillary      03/28/21 0730 (!) 110/53   74 19 100 %   03/28/21 0715 (!) 102/54   75 25 99 %   03/28/21 0700 (!) 108/51   74 22 99 %   03/28/21 0645 (!) 105/53   75 24 100 %   03/28/21 0630 (!) 108/56   77 24 99 %   03/28/21 0615 (!) 106/57   76 24 100 %   03/28/21 0600 (!) 109/55   76 25 100 %   03/28/21 0545 108/62   77 24 100 %   03/28/21 0530 (!) 109/55   77 25 100 %   03/28/21 0515 (!) 105/58   79 24 100 %   03/28/21 0500 (!) 112/56   79 24 99 %   03/28/21 0445 (!) 107/50   76 23 100 %   03/28/21 0430 (!) 98/57   77 25 100 %   03/28/21 0415 (!) 109/54   78 24 100 %   03/28/21 0400 (!) 112/54 98.3 °F (36.8 °C) Rectal 77 26 100 %   03/28/21 0345 (!) 106/55   77 25 100 %   03/28/21 0330 (!) 111/53   77 23 100 %   03/28/21 0315 (!) 110/54   77 26 100 %   03/28/21 0300 (!) 107/55   78 24 100 %   03/28/21 0245 (!) 120/56   80 20 100 %   03/28/21 0230 (!) 111/58   79 25 100 % 03/28/21 0222    78 24 99 %   03/28/21 0215 (!) 119/59   78 27 100 %   03/28/21 0200 112/62   77 26 100 %   03/28/21 0145 122/63   79 23 98 %   03/28/21 0130 (!) 106/58   72 20 99 %   03/28/21 0115 (!) 105/54   71 16 98 %   03/28/21 0100 (!) 102/59   69 20 99 %   03/28/21 0045 (!) 109/46   70 18 99 %   03/28/21 0030 (!) 124/58   69 14 99 %   03/28/21 0015 (!) 117/58   69 9 98 %   03/28/21 0000 (!) 101/58 95 °F (35 °C) Rectal 68 17 98 %   03/27/21 2345 116/79   70 17 99 %   03/27/21 2336    84 25 94 %   03/27/21 2330 (!) 114/102   76 18 98 %   03/27/21 2315 (!) 119/58   60 16 97 %   03/27/21 2300 (!) 104/59   60 17 98 %   03/27/21 2245 97/61   58 22 97 %   03/27/21 2230 (!) 115/59   57 22 98 %   03/27/21 2215 116/64   58 24 97 %   03/27/21 2200 (!) 100/59   58 20 99 %   03/27/21 2145 122/60   60 23 98 %   03/27/21 2130 115/61   63 19 99 %   03/27/21 2115 113/66   69 27 96 %   03/27/21 2100 (!) 129/96 94 °F (34.4 °C) Oral 76 14 96 %   03/27/21 2045 130/61   66 16 98 %   03/27/21 2030 124/78   66 16 98 %   03/27/21 2015 115/72   69 12 95 %   03/27/21 2000 (!) 143/68   67 15 96 %   03/27/21 1945 127/70   68 13 96 %   03/27/21 1930 135/70   64 16 96 %   03/27/21 1915 118/62   59 27 (!) 89 %   03/27/21 1902 (!) 89/59   66 22 95 %   03/27/21 1846 116/69   64 21 98 %   03/27/21 1831 135/68   64 19 97 %   03/27/21 1816 128/70   62 17 98 %   03/27/21 1802 123/74   65 19 98 %   03/27/21 1747 134/68   64 17 99 %   03/27/21 1732 113/72   66 16 100 %   03/27/21 1717 136/68   66 24 99 %   03/27/21 1646 135/71   66 19 99 %   03/27/21 1632 139/71   67 19 98 %   03/27/21 1617 125/62   65 17 99 %   03/27/21 1602 123/71   67 15 100 %   03/27/21 1600  98.3 °F (36.8 °C) Axillary 68 15 99 %   03/27/21 1546 (!) 154/76   66 25 97 %   03/27/21 1532 (!) 149/76   68 20 97 %   03/27/21 1516 124/81   70 13 99 %   03/27/21 1501 (!) 141/73   67 17 99 % 03/27/21 1446 (!) 148/72   67 15 98 %   03/27/21 1431 129/75   67 13 97 %   03/27/21 1416 118/73   67 12 96 %   03/27/21 1402 125/77   69 14 97 %   03/27/21 1347 134/79   68 16 96 %          Intake/Output Summary (Last 24 hours) at 3/28/2021 1335  Last data filed at 3/28/2021 1128  Gross per 24 hour   Intake 1604.03 ml   Output 90 ml   Net 1514.03 ml       Wt Readings from Last 3 Encounters:   03/27/21 141 lb 5 oz (64.1 kg)   03/08/21 117 lb (53.1 kg)   01/10/21 130 lb (59 kg)       General:  in no acute distress  HEENT: NC/AT, EOMI, sclera and conjunctiva are clear and anicteric. Mucous membranes moist.  Cardiovascular: regular rate and rhythm, no murmurs, gallops, or rubs  Respiratory:  Clear, no rales, rhochi, or wheezes  Gastrointestinal: soft, nontender, nondistended, NABS  Ext: no cyanosis, clubbing, or edema bilateral lower extremities  Neuro: awake, alert, oriented x3. Moves all 4 extremities. Cranial nerves II through XII grossly intact. Skin: dry, no rash      DATA:      Recent Labs     03/26/21  0454 03/27/21  0511 03/28/21  0601   WBC 13.2* 7.3 6.9   HGB 8.0* 8.4* 7.3*   HCT 24.8* 25.4* 21.9*   MCV 87.9 87.0 86.9    325 219     Recent Labs     03/26/21  0454 03/27/21  0511 03/28/21  0601    137 139   K 4.9 4.3 3.8   * 101 102   CO2 22 25 26   BUN 90* 50* 45*   CREATININE 3.9* 2.4* 2.4*       Iron studies:  Lab Results   Component Value Date    FERRITIN 613 03/16/2021     Bone disease:  Lab Results   Component Value Date    PTH 42 08/05/2020    MG 2.8 (H) 03/26/2021    PHOS 6.3 (H) 03/26/2021     Nutrition:No results found for: ALB    Microbiology      ASSESSMENT / RECOMMENDATIONS:     1.   GAVIN on top of chronic disease stage IV with hemodialysis initiation   cont diffusive IHD support for solute and volume clearance per orders              Patient was seen on hemodialysis initiation vital signs are stable orders were given for dialysis with 4K bath 2.5 calcium bicarb to pull 1 to 2 kg if possible. While using blood flow of 300 mils per minute and dialysate flow 500 mils per minute. 2. Anemia:   Cont. epo and iv ferrlecit per orders  3. Secondary Hyperparathyroidism:   Cont vitamin d analog  4. Hypotension which prevented us from proceeding forward with dialysis treatment yesterday, case was discussed with ICU attending for future management.    Cont current tx, see orders    Mike Spencer MD  3/28/2021 Acute dialysis access

## 2021-03-28 NOTE — PROGRESS NOTES
Department of Internal Medicine  General Internal Medicine  Attending Progress Note  Chief Complaint   Patient presents with    Concern For COVID-19     positive covid, shortness of breath, EMS states 60% on room air. 94% on 15LPM NRB    Shortness of Breath     SUBJECTIVE:    Patient is currently intubated.     OBJECTIVE      Medications    Current Facility-Administered Medications: dextrose 10 % infusion, , Intravenous, Continuous  vancomycin (VANCOCIN) 500 mg in sodium chloride 0.9 % 100 mL IVPB (mini-bag), 500 mg, Intravenous, Once  vasopressin 20 Units in dextrose 5 % 100 mL infusion, 0.04 Units/min, Intravenous, Continuous  insulin glargine (LANTUS) injection vial 9 Units, 9 Units, Subcutaneous, QAM  famotidine (PEPCID) tablet 10 mg, 10 mg, Oral, Daily  [Held by provider] carvedilol (COREG) tablet 3.125 mg, 3.125 mg, Oral, BID WC  insulin lispro (HUMALOG) injection vial 0-6 Units, 0-6 Units, Subcutaneous, Q6H  sodium bicarbonate tablet 1,300 mg, 1,300 mg, Per NG tube, TID  doxycycline (VIBRAMYCIN) 100 mg in dextrose 5 % 100 mL IVPB, 100 mg, Intravenous, Q12H  heparin (porcine) injection 5,000 Units, 5,000 Units, Subcutaneous, 3 times per day  vancomycin (VANCOCIN) intermittent dosing (placeholder), , Other, RX Placeholder  DOPamine (INTROPIN) 800 mg in dextrose 5 % 250 mL infusion, 2-20 mcg/kg/min, Intravenous, Continuous  [Held by provider] hydrOXYzine (VISTARIL) capsule 25 mg, 25 mg, Oral, 4x Daily PRN  [Held by provider] hydrALAZINE (APRESOLINE) injection 10 mg, 10 mg, Intravenous, Q4H PRN  fentaNYL 5 mcg/ml in 0.9%  ml infusion, 12.5-200 mcg/hr, Intravenous, Continuous  propofol injection, 5-50 mcg/kg/min, Intravenous, Titrated  [Held by provider] amLODIPine (NORVASC) tablet 5 mg, 5 mg, Oral, BID  piperacillin-tazobactam (ZOSYN) 3,375 mg in dextrose 5 % 50 mL IVPB extended infusion (mini-bag), 3,375 mg, Intravenous, Q12H  dexamethasone (PF) (DECADRON) injection 6 mg, 6 mg, Intravenous, Q24H  0.9 % sodium chloride infusion, , Intravenous, Q12H  [Held by provider] pentoxifylline (TRENTAL) extended release tablet 400 mg, 400 mg, Oral, BID  ipratropium-albuterol (DUONEB) nebulizer solution 1 ampule, 1 ampule, Inhalation, 4x daily  nystatin (MYCOSTATIN) 849499 UNIT/ML suspension 500,000 Units, 5 mL, Oral, 4x Daily  glucose (GLUTOSE) 40 % oral gel 15 g, 15 g, Oral, PRN  dextrose 50 % IV solution, 12.5 g, Intravenous, PRN  glucagon (rDNA) injection 1 mg, 1 mg, Intramuscular, PRN  dextrose 5 % solution, 100 mL/hr, Intravenous, PRN  acetaminophen (TYLENOL) tablet 500 mg, 500 mg, Oral, 4x Daily PRN  aspirin chewable tablet 81 mg, 81 mg, Oral, Daily  atorvastatin (LIPITOR) tablet 80 mg, 80 mg, Oral, Daily  clopidogrel (PLAVIX) tablet 75 mg, 75 mg, Oral, Daily  gabapentin (NEURONTIN) capsule 300 mg, 300 mg, Oral, Nightly  levothyroxine (SYNTHROID) tablet 88 mcg, 88 mcg, Oral, Daily  sodium chloride flush 0.9 % injection 10 mL, 10 mL, Intravenous, 2 times per day  sodium chloride flush 0.9 % injection 10 mL, 10 mL, Intravenous, PRN  promethazine (PHENERGAN) tablet 12.5 mg, 12.5 mg, Oral, Q6H PRN **OR** ondansetron (ZOFRAN) injection 4 mg, 4 mg, Intravenous, Q6H PRN  polyethylene glycol (GLYCOLAX) packet 17 g, 17 g, Oral, Daily PRN  [DISCONTINUED] acetaminophen (TYLENOL) tablet 650 mg, 650 mg, Oral, Q6H PRN **OR** acetaminophen (TYLENOL) suppository 650 mg, 650 mg, Rectal, Q6H PRN  Physical    VITALS:  BP (!) 87/50   Pulse 72   Temp 97.5 °F (36.4 °C)   Resp 25   Ht 5' (1.524 m)   Wt 144 lb 6.4 oz (65.5 kg)   SpO2 100%   BMI 28.20 kg/m²   CONSTITUTIONAL:  awake, alert, cooperative, no apparent distress, and appears stated age  EYES:  Lids and lashes normal, pupils equal, round and reactive to light, extra ocular muscles intact, sclera clear, conjunctiva normal  ENT:  Normocephalic, without obvious abnormality, atraumatic, sinuses nontender on palpation, external ears without lesions, oral pharynx with moist mucus membranes, tonsils without erythema or exudates, gums normal and good dentition. NECK:  Supple, symmetrical, trachea midline, no adenopathy, thyroid symmetric, not enlarged and no tenderness, skin normal  HEMATOLOGIC/LYMPHATICS:  no cervical lymphadenopathy  LUNGS:  No increased work of breathing, good air exchange, clear to auscultation bilaterally, no crackles or wheezing  CARDIOVASCULAR:  Normal apical impulse, regular rate and rhythm, normal S1 and S2, no S3 or S4, and no murmur noted  ABDOMEN:  No scars, normal bowel sounds, soft, non-distended, non-tender, no masses palpated, no hepatosplenomegally  MUSCULOSKELETAL:  No extremity edema  NEUROLOGIC:  Unable to Assess  SKIN:  no bruising or bleeding  Data    CBC:   Lab Results   Component Value Date    WBC 6.9 03/28/2021    RBC 2.52 03/28/2021    HGB 7.3 03/28/2021    HCT 21.9 03/28/2021    MCV 86.9 03/28/2021    MCH 29.0 03/28/2021    MCHC 33.3 03/28/2021    RDW 14.6 03/28/2021     03/28/2021    MPV 10.2 03/28/2021     CMP:    Lab Results   Component Value Date     03/28/2021    K 3.8 03/28/2021    K 4.2 03/21/2021     03/28/2021    CO2 26 03/28/2021    BUN 45 03/28/2021    CREATININE 2.4 03/28/2021    GFRAA 25 03/28/2021    LABGLOM 25 03/28/2021    GLUCOSE 55 03/28/2021    GLUCOSE 419 03/21/2011    PROT 4.8 03/24/2021    LABALBU 2.2 03/24/2021    LABALBU 4.1 03/21/2011    CALCIUM 7.6 03/28/2021    BILITOT 0.2 03/24/2021    ALKPHOS 182 03/24/2021    AST 42 03/24/2021    ALT 32 03/24/2021       ASSESSMENT AND PLAN      Brief Summary of Stay:  Ms. Talisha Isabel was admitted with shortness of breath. This is readmission after being admitted prior with covid. Her symptoms continued to deteriorate. She was initially on BiPAP, her respiration continues to decline she was intubated. Her renal function is compromised and she is now on dialysis. 1.  Acute respiratory failure with hypoxia:  Currently intubated. Continue to monitor.     2.  COVID-19 superimposed Pneumonia:   On Decadron    3. GAVIN on CKD:  Now requiring dialysis. Nephro following  Continue to monitor    4. Pneumomediastinum:     5. DM Type 2:     6. Hypertension Essential:     7. Anemia due to CKD:     8. Hypothyroidism: continue synthroid.

## 2021-03-28 NOTE — PROGRESS NOTES
Addendum to above. Also recommend MR venogram of brain without contrast and again when deemed safe to travel per intensivist judgment.

## 2021-03-28 NOTE — PROGRESS NOTES
PULMONARY MEDICINE FOLLOW UP       57year old woman with PMH of dibetes mellitus, hypothyroidism, hyperlipidemia, hypertension, overweight, and as described below admitted to hospital for management of acute hypoxemic respiratory failure due to COVID-19 pneumonia. --Today with anisocoria, moves 4 limbs when sedation is stopped; CT head with chronic lacunar infarct in right caudate, no hemorrhage, prominent ophthalmic veins --> Cavernous sinus thrombosis? ABG on PCV PIP 25/RR25/PEEP 7  Component      Latest Ref Rng & Units 3/27/2021          10:05 AM   pH, Blood Gas      7.350 - 7.450 7.350   PCO2      35.0 - 45.0 mmHg 46.9 (H)   pO2      75.0 - 100.0 mmHg 116.4 (H)   HCO3      22.0 - 26.0 mmol/L 25.3       A/P:  Acute hypoxemic respiratory failure secondary to severe COVID-19 pneumonia (positive test on 3/9) and HCAP (MRSA vs gram negative MDROs)  --Mechanical ventilation with lung protective strategy  --Prone position recommendedNow supinentimicrobial regimen: Piperacillin/tazobactam, vancomycin and doxycyclin  --Antiviral regimen: Dexamethasone 6 mg X 10 days  --Cultures reviewed  --Inflammatory markers:  Reviewed  --Anticoagulation: heparin SQ    Air leak syndrome with pneumomediastinum  --PCV PIP 25/ RR25/PEEP 10/100%  --Dialysis cath on right works properly. Hypothyroidism  --Management with thyroid hormone supplements    Hypertension  --Antihypertensives on hold     Diabetes mellitus  --Management with insulin administration      CKD   --Avoid nephrotoxins and dose medications according GFR    BP (!) 110/53   Pulse 74   Temp 97.5 °F (36.4 °C) (Axillary)   Resp 19   Ht 5' (1.524 m)   Wt 141 lb 5 oz (64.1 kg)   SpO2 100%   BMI 27.60 kg/m²   General: Comatose, anisocoric, with myosis on left at 2mm and right around 6 mm, I do not appreciate reaction to light.   HEENT: No head lesions, mouth without lesions, no nasal lesions, no cervical adenopathy palpated  Respiratory: Lungs with equal breath sounds bilaterally, no adventitious sounds auscultated, no accessory muscle use  CV: Regular rate, no murmurs, no JVD, no leg edema  Abdomen: Soft, non tender, + bowel sounds, no lesions  Skin: Hydrated, adequate turgor, no rash, capillary refill <2 seconds  Extremities: Moves 4 limbs spontaneously, distal pulses present  Neurology: Comatose, neck is supple, no meningitic signs present.      SARS-COV-2 biomarkers  Recent Labs     03/26/21  0454   DDIMER 2336     Lab Results   Component Value Date    CHOL 168 08/05/2020    TRIG 182 08/05/2020    HDL 45 08/05/2020    LDLCALC 87 08/05/2020    LABVLDL 36 08/05/2020     Lab Results   Component Value Date/Time    VITD25 27 (L) 03/11/2021 06:45 AM     MEDICATIONS:   insulin glargine  9 Units Subcutaneous QAM    famotidine  10 mg Oral Daily    [Held by provider] carvedilol  3.125 mg Oral BID WC    insulin lispro  0-6 Units Subcutaneous Q6H    sodium bicarbonate  1,300 mg Per NG tube TID    doxycycline (VIBRAMYCIN) IV  100 mg Intravenous Q12H    heparin (porcine)  5,000 Units Subcutaneous 3 times per day    vancomycin (VANCOCIN) intermittent dosing (placeholder)   Other RX Placeholder    [Held by provider] amLODIPine  5 mg Oral BID    piperacillin-tazobactam  3,375 mg Intravenous Q12H    dexamethasone  6 mg Intravenous Q24H    [Held by provider] pentoxifylline  400 mg Oral BID    ipratropium-albuterol  1 ampule Inhalation 4x daily    nystatin  5 mL Oral 4x Daily    aspirin  81 mg Oral Daily    atorvastatin  80 mg Oral Daily    clopidogrel  75 mg Oral Daily    gabapentin  300 mg Oral Nightly    levothyroxine  88 mcg Oral Daily    sodium chloride flush  10 mL Intravenous 2 times per day      dextrose 100 mL/hr at 03/28/21 0855    dextrose 150 mL/hr at 03/28/21 1037    vasopressin (Septic Shock) infusion Stopped (03/27/21 1105)    DOPamine 1 mcg/kg/min (03/28/21 1008)    fentaNYL 5 mcg/ml in 0.9%  ml infusion 125 mcg/hr (03/28/21 1158)    propofol 25 mcg/kg/min (03/28/21 1057)    sodium chloride 12.5 mL/hr at 03/28/21 1003    dextrose Stopped (03/24/21 0626)     [Held by provider] hydrOXYzine, [Held by provider] hydrALAZINE, glucose, dextrose, glucagon (rDNA), dextrose, acetaminophen, sodium chloride flush, promethazine **OR** ondansetron, polyethylene glycol, [DISCONTINUED] acetaminophen **OR** acetaminophen    OBJECTIVE:  Vitals:    03/28/21 1200   BP:    Pulse:    Resp:    Temp: 97.5 °F (36.4 °C)   SpO2:      FiO2 : 100 %  O2 Flow Rate (L/min): 50 L/min  O2 Device: Ventilator        LABS:  WBC   Date Value Ref Range Status   03/28/2021 6.9 4.5 - 11.5 E9/L Final   03/27/2021 7.3 4.5 - 11.5 E9/L Final   03/26/2021 13.2 (H) 4.5 - 11.5 E9/L Final     Hemoglobin   Date Value Ref Range Status   03/28/2021 7.3 (L) 11.5 - 15.5 g/dL Final   03/27/2021 8.4 (L) 11.5 - 15.5 g/dL Final   03/26/2021 8.0 (L) 11.5 - 15.5 g/dL Final     Hematocrit   Date Value Ref Range Status   03/28/2021 21.9 (L) 34.0 - 48.0 % Final   03/27/2021 25.4 (L) 34.0 - 48.0 % Final   03/26/2021 24.8 (L) 34.0 - 48.0 % Final     MCV   Date Value Ref Range Status   03/28/2021 86.9 80.0 - 99.9 fL Final   03/27/2021 87.0 80.0 - 99.9 fL Final   03/26/2021 87.9 80.0 - 99.9 fL Final     Platelets   Date Value Ref Range Status   03/28/2021 219 130 - 450 E9/L Final   03/27/2021 325 130 - 450 E9/L Final   03/26/2021 344 130 - 450 E9/L Final     Sodium   Date Value Ref Range Status   03/28/2021 139 132 - 146 mmol/L Final   03/27/2021 137 132 - 146 mmol/L Final   03/26/2021 142 132 - 146 mmol/L Final     Potassium   Date Value Ref Range Status   03/28/2021 3.8 3.5 - 5.0 mmol/L Final   03/27/2021 4.3 3.5 - 5.0 mmol/L Final   03/26/2021 4.9 3.5 - 5.0 mmol/L Final     Potassium reflex Magnesium   Date Value Ref Range Status   03/21/2021 4.2 3.5 - 5.0 mmol/L Final   03/09/2021 4.2 3.5 - 5.0 mmol/L Final     Chloride   Date Value Ref Range Status   03/28/2021 102 98 - 107 mmol/L Final   03/27/2021 101 98 - 107 mmol/L Final   03/26/2021 111 (H) 98 - 107 mmol/L Final     CO2   Date Value Ref Range Status   03/28/2021 26 22 - 29 mmol/L Final   03/27/2021 25 22 - 29 mmol/L Final   03/26/2021 22 22 - 29 mmol/L Final     BUN   Date Value Ref Range Status   03/28/2021 45 (H) 8 - 23 mg/dL Final   03/27/2021 50 (H) 8 - 23 mg/dL Final   03/26/2021 90 (H) 8 - 23 mg/dL Final     CREATININE   Date Value Ref Range Status   03/28/2021 2.4 (H) 0.5 - 1.0 mg/dL Final   03/27/2021 2.4 (H) 0.5 - 1.0 mg/dL Final   03/26/2021 3.9 (H) 0.5 - 1.0 mg/dL Final     Glucose   Date Value Ref Range Status   03/28/2021 55 (L) 74 - 99 mg/dL Final   03/27/2021 122 (H) 74 - 99 mg/dL Final   03/26/2021 175 (H) 74 - 99 mg/dL Final   03/21/2011 419 (H) 70 - 110 mg/dL Final     Calcium   Date Value Ref Range Status   03/28/2021 7.6 (L) 8.6 - 10.2 mg/dL Final   03/27/2021 7.7 (L) 8.6 - 10.2 mg/dL Final   03/26/2021 7.7 (L) 8.6 - 10.2 mg/dL Final     Total Protein   Date Value Ref Range Status   03/24/2021 4.8 (L) 6.4 - 8.3 g/dL Final   03/21/2021 5.1 (L) 6.4 - 8.3 g/dL Final   03/15/2021 4.9 (L) 6.4 - 8.3 g/dL Final     Albumin   Date Value Ref Range Status   03/24/2021 2.2 (L) 3.5 - 5.2 g/dL Final   03/21/2021 2.5 (L) 3.5 - 5.2 g/dL Final   03/15/2021 2.4 (L) 3.5 - 5.2 g/dL Final   03/21/2011 4.1 3.2 - 4.8 g/dL Final     Total Bilirubin   Date Value Ref Range Status   03/24/2021 0.2 0.0 - 1.2 mg/dL Final   03/21/2021 0.3 0.0 - 1.2 mg/dL Final   03/15/2021 <0.2 0.0 - 1.2 mg/dL Final     Alkaline Phosphatase   Date Value Ref Range Status   03/24/2021 182 (H) 35 - 104 U/L Final   03/21/2021 89 35 - 104 U/L Final   03/15/2021 66 35 - 104 U/L Final     AST   Date Value Ref Range Status   03/24/2021 42 (H) 0 - 31 U/L Final   03/21/2021 50 (H) 0 - 31 U/L Final   03/15/2021 48 (H) 0 - 31 U/L Final     ALT   Date Value Ref Range Status   03/24/2021 32 0 - 32 U/L Final   03/21/2021 71 (H) 0 - 32 U/L Final   03/15/2021 30 0 - 32 U/L Final     GFR Non- in normal position. Stable pattern of extensive bilateral airspace disease, pneumonia or ARDS. XR CHEST PORTABLE   Final Result   Unchanged extensive infiltrates and pneumomediastinum. XR CHEST PORTABLE   Final Result   Right internal jugular line tip in the proximal SVC. No sizable pneumothorax. Stable diffuse and bilateral airspace opacities. Subcutaneous air involving both sides of the neck right greater than left. Probable pneumomediastinum. XR CHEST PORTABLE   Final Result   Right IJ catheter in satisfactory position, no complicating pneumothorax. Diffuse parenchymal edema unchanged         XR CHEST PORTABLE   Final Result   Extensive bilateral lung infiltrates are similar to subtly improved. Continued follow-up recommended. XR CHEST PORTABLE   Final Result   Endotracheal tube terminates approximately 1 cm above the sylvia and should   be retracted approximately 2 cm for more optimal positioning. Extensive bilateral diffuse lung infiltrates are similar to previous and may   represent severe edema, pneumonia or ARDS. XR CHEST PORTABLE   Final Result   ET tube tip at the entrance of the right mainstem bronchus and should be   retracted 2.0 cm. Diffuse bilateral infiltrates. Enteric tube tip in the distal body of the stomach. XR CHEST PORTABLE   Final Result   Stable diffuse and bilateral infiltrates. ET tube tip is at the entrance of the right mainstem bronchus and should be   retracted at least 2.0 cm. XR CHEST PORTABLE   Final Result   Diffuse and bilateral airspace opacities. Findings consistent with extensive   pulmonary edema versus pneumonia. NM LUNG SCAN PERFUSION ONLY   Final Result   Low probability for pulmonary embolism. Small perfusion defects correspond   to the opacities in the mid lungs bilaterally.          US DUP LOWER EXTREMITIES BILATERAL VENOUS   Final Result   No evidence of DVT in either lower extremity. CT Head WO Contrast   Final Result   No skull fracture or acute intracranial abnormality. CT CHEST WO CONTRAST   Final Result   Extensive airspace opacities throughout both lungs which is most prominent   along the upper lobes and perihilar regions and could pulmonary edema and/or   pneumonia vs pulmonary hemorrhage. Recommend short-term follow-up. Prominent central pulmonary vessels suggestive of pulmonary congestion or   pulmonary artery hypertension. Small pericardial effusion with no mediastinal mass or adenopathy. Tiny right pleural effusion. Questionable mild ascites along the upper abdomen         XR CHEST PORTABLE   Final Result   Mild cardiomegaly and mild pulmonary edema which is more prominent. Hazy perihilar and bibasilar opacities which have increased and could be due   to pulmonary edema and/or pneumonia. Recommend follow-up. Resolving left upper lobe opacity. CTA VENOUS HEAD W WO CONTRAST    (Results Pending)   CTA HEAD W CONTRAST    (Results Pending)   XR CHEST PORTABLE    (Results Pending)     PROBLEM LIST:  Active Problems:    Acute respiratory failure with hypoxia ([address]    COVID-19  Resolved Problems:    * No resolved hospital problems.  *    ATTESTATION:  ICU Staff Physician note of personal involvement in Care  As the attending physician, I certify that I personally reviewed the patients history and personnally examined the patient to confirm the physical findings described above,  And that I reviewed the relevant imaging studies and available reports.  I also discussed the differential diagnosis and all of the proposed management plans with the patient and individuals accompanying the patient to this visit.  They had the opportunity to ask questions about the proposed management plans and to have those questions answered.     This patient has a high probability of sudden, clinically significant deterioration, which

## 2021-03-28 NOTE — FLOWSHEET NOTE
Called Dr Amarilys Langford informed of patient temp 94 order received also informed of residual 50cc running at 10cc/hr and pt prone ok to keep running.

## 2021-03-28 NOTE — FLOWSHEET NOTE
Jj served  informed of patients right pupil larger than left with sclera bulging Patient on moe huggar temp 95 rectal no new orders.

## 2021-03-28 NOTE — PROGRESS NOTES
ALKPHOS 182 03/24/2021    AST 42 03/24/2021    ALT 32 03/24/2021     Lab Results   Component Value Date    CRP 0.9 (H) 03/16/2021    CRP 10.8 (H) 03/11/2021     Lab Results   Component Value Date    SEDRATE 40 (H) 03/16/2021    SEDRATE 44 (H) 03/11/2021     Recent Labs     03/26/21  0454   DDIMER 2336     Lab Results   Component Value Date    CHOL 168 08/05/2020    TRIG 182 08/05/2020    HDL 45 08/05/2020    LDLCALC 87 08/05/2020    LABVLDL 36 08/05/2020     Lab Results   Component Value Date/Time    VITD25 27 (L) 03/11/2021 06:45 AM       Microbiology:   No results for input(s): COVID19 in the last 72 hours.   Lab Results   Component Value Date    BC 24 Hours no growth 03/25/2021    BC 5 Days no growth 03/08/2021    BLOODCULT2 24 Hours no growth 03/25/2021    BLOODCULT2 5 Days no growth 03/08/2021        ASSESSMENT/PLAN:  LEUKOCYTOSIS FOLLOW CBC dec  respiratory failure intubated  S/P COVID RX  COVERING HCAP  CKD URINARY RETENTION HAS GIPSON     Chronic lacunar infarcts in right caudate head - prominence of bilateral superior ophthalimc veins - possible cavernous sinus thromosis?      doxycycline (VIBRAMYCIN) 100 mg in dextrose 5 % 100 mL IVPB, Q12H   piperacillin-tazobactam (ZOSYN) 3,375 mg in dextrose 5 % 50 mL IVPB extended infusion (mini-bag), Q12H  dexamethasone (PF) (DECADRON) injection 6 mg, Q24H       Family has decided on [facility] only     Follow cultures, monitor labs     INDRA Lang NP  3/28/2021  3:18 PM    Patient examined along with the nurse practitioner  Agree with above  Patient still intubated on a ventilator  Events of last 24 hours noted she had a CT scan of the head done which showed chronic lacunar infarct and questionable thrombosis of ophthalmic vein raising a high suspicion of cavernous sinus thrombosis  Patient did present on Zosyn and doxycycline  Continue present treatment awaiting MRA to rule out cavernous sinus thrombosis    I have discussed the case, including pertinent history and physical  exam findings . I have seen and examined the patient and the key elements of the encounter have been performed by me. I agree with the assessment, plan and orders as documented.       Treatment plan as per my recommendation     Rosalind Cantu MD, FACP  3/28/2021  3:47 PM

## 2021-03-28 NOTE — PROGRESS NOTES
Pharmacy Consultation Note  (Antibiotic Dosing and Monitoring)    Initial consult date: 3/26/21  Consulting physician: Dr. Xiao Duque  Drug(s): Vancomycin  Indication: Empiric    Ht Readings from Last 1 Encounters:   21 5' (1.524 m)     Wt Readings from Last 1 Encounters:   21 144 lb 6.4 oz (65.5 kg)       Age/  Gender IBW DW  Allergy Information   57 y.o.     female 45.5 kg 61.6 kg  Demerol, Peanut-containing drug products, and Toradol [ketorolac tromethamine]                 Date  WBC BUN/CR HD Drug/Dose Time   Given Level(s)   (Time) Comments   3/26  (#1) 13.2 90/3.9 HD x 3.75h -- --     3/27  (#2) 7.3 50/2.4 HD x 2h No dose -- 22.1 mcg/mL @ 0511  15.3 mcg/mL @ 1623    3/28  (#3) 6.9 45/2.4 HD today Vancomycin 500 mg IV x 1 <1700>       (#4)            (#5)            (#6)            (#7)            Estimated Creatinine Clearance: 21 mL/min (A) (based on SCr of 2.4 mg/dL (H)). UOP over the past 24 hours:       Intake/Output Summary (Last 24 hours) at 3/28/2021 1411  Last data filed at 3/28/2021 1128  Gross per 24 hour   Intake 1040 ml   Output 45 ml   Net 995 ml       Temp max: Temp (24hrs), Av.9 °F (36.1 °C), Min:94 °F (34.4 °C), Max:98.3 °F (36.8 °C)      Antibiotic Regimen:  Antibiotic Dose Date Initiated   Doxycycline 100 mg IV Q12H 3/26   Pip/tazo 3.375 g Q12H 3/23     Cultures:  available culture and sensitivity results were reviewed in Epic   Culture Date Result    Blood x 2 3/25 NGTD     Assessment:  · Consulted by Dr. Xiao Duque to dose/monitor vancomycin  · Goal trough level:  15-20 mcg/mL  · Pt is a 57 yof admitted to the hospital for acute respiratory failure 2/2 COVID-19 and CKD. [facility] was intubated on 3/25 and transferred to the ICU. A HD cath has been placed with plans to start dialysis today.   · Serum creatinine today: 2.4; CrCl < 20 mL/min; started dialysis on 3/26  · 3/26: Patient received Vancomycin 1,000 mg IV x 1 yesterday @ 6388, dialysis today started @ ~1545  · 3/27: Post-HD level this morning @ 0511 = 22.1 mcg/mL, dialyzed again this morning x 2h & completed @ ~1045  · 3/28: Dialysis today x 2 hours, started @ ~1345      Plan:  · Vancomycin 500 mg IV x 1 after dialysis today  · Levels PRN  · Follow HD orders  · Pharmacist will follow and monitor/adjust dosing as necessary      Thank you for the consult,    Ramez Samuels, PharmD, BCPS 3/28/2021 2:11 PM   [phone]

## 2021-03-28 NOTE — FLOWSHEET NOTE
03/28/21 1527   Vital Signs   BP (!) 100/53   Temp 97.7 °F (36.5 °C)   Pulse 72   Resp 18   Weight 144 lb 6.4 oz (65.5 kg)   Weight Method Bed scale   Percent Weight Change 0   Post-Hemodialysis Assessment   Post-Treatment Procedures Blood returned;Catheter capped, clamped and heparinized x 2 ports   Machine Disinfection Process Acid/Vinegar Clean;Bleach; Exterior Machine Disinfection   Rinseback Volume (ml) 300 ml   Total Liters Processed (l/min) 31.3 l/min   Dialyzer Clearance Lightly streaked   Duration of Treatment (minutes) 120 minutes   Hemodialysis Intake (ml) 500 ml   Hemodialysis Output (ml) 500 ml   NET Removed (ml) 0 ml   Tolerated Treatment Fair   Patient Response to Treatment Attempted 1L fr without success d/t hypotension and poor Cl reaction (-20% bv change in first 15 mins.) no fluid removed dopamine increased to 5 mcg/kg during tx bp stable post tx. cath closed with heparin   Bilateral Breath Sounds Diminished   Edema Generalized   Edema Generalized +1   RLE Edema +1   LLE Edema +1

## 2021-03-29 PROBLEM — R41.82 ALTERED MENTAL STATE: Status: ACTIVE | Noted: 2021-01-01

## 2021-03-29 NOTE — PROGRESS NOTES
[facility] Roberto A. Darel Kehr, MD Melia Bureau, MD Madaline Knock, MD Derl Peabody, MD Merri Prior, NUNO Nascimento, RAZA  Progress Note  3/29/2021    SUBJECTIVE:  We are following this patient for CKD stage IV on hemodialysis. Transferred to ICU bed 9 currently in prone position because of COVID-19 mechanical ventilation  Today with [facility] ICU attending Dr. Lilli Mcqueen assess to perform acute dialysis treatment patient be scheduled for MR venogram with no contrast.  Patient was evaluated and case discussed with the nurse currently will be needing alternating supine and prone positions every 12 hours she remains on mechanical ventilation with sedation.   Last dialysis was done yesterday  PROBLEM LIST:    Patient Active Problem List   Diagnosis    Diabetes mellitus type 2, insulin dependent ([address]    Hypothyroid    Hypertension    Hyperlipidemia    Hypertensive urgency    Acute respiratory failure with hypoxia (HCC)    COVID-19    Altered mental state        PAST MEDICAL HISTORY:    Past Medical History:   Diagnosis Date    Acid reflux     Hyperlipidemia     Hypertension     Hypothyroidism     S/P appendectomy     Type II or unspecified type diabetes mellitus without mention of complication, not stated as uncontrolled        MEDS (scheduled):   acetylcysteine  400 mg Inhalation TID    insulin glargine  9 Units Subcutaneous QAM    famotidine  10 mg Oral Daily    [Held by provider] carvedilol  3.125 mg Oral BID WC    insulin lispro  0-6 Units Subcutaneous Q6H    sodium bicarbonate  1,300 mg Per NG tube TID    doxycycline (VIBRAMYCIN) IV  100 mg Intravenous Q12H    heparin (porcine)  5,000 Units Subcutaneous 3 times per day    vancomycin (VANCOCIN) intermittent dosing (placeholder)   Other RX Placeholder    [Held by provider] amLODIPine  5 mg Oral BID    piperacillin-tazobactam  3,375 mg Intravenous Q12H    dexamethasone  6 mg Intravenous Q24H    [Held by provider] pentoxifylline  400 mg Oral BID    ipratropium-albuterol  1 ampule Inhalation 4x daily    nystatin  5 mL Oral 4x Daily    aspirin  81 mg Oral Daily    atorvastatin  80 mg Oral Daily    clopidogrel  75 mg Oral Daily    gabapentin  300 mg Oral Nightly    levothyroxine  88 mcg Oral Daily    sodium chloride flush  10 mL Intravenous 2 times per day       MEDS (infusions):   vasopressin (Septic Shock) infusion Stopped (03/27/21 1105)    DOPamine Stopped (03/28/21 1745)    fentaNYL 5 mcg/ml in 0.9%  ml infusion 125 mcg/hr (03/29/21 0045)    propofol 20 mcg/kg/min (03/29/21 0031)    sodium chloride 12.5 mL/hr at 03/29/21 1050    dextrose Stopped (03/24/21 0626)       MEDS (prn):  [Held by provider] hydrOXYzine, [Held by provider] hydrALAZINE, glucose, dextrose, glucagon (rDNA), dextrose, acetaminophen, sodium chloride flush, promethazine **OR** ondansetron, polyethylene glycol, [DISCONTINUED] acetaminophen **OR** acetaminophen    DIET:    DIET TUBE FEED CONTINUOUS/CYCLIC NPO; Renal Formula; Nasogastric; 10; 50      PHYSICAL EXAM:      Patient Vitals for the past 24 hrs:   BP Temp Temp src Pulse Resp SpO2 Height Weight   03/29/21 1100 (!) 85/46   75 (!) 31 93 %     03/29/21 1018       5' (1.524 m)    03/29/21 1000 (!) 94/54   79 20 94 %     03/29/21 0905      95 %     03/29/21 0900 (!) 96/46   81 25 (!) 84 %     03/29/21 0800 (!) 91/48 96.8 °F (36 °C) Axillary 77 21 95 %     03/29/21 0700 (!) 95/51   82 24 93 %     03/29/21 0600 (!) 93/56   79 15 96 %  149 lb 0.5 oz (67.6 kg)   03/29/21 0530 (!) 116/51   79 23 97 %     03/29/21 0500 103/67   81 19 94 %     03/29/21 0430 (!) 161/84   85 15 (!) 89 %     03/29/21 0400 124/68 96.3 °F (35.7 °C) Axillary 81 23 92 %     03/29/21 0330 (!) 108/59   79 22 98 %     03/29/21 0300 (!) 104/51   75 26 99 %     03/29/21 0230 92/63   78 16 98 %     03/29/21 0200 (!) 107/54   77 16 97 %     03/29/21       03/28/21 1341 (!) 90/49   69 21 99 %     03/28/21 1333 (!) 99/53   69 24 99 %     03/28/21 1331 (!) 87/48   68 18 99 %     03/28/21 1327  97.5 °F (36.4 °C)  69 24 97 %  144 lb 6.4 oz (65.5 kg)   03/28/21 1316 (!) 100/56   69 17 98 %     03/28/21 1301 (!) 96/54   70 25 96 %     03/28/21 1246 (!) 94/51   70 22 97 %     03/28/21 1231 (!) 101/55   68 19 99 %     03/28/21 1216 (!) 100/56   71 18 99 %     03/28/21 1201 (!) 102/53   71 8 99 %     03/28/21 1200  97.5 °F (36.4 °C) Axillary 72 12 99 %     03/28/21 1146 (!) 96/58   73 14 98 %     03/28/21 1132 (!) 94/52   75 18 99 %            Intake/Output Summary (Last 24 hours) at 3/29/2021 1128  Last data filed at 3/29/2021 0800  Gross per 24 hour   Intake 3999 ml   Output 591 ml   Net 3408 ml       Wt Readings from Last 3 Encounters:   03/29/21 149 lb 0.5 oz (67.6 kg)   03/08/21 117 lb (53.1 kg)   01/10/21 130 lb (59 kg)       General:  in no acute distress  HEENT: NC/AT, EOMI, sclera and conjunctiva are clear and anicteric. Mucous membranes moist.  Cardiovascular: regular rate and rhythm, no murmurs, gallops, or rubs  Respiratory:  Clear, no rales, rhochi, or wheezes  Gastrointestinal: soft, nontender, nondistended, NABS  Ext: no cyanosis, clubbing, or edema bilateral lower extremities  Neuro: awake, alert, oriented x3. Moves all 4 extremities. Cranial nerves II through XII grossly intact.   Skin: dry, no rash      DATA:      Recent Labs     03/27/21  0511 03/28/21  0601   WBC 7.3 6.9   HGB 8.4* 7.3*   HCT 25.4* 21.9*   MCV 87.0 86.9    219     Recent Labs     03/27/21  0511 03/28/21  0601 03/29/21  0501    139 135   K 4.3 3.8 4.1    102 98   CO2 25 26 26   BUN 50* 45* 34*   CREATININE 2.4* 2.4* 2.2*       Iron studies:  Lab Results   Component Value Date    FERRITIN 613 03/16/2021     Bone disease:  Lab Results   Component Value Date    PTH 42 08/05/2020    MG 2.8 (H) 03/26/2021    PHOS 6.3 (H) 03/26/2021     Nutrition:No results found for: ALB    Microbiology      ASSESSMENT / RECOMMENDATIONS:     1. GAVIN on top of chronic disease stage IV with hemodialysis initiation   cont diffusive IHD support for solute and volume clearance per orders              Patient was seen on hemodialysis initiation vital signs are stable orders were given for dialysis with 4K bath 2.5 calcium bicarb to pull 1 to 2 kg if possible. While using blood flow of 300 mils per minute and dialysate flow 500 mils per minute. No dialysis planned at this point for today, will discuss clinical condition with ICU attending and proceed from there pending monitoring situation. 2. Anemia:   Cont. epo and iv ferrlecit per orders  3. Secondary Hyperparathyroidism:   Cont vitamin d analog  4. Hypotension which prevented us from proceeding forward with dialysis treatment yesterday, case was discussed with ICU attending for future management. Cont current tx, see orders  5. Pneumomediastinum, currently being followed by ICU physicians and radiology x-rays. 6.  COVID-19, patient is on appropriate management and treatment as per intensive care physicians.   Dr. cEho Bryant will be rounding tomorrow, please call us back if you need any intervention of with dialysis today otherwise  Kaye Jiang MD  3/29/2021 Acute dialysis access

## 2021-03-29 NOTE — PLAN OF CARE
Problem: Non-Violent Restraints  Goal: No harm/injury to patient while restraints in use  3/29/2021 1511 by James Sullivan RN  Outcome: Met This Shift     Problem: Non-Violent Restraints  Goal: Patient's dignity will be maintained  3/29/2021 1511 by James Sullivan RN  Outcome: Met This Shift     Problem: Non-Violent Restraints  Goal: Removal from restraints as soon as assessed to be safe  3/29/2021 1511 by James Sullivan RN  Outcome: Not Met This Shift

## 2021-03-29 NOTE — PROGRESS NOTES
Pharmacy Consultation Note  (Antibiotic Dosing and Monitoring)    Initial consult date: 3/26/21  Consulting physician: Dr. Paulina Shah  Drug(s): Vancomycin  Indication: Empiric    Ht Readings from Last 1 Encounters:   21 5' (1.524 m)     Wt Readings from Last 1 Encounters:   21 149 lb 0.5 oz (67.6 kg)     Age/  Gender IBW DW  Allergy Information   57 y.o.   female 45.5 kg 61.6 kg  Demerol, Peanut-containing drug products, and Toradol [ketorolac tromethamine]          Date  WBC BUN/CR HD Drug/Dose Time   Given Level(s)   (Time) Comments   3/26  (#1) 13.2 90/3.9 HD x 3.75h -- --     3/27  (#2) 7.3 50/2.4 HD x 2h No dose -- 22.1 mcg/mL @ 0511  15.3 mcg/mL @ 1623    3/28  (#3) 6.9 45/2.4 HD x 2h Vancomycin 500 mg IV x 1 1840     3/29  (#4) -- 34/2.2 No HD No dose --     3/30  (#5)      Random level @ <0600> =       (#6)            (#7)            Estimated Creatinine Clearance: 23 mL/min (A) (based on SCr of 2.2 mg/dL (H)). UOP over the past 24 hours:       Intake/Output Summary (Last 24 hours) at 3/29/2021 1134  Last data filed at 3/29/2021 0800  Gross per 24 hour   Intake 3999 ml   Output 591 ml   Net 3408 ml       Temp max: Temp (24hrs), Av.6 °F (35.9 °C), Min:94 °F (34.4 °C), Max:97.7 °F (36.5 °C)      Antibiotic Regimen:  Antibiotic Dose Date Initiated   Doxycycline 100 mg IV Q12H 3/26   Pip/tazo 3.375 g Q12H 3/23     Cultures:  available culture and sensitivity results were reviewed in Epic   Culture Date Result    Blood x 2 3/25 NGTD   Strep/legionella urine antigens 3/26 Negative     Assessment:  · Consulted by Dr. Paulina Shah to dose/monitor vancomycin  · Goal trough level:  15-20 mcg/mL  · Pt is a 57 yof admitted to the hospital for acute respiratory failure 2/2 COVID-19 and CKD. Mike Nelson was intubated on 3/25 and transferred to the ICU. A HD cath has been placed with plans to start dialysis today.   · Serum creatinine today: 2.2; CrCl < 20 mL/min; started dialysis on 3/26    Plan:  · No HD today, no dose · Random level tomorrow AM  · Levels PRN  · Follow HD orders  · Pharmacist will follow and monitor/adjust dosing as necessary      Thank you for the consult,    Dionna Francois, PharmD, BCPS 3/29/2021 11:39 AM   Ext: [phone]

## 2021-03-29 NOTE — CARE COORDINATION
3/29/21 Droplet plus 3/9/21- with a positive covid at that time. Cm transition of care: icu/vent/sedation/pressors being started. Attempted to prone and pt with bp and saturation problems. Pt on vent at 100% fio2. Code status Limited meds only- continues on vent. Palliative care consult may be beneficial. Cm/SS to follow. Pt with Stephany Madrid prior to admission.  Electronically signed by SIERAR Killian on 3/29/2021 at 1:15 PM

## 2021-03-29 NOTE — PROGRESS NOTES
1140- At patients bedside to prepare for proning. Patient desaturating to 65%. Respiratory at the bedside. Patient's heart rate began to slow to 45bpm. Dr. Kristie Ashton at the bedside. Atropine given per Dr. Kristie Ashton. Patient proned. Saturations not improving. Per Dr. Kristie Ashton, patient returned to the supine position. Respiratory bagging the patient for the entirety of the episode. Patient's blood pressure low, epinephrine pushed per Dr. Dane Ny ordered. Will continue to monitor closely.

## 2021-03-29 NOTE — PROGRESS NOTES
Comprehensive Nutrition Assessment    Type and Reason for Visit:  Reassess    Nutrition Recommendations/Plan:  Recommend to decrease TF rate exceeds estimated nutr'l needs. Renal TF(Nepro) @25ml/hr x24hrs w/1 protein modular to  provide:600mlTV/1364kcal w/propofol and protein modular/75gm pro w/protein modular /435ml FW Addtional water flushes per critical care. Nutrition Assessment:  Pt admit d/t CoVID-19 PNA, Pt now receiving Hemodialysis d/t declining renal fxn w/PMH of DM, Hypothyroid, CKD, CHF. Will continue to monitor and recommend TF be decreased as current rate exceeding nutr'l needs. Malnutrition Assessment:  Malnutrition Status: At risk for malnutrition (Comment)    Context:  Acute Illness     Findings of the 6 clinical characteristics of malnutrition:  Energy Intake:  7 - 50% or less of estimated energy requirements for 5 or more days  Weight Loss:  Unable to assess(DAR weight changes d/t inaccurate information)     Body Fat Loss:  Unable to assess     Muscle Mass Loss:  Unable to assess    Fluid Accumulation:  No significant fluid accumulation     Strength:  Not Performed    Estimated Daily Nutrient Needs:  Energy (kcal):  1200-1300kcal/d; Weight Used for Energy Requirements:  Admission     Protein (g):  70-80gm pro/d(x1.2-1.4gm/kg( protein needs increased d/t hemodialysis));  Weight Used for Protein Requirements:  Admission        Fluid (ml/day):  per critical care; Method Used for Fluid Requirements:  Standard Renal      Nutrition Related Findings:  Intubated, Propofol @6.8ml/hr provides 180kcal/d, +BS, Abd soft, NGT, BLE +1 edema, BUE +1 non-pitting edema, Pt now on hemodialysis,      Wounds:  None       Current Nutrition Therapies:    Current Tube Feeding (TF) Orders:  · Feeding Route: Nasogastric  · Formula: Renal  · Schedule: Continuous        Water Flushes: per critical care  · Current TF & Flush Orders Provides: 1200mlTV/2160kcal/97gm pro/871ml FW      Anthropometric Measures:  · Height: 5' (152.4 cm)  · Current Body Weight: 126 lb (57.2 kg)(# likely d.t fluid accumulation: edema, +I/O)     · Usual Body Weight: (DAR UBW d/t per EMR no wt methods: 117-130# range)     · Ideal Body Weight: 100 lbs; % Ideal Body Weight 126 %   · BMI: 24.6  · Adjusted Body Weight:  ; No Adjustment    · BMI Categories: Normal Weight (BMI 18.5-24. 9)       Nutrition Diagnosis:   · Other (Comment)(Excessive protein-enegy intake: TF kcal protein needs are avg. 50% lower) related to impaired respiratory function as evidenced by NPO or clear liquid status due to medical condition, nutrition support - enteral nutrition, intubation      Nutrition Interventions:   Food and/or Nutrient Delivery:  Continue NPO(See nutr. recs)  Nutrition Education/Counseling:  Education not indicated   Coordination of Nutrition Care:  Continue to monitor while inpatient    Goals:  EN Tolerance       Nutrition Monitoring and Evaluation:   Behavioral-Environmental Outcomes:  None Identified   Food/Nutrient Intake Outcomes:  Enteral Nutrition Intake/Tolerance  Physical Signs/Symptoms Outcomes:  Biochemical Data, Chewing or Swallowing, Fluid Status or Edema, Hemodynamic Status, Nutrition Focused Physical Findings, Skin, Weight     Discharge Planning:     Too soon to determine     Electronically signed by Vivian Aparicio RD, LD on 3/29/21 at 10:58 AM EDT    Contact: [phone]

## 2021-03-29 NOTE — CONSULTS
History Of Present Illness: Brief Summary of Stay:  Ms. Piyush Walton was admitted with shortness of breath. This is readmission after being admitted prior with covid. Her symptoms continued to deteriorate. She was initially on BiPAP, her respiration continues to decline she was intubated. Her renal function is compromised and she is now on dialysis.      1. Acute respiratory failure with hypoxia:  Currently intubated. Continue to monitor.     2.  COVID-19 superimposed Pneumonia:   On Decadron     3. GAVIN on CKD:  Now requiring dialysis. Nephro following  Continue to monitor     4. Pneumomediastinum:      5.  DM Type 2:      6. Hypertension Essential:      7. Anemia due to CKD:      8. Hypothyroidism: continue synthroid. As above per Dr Adrianne Lopez. Neurology asked to evaluate after head ct showed prominence of superior ophthalmic veins per radiologist report. The patient is a 57 y.o. female with significant past medical history of see below who presents with shortness of breath.       The patient has the following symptoms:    Change in level of consciousness: unresponsive    New Weakness: no    Numbness or Tingling: no    Difficulty Swallowing: yes    Current Medications:   Scheduled Meds:   acetylcysteine  400 mg Inhalation TID    insulin glargine  9 Units Subcutaneous QAM    famotidine  10 mg Oral Daily    [Held by provider] carvedilol  3.125 mg Oral BID WC    insulin lispro  0-6 Units Subcutaneous Q6H    sodium bicarbonate  1,300 mg Per NG tube TID    doxycycline (VIBRAMYCIN) IV  100 mg Intravenous Q12H    heparin (porcine)  5,000 Units Subcutaneous 3 times per day    vancomycin (VANCOCIN) intermittent dosing (placeholder)   Other RX Placeholder    [Held by provider] amLODIPine  5 mg Oral BID    piperacillin-tazobactam  3,375 mg Intravenous Q12H    dexamethasone  6 mg Intravenous Q24H    [Held by provider] pentoxifylline  400 mg Oral BID    ipratropium-albuterol  1 ampule Inhalation 4x daily    nystatin  5 mL Oral 4x Daily    aspirin  81 mg Oral Daily    atorvastatin  80 mg Oral Daily    clopidogrel  75 mg Oral Daily    gabapentin  300 mg Oral Nightly    levothyroxine  88 mcg Oral Daily    sodium chloride flush  10 mL Intravenous 2 times per day     Continuous Infusions:   vasopressin (Septic Shock) infusion Stopped (03/27/21 1105)    DOPamine Stopped (03/28/21 1745)    fentaNYL 5 mcg/ml in 0.9%  ml infusion 125 mcg/hr (03/29/21 0045)    propofol 20 mcg/kg/min (03/29/21 0031)    sodium chloride Stopped (03/29/21 0031)    dextrose Stopped (03/24/21 0626)     PRN Meds:[Held by provider] hydrOXYzine, [Held by provider] hydrALAZINE, glucose, dextrose, glucagon (rDNA), dextrose, acetaminophen, sodium chloride flush, promethazine **OR** ondansetron, polyethylene glycol, [DISCONTINUED] acetaminophen **OR** acetaminophen    Allergies:  Demerol, Peanut-containing drug products, and Toradol [ketorolac tromethamine]    Social History:   TOBACCO:   reports that she has never smoked. She has never used smokeless tobacco.  ETOH:   reports no history of alcohol use. Past Medical History:        Diagnosis Date    Acid reflux     Hyperlipidemia     Hypertension     Hypothyroidism     S/P appendectomy     Type II or unspecified type diabetes mellitus without mention of complication, not stated as uncontrolled        Past Surgical History:        Procedure Laterality Date    APPENDECTOMY      HYSTERECTOMY           Outside reports reviewed: ER records, historical medical records, lab reports and radiology reports. Patient's medications, allergies, past medical, surgical, social and family histories were reviewed and updated as appropriate. Review of Systems  A comprehensive review of systems was negative except for:       Objective:     Neuro exam 96/46 p 80 t 97  General: comatose. Sedated. Cranial nerve testing  unable to cooperate. PERRL, corneal reflexes +  .   Funduscopic eye exam revealed not testable. Motor exam: . Corrina Maid Deep tendon reflexes were absent bilaterally. Plantar responses were flexor bilaterally. Cerebellar exam noted . Corrina Maid Sensation was . Corrina Maid       Assessment:   Altered mental state primarily secondary to covid pneumonia (FiO2 100%)  MRI/MRV studies of brain while suboptimal did not show evidence of venous thrombosis or other gross pathology      Plan:    neurosurgical opinion regarding possible cavernous AV fistula  Will follow PRN  Thank you for consult

## 2021-03-29 NOTE — PROGRESS NOTES
Department of Internal Medicine  General Internal Medicine  Attending Progress Note  Chief Complaint   Patient presents with    Concern For COVID-19     positive covid, shortness of breath, EMS states 60% on room air. 94% on 15LPM NRB    Shortness of Breath     SUBJECTIVE:    Patient is currently intubated. 3/29: did not tolerate pronging. Mucous plug. Bagged. intensivist in room. D/w him.     OBJECTIVE      Medications    Current Facility-Administered Medications: acetylcysteine (MUCOMYST) 20 % solution 400 mg, 400 mg, Inhalation, TID  0.9 % sodium chloride bolus, 1,000 mL, Intravenous, Once  insulin glargine (LANTUS) injection vial 9 Units, 9 Units, Subcutaneous, QAM  famotidine (PEPCID) tablet 10 mg, 10 mg, Oral, Daily  [Held by provider] carvedilol (COREG) tablet 3.125 mg, 3.125 mg, Oral, BID WC  insulin lispro (HUMALOG) injection vial 0-6 Units, 0-6 Units, Subcutaneous, Q6H  sodium bicarbonate tablet 1,300 mg, 1,300 mg, Per NG tube, TID  doxycycline (VIBRAMYCIN) 100 mg in dextrose 5 % 100 mL IVPB, 100 mg, Intravenous, Q12H  heparin (porcine) injection 5,000 Units, 5,000 Units, Subcutaneous, 3 times per day  vancomycin (VANCOCIN) intermittent dosing (placeholder), , Other, RX Placeholder  DOPamine (INTROPIN) 800 mg in dextrose 5 % 250 mL infusion, 2-20 mcg/kg/min, Intravenous, Continuous  [Held by provider] hydrOXYzine (VISTARIL) capsule 25 mg, 25 mg, Oral, 4x Daily PRN  [Held by provider] hydrALAZINE (APRESOLINE) injection 10 mg, 10 mg, Intravenous, Q4H PRN  fentaNYL 5 mcg/ml in 0.9%  ml infusion, 12.5-200 mcg/hr, Intravenous, Continuous  propofol injection, 5-50 mcg/kg/min, Intravenous, Titrated  [Held by provider] amLODIPine (NORVASC) tablet 5 mg, 5 mg, Oral, BID  piperacillin-tazobactam (ZOSYN) 3,375 mg in dextrose 5 % 50 mL IVPB extended infusion (mini-bag), 3,375 mg, Intravenous, Q12H  dexamethasone (PF) (DECADRON) injection 6 mg, 6 mg, Intravenous, Q24H  0.9 % sodium chloride infusion, , Intravenous, Q12H  [Held by provider] pentoxifylline (TRENTAL) extended release tablet 400 mg, 400 mg, Oral, BID  ipratropium-albuterol (DUONEB) nebulizer solution 1 ampule, 1 ampule, Inhalation, 4x daily  nystatin (MYCOSTATIN) 997756 UNIT/ML suspension 500,000 Units, 5 mL, Oral, 4x Daily  glucose (GLUTOSE) 40 % oral gel 15 g, 15 g, Oral, PRN  dextrose 50 % IV solution, 12.5 g, Intravenous, PRN  glucagon (rDNA) injection 1 mg, 1 mg, Intramuscular, PRN  dextrose 5 % solution, 100 mL/hr, Intravenous, PRN  acetaminophen (TYLENOL) tablet 500 mg, 500 mg, Oral, 4x Daily PRN  aspirin chewable tablet 81 mg, 81 mg, Oral, Daily  atorvastatin (LIPITOR) tablet 80 mg, 80 mg, Oral, Daily  clopidogrel (PLAVIX) tablet 75 mg, 75 mg, Oral, Daily  gabapentin (NEURONTIN) capsule 300 mg, 300 mg, Oral, Nightly  levothyroxine (SYNTHROID) tablet 88 mcg, 88 mcg, Oral, Daily  sodium chloride flush 0.9 % injection 10 mL, 10 mL, Intravenous, 2 times per day  sodium chloride flush 0.9 % injection 10 mL, 10 mL, Intravenous, PRN  promethazine (PHENERGAN) tablet 12.5 mg, 12.5 mg, Oral, Q6H PRN **OR** ondansetron (ZOFRAN) injection 4 mg, 4 mg, Intravenous, Q6H PRN  polyethylene glycol (GLYCOLAX) packet 17 g, 17 g, Oral, Daily PRN  [DISCONTINUED] acetaminophen (TYLENOL) tablet 650 mg, 650 mg, Oral, Q6H PRN **OR** acetaminophen (TYLENOL) suppository 650 mg, 650 mg, Rectal, Q6H PRN  Physical    VITALS:  BP (!) 85/46   Pulse 75   Temp 96.8 °F (36 °C) (Axillary)   Resp (!) 31   Ht 5' (1.524 m)   Wt 149 lb 0.5 oz (67.6 kg)   SpO2 93%   BMI 29.11 kg/m²   CONSTITUTIONAL:  awake, alert, cooperative, no apparent distress, and appears stated age  EYES:  Lids and lashes normal, pupils equal, round and reactive to light, extra ocular muscles intact, sclera clear, conjunctiva normal  ENT:  Normocephalic, without obvious abnormality, atraumatic, sinuses nontender on palpation, external ears without lesions, oral pharynx with moist mucus membranes, tonsils without erythema or exudates, gums normal and good dentition. NECK:  Supple, symmetrical, trachea midline, no adenopathy, thyroid symmetric, not enlarged and no tenderness, skin normal  HEMATOLOGIC/LYMPHATICS:  no cervical lymphadenopathy  LUNGS:  No increased work of breathing, good air exchange, clear to auscultation bilaterally, no crackles or wheezing  CARDIOVASCULAR:  Normal apical impulse, regular rate and rhythm, normal S1 and S2, no S3 or S4, and no murmur noted  ABDOMEN:  No scars, normal bowel sounds, soft, non-distended, non-tender, no masses palpated, no hepatosplenomegally  MUSCULOSKELETAL:  No extremity edema  NEUROLOGIC:  Unable to Assess  SKIN:  no bruising or bleeding  Data    CBC:   Lab Results   Component Value Date    WBC 6.9 03/28/2021    RBC 2.52 03/28/2021    HGB 7.3 03/28/2021    HCT 21.9 03/28/2021    MCV 86.9 03/28/2021    MCH 29.0 03/28/2021    MCHC 33.3 03/28/2021    RDW 14.6 03/28/2021     03/28/2021    MPV 10.2 03/28/2021     CMP:    Lab Results   Component Value Date     03/29/2021    K 4.1 03/29/2021    K 4.2 03/21/2021    CL 98 03/29/2021    CO2 26 03/29/2021    BUN 34 03/29/2021    CREATININE 2.2 03/29/2021    GFRAA 27 03/29/2021    LABGLOM 27 03/29/2021    GLUCOSE 166 03/29/2021    GLUCOSE 419 03/21/2011    PROT 4.8 03/24/2021    LABALBU 2.2 03/24/2021    LABALBU 4.1 03/21/2011    CALCIUM 7.2 03/29/2021    BILITOT 0.2 03/24/2021    ALKPHOS 182 03/24/2021    AST 42 03/24/2021    ALT 32 03/24/2021       ASSESSMENT AND PLAN      Brief Summary of Stay:  Ms. Jose Valenzuela was admitted with shortness of breath. This is readmission after being admitted prior with covid. Her symptoms continued to deteriorate. She was initially on BiPAP, her respiration continues to decline she was intubated. Her renal function is compromised and she is now on dialysis. 1.  Acute respiratory failure with hypoxia:  Intubated. Continue to monitor.     2.  COVID-19 superimposed Pneumonia:  Decadron over 10 days in total so I will leave to discretion of ICU physician    3. GAVIN on CKD: Now requiring dialysis. Nephro following. Monitor    4. Air leak syndrome with Pneumomediastinum: mech vent     5. DM Type 2: usually in 100's. Recent spike 230's    6. Hypertension Essential: occasional sBP 80's: supportive care    7. Anemia due to CKD: monitor    8. Hypothyroidism: continue synthroid.

## 2021-03-29 NOTE — CARE COORDINATION
3/29/21 Droplet plus 3/9/21- with a positive covid at that time. Cm transition of care: icu/vent/sedation/pressors being started. Attempted to prone and pt with bp and saturation problems. Pt on vent at 100% fio2. Code status Limited meds only- continues on vent. Palliative care consult may be beneficial. Cm/SS to follow. Pt with Stephany Madrid prior to admission.  Electronically signed by Ziyad Guerrero RN-BC on 3/29/2021 at 1:15 PM

## 2021-03-29 NOTE — PROGRESS NOTES
Pulmonary/Critical Care Progress Note    We are following patient for Covid19 pneumonia multifocal    SUBJECTIVE:  48-year-old woman with recent Covid pneumonia was discharged home but represented with respiratory failure and ARDS. Past medical history is positive for diabetes mellitus, hypothyroidism, hyperlipidemia, hypertension, obesity and patient is now a DNR CCA. Patient's work-up during this admission includes a CT scan which showed chronic lacunar infarcts and infarct into the right caudate possible cavernous sinus thrombosis. This a.m. patient went prone deteriorated acutely required atropine epinephrine bagging and had apparently some mucus plugging. Mucomyst to just started this a.m. for issues of this nature.     MEDICATIONS:   acetylcysteine  400 mg Inhalation TID    atropine  0.4 mg Intravenous Once    insulin glargine  9 Units Subcutaneous QAM    famotidine  10 mg Oral Daily    [Held by provider] carvedilol  3.125 mg Oral BID WC    insulin lispro  0-6 Units Subcutaneous Q6H    sodium bicarbonate  1,300 mg Per NG tube TID    doxycycline (VIBRAMYCIN) IV  100 mg Intravenous Q12H    heparin (porcine)  5,000 Units Subcutaneous 3 times per day    vancomycin (VANCOCIN) intermittent dosing (placeholder)   Other RX Placeholder    [Held by provider] amLODIPine  5 mg Oral BID    piperacillin-tazobactam  3,375 mg Intravenous Q12H    dexamethasone  6 mg Intravenous Q24H    [Held by provider] pentoxifylline  400 mg Oral BID    ipratropium-albuterol  1 ampule Inhalation 4x daily    nystatin  5 mL Oral 4x Daily    aspirin  81 mg Oral Daily    atorvastatin  80 mg Oral Daily    clopidogrel  75 mg Oral Daily    gabapentin  300 mg Oral Nightly    levothyroxine  88 mcg Oral Daily    sodium chloride flush  10 mL Intravenous 2 times per day      norepinephrine 5 mcg/min (03/29/21 1353)    DOPamine Stopped (03/28/21 1745)    fentaNYL 5 mcg/ml in 0.9%  ml infusion 125 mcg/hr (03/29/21 6280)    propofol 10 mcg/kg/min (03/29/21 1215)    sodium chloride Stopped (03/29/21 1150)    dextrose Stopped (03/24/21 0626)     [Held by provider] hydrOXYzine, [Held by provider] hydrALAZINE, glucose, dextrose, glucagon (rDNA), dextrose, acetaminophen, sodium chloride flush, promethazine **OR** ondansetron, polyethylene glycol, [DISCONTINUED] acetaminophen **OR** acetaminophen    Review of Systems   Unable to perform ROS: Intubated       OBJECTIVE:  Vitals:    03/29/21 1300   BP: (!) 92/52   Pulse: 90   Resp: 24   Temp:    SpO2: 90%     FiO2 : 100 %  O2 Flow Rate (L/min): 50 L/min  O2 Device: Ventilator    PHYSICAL EXAM:  Constitutional: Chronically ill intubated -American female  Skin: +1 edema in her lower legs no palpable DVT or ischemia  HEENT: Normocephalic neck is supple no JVD no tracheal ovation no crepitus no maritza sinus tenderness pupils are slightly asymmetric as above  Neck: No JVD no trach deviation  Cardiovascular: Rate and rhythm are regular no gallop no murmur  Respiratory: Few crackles bilaterally no rhonchi symmetric  Gastrointestinal: Soft bowel sounds present no peritoneal signs  Genitourinary: Deferred at this time Musa catheter in place  Extremities: No signs of DVT or ischemia  Neurological: Patient is currently sedated not responsive  Psychological: Cannot be assessed    LABS: Chest x-ray personally reviewed from yesterday demonstrates diffuse interstitial edema endotracheally well-placed.   WBC   Date Value Ref Range Status   03/28/2021 6.9 4.5 - 11.5 E9/L Final   03/27/2021 7.3 4.5 - 11.5 E9/L Final   03/26/2021 13.2 (H) 4.5 - 11.5 E9/L Final     Hemoglobin   Date Value Ref Range Status   03/28/2021 7.3 (L) 11.5 - 15.5 g/dL Final   03/27/2021 8.4 (L) 11.5 - 15.5 g/dL Final   03/26/2021 8.0 (L) 11.5 - 15.5 g/dL Final     Hematocrit   Date Value Ref Range Status   03/28/2021 21.9 (L) 34.0 - 48.0 % Final   03/27/2021 25.4 (L) 34.0 - 48.0 % Final   03/26/2021 24.8 (L) 34.0 - 48.0 % Final     MCV   Date Value Ref Range Status   03/28/2021 86.9 80.0 - 99.9 fL Final   03/27/2021 87.0 80.0 - 99.9 fL Final   03/26/2021 87.9 80.0 - 99.9 fL Final     Platelets   Date Value Ref Range Status   03/28/2021 219 130 - 450 E9/L Final   03/27/2021 325 130 - 450 E9/L Final   03/26/2021 344 130 - 450 E9/L Final     Sodium   Date Value Ref Range Status   03/29/2021 135 132 - 146 mmol/L Final   03/28/2021 139 132 - 146 mmol/L Final   03/27/2021 137 132 - 146 mmol/L Final     Potassium   Date Value Ref Range Status   03/29/2021 4.1 3.5 - 5.0 mmol/L Final   03/28/2021 3.8 3.5 - 5.0 mmol/L Final   03/27/2021 4.3 3.5 - 5.0 mmol/L Final     Potassium reflex Magnesium   Date Value Ref Range Status   03/21/2021 4.2 3.5 - 5.0 mmol/L Final   03/09/2021 4.2 3.5 - 5.0 mmol/L Final     Chloride   Date Value Ref Range Status   03/29/2021 98 98 - 107 mmol/L Final   03/28/2021 102 98 - 107 mmol/L Final   03/27/2021 101 98 - 107 mmol/L Final     CO2   Date Value Ref Range Status   03/29/2021 26 22 - 29 mmol/L Final   03/28/2021 26 22 - 29 mmol/L Final   03/27/2021 25 22 - 29 mmol/L Final     BUN   Date Value Ref Range Status   03/29/2021 34 (H) 8 - 23 mg/dL Final   03/28/2021 45 (H) 8 - 23 mg/dL Final   03/27/2021 50 (H) 8 - 23 mg/dL Final     CREATININE   Date Value Ref Range Status   03/29/2021 2.2 (H) 0.5 - 1.0 mg/dL Final   03/28/2021 2.4 (H) 0.5 - 1.0 mg/dL Final   03/27/2021 2.4 (H) 0.5 - 1.0 mg/dL Final     Glucose   Date Value Ref Range Status   03/29/2021 166 (H) 74 - 99 mg/dL Final   03/28/2021 55 (L) 74 - 99 mg/dL Final   03/27/2021 122 (H) 74 - 99 mg/dL Final   03/21/2011 419 (H) 70 - 110 mg/dL Final     Calcium   Date Value Ref Range Status   03/29/2021 7.2 (L) 8.6 - 10.2 mg/dL Final   03/28/2021 7.6 (L) 8.6 - 10.2 mg/dL Final   03/27/2021 7.7 (L) 8.6 - 10.2 mg/dL Final     Total Protein   Date Value Ref Range Status   03/24/2021 4.8 (L) 6.4 - 8.3 g/dL Final   03/21/2021 5.1 (L) 6.4 - 8.3 g/dL Final   03/15/2021 4.9 (L) 6.4 - 8.3 g/dL Final     Albumin   Date Value Ref Range Status   03/24/2021 2.2 (L) 3.5 - 5.2 g/dL Final   03/21/2021 2.5 (L) 3.5 - 5.2 g/dL Final   03/15/2021 2.4 (L) 3.5 - 5.2 g/dL Final   03/21/2011 4.1 3.2 - 4.8 g/dL Final     Total Bilirubin   Date Value Ref Range Status   03/24/2021 0.2 0.0 - 1.2 mg/dL Final   03/21/2021 0.3 0.0 - 1.2 mg/dL Final   03/15/2021 <0.2 0.0 - 1.2 mg/dL Final     Alkaline Phosphatase   Date Value Ref Range Status   03/24/2021 182 (H) 35 - 104 U/L Final   03/21/2021 89 35 - 104 U/L Final   03/15/2021 66 35 - 104 U/L Final     AST   Date Value Ref Range Status   03/24/2021 42 (H) 0 - 31 U/L Final   03/21/2021 50 (H) 0 - 31 U/L Final   03/15/2021 48 (H) 0 - 31 U/L Final     ALT   Date Value Ref Range Status   03/24/2021 32 0 - 32 U/L Final   03/21/2021 71 (H) 0 - 32 U/L Final   03/15/2021 30 0 - 32 U/L Final     GFR Non-   Date Value Ref Range Status   03/29/2021 27 >=60 mL/min/1.73 Final     Comment:     Chronic Kidney Disease: less than 60 ml/min/1.73 sq.m. Kidney Failure: less than 15 ml/min/1.73 sq.m. Results valid for patients 18 years and older. 03/28/2021 25 >=60 mL/min/1.73 Final     Comment:     Chronic Kidney Disease: less than 60 ml/min/1.73 sq.m. Kidney Failure: less than 15 ml/min/1.73 sq.m. Results valid for patients 18 years and older. 03/27/2021 25 >=60 mL/min/1.73 Final     Comment:     Chronic Kidney Disease: less than 60 ml/min/1.73 sq.m. Kidney Failure: less than 15 ml/min/1.73 sq.m. Results valid for patients 18 years and older.        GFR    Date Value Ref Range Status   03/29/2021 27  Final   03/28/2021 25  Final   03/27/2021 25  Final     Magnesium   Date Value Ref Range Status   03/26/2021 2.8 (H) 1.6 - 2.6 mg/dL Final   03/11/2021 2.2 1.6 - 2.6 mg/dL Final     Phosphorus   Date Value Ref Range Status   03/26/2021 6.3 (H) 2.5 - 4.5 mg/dL Final   03/11/2021 7.9 (H) 2.5 - 4.5 mg/dL Final 03/06/2021 4.5 2.5 - 4.5 mg/dL Final     Recent Labs     03/28/21  1059   PH 7.329*   PO2 80.8   PCO2 46.9*   HCO3 24.1   BE -1.8   O2SAT 94.4       RADIOLOGY:  MRV HEAD WO CONTRAST   Final Result   1. Significant limitation to this examination due to motion. 2.  No evidence of intracranial ischemia, gross edema, or hemorrhage. 3.  Limited MRA head due to motion. No evidence of intracranial arterial   occlusion. Subtle stenosis cannot be excluded. Follow-up CTA head or repeat   MRA head may be helpful for further evaluation. 4.  Limited MRV due to motion, however no findings to suggest dural venous   sinus thrombosis. 5.  Acute maxillary and sphenoid sinusitis as well as findings to suggest   bilateral mastoiditis. MRI BRAIN WO CONTRAST   Final Result   1. Significant limitation to this examination due to motion. 2.  No evidence of intracranial ischemia, gross edema, or hemorrhage. 3.  Limited MRA head due to motion. No evidence of intracranial arterial   occlusion. Subtle stenosis cannot be excluded. Follow-up CTA head or repeat   MRA head may be helpful for further evaluation. 4.  Limited MRV due to motion, however no findings to suggest dural venous   sinus thrombosis. 5.  Acute maxillary and sphenoid sinusitis as well as findings to suggest   bilateral mastoiditis. MRA HEAD WO CONTRAST   Final Result   1. Significant limitation to this examination due to motion. 2.  No evidence of intracranial ischemia, gross edema, or hemorrhage. 3.  Limited MRA head due to motion. No evidence of intracranial arterial   occlusion. Subtle stenosis cannot be excluded. Follow-up CTA head or repeat   MRA head may be helpful for further evaluation. 4.  Limited MRV due to motion, however no findings to suggest dural venous   sinus thrombosis. 5.  Acute maxillary and sphenoid sinusitis as well as findings to suggest   bilateral mastoiditis. XR CHEST PORTABLE   Final Result   Decreasing pneumo mediastinum      Remainder unchanged. CT HEAD W WO CONTRAST   Final Result   1. No acute intracranial abnormality. 2. Chronic lacunar infarct in the right caudate head. 3. Prominence of bilateral superior ophthalmic veins, more conspicuous since   the previous exam.  If there is concern for a cavernous carotid fistula,   catheter angiography could be performed for further evaluation. XR CHEST PORTABLE   Final Result   Slightly worsened pneumomediastinum and cervical emphysema. Invasive lines and tubes appear satifactory unchanged. Unchanged diffuse pulmonary consolidation, suspect ARDS/pneumonia. XR CHEST PORTABLE   Final Result   Stable pattern of pneumomediastinum and emphysema in the lower neck. Supportive tubing is in normal position. Stable pattern of extensive bilateral airspace disease, pneumonia or ARDS. XR CHEST PORTABLE   Final Result   Unchanged extensive infiltrates and pneumomediastinum. XR CHEST PORTABLE   Final Result   Right internal jugular line tip in the proximal SVC. No sizable pneumothorax. Stable diffuse and bilateral airspace opacities. Subcutaneous air involving both sides of the neck right greater than left. Probable pneumomediastinum. XR CHEST PORTABLE   Final Result   Right IJ catheter in satisfactory position, no complicating pneumothorax. Diffuse parenchymal edema unchanged         XR CHEST PORTABLE   Final Result   Extensive bilateral lung infiltrates are similar to subtly improved. Continued follow-up recommended. XR CHEST PORTABLE   Final Result   Endotracheal tube terminates approximately 1 cm above the sylvia and should   be retracted approximately 2 cm for more optimal positioning. Extensive bilateral diffuse lung infiltrates are similar to previous and may   represent severe edema, pneumonia or ARDS.          XR CHEST PORTABLE   Final Result   ET tube tip at the entrance of the right mainstem bronchus and should be   retracted 2.0 cm. Diffuse bilateral infiltrates. Enteric tube tip in the distal body of the stomach. XR CHEST PORTABLE   Final Result   Stable diffuse and bilateral infiltrates. ET tube tip is at the entrance of the right mainstem bronchus and should be   retracted at least 2.0 cm. XR CHEST PORTABLE   Final Result   Diffuse and bilateral airspace opacities. Findings consistent with extensive   pulmonary edema versus pneumonia. NM LUNG SCAN PERFUSION ONLY   Final Result   Low probability for pulmonary embolism. Small perfusion defects correspond   to the opacities in the mid lungs bilaterally. US DUP LOWER EXTREMITIES BILATERAL VENOUS   Final Result   No evidence of DVT in either lower extremity. CT Head WO Contrast   Final Result   No skull fracture or acute intracranial abnormality. CT CHEST WO CONTRAST   Final Result   Extensive airspace opacities throughout both lungs which is most prominent   along the upper lobes and perihilar regions and could pulmonary edema and/or   pneumonia vs pulmonary hemorrhage. Recommend short-term follow-up. Prominent central pulmonary vessels suggestive of pulmonary congestion or   pulmonary artery hypertension. Small pericardial effusion with no mediastinal mass or adenopathy. Tiny right pleural effusion. Questionable mild ascites along the upper abdomen         XR CHEST PORTABLE   Final Result   Mild cardiomegaly and mild pulmonary edema which is more prominent. Hazy perihilar and bibasilar opacities which have increased and could be due   to pulmonary edema and/or pneumonia. Recommend follow-up. Resolving left upper lobe opacity.                  PROBLEM LIST:  Principal Problem:    Altered mental state  Active Problems:    Acute respiratory failure with hypoxia ([address]    COVID-19 Resolved Problems:    * No resolved hospital problems. *      IMPRESSION:  1. [id number] pneumonia multifocal  2. ARDS associate with Covid  3. Hypoxic respiratory failure secondary to Covid  4. Acute kidney injury status post 3 cycles of dialysis  5. Mucous plugging   6. History of prior CVA placed recent CT scans  7. Hypotension      PLAN:  1. Patient required fluid challenge for hypotension  2. Considerations for Levophed pending response to the above  3. Patient had an attempt to prone today but deteriorated and had to be returned to the supine position. 4. Check chest x-ray  5. DVT prophylaxis    ATTESTATION:  ICU Staff Physician note of personal involvement in Care  As the attending physician, I certify that I personally reviewed the patients history and personnally examined the patient to confirm the physical findings described above,  And that I reviewed the relevant imaging studies and available reports. I also discussed the differential diagnosis and all of the proposed management plans with the patient and individuals accompanying the patient to this visit. They had the opportunity to ask questions about the proposed management plans and to have those questions answered. This patient has a high probability of sudden, clinically significant deterioration, which requires the highest level of physician preparedness to intervene urgently. I managed/supervised life or organ supporting interventions that required frequent physician assessment. I devoted my full attention to the direct care of this patient for the amount of time indicated below. Time I spent with the family or surrogate(s) is included only if the patient was incapable of providing the necessary information or participating in medical decisions  Time devoted to teaching and to any procedures I billed separately is not included.     CRITICAL CARE TIME:  50min    Electronically signed by Cecily Nageotte, DO on 3/29/2021 at 2:19 PM

## 2021-03-29 NOTE — PROGRESS NOTES
[facility]  [facility]  Progress Note    Chief Complaint   Patient presents with    Concern For COVID-19     positive covid, shortness of breath, EMS states 60% on room air. 94% on 15LPM NRB    Shortness of Breath   f/u covid    SUBJECTIVE:  In icu   On vent sedated  On HD    Recent CT showed chronic lacunar infarcts   Pt to be proned  Review of systems:  Unable to obtain due to mentation     OBJECTIVE:  BP (!) 89/49   Pulse 81   Temp 97 °F (36.1 °C) (Axillary)   Resp 22   Ht 5' (1.524 m)   Wt 149 lb 0.5 oz (67.6 kg)   SpO2 92%   BMI 29.11 kg/m²   Temp  Av.4 °F (35.8 °C)  Min: 94 °F (34.4 °C)  Max: 97.7 °F (36.5 °C)  Constitutional:  The patient is on vent sedated swollen  Skin:    Warm and dry   HEENT:      AT/NC  Chest:   Symmetrical expansion. DEC BS ANT B  ralesvent  Cardiovascular:  S1 and S2 are rhythmic and regular. Abdomen:   Positive bowel sounds to auscultation. Benign to palpation. ngt  Extremities:   No clubbing, no cyanosis,   edema.   CNS    sedated  Lines: pIV  ij hd cath 3/26   brasher    Radiology:  Laboratory and Tests Review:  Lab Results   Component Value Date    WBC 6.9 2021    WBC 7.3 2021    WBC 13.2 (H) 2021    HGB 7.3 (L) 2021    HCT 21.9 (L) 2021    MCV 86.9 2021     2021     No results found for: [facility]  Lab Results   Component Value Date    ALT 32 2021    AST 42 (H) 2021    ALKPHOS 182 (H) 2021    BILITOT 0.2 2021     Lab Results   Component Value Date     2021    K 4.1 2021    K 4.2 2021    CL 98 2021    CO2 26 2021    BUN 34 2021    CREATININE 2.2 2021    CREATININE 2.4 2021    CREATININE 2.4 2021    GFRAA 27 2021    LABGLOM 27 2021    GLUCOSE 166 2021    GLUCOSE 419 2011    PROT 4.8 2021    LABALBU 2.2 2021    LABALBU 4.1 2011    CALCIUM 7.2 2021    BILITOT 0.2 03/24/2021    ALKPHOS 182 03/24/2021    AST 42 03/24/2021    ALT 32 03/24/2021     Lab Results   Component Value Date    CRP 0.9 (H) 03/16/2021    CRP 10.8 (H) 03/11/2021     Lab Results   Component Value Date    SEDRATE 40 (H) 03/16/2021    SEDRATE 44 (H) 03/11/2021     No results for input(s): CRP, PROCAL, FERRITIN, LDH, TROPONINI, DDIMER, FIBRINOGEN, INR, PROTIME, AST, ALT, TRIG in the last 72 hours. Lab Results   Component Value Date    CHOL 168 08/05/2020    TRIG 182 08/05/2020    HDL 45 08/05/2020    LDLCALC 87 08/05/2020    LABVLDL 36 08/05/2020     Lab Results   Component Value Date/Time    VITD25 27 (L) 03/11/2021 06:45 AM       Microbiology:   No results for input(s): COVID19 in the last 72 hours.   Lab Results   Component Value Date    BC 24 Hours no growth 03/25/2021    BC 5 Days no growth 03/08/2021    BLOODCULT2 24 Hours no growth 03/25/2021    BLOODCULT2 5 Days no growth 03/08/2021        ASSESSMENT/PLAN:  LEUKOCYTOSIS FOLLOW CBC dec  respiratory failure intubated  S/P COVID RX s/p toci/convalescent plasma   COVERING HCAP  CKD URINARY RETENTION HAS GIPSON     Chronic lacunar infarcts in right caudate head - prominence of bilateral superior ophthalimc veins - possible cavernous sinus thrombosis?     doxycycline (VIBRAMYCIN) 100 mg in dextrose 5 % 100 mL IVPB, Q12H  vancomycin (VANCOCIN) intermittent dosing (placeholder), RX Placeholder  piperacillin-tazobactam (ZOSYN) 3,375 mg in dextrose 5 % 50 mL IVPB extended infusion (mini-bag), Q12H  dexamethasone (PF) (DECADRON) injection 6 mg, Q24H        Family has decided on [facility]     Follow cultures, monitor labs   resp viral panel     Abe Suazo MD  3/29/2021  3:06 PM

## 2021-03-30 NOTE — PLAN OF CARE
Problem: Airway Clearance - Ineffective  Goal: Achieve or maintain patent airway  Outcome: Met This Shift     Problem: Gas Exchange - Impaired  Goal: Absence of hypoxia  Outcome: Met This Shift  Goal: Promote optimal lung function  Outcome: Met This Shift     Problem: Breathing Pattern - Ineffective  Goal: Ability to achieve and maintain a regular respiratory rate  Outcome: Met This Shift     Problem:  Body Temperature -  Risk of, Imbalanced  Goal: Ability to maintain a body temperature within defined limits  Outcome: Not Met This Shift  Goal: Will regain or maintain usual level of consciousness  Outcome: Completed  Goal: Complications related to the disease process, condition or treatment will be avoided or minimized  Outcome: Not Met This Shift     Problem: Isolation Precautions - Risk of Spread of Infection  Goal: Prevent transmission of infection  Outcome: Met This Shift     Problem: Nutrition Deficits  Goal: Optimize nutritional status  Outcome: Met This Shift     Problem: Risk for Fluid Volume Deficit  Goal: Maintain normal heart rhythm  Outcome: Completed  Goal: Maintain absence of muscle cramping  Outcome: Completed  Goal: Maintain normal serum potassium, sodium, calcium, phosphorus, and pH  Outcome: Completed     Problem: Loneliness or Risk for Loneliness  Goal: Demonstrate positive use of time alone when socialization is not possible  Outcome: Completed     Problem: Fatigue  Goal: Verbalize increase energy and improved vitality  Outcome: Completed     Problem: Falls - Risk of:  Goal: Will remain free from falls  Description: Will remain free from falls  Outcome: Met This Shift  Goal: Absence of physical injury  Description: Absence of physical injury  Outcome: Met This Shift     Problem: Skin Integrity:  Goal: Will show no infection signs and symptoms  Description: Will show no infection signs and symptoms  Outcome: Met This Shift  Goal: Absence of new skin breakdown  Description: Absence of new skin breakdown  Outcome: Met This Shift  Goal: Status of oral mucous membranes will improve  Description: Status of oral mucous membranes will improve  Outcome: Met This Shift  Goal: Skin integrity will be maintained  Description: Skin integrity will be maintained  Outcome: Met This Shift     Problem: Non-Violent Restraints  Goal: Removal from restraints as soon as assessed to be safe  3/29/2021 2038 by Amber Dumont RN  Outcome: Not Met This Shift  3/29/2021 1511 by Yvette Alves RN  Outcome: Not Met This Shift  Goal: No harm/injury to patient while restraints in use  3/29/2021 2038 by Amber Dumont RN  Outcome: Met This Shift  3/29/2021 1511 by Yvette Alves RN  Outcome: Met This Shift  Goal: Patient's dignity will be maintained  3/29/2021 2038 by Amber Dumont RN  Outcome: Met This Shift  3/29/2021 1511 by Yvette Alves RN  Outcome: Met This Shift     Problem: Inadequate energy intake (NI-1.4)  Goal: Food and/or Nutrient Delivery  Description: Individualized approach for food/nutrient provision.   3/29/2021 1107 by Tosha Linton RD, LD  Outcome: Met This Shift     Problem: Fluid Volume:  Goal: Will show no signs or symptoms of fluid imbalance  Description: Will show no signs or symptoms of fluid imbalance  Outcome: Met This Shift  Goal: Ability to achieve and maintain adequate urine output will improve  Description: Ability to achieve and maintain adequate urine output will improve  Outcome: Met This Shift     Problem: Nutritional:  Goal: Ability to identify appropriate dietary choices will improve  Description: Ability to identify appropriate dietary choices will improve  Outcome: Completed     Problem: Physical Regulation:  Goal: Ability to maintain clinical measurements within normal limits will improve  Description: Ability to maintain clinical measurements within normal limits will improve  Outcome: Met This Shift  Goal: Complications related to the disease process, condition or treatment

## 2021-03-30 NOTE — PROGRESS NOTES
[facility] MD Georgia Tavera MD Wilhemena Radar, MD Charm Prudent, MD Justyn Gee, NUNO Nascimento, RAZA  Progress Note  3/30/2021    SUBJECTIVE:  We are following this patient for CKD stage IV on hemodialysis. 3/29 : Transferred to ICU bed 9 currently in prone position because of COVID-19 mechanical ventilation  Today with [facility] ICU attending Dr. Kristie Marcos assess to perform acute dialysis treatment patient be scheduled for MR venogram with no contrast.  Patient was evaluated and case discussed with the nurse currently will be needing alternating supine and prone positions every 12 hours she remains on mechanical ventilation with sedation. Last dialysis was done yesterday    3/30 : seen in ICU this am .   Mechanically ventialted .  Fio2 90   On small dose levo   Poor UO   No reported edema   D/w ICU staff         PROBLEM LIST:    Patient Active Problem List   Diagnosis    Diabetes mellitus type 2, insulin dependent ([facility] [address]    Hypothyroid    Hypertension    Hyperlipidemia    Hypertensive urgency    Acute respiratory failure with hypoxia (HCC)    COVID-19    Altered mental state        PAST MEDICAL HISTORY:    Past Medical History:   Diagnosis Date    Acid reflux     COVID-19     Hemodialysis patient ([facility] [address]     Hyperlipidemia     Hypertension     Hypothyroidism     S/P appendectomy     Type II or unspecified type diabetes mellitus without mention of complication, not stated as uncontrolled        MEDS (scheduled):   midodrine  10 mg Per NG tube TID    polyvinyl alcohol  1 drop Both Eyes Q4H    acetylcysteine  400 mg Inhalation TID    atropine  0.4 mg Intravenous Once    Gabapentin  300 mg Oral Nightly    insulin glargine  9 Units Subcutaneous QAM    famotidine  10 mg Oral Daily    [Held by provider] carvedilol  3.125 mg Oral BID WC    insulin lispro  0-6 Units Subcutaneous Q6H    sodium bicarbonate  1,300 mg Per NG tube TID   

## 2021-03-30 NOTE — CONSULTS
Palliative Care Department  [phone]  Palliative Care Initial Consult  Provider Mahmood Ambershire  [phone]  Hospital Day: 10  Date of Initial Consult: 3/30/2021  Referring Provider: Fito Mcqueen DO  Palliative Medicine was consulted for assistance with: Ediblerto Pettit, family support    HPI:   Kierra Senior is a 57 y.o. with a medical history of CKD, COVID infection, type 2 DM, hypothyroid, HTN, HLD, GERD who was admitted on 3/21/2021 from home with a CHIEF COMPLAINT of worsening SOB and hypoxia, leg swelling. ASSESSMENT/PLAN:     Pertinent Hospital Diagnoses      COVID-19 pneumonia with ARDS and possible superimposed HCAP   GAVIN on CKD   Acute hypoxic respiratory failure   Pneumomediastinum   Anemia of CKD      Palliative Care Encounter / Counseling Regarding Goals of Care  Please see detailed goals of care discussion as below   At this time, Kierra Senior, Does Not have capacity for medical decision-making. Capacity is time limited and situation/question specific   During encounter flower Wing was surrogate medical decision-maker   Outcome of goals of care meeting: l;eft message for daughter  Star Code status Limited no to all but meds  o Over the past 40 years, despite advances in the protocol for Cardiopulmonary Resuscitation (CPR) outcomes for survival have not statistically changed. o The consensus is that 17% of all adult arrest patients survive to hospital discharge. Many factors lower a patients chance of survival, including advanced age, performance status, malignancy, and presence of multiple comorbidities. Joseph Yang and colleagues examined medical data from [id number],[phone] Medicare patients 72 and older who underwent in-hospital CPR. 5 Both older age and [facility] were found to be associated with poorer survival rates.   o Patients 85 and older having only a 6% chance of surviving to hospital discharge.  o In general Cancer patients generally have an overall survival rate of only 6.2%. o Cancer patients whose hospital course followed a path of gradual deterioration showed a 0% survival rate.  o The presence of hepatic insufficiency, acute stroke, immunodeficiency, renal failure, or dialysis were associated with lower survival rates.  Advanced Directives: no POA or living will in [facility]   Surrogate/Legal NOK:  o Daughter Susie Curtis [phone]    Spiritual assessment: no spiritual distress identified  Bereavement and grief: to be determined  Referrals to: none today   SUBJECTIVE:     Current medical issues leading to Palliative Medicine involvement include   Active Hospital Problems    Diagnosis Date Noted    Altered mental state [R41.82] 03/29/2021    Acute respiratory failure with hypoxia ([facility] [address] [J96.01] 03/21/2021    COVID-19 [U07.1] 03/21/2021        Details of Conversation:  Spoke with intensivist, patient remains intubated, overall poor prognosis with multiple problems. Called patient's daughter Shana Muñoz, left message. Code status limited no to all but meds. Patient has been proned this admission. 1500: spoke with daughter Shana Muñoz. She states she gets updates daily from nursing staff, but doesn't know overall prognosis. Is unable to make decisions regarding [address] without more information about patient's medical status and likelihood of recovery. Daughter states she doesn't think patient would want tracheostomy and long-term nursing facility, but would need to talk with patient's son as well before making a final decision. Spoke with ICU attending who plans to call daughter and discuss patient's current status and prognosis to assist daughter with making decisions.     Physical Function:  PPS: 10    OBJECTIVE:   Prognosis: Poor    Physical Exam:  /61   Pulse 85   Temp 97.8 °F (36.6 °C) (Axillary)   Resp 20   Ht 5' (1.524 m)   Wt 153 lb (69.4 kg)   SpO2 93%   BMI 29.88 kg/m²   Patient observed from glass door  Constitutional: NAD  Eyes: eyes closed  ENMT:  Normocephalic, atraumatic, ETT in place  Lungs:  Mechanical respirations  Heart[de-identified]  RRR on monitor  :  brasher  Ext:  Not seen moving extremities  Skin:  no rashes on visible skin  Psych: unable to assess  Neuro:  Intubated/sedated    Objective data reviewed: labs, images, records, medication use, vitals and chart    Discussed patient and the plan of care with the other IDT members: Palliative Medicine IDT Team, Floor Nurse and Family    Time/Communication  Greater than 50% of time spent, total 30 minutes in counseling and coordination of care at the bedside regarding goals of care, diagnosis and prognosis and see above. Thank you for allowing Palliative Medicine to participate in the care of [place] .

## 2021-03-30 NOTE — PLAN OF CARE
Problem: Airway Clearance - Ineffective  Goal: Achieve or maintain patent airway  3/30/2021 0114 by Italia Deng RN  Outcome: Met This Shift  3/29/2021 2038 by Marivel Crandall RN  Outcome: Met This Shift     Problem: Gas Exchange - Impaired  Goal: Absence of hypoxia  3/29/2021 2038 by Marivel Crandall RN  Outcome: Met This Shift  Goal: Promote optimal lung function  3/30/2021 0114 by Italia Deng RN  Outcome: Met This Shift  3/29/2021 2038 by Marivel Crandall RN  Outcome: Met This Shift     Problem: Breathing Pattern - Ineffective  Goal: Ability to achieve and maintain a regular respiratory rate  3/29/2021 2038 by Marivel Crandall RN  Outcome: Met This Shift     Problem: Isolation Precautions - Risk of Spread of Infection  Goal: Prevent transmission of infection  3/30/2021 0114 by Italia Deng RN  Outcome: Met This Shift  3/29/2021 2038 by Marivel Crandall RN  Outcome: Met This Shift     Problem: Nutrition Deficits  Goal: Optimize nutritional status  3/29/2021 2038 by Marivel Crandall RN  Outcome: Met This Shift     Problem: Falls - Risk of:  Goal: Will remain free from falls  Description: Will remain free from falls  3/29/2021 2038 by Marivel Crandall RN  Outcome: Met This Shift  Goal: Absence of physical injury  Description: Absence of physical injury  3/29/2021 2038 by Marivel Crandall RN  Outcome: Met This Shift     Problem: Skin Integrity:  Goal: Will show no infection signs and symptoms  Description: Will show no infection signs and symptoms  3/29/2021 2038 by Marivel Crandall RN  Outcome: Met This Shift  Goal: Absence of new skin breakdown  Description: Absence of new skin breakdown  3/29/2021 2038 by Marivel Crandall RN  Outcome: Met This Shift  Goal: Status of oral mucous membranes will improve  Description: Status of oral mucous membranes will improve  3/29/2021 2038 by Marivel Crandall RN  Outcome: Met This Shift  Goal: Skin integrity will be maintained  Description: Skin integrity will be maintained  3/29/2021 2038 by Rosi Argueta RN  Outcome: Met This Shift     Problem: Non-Violent Restraints  Goal: No harm/injury to patient while restraints in use  3/30/2021 0114 by Johan Fermin RN  Outcome: Met This Shift  3/29/2021 2038 by Rosi Argueta RN  Outcome: Met This Shift  3/29/2021 1511 by Edwin Arndt RN  Outcome: Met This Shift  Goal: Patient's dignity will be maintained  3/30/2021 0114 by Johan Fermin RN  Outcome: Met This Shift  3/29/2021 2038 by Rosi Argueta RN  Outcome: Met This Shift  3/29/2021 1511 by Edwin Arndt RN  Outcome: Met This Shift     Problem: Fluid Volume:  Goal: Will show no signs or symptoms of fluid imbalance  Description: Will show no signs or symptoms of fluid imbalance  3/29/2021 2038 by Rosi Argueta RN  Outcome: Met This Shift  Goal: Ability to achieve and maintain adequate urine output will improve  Description: Ability to achieve and maintain adequate urine output will improve  3/29/2021 2038 by Rosi Argueta RN  Outcome: Met This Shift     Problem: Physical Regulation:  Goal: Ability to maintain clinical measurements within normal limits will improve  Description: Ability to maintain clinical measurements within normal limits will improve  3/29/2021 2038 by Rosi Argueta RN  Outcome: Met This Shift  Goal: Complications related to the disease process, condition or treatment will be avoided or minimized  Description: Complications related to the disease process, condition or treatment will be avoided or minimized  3/29/2021 2038 by Rosi Argueta RN  Outcome: Met This Shift     Problem: Tissue Perfusion - Renal, Altered:  Goal: Electrolytes within specified parameters  Description: Electrolytes within specified parameters  3/29/2021 2038 by Rosi Argueta RN  Outcome: Met This Shift  Goal: Ability to achieve a balanced intake and output will improve  Description: Ability to achieve a balanced intake and

## 2021-03-30 NOTE — PROGRESS NOTES
mL/min; started dialysis on 3/26    Plan:  · HD today x 3 hours.  Vancomycin 500 mg IV x 1 after HD  · Levels PRN  · Follow HD orders  · Pharmacist will follow and monitor/adjust dosing as necessary      Thank you for the consult,    Annmarie Blizzard, PharmD, [facility] 3/30/2021 2:39 PM   Ext: [phone]

## 2021-03-30 NOTE — PROGRESS NOTES
Patient's brasher irrigated with 50cc NSS. 50cc brown urine returned with black flecks. Patient tolerated well. Will continue to monitor.

## 2021-03-30 NOTE — CARE COORDINATION
3/30/21 Droplet plus-  3/9 positive covid- prior to this admission. Cm transition of care: icu/vent/sedation/new hd. Palliative care on consult, new hemodialysis- with temporary line at this time. Code status is limited meds only. May need ltach at discharge. [facility] following to resume orders if needed. CM/SS following.  Electronically signed by SIERRA Guzman on 3/30/2021 at 1:20 PM

## 2021-03-30 NOTE — PROGRESS NOTES
Changed pts vent setting per order. Increased rate to 28 and decreased peep to 12.      Electronically signed by Jordan Rodríguez RCP on 3/30/2021 at 5:22 AM

## 2021-03-30 NOTE — PROGRESS NOTES
[facility]  [facility]  Progress Note    Chief Complaint   Patient presents with    Concern For COVID-19     positive covid, shortness of breath, EMS states 60% on room air. 94% on 15LPM NRB    Shortness of Breath   f/u covid    SUBJECTIVE:  In icu   On vent sedated  On HD  rij   Recent CT showed chronic lacunar infarcts   Pt to be proned hypotensiveyesterday  Hypothermia   Review of systems:  Unable to obtain due to mentation     OBJECTIVE:  /61   Pulse 85   Temp 97.8 °F (36.6 °C) (Axillary)   Resp 20   Ht 5' (1.524 m)   Wt 153 lb (69.4 kg)   SpO2 93%   BMI 29.88 kg/m²   Temp  Av.1 °F (28.4 °C)  Min: 33.8 °F (1 °C)  Max: 97.8 °F (36.6 °C)  Constitutional:  The patient is on vent sedated swollen  Skin:    Warm and dry   HEENT:      AT/NC intubated  Chest:     DEC BS ANT B   Cardiovascular:  S1 and S2 are rhythmic and regular. Abdomen:   Positive bowel sounds to auscultation. Benign to palpation. ngt  Extremities:      edema.    CNS    sedated  Lines: pIV  ij hd cath 3/26   brasher    Radiology:  Laboratory and Tests Review:  Lab Results   Component Value Date    WBC 2.2 (L) 2021    WBC 6.9 2021    WBC 7.3 2021    HGB 8.0 (L) 2021    HCT 25.4 (L) 2021    MCV 88.8 2021     2021     No results found for: [facility]  Lab Results   Component Value Date    ALT 32 2021    AST 42 (H) 2021    ALKPHOS 182 (H) 2021    BILITOT 0.2 2021     Lab Results   Component Value Date     2021    K 4.3 2021    K 4.2 2021    CL 96 2021    CO2 27 2021    BUN 47 2021    CREATININE 2.8 2021    CREATININE 2.2 2021    CREATININE 2.4 2021    GFRAA 21 2021    LABGLOM 21 2021    GLUCOSE 163 2021    GLUCOSE 419 2011    PROT 4.8 2021    LABALBU 2.2 2021    LABALBU 4.1 2011    CALCIUM 7.8 2021    BILITOT 0.2 2021 ALKPHOS 182 03/24/2021    AST 42 03/24/2021    ALT 32 03/24/2021     Lab Results   Component Value Date    CRP 0.9 (H) 03/16/2021    CRP 10.8 (H) 03/11/2021     Lab Results   Component Value Date    SEDRATE 40 (H) 03/16/2021    SEDRATE 44 (H) 03/11/2021     No results for input(s): CRP, PROCAL, FERRITIN, LDH, TROPONINI, DDIMER, FIBRINOGEN, INR, PROTIME, AST, ALT, TRIG in the last 72 hours. Lab Results   Component Value Date    CHOL 168 08/05/2020    TRIG 182 08/05/2020    HDL 45 08/05/2020    LDLCALC 87 08/05/2020    LABVLDL 36 08/05/2020     Lab Results   Component Value Date/Time    VITD25 27 (L) 03/11/2021 06:45 AM       Microbiology:   Recent Labs     03/29/21  1630   COVID19 DETECTED*     Lab Results   Component Value Date    BC 24 Hours no growth 03/25/2021    BC 5 Days no growth 03/08/2021    BLOODCULT2 24 Hours no growth 03/25/2021    BLOODCULT2 5 Days no growth 03/08/2021        ASSESSMENT/PLAN:     LEUKOCYTOSIS FOLLOW CBC decpoa now leukopenic    respirator failure   S/P COVID RX s/p toci/convalescent plasma +covid 3/29  COVERING HCAP  CKD URINARY RETENTION HAS GIPSON     Chronic lacunar infarcts in right caudate head - prominence of bilateral superior ophthalimc veins - possible cavernous sinus thrombosis?        dexamethasone (DECADRON) injection 4 mg, Q8H     doxycycline (VIBRAMYCIN) 100 mg in dextrose 5 % 100 mL IVPB, Q12H     vancomycin (VANCOCIN) intermittent dosing (placeholder), RX Placeholder  piperacillin-tazobactam (ZOSYN) 3,375 mg in dextrose 5 % 50 mL IVPB extended infusion (mini-bag), Q12H     Plan at least 8 day sof atbx  If to cont care would   Repeat blood cx  And add antifungal due to hypotension/leukopenia /hypothermia    Palliative to see  Loly Pringle MD  3/30/2021  1:29 PM

## 2021-03-30 NOTE — PROGRESS NOTES
Department of Internal Medicine  General Internal Medicine  Attending Progress Note  Chief Complaint   Patient presents with    Concern For COVID-19     positive covid, shortness of breath, EMS states 60% on room air. 94% on 15LPM NRB    Shortness of Breath     SUBJECTIVE:    Patient is currently intubated. 3/29: did not tolerate pronging. Mucous plug. Bagged. intensivist in room. D/w him.     OBJECTIVE      Medications    Current Facility-Administered Medications: midodrine (PROAMATINE) tablet 10 mg, 10 mg, Per NG tube, TID  polyvinyl alcohol (LIQUIFILM TEARS) 1.4 % ophthalmic solution 1 drop, 1 drop, Both Eyes, Q4H  dexamethasone (DECADRON) injection 4 mg, 4 mg, Intravenous, Q8H  acetylcysteine (MUCOMYST) 20 % solution 400 mg, 400 mg, Inhalation, TID  atropine injection 0.4 mg, 0.4 mg, Intravenous, Once  norepinephrine (LEVOPHED) 16 mg in dextrose 5 % 250 mL infusion, 2-100 mcg/min, Intravenous, Continuous  Gabapentin SOLN 300 mg, 300 mg, Oral, Nightly  cisatracurium besylate (NIMBEX) 200 mg in sodium chloride 0.9 % 100 mL infusion, 0.5-10 mcg/kg/min, Intravenous, Continuous  insulin glargine (LANTUS) injection vial 9 Units, 9 Units, Subcutaneous, QAM  famotidine (PEPCID) tablet 10 mg, 10 mg, Oral, Daily  [Held by provider] carvedilol (COREG) tablet 3.125 mg, 3.125 mg, Oral, BID WC  insulin lispro (HUMALOG) injection vial 0-6 Units, 0-6 Units, Subcutaneous, Q6H  sodium bicarbonate tablet 1,300 mg, 1,300 mg, Per NG tube, TID  doxycycline (VIBRAMYCIN) 100 mg in dextrose 5 % 100 mL IVPB, 100 mg, Intravenous, Q12H  heparin (porcine) injection 5,000 Units, 5,000 Units, Subcutaneous, 3 times per day  vancomycin (VANCOCIN) intermittent dosing (placeholder), , Other, RX Placeholder  [Held by provider] hydrOXYzine (VISTARIL) capsule 25 mg, 25 mg, Oral, 4x Daily PRN  [Held by provider] hydrALAZINE (APRESOLINE) injection 10 mg, 10 mg, Intravenous, Q4H PRN  fentaNYL 5 mcg/ml in 0.9%  ml infusion, 12.5-200 mcg/hr, Intravenous, Continuous  propofol injection, 5-50 mcg/kg/min, Intravenous, Titrated  [Held by provider] amLODIPine (NORVASC) tablet 5 mg, 5 mg, Oral, BID  piperacillin-tazobactam (ZOSYN) 3,375 mg in dextrose 5 % 50 mL IVPB extended infusion (mini-bag), 3,375 mg, Intravenous, Q12H  0.9 % sodium chloride infusion, , Intravenous, Q12H  [Held by provider] pentoxifylline (TRENTAL) extended release tablet 400 mg, 400 mg, Oral, BID  ipratropium-albuterol (DUONEB) nebulizer solution 1 ampule, 1 ampule, Inhalation, 4x daily  nystatin (MYCOSTATIN) 761522 UNIT/ML suspension 500,000 Units, 5 mL, Oral, 4x Daily  glucose (GLUTOSE) 40 % oral gel 15 g, 15 g, Oral, PRN  dextrose 50 % IV solution, 12.5 g, Intravenous, PRN  glucagon (rDNA) injection 1 mg, 1 mg, Intramuscular, PRN  dextrose 5 % solution, 100 mL/hr, Intravenous, PRN  acetaminophen (TYLENOL) tablet 500 mg, 500 mg, Oral, 4x Daily PRN  aspirin chewable tablet 81 mg, 81 mg, Oral, Daily  atorvastatin (LIPITOR) tablet 80 mg, 80 mg, Oral, Daily  clopidogrel (PLAVIX) tablet 75 mg, 75 mg, Oral, Daily  levothyroxine (SYNTHROID) tablet 88 mcg, 88 mcg, Oral, Daily  sodium chloride flush 0.9 % injection 10 mL, 10 mL, Intravenous, 2 times per day  sodium chloride flush 0.9 % injection 10 mL, 10 mL, Intravenous, PRN  promethazine (PHENERGAN) tablet 12.5 mg, 12.5 mg, Oral, Q6H PRN **OR** ondansetron (ZOFRAN) injection 4 mg, 4 mg, Intravenous, Q6H PRN  polyethylene glycol (GLYCOLAX) packet 17 g, 17 g, Oral, Daily PRN  [DISCONTINUED] acetaminophen (TYLENOL) tablet 650 mg, 650 mg, Oral, Q6H PRN **OR** acetaminophen (TYLENOL) suppository 650 mg, 650 mg, Rectal, Q6H PRN  Physical    VITALS:  /61   Pulse 85   Temp 97.8 °F (36.6 °C) (Axillary)   Resp 20   Ht 5' (1.524 m)   Wt 153 lb (69.4 kg)   SpO2 93%   BMI 29.88 kg/m²   Patient is sedated and ventilated  Normocephalic, without obvious abnormality, atraumatic, external ears without lesions,   Neck  cervical lymphadenopathy with Recent spike 230's    6. Hypertension Essential: occasional sBP 80's: supportive care    7. Anemia due to CKD: monitor    8. Hypothyroidism: continue synthroid. 9.  Stroke CT 3/28 showed chronic lacunar infarct right caudate head.   This was with contrast.  CT noncontrast on 3/21 showed no skull fracture  10 dnrcca  11/ dispo: palliatve care will help

## 2021-03-30 NOTE — PROGRESS NOTES
Pulmonary/Critical Care Progress Note    We are following patient for  COVID-19 positive test (U07.1, COVID-19) with Acute Pneumonia (J12.89, Other viral pneumonia)  (If respiratory failure or sepsis present, add as separate assessment)        SUBJECTIVE:  57-year-old woman with recent Covid pneumonia multi lobar. Patient was doing well and enough to be discharged home but represented with ARDS. Patient has a past medical history of type 2 diabetes, hypothyroidism, hyperlipidemia, hypertension, obesity she is now a DNR CCA. Patient's had a difficult ICU course with that dependence and severe hypoxemia coming close to being paralyzed to treat her hypoxemia. She has increasing secretions and mucous plugging. Acute kidney injury requiring dialysis. Recent evaluation as well by CT should scan showed chronic lacunar infarcts and cavernous sinus thrombosis possibly. Patient scheduled for dialysis again today. Hypotension sepsis despite antibiotics and  Decadron.     MEDICATIONS:   midodrine  10 mg Per NG tube TID    polyvinyl alcohol  1 drop Both Eyes Q4H    acetylcysteine  400 mg Inhalation TID    atropine  0.4 mg Intravenous Once    Gabapentin  300 mg Oral Nightly    insulin glargine  9 Units Subcutaneous QAM    famotidine  10 mg Oral Daily    [Held by provider] carvedilol  3.125 mg Oral BID WC    insulin lispro  0-6 Units Subcutaneous Q6H    sodium bicarbonate  1,300 mg Per NG tube TID    doxycycline (VIBRAMYCIN) IV  100 mg Intravenous Q12H    heparin (porcine)  5,000 Units Subcutaneous 3 times per day    vancomycin (VANCOCIN) intermittent dosing (placeholder)   Other RX Placeholder    [Held by provider] amLODIPine  5 mg Oral BID    piperacillin-tazobactam  3,375 mg Intravenous Q12H    dexamethasone  6 mg Intravenous Q24H    [Held by provider] pentoxifylline  400 mg Oral BID    ipratropium-albuterol  1 ampule Inhalation 4x daily    nystatin  5 mL Oral 4x Daily    aspirin  81 mg Oral Daily    atorvastatin  80 mg Oral Daily    clopidogrel  75 mg Oral Daily    levothyroxine  88 mcg Oral Daily    sodium chloride flush  10 mL Intravenous 2 times per day      norepinephrine 5 mcg/min (03/30/21 1133)    cisatracurium (NIMBEX) infusion Stopped (03/29/21 2249)    fentaNYL 5 mcg/ml in 0.9%  ml infusion 125 mcg/hr (03/29/21 2130)    propofol 30 mcg/kg/min (03/30/21 0940)    sodium chloride 12.5 mL/hr at 03/30/21 1030    dextrose Stopped (03/24/21 0626)     [Held by provider] hydrOXYzine, [Held by provider] hydrALAZINE, glucose, dextrose, glucagon (rDNA), dextrose, acetaminophen, sodium chloride flush, promethazine **OR** ondansetron, polyethylene glycol, [DISCONTINUED] acetaminophen **OR** acetaminophen    Review of Systems   Unable to perform ROS: Intubated       OBJECTIVE:  Vitals:    03/30/21 1200   BP: 109/61   Pulse: 85   Resp: 20   Temp: 97.8 °F (36.6 °C)   SpO2: 93%     FiO2 : 100 %  O2 Flow Rate (L/min): 50 L/min  O2 Device: Ventilator    PHYSICAL EXAM:  Constitutional: Ill by appearance generalized edema   Skin: No cyanosis no skin breakdown however she is weeping secondary to edema   HEENT: Normocephalic neck is supple patient has a dialysis line in the right IJ intubated tubes and lines in place  Neck: No crepitus no trach deviation  Cardiovascular: Rate and rhythm regular no gallop no murmur  Respiratory: Crackles bilaterally decreased breath sounds symmetric no crepitus  Gastrointestinal: Soft bowel sounds present no peritoneal signs  Genitourinary:  Musa catheter in place with only small amounts of urine  Extremities: Edema generalized no palp DVT peripheral pulses intact  Neurological: Patient is sedated at this time remains the ventilator  Psychological: Cannot be assessed    Chest x-ray yesterday reviewed tubes and lines in place diffuse interstitial edema throughout no pneumothorax    LABS:  WBC   Date Value Ref Range Status   03/30/2021 2.2 (L) 4.5 - 11.5 E9/L Final   03/28/2021 6.9 4.5 - 11.5 E9/L Final   03/27/2021 7.3 4.5 - 11.5 E9/L Final     Hemoglobin   Date Value Ref Range Status   03/30/2021 8.0 (L) 11.5 - 15.5 g/dL Final   03/28/2021 7.3 (L) 11.5 - 15.5 g/dL Final   03/27/2021 8.4 (L) 11.5 - 15.5 g/dL Final     Hematocrit   Date Value Ref Range Status   03/30/2021 25.4 (L) 34.0 - 48.0 % Final   03/28/2021 21.9 (L) 34.0 - 48.0 % Final   03/27/2021 25.4 (L) 34.0 - 48.0 % Final     MCV   Date Value Ref Range Status   03/30/2021 88.8 80.0 - 99.9 fL Final   03/28/2021 86.9 80.0 - 99.9 fL Final   03/27/2021 87.0 80.0 - 99.9 fL Final     Platelets   Date Value Ref Range Status   03/30/2021 147 130 - 450 E9/L Final   03/28/2021 219 130 - 450 E9/L Final   03/27/2021 325 130 - 450 E9/L Final     Sodium   Date Value Ref Range Status   03/30/2021 133 132 - 146 mmol/L Final   03/29/2021 135 132 - 146 mmol/L Final   03/28/2021 139 132 - 146 mmol/L Final     Potassium   Date Value Ref Range Status   03/30/2021 4.3 3.5 - 5.0 mmol/L Final   03/29/2021 4.1 3.5 - 5.0 mmol/L Final   03/28/2021 3.8 3.5 - 5.0 mmol/L Final     Potassium reflex Magnesium   Date Value Ref Range Status   03/21/2021 4.2 3.5 - 5.0 mmol/L Final   03/09/2021 4.2 3.5 - 5.0 mmol/L Final     Chloride   Date Value Ref Range Status   03/30/2021 96 (L) 98 - 107 mmol/L Final   03/29/2021 98 98 - 107 mmol/L Final   03/28/2021 102 98 - 107 mmol/L Final     CO2   Date Value Ref Range Status   03/30/2021 27 22 - 29 mmol/L Final   03/29/2021 26 22 - 29 mmol/L Final   03/28/2021 26 22 - 29 mmol/L Final     BUN   Date Value Ref Range Status   03/30/2021 47 (H) 8 - 23 mg/dL Final   03/29/2021 34 (H) 8 - 23 mg/dL Final   03/28/2021 45 (H) 8 - 23 mg/dL Final     CREATININE   Date Value Ref Range Status   03/30/2021 2.8 (H) 0.5 - 1.0 mg/dL Final   03/29/2021 2.2 (H) 0.5 - 1.0 mg/dL Final   03/28/2021 2.4 (H) 0.5 - 1.0 mg/dL Final     Glucose   Date Value Ref Range Status   03/30/2021 163 (H) 74 - 99 mg/dL Final   03/29/2021 166 (H) 74 - 99 mg/dL Final   03/28/2021 55 (L) 74 - 99 mg/dL Final   03/21/2011 419 (H) 70 - 110 mg/dL Final     Calcium   Date Value Ref Range Status   03/30/2021 7.8 (L) 8.6 - 10.2 mg/dL Final   03/29/2021 7.2 (L) 8.6 - 10.2 mg/dL Final   03/28/2021 7.6 (L) 8.6 - 10.2 mg/dL Final     Total Protein   Date Value Ref Range Status   03/24/2021 4.8 (L) 6.4 - 8.3 g/dL Final   03/21/2021 5.1 (L) 6.4 - 8.3 g/dL Final   03/15/2021 4.9 (L) 6.4 - 8.3 g/dL Final     Albumin   Date Value Ref Range Status   03/24/2021 2.2 (L) 3.5 - 5.2 g/dL Final   03/21/2021 2.5 (L) 3.5 - 5.2 g/dL Final   03/15/2021 2.4 (L) 3.5 - 5.2 g/dL Final   03/21/2011 4.1 3.2 - 4.8 g/dL Final     Total Bilirubin   Date Value Ref Range Status   03/24/2021 0.2 0.0 - 1.2 mg/dL Final   03/21/2021 0.3 0.0 - 1.2 mg/dL Final   03/15/2021 <0.2 0.0 - 1.2 mg/dL Final     Alkaline Phosphatase   Date Value Ref Range Status   03/24/2021 182 (H) 35 - 104 U/L Final   03/21/2021 89 35 - 104 U/L Final   03/15/2021 66 35 - 104 U/L Final     AST   Date Value Ref Range Status   03/24/2021 42 (H) 0 - 31 U/L Final   03/21/2021 50 (H) 0 - 31 U/L Final   03/15/2021 48 (H) 0 - 31 U/L Final     ALT   Date Value Ref Range Status   03/24/2021 32 0 - 32 U/L Final   03/21/2021 71 (H) 0 - 32 U/L Final   03/15/2021 30 0 - 32 U/L Final     GFR Non-   Date Value Ref Range Status   03/30/2021 21 >=60 mL/min/1.73 Final     Comment:     Chronic Kidney Disease: less than 60 ml/min/1.73 sq.m. Kidney Failure: less than 15 ml/min/1.73 sq.m. Results valid for patients 18 years and older. 03/29/2021 27 >=60 mL/min/1.73 Final     Comment:     Chronic Kidney Disease: less than 60 ml/min/1.73 sq.m. Kidney Failure: less than 15 ml/min/1.73 sq.m. Results valid for patients 18 years and older. 03/28/2021 25 >=60 mL/min/1.73 Final     Comment:     Chronic Kidney Disease: less than 60 ml/min/1.73 sq.m. Kidney Failure: less than 15 ml/min/1.73 sq.m.   Results valid for bilateral mastoiditis. MRA HEAD WO CONTRAST   Final Result   1. Significant limitation to this examination due to motion. 2.  No evidence of intracranial ischemia, gross edema, or hemorrhage. 3.  Limited MRA head due to motion. No evidence of intracranial arterial   occlusion. Subtle stenosis cannot be excluded. Follow-up CTA head or repeat   MRA head may be helpful for further evaluation. 4.  Limited MRV due to motion, however no findings to suggest dural venous   sinus thrombosis. 5.  Acute maxillary and sphenoid sinusitis as well as findings to suggest   bilateral mastoiditis. XR CHEST PORTABLE   Final Result   Decreasing pneumo mediastinum      Remainder unchanged. CT HEAD W WO CONTRAST   Final Result   1. No acute intracranial abnormality. 2. Chronic lacunar infarct in the right caudate head. 3. Prominence of bilateral superior ophthalmic veins, more conspicuous since   the previous exam.  If there is concern for a cavernous carotid fistula,   catheter angiography could be performed for further evaluation. XR CHEST PORTABLE   Final Result   Slightly worsened pneumomediastinum and cervical emphysema. Invasive lines and tubes appear satifactory unchanged. Unchanged diffuse pulmonary consolidation, suspect ARDS/pneumonia. XR CHEST PORTABLE   Final Result   Stable pattern of pneumomediastinum and emphysema in the lower neck. Supportive tubing is in normal position. Stable pattern of extensive bilateral airspace disease, pneumonia or ARDS. XR CHEST PORTABLE   Final Result   Unchanged extensive infiltrates and pneumomediastinum. XR CHEST PORTABLE   Final Result   Right internal jugular line tip in the proximal SVC. No sizable pneumothorax. Stable diffuse and bilateral airspace opacities. Subcutaneous air involving both sides of the neck right greater than left. Probable pneumomediastinum.          XR CHEST PORTABLE   Final Result   Right IJ catheter in satisfactory position, no complicating pneumothorax. Diffuse parenchymal edema unchanged         XR CHEST PORTABLE   Final Result   Extensive bilateral lung infiltrates are similar to subtly improved. Continued follow-up recommended. XR CHEST PORTABLE   Final Result   Endotracheal tube terminates approximately 1 cm above the sylvia and should   be retracted approximately 2 cm for more optimal positioning. Extensive bilateral diffuse lung infiltrates are similar to previous and may   represent severe edema, pneumonia or ARDS. XR CHEST PORTABLE   Final Result   ET tube tip at the entrance of the right mainstem bronchus and should be   retracted 2.0 cm. Diffuse bilateral infiltrates. Enteric tube tip in the distal body of the stomach. XR CHEST PORTABLE   Final Result   Stable diffuse and bilateral infiltrates. ET tube tip is at the entrance of the right mainstem bronchus and should be   retracted at least 2.0 cm. XR CHEST PORTABLE   Final Result   Diffuse and bilateral airspace opacities. Findings consistent with extensive   pulmonary edema versus pneumonia. NM LUNG SCAN PERFUSION ONLY   Final Result   Low probability for pulmonary embolism. Small perfusion defects correspond   to the opacities in the mid lungs bilaterally. US DUP LOWER EXTREMITIES BILATERAL VENOUS   Final Result   No evidence of DVT in either lower extremity. CT Head WO Contrast   Final Result   No skull fracture or acute intracranial abnormality. CT CHEST WO CONTRAST   Final Result   Extensive airspace opacities throughout both lungs which is most prominent   along the upper lobes and perihilar regions and could pulmonary edema and/or   pneumonia vs pulmonary hemorrhage. Recommend short-term follow-up.       Prominent central pulmonary vessels suggestive of pulmonary congestion or   pulmonary artery hypertension. Small pericardial effusion with no mediastinal mass or adenopathy. Tiny right pleural effusion. Questionable mild ascites along the upper abdomen         XR CHEST PORTABLE   Final Result   Mild cardiomegaly and mild pulmonary edema which is more prominent. Hazy perihilar and bibasilar opacities which have increased and could be due   to pulmonary edema and/or pneumonia. Recommend follow-up. Resolving left upper lobe opacity. PROBLEM LIST:  Principal Problem:    Altered mental state  Active Problems:    Acute respiratory failure with hypoxia ([address]    COVID-19  Resolved Problems:    * No resolved hospital problems. *      IMPRESSION:  1. [id number] multifocal pneumonia  2. Hypoxic respiratory failure  3. Acute kidney injury and volume overload requiring dialysis  4. Protein caloric malnutrition    PLAN:  1. Input from palliative care requested  2. Continue dialysis for fluid management  3. Wean FiO2 as allowed  4. Patient is currently on assist control not using pressure support further  5. No proning given her hemodynamic instability yesterday  6. Nutritional support  7. DVT prophylaxis  8. Update family follow-up is guarded    ATTESTATION:  ICU Staff Physician note of personal involvement in Care  As the attending physician, I certify that I personally reviewed the patients history and personnally examined the patient to confirm the physical findings described above,  And that I reviewed the relevant imaging studies and available reports. I also discussed the differential diagnosis and all of the proposed management plans with the patient and individuals accompanying the patient to this visit. They had the opportunity to ask questions about the proposed management plans and to have those questions answered.      This patient has a high probability of sudden, clinically significant deterioration, which requires the highest level of physician preparedness to intervene urgently. I managed/supervised life or organ supporting interventions that required frequent physician assessment. I devoted my full attention to the direct care of this patient for the amount of time indicated below. Time I spent with the family or surrogate(s) is included only if the patient was incapable of providing the necessary information or participating in medical decisions  Time devoted to teaching and to any procedures I billed separately is not included.     CRITICAL CARE TIME:  40 minutes    Electronically signed by Toni Morales DO on 3/30/2021 at 1:03 PM

## 2021-03-31 NOTE — CARE COORDINATION
3/31/21 Positive covid- on 3/9 prior to admission. CM transition of care:  isolation/vent/sedation/new HD. May need ltach at [facility] following- reorders needed if appropriate to home is the discharge plan. Palliative care following. Daughter needs to speak to pcp- before decisions on goals of care. CM/SS following.   Electronically signed by SIERRA Rodriguez on 3/31/2021 at 2:56 PM

## 2021-03-31 NOTE — PROGRESS NOTES
Pharmacy Consultation Note  (Antibiotic Dosing and Monitoring)    Initial consult date: 3/26/21  Consulting physician: Dr. Sean Peters  Drug(s): Vancomycin  Indication: Empiric    Ht Readings from Last 1 Encounters:   21 5' (1.524 m)     Wt Readings from Last 1 Encounters:   21 154 lb (69.9 kg)     Age/  Gender IBW DW  Allergy Information   57 y.o.   female 45.5 kg 61.6 kg  Demerol, Peanut-containing drug products, and Toradol [ketorolac tromethamine]          Date  WBC BUN/CR HD Drug/Dose Time   Given Level(s)   (Time) Comments   3/26  (#1) 13.2 90/3.9 HD x 3.75h -- --     3/27  (#2) 7.3 50/2.4 HD x 2h No dose -- 22.1 mcg/mL @ 0511  15.3 mcg/mL @ 1623    3/28  (#3) 6.9 45/2.4 HD x 2h Vancomycin 500 mg IV x 1 1840     3/29  (#4) -- 34/2.2 No HD No dose --     3/30  (#5) 2.2 47/2.8 HD x 32 minutes Vancomycin 500 mg IV x 1 after HD 2100 Random level @ 0503 = 17.8 mcg/mL    3/31  (#6) 6 58/3.1  No dose --       (#7)      Random level @ <0600> =      Estimated Creatinine Clearance: 16 mL/min (A) (based on SCr of 3.1 mg/dL (H)). UOP over the past 24 hours:       Intake/Output Summary (Last 24 hours) at 3/31/2021 1411  Last data filed at 3/31/2021 1229  Gross per 24 hour   Intake 3049.05 ml   Output 302 ml   Net 2747.05 ml       Temp max: Temp (24hrs), Av.6 °F (36.4 °C), Min:96.6 °F (35.9 °C), Max:98.4 °F (36.9 °C)      Antibiotic Regimen:  Antibiotic Dose Date Initiated   Doxycycline 100 mg IV Q12H 3/26   Pip/tazo 3.375 g Q12H 3/23     Cultures:  available culture and sensitivity results were reviewed in Epic   Culture Date Result    Blood x 2 3/25 NGTD   Strep/legionella urine antigens 3/26 Negative     Assessment:  · Consulted by Dr. Sean Peters to dose/monitor vancomycin  · Goal trough level:  15-20 mcg/mL  · Pt is a 57 yof admitted to the hospital for acute respiratory failure 2/2 COVID-19 and CKD. Kaiden Claudio was intubated on 3/25 and transferred to the ICU.  A HD cath has been placed with plans to start dialysis today.  · Serum creatinine today: 3.1; CrCl < 20 mL/min; started dialysis on 3/26    Plan:  · Vancomycin 500 mg given yesterday after HD (though patient only completed 32 minutes of her session due to line issues)  · Check random level tomorrow AM and re-dose if needed  · Levels PRN  · Follow HD orders  · Pharmacist will follow and monitor/adjust dosing as necessary      Thank you for the consult,    Trupti BradleyD, BCPS 3/31/2021 2:11 PM   Ext: [id number]

## 2021-03-31 NOTE — PROGRESS NOTES
[facility] MD Urmila Berger MD Samuel Felling, MD Pennelope Liner, MD Evelyn Zavaleta, NUNO Nascimento, RAZA  Progress Note  3/31/2021    SUBJECTIVE:  We are following this patient for CKD stage IV on hemodialysis. 3/29 : Transferred to ICU bed 9 currently in prone position because of COVID-19 mechanical ventilation  Today with [facility] ICU attending Dr. Jolie Parry assess to perform acute dialysis treatment patient be scheduled for MR venogram with no contrast.  Patient was evaluated and case discussed with the nurse currently will be needing alternating supine and prone positions every 12 hours she remains on mechanical ventilation with sedation. Last dialysis was done yesterday    3/30 : seen in ICU this am .   Mechanically ventialted . Fio2 90   On small dose levo   Poor UO   No reported edema   D/w ICU staff     3/31 : partial HD yesterday due to Line malfunctioning . Will try cathflow . If line does not work then plan on IR exchange over guidewire   She continue to be mechanically ventilated , on 60 % fio2 when seeing her .  Off pressors           PROBLEM LIST:    Patient Active Problem List   Diagnosis    Diabetes mellitus type 2, insulin dependent ([facility] [address]    Hypothyroid    Hypertension    Hyperlipidemia    Hypertensive urgency    Acute respiratory failure with hypoxia (HCC)    COVID-19    Altered mental state    Goals of care, counseling/discussion    Palliative care by specialist        PAST MEDICAL HISTORY:    Past Medical History:   Diagnosis Date    Acid reflux     COVID-19     Hemodialysis patient ([facility] [address]     Hyperlipidemia     Hypertension     Hypothyroidism     S/P appendectomy     Type II or unspecified type diabetes mellitus without mention of complication, not stated as uncontrolled        MEDS (scheduled):   alteplase  1 mg Intracatheter Once    midodrine  10 mg Per NG tube TID    polyvinyl alcohol  1 drop Both Eyes Q4H    dexamethasone  4 mg Intravenous Q8H    acetylcysteine  400 mg Inhalation TID    atropine  0.4 mg Intravenous Once    Gabapentin  300 mg Oral Nightly    insulin glargine  9 Units Subcutaneous QAM    famotidine  10 mg Oral Daily    [Held by provider] carvedilol  3.125 mg Oral BID WC    insulin lispro  0-6 Units Subcutaneous Q6H    sodium bicarbonate  1,300 mg Per NG tube TID    doxycycline (VIBRAMYCIN) IV  100 mg Intravenous Q12H    heparin (porcine)  5,000 Units Subcutaneous 3 times per day    vancomycin (VANCOCIN) intermittent dosing (placeholder)   Other RX Placeholder    [Held by provider] amLODIPine  5 mg Oral BID    piperacillin-tazobactam  3,375 mg Intravenous Q12H    [Held by provider] pentoxifylline  400 mg Oral BID    ipratropium-albuterol  1 ampule Inhalation 4x daily    nystatin  5 mL Oral 4x Daily    aspirin  81 mg Oral Daily    atorvastatin  80 mg Oral Daily    clopidogrel  75 mg Oral Daily    levothyroxine  88 mcg Oral Daily    sodium chloride flush  10 mL Intravenous 2 times per day       MEDS (infusions):   sodium chloride      norepinephrine Stopped (03/31/21 0200)    cisatracurium (NIMBEX) infusion Stopped (03/29/21 2249)    fentaNYL 5 mcg/ml in 0.9%  ml infusion 125 mcg/hr (03/31/21 0506)    propofol 30 mcg/kg/min (03/31/21 1020)    sodium chloride 12.5 mL/hr at 03/31/21 0857    dextrose Stopped (03/24/21 0626)       MEDS (prn):  sodium chloride, [Held by provider] hydrOXYzine, [Held by provider] hydrALAZINE, glucose, dextrose, glucagon (rDNA), dextrose, acetaminophen, sodium chloride flush, promethazine **OR** ondansetron, polyethylene glycol, [DISCONTINUED] acetaminophen **OR** acetaminophen    DIET:    DIET TUBE FEED CONTINUOUS/CYCLIC NPO; Renal Formula; Nasogastric; 10; 50      PHYSICAL EXAM:      Patient Vitals for the past 24 hrs:   BP Temp Temp src Pulse Resp SpO2 Weight   03/31/21 0900 (!) 119/52   72 28 97 %    03/31/21 0800 (!) 93/50 98.4 °F (36.9 °C) Axillary 78 28 97 %    03/31/21 0700 (!) 116/59   89 28 91 %    03/31/21 0600 (!) 120/57   75 28 97 % 154 lb (69.9 kg)   03/31/21 0500 (!) 153/65   93 28 94 %    03/31/21 0400 (!) 146/65 98.3 °F (36.8 °C) Axillary 66 28 100 %    03/31/21 0300 138/62   68 28 99 %    03/31/21 0224    70  100 %    03/31/21 0200 138/64   70 28 100 %    03/31/21 0100 129/61   72 28 100 %    03/31/21 0000 (!) 124/59 98.4 °F (36.9 °C) Axillary 75 28 100 %    03/30/21 2300 (!) 124/59   76  99 %    03/30/21 2200 129/60   71 28 99 %    03/30/21 2100 (!) 109/56   69 28 98 %    03/30/21 2000 (!) 128/59 97.1 °F (36.2 °C) Axillary 71 28 97 %    03/30/21 1900 131/60   71 28 97 %    03/30/21 1851    70  96 %    03/30/21 1800 (!) 133/57   68 28 96 %    03/30/21 1700 (!) 105/54   70 28 96 %    03/30/21 1618 (!) 106/51 96.6 °F (35.9 °C)  79  96 % 153 lb 3.5 oz (69.5 kg)   03/30/21 1605 (!) 106/51         03/30/21 1600 (!) 59/35 96.6 °F (35.9 °C) Axillary 82 28 94 %    03/30/21 1545 (!) 82/45   83      03/30/21 1530 (!) 81/48   80      03/30/21 1509 110/61 97.7 °F (36.5 °C)  76 22 96 % 153 lb (69.4 kg)   03/30/21 1500 (!) 106/55   77 28 91 %    03/30/21 1400 (!) 98/51   78 22 96 %    03/30/21 1300 (!) 87/49   79 19 97 %    03/30/21 1200 109/61 97.8 °F (36.6 °C) Axillary 85 20 93 %    03/30/21 1100 (!) 87/52   80 28 98 %           Intake/Output Summary (Last 24 hours) at 3/31/2021 1044  Last data filed at 3/31/2021 0857  Gross per 24 hour   Intake 2949.05 ml   Output 302 ml   Net 2647.05 ml       Wt Readings from Last 3 Encounters:   03/31/21 154 lb (69.9 kg)   03/08/21 117 lb (53.1 kg)   01/10/21 130 lb (59 kg)       General:  in no acute distress  HEENT: NC/AT, EOMI, sclera and conjunctiva are clear and anicteric.   Mucous membranes moist.  Cardiovascular: regular rate and rhythm, no murmurs, gallops, or rubs  Respiratory:  Clear, no rales, rhochi, or wheezes Gastrointestinal: soft, nontender, nondistended, NABS  Ext: no cyanosis, clubbing, or edema bilateral lower extremities  Neuro: awake, alert, oriented x3. Moves all 4 extremities. Cranial nerves II through XII grossly intact. Skin: dry, no rash      DATA:      Recent Labs     03/30/21  0503 03/31/21  0501   WBC 2.2* 6.0   HGB 8.0* 6.8*   HCT 25.4* 20.8*   MCV 88.8 86.7    105*     Recent Labs     03/29/21  0501 03/30/21  0503 03/31/21  0501    133 135   K 4.1 4.3 4.4   CL 98 96* 97*   CO2 26 27 24   BUN 34* 47* 58*   CREATININE 2.2* 2.8* 3.1*       Iron studies:  Lab Results   Component Value Date    FERRITIN 613 03/16/2021     Bone disease:  Lab Results   Component Value Date    PTH 42 08/05/2020    MG 1.8 03/30/2021    PHOS 6.7 (H) 03/30/2021     Nutrition:No results found for: ALB    Microbiology      ASSESSMENT / RECOMMENDATIONS:     1. GAVIN on top of chronic disease stage IV with hemodialysis initiation    partial HD yesterday due to Line malfunctioning . Will try cathflow . If line does not work then plan on IR exchange over guidewire       2. Anemia:   Cont. epo and iv ferrlecit per orders    3. Secondary Hyperparathyroidism:   Cont vitamin d analog    4. Hypotension which prevented us from proceeding forward with dialysis treatment yesterday, case was discussed with ICU attending for future management. Cont current tx, see orders    5. Pneumomediastinum, currently being followed by ICU physicians and radiology x-rays.     6.  COVID-19    7   Respiratory failure which appear to be mainly due to COVID 19 Pneumonia with ARDS           Landon Damon MD  3/31/2021 Acute dialysis access

## 2021-03-31 NOTE — FLOWSHEET NOTE
03/31/21 1850   Observations & Evaluations   FiO2  75 %   Vital Signs   BP (!) 123/57   Temp 95.4 °F (35.2 °C)   Pulse 75   SpO2 93 %   Pain Assessment   Pain Assessment CPOT   Patient's Stated Pain Goal No pain   Hemodialysis Central Access Internal jugular Right Neck   Placement Date/Time: 03/26/21 1400   Timeout: Patient;Procedure;Site/Side;Appropriate Equipment;Sterile Technique using full body drape;Site prepped with chlorhexidine;Sterile drsg with biopatch; Correct Position; Consent Confirmed  Inserted by: Dr. Ara Maxwell... Site Assessment Clean;Dry; Intact   Post-Hemodialysis Assessment   Post-Treatment Procedures Blood returned;Catheter capped, clamped and heparinized x 2 ports   Machine Disinfection Process Acid/Vinegar Clean;Heat Disinfect; Exterior Machine Disinfection   Rinseback Volume (ml) 400 ml   Total Liters Processed (l/min) 35.8 l/min   Dialyzer Clearance Heavily streaked   Duration of Treatment (minutes) 180 minutes   Hemodialysis Intake (ml) 400 ml   Hemodialysis Output (ml) 2000 ml   NET Removed (ml) 1600 ml   Tolerated Treatment Fair   Patient Response to Treatment very poorly functioning catheter   Physician Notified? Yes       Catheter very poor function. Not able to meet prescribed blood flow rate.   Dr Malu Redd aware see orders    1.6 L UF  35.8 BVP  1 unit PRBC transfused

## 2021-03-31 NOTE — PLAN OF CARE
Problem: Non-Violent Restraints  Goal: Removal from restraints as soon as assessed to be safe  Outcome: Not Met This Shift  Goal: No harm/injury to patient while restraints in use  Outcome: Met This Shift  Goal: Patient's dignity will be maintained  Outcome: Met This Shift    Problem: Skin Integrity:  Goal: Will show no infection signs and symptoms  Description: Will show no infection signs and symptoms  Outcome: Met This Shift  Goal: Absence of new skin breakdown  Description: Absence of new skin breakdown  Outcome: Met This Shift      Problem: Gas Exchange - Impaired  Goal: Absence of hypoxia  Outcome: Met This Shift

## 2021-03-31 NOTE — PLAN OF CARE
Problem: Airway Clearance - Ineffective  Goal: Achieve or maintain patent airway  Outcome: Met This Shift     Problem: Gas Exchange - Impaired  Goal: Absence of hypoxia  Outcome: Met This Shift     Problem: Gas Exchange - Impaired  Goal: Promote optimal lung function  Outcome: Met This Shift     Problem: Body Temperature -  Risk of, Imbalanced  Goal: Ability to maintain a body temperature within defined limits  Outcome: Met This Shift     Problem: Isolation Precautions - Risk of Spread of Infection  Goal: Prevent transmission of infection  Outcome: Met This Shift     Problem: Non-Violent Restraints  Goal: Patient's dignity will be maintained  3/31/2021 0125 by Len Woodruff RN  Outcome: Met This Shift     Problem: Non-Violent Restraints  Goal: Removal from restraints as soon as assessed to be safe  3/31/2021 0125 by Len Woodruff RN  Outcome: Not Met This Shift   Patient educated on restraints and lines/tubes connected to her. Patient continues to pull at lines and tubes and is not redirectable at this time. Restraints continued for patient's safety. Will continue to assess.

## 2021-03-31 NOTE — PROGRESS NOTES
Spoke to Dr. Jonna Manjarrez regarding orders for PRBCs to be given during HD. Unsure when patient will be dialyzed today due to nonfunctioning HD line. Orders to give 1 Unit of PRBCs now and give the other with dialysis. Also discussed limited IV access. No orders for PICC line placement at this time. Okay to leave current IV catheters in place as long as they are functioning.   Sanaz Blakely

## 2021-03-31 NOTE — PROGRESS NOTES
[facility]  [facility]  Progress Note    Chief Complaint   Patient presents with    Concern For COVID-19     positive covid, shortness of breath, EMS states 60% on room air. 94% on 15LPM NRB    Shortness of Breath   f/u covid    SUBJECTIVE:  In icu   On vent sedated  On HD  rij   Recent CT showed chronic lacunar infarcts   unbale to prone     Review of systems:  Unable to obtain due to mentation     OBJECTIVE:  BP (!) 116/59   Pulse 89   Temp 98.3 °F (36.8 °C) (Axillary)   Resp 28   Ht 5' (1.524 m)   Wt 154 lb (69.9 kg)   SpO2 91%   BMI 30.08 kg/m²   Temp  Av.5 °F (36.4 °C)  Min: 96.6 °F (35.9 °C)  Max: 98.4 °F (36.9 °C)  Constitutional:  The patient is on vent sedated swollen  Skin:    Warm and dry   HEENT:      AT/NC intubated  Chest:     DEC BS ANT B   Cardiovascular:  S1 and S2 are rhythmic and regular. Abdomen:   Positive bowel sounds to auscultation. Benign to palpation. ngt  Extremities:   Overall    edema.    CNS    sedated  Lines: pIV  ij hd cath 3/26   brasher    Radiology:  Laboratory and Tests Review:  Lab Results   Component Value Date    WBC 6.0 2021    WBC 2.2 (L) 2021    WBC 6.9 2021    HGB 6.8 (L) 2021    HCT 20.8 (L) 2021    MCV 86.7 2021     (L) 2021     No results found for: [facility]  Lab Results   Component Value Date    ALT 32 2021    AST 42 (H) 2021    ALKPHOS 182 (H) 2021    BILITOT 0.2 2021     Lab Results   Component Value Date     2021    K 4.4 2021    K 4.2 2021    CL 97 2021    CO2 24 2021    BUN 58 2021    CREATININE 3.1 2021    CREATININE 2.8 2021    CREATININE 2.2 2021    GFRAA 18 2021    LABGLOM 18 2021    GLUCOSE 181 2021    GLUCOSE 419 2011    PROT 4.8 2021    LABALBU 2.2 2021    LABALBU 4.1 2011    CALCIUM 7.4 2021    BILITOT 0.2 2021    ALKPHOS 182

## 2021-03-31 NOTE — PROGRESS NOTES
Department of Internal Medicine  General Internal Medicine  Attending Progress Note  Chief Complaint   Patient presents with    Concern For COVID-19     positive covid, shortness of breath, EMS states 60% on room air. 94% on 15LPM NRB    Shortness of Breath     SUBJECTIVE:    Patient is currently intubated undergoing HD.    3/29: did not tolerate proning. Mucous plug. Bagged. intensivist in room. D/w him.     OBJECTIVE      Medications    Current Facility-Administered Medications: 0.9 % sodium chloride infusion, 250 mL, Intravenous, PRN  heparin (porcine) 1000 UNIT/ML injection, , ,   fluconazole (DIFLUCAN) in 0.9 % sodium chloride IVPB 400 mg, 400 mg, Intravenous, Q24H  midodrine (PROAMATINE) tablet 10 mg, 10 mg, Per NG tube, TID  polyvinyl alcohol (LIQUIFILM TEARS) 1.4 % ophthalmic solution 1 drop, 1 drop, Both Eyes, Q4H  dexamethasone (DECADRON) injection 4 mg, 4 mg, Intravenous, Q8H  acetylcysteine (MUCOMYST) 20 % solution 400 mg, 400 mg, Inhalation, TID  atropine injection 0.4 mg, 0.4 mg, Intravenous, Once  norepinephrine (LEVOPHED) 16 mg in dextrose 5 % 250 mL infusion, 2-100 mcg/min, Intravenous, Continuous  Gabapentin SOLN 300 mg, 300 mg, Oral, Nightly  cisatracurium besylate (NIMBEX) 200 mg in sodium chloride 0.9 % 100 mL infusion, 0.5-10 mcg/kg/min, Intravenous, Continuous  insulin glargine (LANTUS) injection vial 9 Units, 9 Units, Subcutaneous, QAM  famotidine (PEPCID) tablet 10 mg, 10 mg, Oral, Daily  [Held by provider] carvedilol (COREG) tablet 3.125 mg, 3.125 mg, Oral, BID WC  insulin lispro (HUMALOG) injection vial 0-6 Units, 0-6 Units, Subcutaneous, Q6H  sodium bicarbonate tablet 1,300 mg, 1,300 mg, Per NG tube, TID  doxycycline (VIBRAMYCIN) 100 mg in dextrose 5 % 100 mL IVPB, 100 mg, Intravenous, Q12H  heparin (porcine) injection 5,000 Units, 5,000 Units, Subcutaneous, 3 times per day  vancomycin (VANCOCIN) intermittent dosing (placeholder), , Other, RX Placeholder  [Held by provider] hydrOXYzine (VISTARIL) capsule 25 mg, 25 mg, Oral, 4x Daily PRN  [Held by provider] hydrALAZINE (APRESOLINE) injection 10 mg, 10 mg, Intravenous, Q4H PRN  fentaNYL 5 mcg/ml in 0.9%  ml infusion, 12.5-200 mcg/hr, Intravenous, Continuous  propofol injection, 5-50 mcg/kg/min, Intravenous, Titrated  [Held by provider] amLODIPine (NORVASC) tablet 5 mg, 5 mg, Oral, BID  piperacillin-tazobactam (ZOSYN) 3,375 mg in dextrose 5 % 50 mL IVPB extended infusion (mini-bag), 3,375 mg, Intravenous, Q12H  0.9 % sodium chloride infusion, , Intravenous, Q12H  [Held by provider] pentoxifylline (TRENTAL) extended release tablet 400 mg, 400 mg, Oral, BID  ipratropium-albuterol (DUONEB) nebulizer solution 1 ampule, 1 ampule, Inhalation, 4x daily  nystatin (MYCOSTATIN) 171097 UNIT/ML suspension 500,000 Units, 5 mL, Oral, 4x Daily  glucose (GLUTOSE) 40 % oral gel 15 g, 15 g, Oral, PRN  dextrose 50 % IV solution, 12.5 g, Intravenous, PRN  glucagon (rDNA) injection 1 mg, 1 mg, Intramuscular, PRN  dextrose 5 % solution, 100 mL/hr, Intravenous, PRN  acetaminophen (TYLENOL) tablet 500 mg, 500 mg, Oral, 4x Daily PRN  aspirin chewable tablet 81 mg, 81 mg, Oral, Daily  atorvastatin (LIPITOR) tablet 80 mg, 80 mg, Oral, Daily  clopidogrel (PLAVIX) tablet 75 mg, 75 mg, Oral, Daily  levothyroxine (SYNTHROID) tablet 88 mcg, 88 mcg, Oral, Daily  sodium chloride flush 0.9 % injection 10 mL, 10 mL, Intravenous, 2 times per day  sodium chloride flush 0.9 % injection 10 mL, 10 mL, Intravenous, PRN  promethazine (PHENERGAN) tablet 12.5 mg, 12.5 mg, Oral, Q6H PRN **OR** ondansetron (ZOFRAN) injection 4 mg, 4 mg, Intravenous, Q6H PRN  polyethylene glycol (GLYCOLAX) packet 17 g, 17 g, Oral, Daily PRN  [DISCONTINUED] acetaminophen (TYLENOL) tablet 650 mg, 650 mg, Oral, Q6H PRN **OR** acetaminophen (TYLENOL) suppository 650 mg, 650 mg, Rectal, Q6H PRN  Physical    VITALS:  BP (!) 122/52   Pulse 90   Temp 96 °F (35.6 °C) (Axillary)   Resp 28   Ht 5' (1.524 m)   Wt 154 lb (69.9 kg)   SpO2 (!) 89%   BMI 30.08 kg/m²   Patient is sedated and ventilated  Normocephalic, without obvious abnormality, atraumatic, external ears without lesions,   Neck  cervical lymphadenopathy with limited exam for motion due to ET tube  Heart has a regular rate and rhythm no murmur  Lungs are clear to auscultation bilaterally with equal movements with the additional sounds of transmitted airway sounds from the ventilator. Abdomen is soft nontender nondistended no rebound or guarding. anasarca  No significant rashes or new skin lesions. Affect and neuro exam limited since she is sedated on the ventilator      Data    CBC:   Lab Results   Component Value Date    WBC 6.0 03/31/2021    RBC 2.40 03/31/2021    HGB 6.8 03/31/2021    HCT 20.8 03/31/2021    MCV 86.7 03/31/2021    MCH 28.3 03/31/2021    MCHC 32.7 03/31/2021    RDW 14.8 03/31/2021     03/31/2021    MPV 10.8 03/31/2021     CMP:    Lab Results   Component Value Date     03/31/2021    K 4.4 03/31/2021    K 4.2 03/21/2021    CL 97 03/31/2021    CO2 24 03/31/2021    BUN 58 03/31/2021    CREATININE 3.1 03/31/2021    GFRAA 18 03/31/2021    LABGLOM 18 03/31/2021    GLUCOSE 181 03/31/2021    GLUCOSE 419 03/21/2011    PROT 4.8 03/24/2021    LABALBU 2.2 03/24/2021    LABALBU 4.1 03/21/2011    CALCIUM 7.4 03/31/2021    BILITOT 0.2 03/24/2021    ALKPHOS 182 03/24/2021    AST 42 03/24/2021    ALT 32 03/24/2021       ASSESSMENT AND PLAN      Brief Summary of Stay:  Ms. Kaitlynn Acosta was admitted with shortness of breath. This is readmission after being admitted prior with covid. Her symptoms continued to deteriorate. She was initially on BiPAP, her respiration continues to decline she was intubated. Her renal function is compromised and she is now on dialysis. 1.  Acute respiratory failure with hypoxia:  Intubated. Continue to monitor. 2.  COVID-19 superimposed Pneumonia:  Decadron 4 mg IV every 8 per ICU physician    3.   GAVIN on CKD: Now requiring dialysis. Nephro following. Monitor    4. Air leak syndrome with Pneumomediastinum: mech vent     5. DM Type 2: usually in 100's. Recent spike 180's    6. Hypertension Essential: occasional sBP 80's: supportive care    7. Anemia due to CKD: monitor    8. Hypothyroidism: continue synthroid. 9.  Stroke CT 3/28 showed chronic lacunar infarct right caudate head.   This was with contrast.  CT noncontrast on 3/21 showed no skull fracture  10 dnrcca  11/ dispo: palliative care will help

## 2021-03-31 NOTE — PROGRESS NOTES
Called IR to ask about order \"malfunctioning HD line\". They request further clarification on what Dr. Lorna Nelson wants done with the HD line, and they also stated that they are unable to do any procedure until later this afternoon, as they have other procedures planned, and patient is COVID +. Notified Dr. Lorna Nelson via perfect serve. Dr. Lorna Nelson requests HD nurse instill cathflo, and if still malfunctioning this afternoon, \"would change it over to guidewire. \"  Notified HD nurse of need for cathflo.   Electronically signed by Lana Cotton RN on 3/31/2021 at 8:04 AM

## 2021-03-31 NOTE — PROGRESS NOTES
Palliative Care Department  [phone]  Palliative Care Progress Note  Provider Timoteo Solano  [id number]  Hospital Day: 11  Date of Initial Consult: 3/30/2021  Referring Provider: Gisella Seo DO  Palliative Medicine was consulted for assistance with: family Cherise support    HPI:   Little Mackay is a 57 y.o. with a medical history of CKD, COVID infection, type 2 DM, hypothyroid, HTN, HLD, GERD who was admitted on 3/21/2021 from home with a CHIEF COMPLAINT of worsening SOB and hypoxia, leg swelling. ASSESSMENT/PLAN:     Pertinent Hospital Diagnoses      COVID-19 pneumonia with ARDS and possible superimposed HCAP   GAVIN on CKD   Acute hypoxic respiratory failure   Pneumomediastinum   Anemia of CKD      Palliative Care Encounter / Counseling Regarding Goals of Care  Please see detailed goals of care discussion as below   At this time, Little Mackay, Does Not have capacity for medical decision-making. Capacity is time limited and situation/question specific   During encounter flower Tate was surrogate medical decision-maker   Outcome of goals of care meeting: l;eft message for daughter  [place] Code status Limited no to all but meds  o Over the past 40 years, despite advances in the protocol for Cardiopulmonary Resuscitation (CPR) outcomes for survival have not statistically changed. o The consensus is that 17% of all adult arrest patients survive to hospital discharge. Many factors lower a patients chance of survival, including advanced age, performance status, malignancy, and presence of multiple comorbidities. Allison Sy and colleagues examined medical data from [id number] Medicare patients 72 and older who underwent in-hospital CPR. 5 Both older age and [facility] were found to be associated with poorer survival rates.   o Patients 85 and older having only a 6% chance of surviving to hospital discharge.  o In general Cancer patients generally have an overall survival rate of only 6.2%. o Cancer patients whose hospital course followed a path of gradual deterioration showed a 0% survival rate.  o The presence of hepatic insufficiency, acute stroke, immunodeficiency, renal failure, or dialysis were associated with lower survival rates.  Advanced Directives: no POA or living will in [facility]   Surrogate/Legal NOK:  o Román Worley [phone]    Spiritual assessment: no spiritual distress identified  Bereavement and grief: to be determined  Referrals to: none today   SUBJECTIVE:     Current medical issues leading to Palliative Medicine involvement include   Active Hospital Problems    Diagnosis Date Noted    Goals of care, counseling/discussion [Z71.89]     Palliative care by specialist [Z51.5]     Altered mental state [R41.82] 03/29/2021    Acute respiratory failure with hypoxia ([facility] [address] [J96.01] 03/21/2021    COVID-19 [U07.1] 03/21/2021        Details of Conversation:  Spoke with intensivist, patient remains intubated, has not been intubated for that long yet, though not doing well. Spoke with daughter Cherylene Mass, who states she is hoping patient will show signs of improvement, and was told today that oxygen was weaned down a little bit so she is hopeful. Will continue to follow along, emotional support provided.     Physical Function:  PPS: 10    OBJECTIVE:   Prognosis: Guarded    Physical Exam:  BP (!) 119/52   Pulse 72   Temp 98.4 °F (36.9 °C) (Axillary)   Resp 28   Ht 5' (1.524 m)   Wt 154 lb (69.9 kg)   SpO2 97%   BMI 30.08 kg/m²   Patient observed from glass door  Constitutional:  NAD  Eyes: eyes closed  ENMT:  Normocephalic, atraumatic, ETT in place, dialysis catheter right neck  Lungs:  Mechanical respirations  Heart[de-identified]  RRR on monitor  :  brahser  Ext:  Not seen moving extremities  Skin:  no rashes on visible skin  Psych: unable to assess  Neuro:  Intubated/sedated    Objective data reviewed: labs, images, records, medication use, vitals and chart    Discussed patient and the plan of care with the other IDT members: Palliative Medicine IDT Team, Floor Nurse and Family    Time/Communication  Greater than 50% of time spent, total 15 minutes in counseling and coordination of care at the bedside regarding goals of care, diagnosis and prognosis and see above. Thank you for allowing Palliative Medicine to participate [facility] .

## 2021-03-31 NOTE — PROGRESS NOTES
Pulmonary/Critical Care Progress Note    We are following patient for COVID-19 pneumonia with respiratory failure    SUBJECTIVE:  69-year-old woman with Covid multilobar pneumonia. Patient has ARDS with diffuse lung injury. Patient DNR CCA has as well type 2 diabetes, hypothyroidism, hyperlipidemia, hypertension as well chronic kidney disease prior to this acute illness. Patient's thought to have some secretions and mucous plugging as well. Recent CT scan of the brain showed chronic lacunar infarcts cavernous sinus thrombosis. Patient is undergoing dialysis with some difficulty due to catheter dysfunction.     MEDICATIONS:   midodrine  10 mg Per NG tube TID    polyvinyl alcohol  1 drop Both Eyes Q4H    dexamethasone  4 mg Intravenous Q8H    acetylcysteine  400 mg Inhalation TID    atropine  0.4 mg Intravenous Once    Gabapentin  300 mg Oral Nightly    insulin glargine  9 Units Subcutaneous QAM    famotidine  10 mg Oral Daily    [Held by provider] carvedilol  3.125 mg Oral BID WC    insulin lispro  0-6 Units Subcutaneous Q6H    sodium bicarbonate  1,300 mg Per NG tube TID    doxycycline (VIBRAMYCIN) IV  100 mg Intravenous Q12H    heparin (porcine)  5,000 Units Subcutaneous 3 times per day    vancomycin (VANCOCIN) intermittent dosing (placeholder)   Other RX Placeholder    [Held by provider] amLODIPine  5 mg Oral BID    piperacillin-tazobactam  3,375 mg Intravenous Q12H    [Held by provider] pentoxifylline  400 mg Oral BID    ipratropium-albuterol  1 ampule Inhalation 4x daily    nystatin  5 mL Oral 4x Daily    aspirin  81 mg Oral Daily    atorvastatin  80 mg Oral Daily    clopidogrel  75 mg Oral Daily    levothyroxine  88 mcg Oral Daily    sodium chloride flush  10 mL Intravenous 2 times per day      sodium chloride      norepinephrine Stopped (03/31/21 0200)    cisatracurium (NIMBEX) infusion Stopped (03/29/21 2249)    fentaNYL 5 mcg/ml in 0.9%  ml infusion 125 mcg/hr (03/31/21 0738)    propofol 30 mcg/kg/min (03/31/21 1020)    sodium chloride Stopped (03/31/21 1106)    dextrose Stopped (03/24/21 0626)     sodium chloride, [Held by provider] hydrOXYzine, [Held by provider] hydrALAZINE, glucose, dextrose, glucagon (rDNA), dextrose, acetaminophen, sodium chloride flush, promethazine **OR** ondansetron, polyethylene glycol, [DISCONTINUED] acetaminophen **OR** acetaminophen    Review of Systems   Unable to perform ROS: Intubated       OBJECTIVE:  Vitals:    03/31/21 1300   BP: (!) 97/43   Pulse: 78   Resp: 28   Temp:    SpO2: 94%     FiO2 : 70 %  O2 Flow Rate (L/min): 50 L/min  O2 Device: Ventilator    PHYSICAL EXAM:  Constitutional: Chronically ill by appearance normocephalic neck is supple tubes and lines in place patient however has crepitance over her neck.   Skin: Crepitance as above no cyanosis  HEENT: Normocephalic neck is supple tubes and lines in place had a bilirubin of 3 degrees  Neck: Trach is midline  Cardiovascular: Rate and rhythm regular no gallop no murmur  Respiratory: Diminished breath sounds bases  Gastrointestinal: Soft bowel sounds present no peritoneal signs  Genitourinary: Deferred Musa catheter  Extremities: Pulses are intact no signs of DVT  Neurological: Patient's sedated not interactive  Psychological: Cannot be assessed    LABS:  WBC   Date Value Ref Range Status   03/31/2021 6.0 4.5 - 11.5 E9/L Final   03/30/2021 2.2 (L) 4.5 - 11.5 E9/L Final   03/28/2021 6.9 4.5 - 11.5 E9/L Final     Hemoglobin   Date Value Ref Range Status   03/31/2021 6.8 (L) 11.5 - 15.5 g/dL Final   03/30/2021 8.0 (L) 11.5 - 15.5 g/dL Final   03/28/2021 7.3 (L) 11.5 - 15.5 g/dL Final     Hematocrit   Date Value Ref Range Status   03/31/2021 20.8 (L) 34.0 - 48.0 % Final   03/30/2021 25.4 (L) 34.0 - 48.0 % Final   03/28/2021 21.9 (L) 34.0 - 48.0 % Final     MCV   Date Value Ref Range Status   03/31/2021 86.7 80.0 - 99.9 fL Final   03/30/2021 88.8 80.0 - 99.9 fL Final   03/28/2021 86.9 80.0 - MRV due to motion, however no findings to suggest dural venous   sinus thrombosis. 5.  Acute maxillary and sphenoid sinusitis as well as findings to suggest   bilateral mastoiditis. XR CHEST PORTABLE   Final Result   Decreasing pneumo mediastinum      Remainder unchanged. CT HEAD W WO CONTRAST   Final Result   1. No acute intracranial abnormality. 2. Chronic lacunar infarct in the right caudate head. 3. Prominence of bilateral superior ophthalmic veins, more conspicuous since   the previous exam.  If there is concern for a cavernous carotid fistula,   catheter angiography could be performed for further evaluation. XR CHEST PORTABLE   Final Result   Slightly worsened pneumomediastinum and cervical emphysema. Invasive lines and tubes appear satifactory unchanged. Unchanged diffuse pulmonary consolidation, suspect ARDS/pneumonia. XR CHEST PORTABLE   Final Result   Stable pattern of pneumomediastinum and emphysema in the lower neck. Supportive tubing is in normal position. Stable pattern of extensive bilateral airspace disease, pneumonia or ARDS. XR CHEST PORTABLE   Final Result   Unchanged extensive infiltrates and pneumomediastinum. XR CHEST PORTABLE   Final Result   Right internal jugular line tip in the proximal SVC. No sizable pneumothorax. Stable diffuse and bilateral airspace opacities. Subcutaneous air involving both sides of the neck right greater than left. Probable pneumomediastinum. XR CHEST PORTABLE   Final Result   Right IJ catheter in satisfactory position, no complicating pneumothorax. Diffuse parenchymal edema unchanged         XR CHEST PORTABLE   Final Result   Extensive bilateral lung infiltrates are similar to subtly improved. Continued follow-up recommended.          XR CHEST PORTABLE   Final Result   Endotracheal tube terminates approximately 1 cm above the sylvia and should   be retracted approximately 2 cm for more optimal positioning. Extensive bilateral diffuse lung infiltrates are similar to previous and may   represent severe edema, pneumonia or ARDS. XR CHEST PORTABLE   Final Result   ET tube tip at the entrance of the right mainstem bronchus and should be   retracted 2.0 cm. Diffuse bilateral infiltrates. Enteric tube tip in the distal body of the stomach. XR CHEST PORTABLE   Final Result   Stable diffuse and bilateral infiltrates. ET tube tip is at the entrance of the right mainstem bronchus and should be   retracted at least 2.0 cm. XR CHEST PORTABLE   Final Result   Diffuse and bilateral airspace opacities. Findings consistent with extensive   pulmonary edema versus pneumonia. NM LUNG SCAN PERFUSION ONLY   Final Result   Low probability for pulmonary embolism. Small perfusion defects correspond   to the opacities in the mid lungs bilaterally. US DUP LOWER EXTREMITIES BILATERAL VENOUS   Final Result   No evidence of DVT in either lower extremity. CT Head WO Contrast   Final Result   No skull fracture or acute intracranial abnormality. CT CHEST WO CONTRAST   Final Result   Extensive airspace opacities throughout both lungs which is most prominent   along the upper lobes and perihilar regions and could pulmonary edema and/or   pneumonia vs pulmonary hemorrhage. Recommend short-term follow-up. Prominent central pulmonary vessels suggestive of pulmonary congestion or   pulmonary artery hypertension. Small pericardial effusion with no mediastinal mass or adenopathy. Tiny right pleural effusion. Questionable mild ascites along the upper abdomen         XR CHEST PORTABLE   Final Result   Mild cardiomegaly and mild pulmonary edema which is more prominent. Hazy perihilar and bibasilar opacities which have increased and could be due   to pulmonary edema and/or pneumonia. Recommend follow-up. Resolving left upper lobe opacity. IR Interventional Radiology Procedure Request    (Results Pending)           PROBLEM LIST:  Principal Problem:    Altered mental state  Active Problems:    Acute respiratory failure with hypoxia ([address]    COVID-19    Goals of care, counseling/discussion    Palliative care by specialist  Resolved Problems:    * No resolved hospital problems. *      IMPRESSION:  1. [id number] pneumonia  2. ARDS secondary to Covid  3. Hypoxic respiratory failure secondary to Covid  4. Mediastinal emphysema and subcutaneous emphysema  5. Protein caloric malnutrition  6. Type 2 diabetes  7. End-stage renal disease  8. History of multiple CVAs    PLAN:  1. Reduce PEEP  2. Follow-up chest x-ray   3. Antibiotics, steroids and bronchodilators  4. Overall outlook is guarded I spoke with    patient's daughter yesterday at length and I also spoke with palliative care. 5.   Glycemic control  6. DVT prophylaxis    ATTESTATION:  ICU Staff Physician note of personal involvement in Care  As the attending physician, I certify that I personally reviewed the patients history and personnally examined the patient to confirm the physical findings described above,  And that I reviewed the relevant imaging studies and available reports. I also discussed the differential diagnosis and all of the proposed management plans with the patient and individuals accompanying the patient to this visit. They had the opportunity to ask questions about the proposed management plans and to have those questions answered. This patient has a high probability of sudden, clinically significant deterioration, which requires the highest level of physician preparedness to intervene urgently. I managed/supervised life or organ supporting interventions that required frequent physician assessment. I devoted my full attention to the direct care of this patient for the amount of time indicated below.   Time I spent with the family or surrogate(s) is included only if the patient was incapable of providing the necessary information or participating in medical decisions  Time devoted to teaching and to any procedures I billed separately is not included.     CRITICAL CARE TIME:  38 minutes    Electronically signed by Marisol Parrish DO on 3/31/2021 at 1:49 PM

## 2021-04-01 NOTE — PLAN OF CARE
Problem: Non-Violent Restraints  Goal: Removal from restraints as soon as assessed to be safe  4/1/2021 0459 by Salazar Graves RN  Outcome: Not Met This Shift  Patient educated on restraints and lines/tubes connected to her. Patient continues to pull at lines and tubes and is not redirectable at this time. Restraints continued for patient's safety. Will continue to assess.        Problem: Non-Violent Restraints  Goal: No harm/injury to patient while restraints in use  4/1/2021 0459 by Salazar Graves RN  Outcome: Met This Shift       Problem: Non-Violent Restraints  Goal: Patient's dignity will be maintained  4/1/2021 0459 by Salazar Graves RN

## 2021-04-01 NOTE — PROGRESS NOTES
Department of Internal Medicine  General Internal Medicine  Attending Progress Note  Chief Complaint   Patient presents with    Concern For COVID-19     positive covid, shortness of breath, EMS states 60% on room air. 94% on 15LPM NRB    Shortness of Breath     SUBJECTIVE:    Patient is currently intubated    3/29: did not tolerate proning. Mucous plug. Bagged. intensivist in room. D/w him.     OBJECTIVE      Medications    Current Facility-Administered Medications: dexamethasone (DECADRON) injection 4 mg, 4 mg, Intravenous, Q12H  heparin flush 100 UNIT/ML injection, , ,   0.9 % sodium chloride infusion, 250 mL, Intravenous, PRN  fluconazole (DIFLUCAN) in 0.9 % sodium chloride IVPB 400 mg, 400 mg, Intravenous, Q24H  midodrine (PROAMATINE) tablet 10 mg, 10 mg, Per NG tube, TID  polyvinyl alcohol (LIQUIFILM TEARS) 1.4 % ophthalmic solution 1 drop, 1 drop, Both Eyes, Q4H  atropine injection 0.4 mg, 0.4 mg, Intravenous, Once  norepinephrine (LEVOPHED) 16 mg in dextrose 5 % 250 mL infusion, 2-100 mcg/min, Intravenous, Continuous  Gabapentin SOLN 300 mg, 300 mg, Oral, Nightly  cisatracurium besylate (NIMBEX) 200 mg in sodium chloride 0.9 % 100 mL infusion, 0.5-10 mcg/kg/min, Intravenous, Continuous  insulin glargine (LANTUS) injection vial 9 Units, 9 Units, Subcutaneous, QAM  famotidine (PEPCID) tablet 10 mg, 10 mg, Oral, Daily  [Held by provider] carvedilol (COREG) tablet 3.125 mg, 3.125 mg, Oral, BID WC  insulin lispro (HUMALOG) injection vial 0-6 Units, 0-6 Units, Subcutaneous, Q6H  sodium bicarbonate tablet 1,300 mg, 1,300 mg, Per NG tube, TID  doxycycline (VIBRAMYCIN) 100 mg in dextrose 5 % 100 mL IVPB, 100 mg, Intravenous, Q12H  heparin (porcine) injection 5,000 Units, 5,000 Units, Subcutaneous, 3 times per day  vancomycin (VANCOCIN) intermittent dosing (placeholder), , Other, RX Placeholder  [Held by provider] hydrOXYzine (VISTARIL) capsule 25 mg, 25 mg, Oral, 4x Daily PRN  [Held by provider] hydrALAZINE (APRESOLINE) injection 10 mg, 10 mg, Intravenous, Q4H PRN  fentaNYL 5 mcg/ml in 0.9%  ml infusion, 12.5-200 mcg/hr, Intravenous, Continuous  propofol injection, 5-50 mcg/kg/min, Intravenous, Titrated  [Held by provider] amLODIPine (NORVASC) tablet 5 mg, 5 mg, Oral, BID  piperacillin-tazobactam (ZOSYN) 3,375 mg in dextrose 5 % 50 mL IVPB extended infusion (mini-bag), 3,375 mg, Intravenous, Q12H  0.9 % sodium chloride infusion, , Intravenous, Q12H  [Held by provider] pentoxifylline (TRENTAL) extended release tablet 400 mg, 400 mg, Oral, BID  ipratropium-albuterol (DUONEB) nebulizer solution 1 ampule, 1 ampule, Inhalation, 4x daily  nystatin (MYCOSTATIN) 122473 UNIT/ML suspension 500,000 Units, 5 mL, Oral, 4x Daily  glucose (GLUTOSE) 40 % oral gel 15 g, 15 g, Oral, PRN  dextrose 50 % IV solution, 12.5 g, Intravenous, PRN  glucagon (rDNA) injection 1 mg, 1 mg, Intramuscular, PRN  dextrose 5 % solution, 100 mL/hr, Intravenous, PRN  acetaminophen (TYLENOL) tablet 500 mg, 500 mg, Oral, 4x Daily PRN  aspirin chewable tablet 81 mg, 81 mg, Oral, Daily  atorvastatin (LIPITOR) tablet 80 mg, 80 mg, Oral, Daily  clopidogrel (PLAVIX) tablet 75 mg, 75 mg, Oral, Daily  levothyroxine (SYNTHROID) tablet 88 mcg, 88 mcg, Oral, Daily  sodium chloride flush 0.9 % injection 10 mL, 10 mL, Intravenous, 2 times per day  sodium chloride flush 0.9 % injection 10 mL, 10 mL, Intravenous, PRN  promethazine (PHENERGAN) tablet 12.5 mg, 12.5 mg, Oral, Q6H PRN **OR** ondansetron (ZOFRAN) injection 4 mg, 4 mg, Intravenous, Q6H PRN  polyethylene glycol (GLYCOLAX) packet 17 g, 17 g, Oral, Daily PRN  [DISCONTINUED] acetaminophen (TYLENOL) tablet 650 mg, 650 mg, Oral, Q6H PRN **OR** acetaminophen (TYLENOL) suppository 650 mg, 650 mg, Rectal, Q6H PRN  Physical    VITALS:  /61   Pulse 85   Temp 99.2 °F (37.3 °C) (Axillary)   Resp 28   Ht 5' (1.524 m)   Wt 154 lb (69.9 kg)   SpO2 91%   BMI 30.08 kg/m²   Patient is sedated and ventilated Normocephalic, without obvious abnormality, atraumatic, external ears without lesions,   Neck  cervical lymphadenopathy with limited exam for motion due to ET tube  Heart has a regular rate and rhythm no murmur  Lungs are clear to auscultation bilaterally with equal movements with the additional sounds of transmitted airway sounds from the ventilator. Abdomen is soft nontender nondistended no rebound or guarding. anasarca  No significant rashes or new skin lesions. Affect and neuro exam limited since she is sedated on the ventilator      Data    CBC:   Lab Results   Component Value Date    WBC 6.1 04/01/2021    RBC 3.23 04/01/2021    HGB 9.2 04/01/2021    HCT 27.0 04/01/2021    MCV 83.6 04/01/2021    MCH 28.5 04/01/2021    MCHC 34.1 04/01/2021    RDW 14.8 04/01/2021     04/01/2021    MPV 11.9 04/01/2021     CMP:    Lab Results   Component Value Date     04/01/2021    K 4.1 04/01/2021    K 4.2 03/21/2021    CL 97 04/01/2021    CO2 25 04/01/2021    BUN 52 04/01/2021    CREATININE 2.7 04/01/2021    GFRAA 22 04/01/2021    LABGLOM 22 04/01/2021    GLUCOSE 175 04/01/2021    GLUCOSE 419 03/21/2011    PROT 4.8 03/24/2021    LABALBU 2.2 03/24/2021    LABALBU 4.1 03/21/2011    CALCIUM 7.7 04/01/2021    BILITOT 0.2 03/24/2021    ALKPHOS 182 03/24/2021    AST 42 03/24/2021    ALT 32 03/24/2021       ASSESSMENT AND PLAN      Brief Summary of Stay:  Presented with shortness of breath. This is a readmission after being admitted prior with covid. Throughout the stay her respiratory status continued to deteriorate. She progressed from BiPAP to mechanical ventilation . Her renal function is compromised and she is now on dialysis. 1.  Acute respiratory failure with hypoxia:  Intubated. Continue to monitor. 2.  COVID-19 superimposed Pneumonia:  Decadron 4 mg IV every 8 per ICU physician    3. GAVIN on CKD: Now requiring dialysis. Nephro following. Monitor    4.  Air leak syndrome with Pneumomediastinum: mech

## 2021-04-01 NOTE — PROGRESS NOTES
[address] [facility]  [facility]  Progress Note    Chief Complaint   Patient presents with    Concern For COVID-19     positive covid, shortness of breath, EMS states 60% on room air. 94% on 15LPM NRB    Shortness of Breath   f/u covid    SUBJECTIVE:  In icu   On vent sedated  On HD  rij   Recent CT showed chronic lacunar infarcts   unbale to prone     Review of systems:  Unable to obtain due to mentation     OBJECTIVE:  BP (!) 109/52   Pulse 88   Temp 95.6 °F (35.3 °C) (Infrared)   Resp 28   Ht 5' (1.524 m)   Wt 154 lb (69.9 kg)   SpO2 92%   BMI 30.08 kg/m²   Temp  Av.2 °F (35.7 °C)  Min: 95.4 °F (35.2 °C)  Max: 97.6 °F (36.4 °C)  Constitutional:  The patient is on vent sedated swollen  Skin:    Warm and dry   HEENT:      AT/NC intubated  Chest:     DEC BS ANT B some wheeze ac75%  Cardiovascular:  Hr 80'sS1 and S2 are rhythmic and regular. Abdomen:   Positive bowel sounds to auscultation. Benign to palpation. ngt  Extremities:   Overall    edema.    CNS    sedated  Lines: pIV  ij hd cath 3/26   Musa yellow    Radiology:  Laboratory and Tests Review:  Lab Results   Component Value Date    WBC 6.1 2021    WBC 6.0 2021    WBC 2.2 (L) 2021    HGB 9.2 (L) 2021    HCT 27.0 (L) 2021    MCV 83.6 2021     (L) 2021     No results found for: [facility]  Lab Results   Component Value Date    ALT 32 2021    AST 42 (H) 2021    ALKPHOS 182 (H) 2021    BILITOT 0.2 2021     Lab Results   Component Value Date     2021    K 4.1 2021    K 4.2 2021    CL 97 2021    CO2 25 2021    BUN 52 2021    CREATININE 2.7 2021    CREATININE 3.1 2021    CREATININE 2.8 2021    GFRAA 22 2021    LABGLOM 22 2021    GLUCOSE 175 2021    GLUCOSE 419 2011    PROT 4.8 2021    LABALBU 2.2 2021    LABALBU 4.1 2011    CALCIUM 7.7 2021    BILITOT 0.2 03/24/2021    ALKPHOS 182 03/24/2021    AST 42 03/24/2021    ALT 32 03/24/2021     Lab Results   Component Value Date    CRP 0.9 (H) 03/16/2021    CRP 10.8 (H) 03/11/2021     Lab Results   Component Value Date    SEDRATE 40 (H) 03/16/2021    SEDRATE 44 (H) 03/11/2021     Recent Labs     03/31/21  0501   TRIG 220*     Lab Results   Component Value Date    CHOL 168 08/05/2020    TRIG 220 03/31/2021    HDL 45 08/05/2020    LDLCALC 87 08/05/2020    LABVLDL 36 08/05/2020     Lab Results   Component Value Date/Time    VITD25 27 (L) 03/11/2021 06:45 AM       Microbiology:   Recent Labs     03/29/21  1630   COVID19 DETECTED*     Lab Results   Component Value Date    BC 5 Days no growth 03/25/2021    BC 5 Days no growth 03/08/2021    BLOODCULT2 5 Days no growth 03/25/2021    BLOODCULT2 5 Days no growth 03/08/2021        ASSESSMENT/PLAN:  respiratory failure/ards/s/p rx covid on rx for hcap   s/p toci/convalescent plasma +covid 3/29  CKD URINARY RETENTION HAS GIPSON on hemo   Chronic lacunar infarcts in right caudate head - prominence of bilateral superior ophthalimc veins - possible cavernous sinus thrombosis?      Plan at least 8 days of atbx     fluconazole (DIFLUCAN) in 0.9 % sodium chloride IVPB 400 mg, Q24H  dexamethasone (DECADRON) injection 4 mg, Q8H  norepinephrine (LEVOPHED) 16 mg in dextrose 5 % 250 mL infusion, Continuous  doxycycline (VIBRAMYCIN) 100 mg in dextrose 5 % 100 mL IVPB, Q12H  vancomycin (VANCOCIN) intermittent dosing (placeholder), RX Placeholder  piperacillin-tazobactam (ZOSYN) 3,375 mg in dextrose 5 % 50 mL IVPB extended infusion (mini-bag), Q12H       Chelsy Wynne MD  4/1/2021  8:37 AM

## 2021-04-01 NOTE — PROGRESS NOTES
Patient not returning volumes cuff appears inflated but leak remains. ETT currently @ 22 cms at the lip advanced to 23 cms @the lip. Cuff reinflated and resecured. Patient now returning volumes.  RN aware

## 2021-04-01 NOTE — PROGRESS NOTES
[facility] MD Bryn Armenta MD Wilene Hasting, MD Marius Bertrand, MD Donnita Sin, CNP   Peg Nascimento, RAZA  Progress Note  4/1/2021    SUBJECTIVE:  We are following this patient for CKD stage IV on hemodialysis. 3/29 : Transferred to ICU bed 9 currently in prone position because of COVID-19 mechanical ventilation  Today with [facility] ICU attending Dr. Socrates Eldridge assess to perform acute dialysis treatment patient be scheduled for MR venogram with no contrast.  Patient was evaluated and case discussed with the nurse currently will be needing alternating supine and prone positions every 12 hours she remains on mechanical ventilation with sedation. Last dialysis was done yesterday    3/30 : seen in ICU this am .   Mechanically ventialted . Fio2 90   On small dose levo   Poor UO   No reported edema   D/w ICU staff     3/31 : partial HD yesterday due to Line malfunctioning . Will try cathflow . If line does not work then plan on IR exchange over guidewire   She continue to be mechanically ventilated , on 60 % fio2 when seeing her . Off pressors       4/1 : mechanically ventilated . fio2 80 . No pressors .  IR to replace HD line today         PROBLEM LIST:    Patient Active Problem List   Diagnosis    Diabetes mellitus type 2, insulin dependent ([facility] [address]    Hypothyroid    Hypertension    Hyperlipidemia    Hypertensive urgency    Acute respiratory failure with hypoxia (HCC)    COVID-19    Altered mental state    Goals of care, counseling/discussion    Palliative care by specialist        PAST MEDICAL HISTORY:    Past Medical History:   Diagnosis Date    Acid reflux     COVID-19     Hemodialysis patient ([facility] [address]     Hyperlipidemia     Hypertension     Hypothyroidism     S/P appendectomy     Type II or unspecified type diabetes mellitus without mention of complication, not stated as uncontrolled        MEDS (scheduled):   dexamethasone  4 mg Intravenous Q12H  heparin flush        fluconazole  400 mg Intravenous Q24H    midodrine  10 mg Per NG tube TID    polyvinyl alcohol  1 drop Both Eyes Q4H    atropine  0.4 mg Intravenous Once    Gabapentin  300 mg Oral Nightly    insulin glargine  9 Units Subcutaneous QAM    famotidine  10 mg Oral Daily    [Held by provider] carvedilol  3.125 mg Oral BID WC    insulin lispro  0-6 Units Subcutaneous Q6H    sodium bicarbonate  1,300 mg Per NG tube TID    doxycycline (VIBRAMYCIN) IV  100 mg Intravenous Q12H    heparin (porcine)  5,000 Units Subcutaneous 3 times per day    vancomycin (VANCOCIN) intermittent dosing (placeholder)   Other RX Placeholder    [Held by provider] amLODIPine  5 mg Oral BID    piperacillin-tazobactam  3,375 mg Intravenous Q12H    [Held by provider] pentoxifylline  400 mg Oral BID    ipratropium-albuterol  1 ampule Inhalation 4x daily    nystatin  5 mL Oral 4x Daily    aspirin  81 mg Oral Daily    atorvastatin  80 mg Oral Daily    clopidogrel  75 mg Oral Daily    levothyroxine  88 mcg Oral Daily    sodium chloride flush  10 mL Intravenous 2 times per day       MEDS (infusions):   sodium chloride      norepinephrine Stopped (04/01/21 0000)    cisatracurium (NIMBEX) infusion Stopped (03/29/21 2249)    fentaNYL 5 mcg/ml in 0.9%  ml infusion 125 mcg/hr (04/01/21 1234)    propofol 30 mcg/kg/min (04/01/21 0824)    sodium chloride Stopped (04/01/21 1237)    dextrose Stopped (03/24/21 0626)       MEDS (prn):  sodium chloride, [Held by provider] hydrOXYzine, [Held by provider] hydrALAZINE, glucose, dextrose, glucagon (rDNA), dextrose, acetaminophen, sodium chloride flush, promethazine **OR** ondansetron, polyethylene glycol, [DISCONTINUED] acetaminophen **OR** acetaminophen    DIET:    DIET TUBE FEED CONTINUOUS/CYCLIC NPO; Renal Formula; Nasogastric; 10; 50      PHYSICAL EXAM:      Patient Vitals for the past 24 hrs:   BP Temp Temp src Pulse Resp SpO2   04/01/21 1546 137/61   85  91 %   04/01/21 1532 (!) 149/63   83  90 %   04/01/21 1517 139/60   84  93 %   04/01/21 1502 137/62   84 28 92 %   04/01/21 1456  99.2 °F (37.3 °C) Axillary 85  92 %   04/01/21 1446 137/60   84  91 %   04/01/21 1431 (!) 131/59   85  92 %   04/01/21 1416 (!) 132/59   86  92 %   04/01/21 1400 (!) 128/59   86  92 %   04/01/21 1300 (!) 133/59   81  93 %   04/01/21 1200 (!) 140/64 97.6 °F (36.4 °C) Axillary 82 28 94 %   04/01/21 1100 (!) 135/58   86 28 92 %   04/01/21 1000 116/61   99 28 (!) 85 %   04/01/21 0900 (!) 124/59   81 28 93 %   04/01/21 0800 (!) 127/57 97.2 °F (36.2 °C) Axillary 81 26 93 %   04/01/21 0700 (!) 109/52   88 28 92 %   04/01/21 0600 (!) 129/59   81 28 94 %   04/01/21 0500 134/62   79 28 95 %   04/01/21 0400 130/61 95.6 °F (35.3 °C) Infrared 79 28 94 %   04/01/21 0300 124/61   80 28 94 %   04/01/21 0200 131/60   79 28 94 %   04/01/21 0100 136/61   80 28 93 %   04/01/21 0000 (!) 159/69 95.5 °F (35.3 °C) Infrared 81 28 94 %   03/31/21 2300 (!) 161/72   82 28 93 %   03/31/21 2200 139/63   79 28 93 %   03/31/21 2100 (!) 104/53   80 30 94 %   03/31/21 2000 (!) 96/49 95.6 °F (35.3 °C) Axillary 74 30 95 %   03/31/21 1900 (!) 110/57   77 30 96 %   03/31/21 1850 (!) 123/57 95.4 °F (35.2 °C)  75  93 %   03/31/21 1847 (!) 102/57   77  96 %   03/31/21 1845 (!) 102/57   77  96 %   03/31/21 1830 99/60   80  96 %   03/31/21 1815 (!) 86/56   83  95 %   03/31/21 1800 (!) 95/58   86 30 95 %   03/31/21 1745 (!) 90/58   85  96 %   03/31/21 1735 (!) 94/57        03/31/21 1730 (!) 94/57   85  95 %   03/31/21 1722 (!) 102/55   86  96 %   03/31/21 1715 (!) 84/54   86  94 %   03/31/21 1703 (!) 105/59 95.9 °F (35.5 °C)  86 30 94 %   03/31/21 1700 100/60   87 28 95 %   03/31/21 1645 (!) 98/51 96.7 °F (35.9 °C)  91 30 93 %          Intake/Output Summary (Last 24 hours) at 4/1/2021 1636  Last data filed at 4/1/2021 1456  Gross per 24 hour Intake 3211.65 ml   Output 2055 ml   Net 1156.65 ml       Wt Readings from Last 3 Encounters:   03/31/21 154 lb (69.9 kg)   03/08/21 117 lb (53.1 kg)   01/10/21 130 lb (59 kg)       General:  in no acute distress  HEENT: NC/AT, EOMI, sclera and conjunctiva are clear and anicteric. Mucous membranes moist.  Cardiovascular: regular rate and rhythm, no murmurs, gallops, or rubs  Respiratory:  Clear, no rales, rhochi, or wheezes  Gastrointestinal: soft, nontender, nondistended, NABS  Ext: no cyanosis, clubbing, or edema bilateral lower extremities  Neuro: awake, alert, oriented x3. Moves all 4 extremities. Cranial nerves II through XII grossly intact. Skin: dry, no rash      DATA:      Recent Labs     03/30/21  0503 03/31/21  0501 04/01/21  0633   WBC 2.2* 6.0 6.1   HGB 8.0* 6.8* 9.2*   HCT 25.4* 20.8* 27.0*   MCV 88.8 86.7 83.6    105* 102*     Recent Labs     03/30/21  0503 03/31/21  0501 04/01/21  0633    135 137   K 4.3 4.4 4.1   CL 96* 97* 97*   CO2 27 24 25   BUN 47* 58* 52*   CREATININE 2.8* 3.1* 2.7*       Iron studies:  Lab Results   Component Value Date    FERRITIN 613 03/16/2021     Bone disease:  Lab Results   Component Value Date    PTH 42 08/05/2020    MG 1.8 03/30/2021    PHOS 6.7 (H) 03/30/2021     Nutrition:No results found for: ALB    Microbiology      ASSESSMENT / RECOMMENDATIONS:     1. GAVIN on top of chronic disease stage IV with hemodialysis initiation     Continue HD support . IR to replace HD line today . Orders given to HD RN     2. Anemia:   Cont. epo and iv ferrlecit per orders    3. Secondary Hyperparathyroidism:   Cont vitamin d analog      4. Pneumomediastinum, currently being followed by ICU physicians and radiology x-rays.     5.  COVID-19    6   Respiratory failure which appear to be mainly due to COVID 19 Pneumonia with ARDS           Mary Grant MD  4/1/2021 Acute dialysis access

## 2021-04-01 NOTE — PROGRESS NOTES
Palliative Care Department  [phone]  Palliative Care Progress Note  Provider Timoteo Solano  [id number]  Hospital Day: 12  Date of Initial Consult: 3/30/2021  Referring Provider: Shagufta Gibson DO  Palliative Medicine was consulted for assistance with: Yusuf Cruz, family support    HPI:   Marquez Donahue is a 57 y.o. with a medical history of CKD, COVID infection, type 2 DM, hypothyroid, HTN, HLD, GERD who was admitted on 3/21/2021 from home with a CHIEF COMPLAINT of worsening SOB and hypoxia, leg swelling. ASSESSMENT/PLAN:     Pertinent Hospital Diagnoses      COVID-19 pneumonia with ARDS and possible superimposed HCAP   GAVIN on CKD   Acute hypoxic respiratory failure   Pneumomediastinum   Anemia of CKD      Palliative Care Encounter / Counseling Regarding Goals of Care  Please see detailed goals of care discussion as below   At this time, Marquez Donahue, Does Not have capacity for medical decision-making. Capacity is time limited and situation/question specific   During encounter flower Husain was surrogate medical decision-maker   Outcome of goals of care meeting: l;eft message for daughter  [facility] Code status Limited no to all but meds  o Over the past 40 years, despite advances in the protocol for Cardiopulmonary Resuscitation (CPR) outcomes for survival have not statistically changed. o The consensus is that 17% of all adult arrest patients survive to hospital discharge. Many factors lower a patients chance of survival, including advanced age, performance status, malignancy, and presence of multiple comorbidities. Greg Candelaria and colleagues examined medical data from [id number] Medicare patients 72 and older who underwent in-hospital CPR. 5 Both older age and [facility] were found to be associated with poorer survival rates.   o Patients 85 and older having only a 6% chance of surviving to hospital discharge.  o In general Cancer patients generally have an overall survival rate of only 6.2%. o Cancer patients whose hospital course followed a path of gradual deterioration showed a 0% survival rate.  o The presence of hepatic insufficiency, acute stroke, immunodeficiency, renal failure, or dialysis were associated with lower survival rates.  Advanced Directives: no POA or living will in [facility]   Surrogate/Legal NOK:  o Daughter Evie Acuna [phone]    Spiritual assessment: no spiritual distress identified  Bereavement and grief: to be determined  Referrals to: none today   SUBJECTIVE:     Current medical issues leading to Palliative Medicine involvement include   Active Hospital Problems    Diagnosis Date Noted    Goals of care, counseling/discussion [Z71.89]     Palliative care by specialist [Z51.5]     Altered mental state [R41.82] 03/29/2021    Acute respiratory failure with hypoxia ([facility] [address] [J96.01] 03/21/2021    COVID-19 [U07.1] 03/21/2021        Details of Conversation:  Spoke with bedside nurse, plan for dialysis line replacement today. Patient stable, not really improving. Nurse spoke with daughter and updated her, daughter still leaning towards no tracheostomy but has not made a firm decision. Will continue to follow along.     Physical Function:  PPS: 10    OBJECTIVE:   Prognosis: Guarded    Physical Exam:  BP (!) 140/64   Pulse 82   Temp 97.6 °F (36.4 °C) (Axillary)   Resp 28   Ht 5' (1.524 m)   Wt 154 lb (69.9 kg)   SpO2 94%   BMI 30.08 kg/m²   Patient observed from glass door  Constitutional:  NAD  Eyes: eyes closed  ENMT:  Normocephalic, atraumatic, ETT in place, dialysis catheter right neck  Lungs:  Mechanical respirations  Heart[de-identified]  RRR on monitor  :  brasher  Ext:  Not seen moving extremities  Skin:  no rashes on visible skin  Psych: unable to assess  Neuro:  Intubated/sedated    Objective data reviewed: labs, images, records, medication use, vitals and chart    Discussed patient and the plan of care with the other IDT members: Palliative Medicine IDT Team, Floor Nurse and Family    Time/Communication  Greater than 50% of time spent, total 5 minutes in counseling and coordination of care at the bedside regarding goals of care, diagnosis and prognosis and see above. Thank you for allowing Palliative Medicine to participate in the care of [facility].

## 2021-04-01 NOTE — PROGRESS NOTES
Pulmonary/Critical Care Progress Note    We are following patient for Covid19 pneumonia multilobar with respiratory failure ARDS    SUBJECTIVE:  60-year-old woman with advanced renal disease presented with Covid multilobar pneumonia initially doing well and being discharged unfortunately representing after short time with diffuse ARDS lung injury. Patient is a DNR CCA as well type II diabetic, hypothyroidism, hyperlipidemia and hypertension as well chronic kidney disease. Patient CT scans most recently have shown lacunar infarcts and cavernous sinus thrombosis. Patient has also embarked on dialysis support.     MEDICATIONS:   fluconazole  400 mg Intravenous Q24H    midodrine  10 mg Per NG tube TID    polyvinyl alcohol  1 drop Both Eyes Q4H    dexamethasone  4 mg Intravenous Q8H    atropine  0.4 mg Intravenous Once    Gabapentin  300 mg Oral Nightly    insulin glargine  9 Units Subcutaneous QAM    famotidine  10 mg Oral Daily    [Held by provider] carvedilol  3.125 mg Oral BID WC    insulin lispro  0-6 Units Subcutaneous Q6H    sodium bicarbonate  1,300 mg Per NG tube TID    doxycycline (VIBRAMYCIN) IV  100 mg Intravenous Q12H    heparin (porcine)  5,000 Units Subcutaneous 3 times per day    vancomycin (VANCOCIN) intermittent dosing (placeholder)   Other RX Placeholder    [Held by provider] amLODIPine  5 mg Oral BID    piperacillin-tazobactam  3,375 mg Intravenous Q12H    [Held by provider] pentoxifylline  400 mg Oral BID    ipratropium-albuterol  1 ampule Inhalation 4x daily    nystatin  5 mL Oral 4x Daily    aspirin  81 mg Oral Daily    atorvastatin  80 mg Oral Daily    clopidogrel  75 mg Oral Daily    levothyroxine  88 mcg Oral Daily    sodium chloride flush  10 mL Intravenous 2 times per day      sodium chloride      norepinephrine Stopped (04/01/21 0000)    cisatracurium (NIMBEX) infusion Stopped (03/29/21 2249)    fentaNYL 5 mcg/ml in 0.9%  ml infusion 125 mcg/hr (04/01/21 0136)    propofol 30 mcg/kg/min (04/01/21 0824)    sodium chloride 12.5 mL/hr at 04/01/21 1035    dextrose Stopped (03/24/21 0626)     sodium chloride, [Held by provider] hydrOXYzine, [Held by provider] hydrALAZINE, glucose, dextrose, glucagon (rDNA), dextrose, acetaminophen, sodium chloride flush, promethazine **OR** ondansetron, polyethylene glycol, [DISCONTINUED] acetaminophen **OR** acetaminophen    Review of Systems   Unable to perform ROS: Intubated       OBJECTIVE:  Vitals:    04/01/21 0900   BP: (!) 124/59   Pulse: 81   Resp:    Temp:    SpO2: 93%     FiO2 : 75 %  O2 Flow Rate (L/min): 50 L/min  O2 Device: Ventilator    PHYSICAL EXAM:  Constitutional: Chronically ill intubated tubes and lines in place right IJ David catheter subcutaneous emphysema over the neck still  Skin: Edema with some subcutaneous emphysema appears less than yesterday  HEENT: Normocephalic pupils are equal reactive tubes and lines in place still heavily sedated  Neck: Large with a subcutaneous emphysema trach is midline  Cardiovascular: Rate and rhythm are regular no gallop no murmur  Respiratory: Diminished to bases few crackles  Gastrointestinal: Soft bowel sounds present no peritoneal signs  Genitourinary: Deferred Musa catheter in place  Extremities: Generalized edema no cyanosis no signs palpable DVT  Neurological: Patient sedated not purposeful in her movements at this time  Psychological: Cannot be assessed    LABS:  WBC   Date Value Ref Range Status   04/01/2021 6.1 4.5 - 11.5 E9/L Final   03/31/2021 6.0 4.5 - 11.5 E9/L Final   03/30/2021 2.2 (L) 4.5 - 11.5 E9/L Final     Hemoglobin   Date Value Ref Range Status   04/01/2021 9.2 (L) 11.5 - 15.5 g/dL Final   03/31/2021 6.8 (L) 11.5 - 15.5 g/dL Final   03/30/2021 8.0 (L) 11.5 - 15.5 g/dL Final     Hematocrit   Date Value Ref Range Status   04/01/2021 27.0 (L) 34.0 - 48.0 % Final   03/31/2021 20.8 (L) 34.0 - 48.0 % Final   03/30/2021 25.4 (L) 34.0 - 48.0 % Final     MCV Date Value Ref Range Status   04/01/2021 83.6 80.0 - 99.9 fL Final   03/31/2021 86.7 80.0 - 99.9 fL Final   03/30/2021 88.8 80.0 - 99.9 fL Final     Platelets   Date Value Ref Range Status   04/01/2021 102 (L) 130 - 450 E9/L Final   03/31/2021 105 (L) 130 - 450 E9/L Final   03/30/2021 147 130 - 450 E9/L Final     Sodium   Date Value Ref Range Status   04/01/2021 137 132 - 146 mmol/L Final   03/31/2021 135 132 - 146 mmol/L Final   03/30/2021 133 132 - 146 mmol/L Final     Potassium   Date Value Ref Range Status   04/01/2021 4.1 3.5 - 5.0 mmol/L Final   03/31/2021 4.4 3.5 - 5.0 mmol/L Final   03/30/2021 4.3 3.5 - 5.0 mmol/L Final     Potassium reflex Magnesium   Date Value Ref Range Status   03/21/2021 4.2 3.5 - 5.0 mmol/L Final   03/09/2021 4.2 3.5 - 5.0 mmol/L Final     Chloride   Date Value Ref Range Status   04/01/2021 97 (L) 98 - 107 mmol/L Final   03/31/2021 97 (L) 98 - 107 mmol/L Final   03/30/2021 96 (L) 98 - 107 mmol/L Final     CO2   Date Value Ref Range Status   04/01/2021 25 22 - 29 mmol/L Final   03/31/2021 24 22 - 29 mmol/L Final   03/30/2021 27 22 - 29 mmol/L Final     BUN   Date Value Ref Range Status   04/01/2021 52 (H) 8 - 23 mg/dL Final   03/31/2021 58 (H) 8 - 23 mg/dL Final   03/30/2021 47 (H) 8 - 23 mg/dL Final     CREATININE   Date Value Ref Range Status   04/01/2021 2.7 (H) 0.5 - 1.0 mg/dL Final   03/31/2021 3.1 (H) 0.5 - 1.0 mg/dL Final   03/30/2021 2.8 (H) 0.5 - 1.0 mg/dL Final     Glucose   Date Value Ref Range Status   04/01/2021 175 (H) 74 - 99 mg/dL Final   03/31/2021 181 (H) 74 - 99 mg/dL Final   03/30/2021 163 (H) 74 - 99 mg/dL Final   03/21/2011 419 (H) 70 - 110 mg/dL Final     Calcium   Date Value Ref Range Status   04/01/2021 7.7 (L) 8.6 - 10.2 mg/dL Final   03/31/2021 7.4 (L) 8.6 - 10.2 mg/dL Final   03/30/2021 7.8 (L) 8.6 - 10.2 mg/dL Final     Total Protein   Date Value Ref Range Status   03/24/2021 4.8 (L) 6.4 - 8.3 g/dL Final   03/21/2021 5.1 (L) 6.4 - 8.3 g/dL Final   03/15/2021 03/26/2021 6.3 (H) 2.5 - 4.5 mg/dL Final   03/11/2021 7.9 (H) 2.5 - 4.5 mg/dL Final     Recent Labs     03/30/21  0009   PH 7.213*   PO2 64.6*   PCO2 59.5*   HCO3 23.4   BE -4.6*   O2SAT 90.1*       RADIOLOGY:  XR CHEST PORTABLE   Final Result   Extensive bilateral infiltrates         XR CHEST PORTABLE   Final Result   No interval change in extensive multifocal bilateral airspace disease. XR CHEST PORTABLE   Final Result   Stable abnormal chest with diffuse bilateral infiltrates/consolidation which   may be due to diffuse pneumonia and or edema. MRV HEAD WO CONTRAST   Final Result   1. Significant limitation to this examination due to motion. 2.  No evidence of intracranial ischemia, gross edema, or hemorrhage. 3.  Limited MRA head due to motion. No evidence of intracranial arterial   occlusion. Subtle stenosis cannot be excluded. Follow-up CTA head or repeat   MRA head may be helpful for further evaluation. 4.  Limited MRV due to motion, however no findings to suggest dural venous   sinus thrombosis. 5.  Acute maxillary and sphenoid sinusitis as well as findings to suggest   bilateral mastoiditis. MRI BRAIN WO CONTRAST   Final Result   1. Significant limitation to this examination due to motion. 2.  No evidence of intracranial ischemia, gross edema, or hemorrhage. 3.  Limited MRA head due to motion. No evidence of intracranial arterial   occlusion. Subtle stenosis cannot be excluded. Follow-up CTA head or repeat   MRA head may be helpful for further evaluation. 4.  Limited MRV due to motion, however no findings to suggest dural venous   sinus thrombosis. 5.  Acute maxillary and sphenoid sinusitis as well as findings to suggest   bilateral mastoiditis. MRA HEAD WO CONTRAST   Final Result   1. Significant limitation to this examination due to motion. 2.  No evidence of intracranial ischemia, gross edema, or hemorrhage.       3. Limited MRA head due to motion. No evidence of intracranial arterial   occlusion. Subtle stenosis cannot be excluded. Follow-up CTA head or repeat   MRA head may be helpful for further evaluation. 4.  Limited MRV due to motion, however no findings to suggest dural venous   sinus thrombosis. 5.  Acute maxillary and sphenoid sinusitis as well as findings to suggest   bilateral mastoiditis. XR CHEST PORTABLE   Final Result   Decreasing pneumo mediastinum      Remainder unchanged. CT HEAD W WO CONTRAST   Final Result   1. No acute intracranial abnormality. 2. Chronic lacunar infarct in the right caudate head. 3. Prominence of bilateral superior ophthalmic veins, more conspicuous since   the previous exam.  If there is concern for a cavernous carotid fistula,   catheter angiography could be performed for further evaluation. XR CHEST PORTABLE   Final Result   Slightly worsened pneumomediastinum and cervical emphysema. Invasive lines and tubes appear satifactory unchanged. Unchanged diffuse pulmonary consolidation, suspect ARDS/pneumonia. XR CHEST PORTABLE   Final Result   Stable pattern of pneumomediastinum and emphysema in the lower neck. Supportive tubing is in normal position. Stable pattern of extensive bilateral airspace disease, pneumonia or ARDS. XR CHEST PORTABLE   Final Result   Unchanged extensive infiltrates and pneumomediastinum. XR CHEST PORTABLE   Final Result   Right internal jugular line tip in the proximal SVC. No sizable pneumothorax. Stable diffuse and bilateral airspace opacities. Subcutaneous air involving both sides of the neck right greater than left. Probable pneumomediastinum. XR CHEST PORTABLE   Final Result   Right IJ catheter in satisfactory position, no complicating pneumothorax.       Diffuse parenchymal edema unchanged         XR CHEST PORTABLE   Final Result   Extensive bilateral lung infiltrates are similar to subtly improved. Continued follow-up recommended. XR CHEST PORTABLE   Final Result   Endotracheal tube terminates approximately 1 cm above the sylvia and should   be retracted approximately 2 cm for more optimal positioning. Extensive bilateral diffuse lung infiltrates are similar to previous and may   represent severe edema, pneumonia or ARDS. XR CHEST PORTABLE   Final Result   ET tube tip at the entrance of the right mainstem bronchus and should be   retracted 2.0 cm. Diffuse bilateral infiltrates. Enteric tube tip in the distal body of the stomach. XR CHEST PORTABLE   Final Result   Stable diffuse and bilateral infiltrates. ET tube tip is at the entrance of the right mainstem bronchus and should be   retracted at least 2.0 cm. XR CHEST PORTABLE   Final Result   Diffuse and bilateral airspace opacities. Findings consistent with extensive   pulmonary edema versus pneumonia. NM LUNG SCAN PERFUSION ONLY   Final Result   Low probability for pulmonary embolism. Small perfusion defects correspond   to the opacities in the mid lungs bilaterally. US DUP LOWER EXTREMITIES BILATERAL VENOUS   Final Result   No evidence of DVT in either lower extremity. CT Head WO Contrast   Final Result   No skull fracture or acute intracranial abnormality. CT CHEST WO CONTRAST   Final Result   Extensive airspace opacities throughout both lungs which is most prominent   along the upper lobes and perihilar regions and could pulmonary edema and/or   pneumonia vs pulmonary hemorrhage. Recommend short-term follow-up. Prominent central pulmonary vessels suggestive of pulmonary congestion or   pulmonary artery hypertension. Small pericardial effusion with no mediastinal mass or adenopathy. Tiny right pleural effusion.       Questionable mild ascites along the upper abdomen         XR CHEST PORTABLE   Final Result   Mild cardiomegaly and mild pulmonary edema which is more prominent. Hazy perihilar and bibasilar opacities which have increased and could be due   to pulmonary edema and/or pneumonia. Recommend follow-up. Resolving left upper lobe opacity. IR Interventional Radiology Procedure Request    (Results Pending)   IR Interventional Radiology Procedure Request    (Results Pending)           PROBLEM LIST:  Principal Problem:    Altered mental state  Active Problems:    Acute respiratory failure with hypoxia ([address]    COVID-19    Goals of care, counseling/discussion    Palliative care by specialist  Resolved Problems:    * No resolved hospital problems. *      IMPRESSION:  1. HWAWT38 multifocal pneumonia with ARDS  2. Hypoxic respiratory failure making some improvement now with 75% with 8 of PEEP  3. Subcutaneous emphysema with mediastinal components no obvious pneumothorax  4. Type 2 diabetes  5. Acute on chronic kidney disease now supported by dialysis    PLAN:  1. Continue weaning FiO2  2. Continue with Covid measures and steroids respiratory treatments  3. Nutritional support  4. Try to wean PEEP in conjunction with her FiO2  5. Family is aware of her grave prognosis and patient is also being followed by palliative care    ATTESTATION:  ICU Staff Physician note of personal involvement in Care  As the attending physician, I certify that I personally reviewed the patients history and personnally examined the patient to confirm the physical findings described above,  And that I reviewed the relevant imaging studies and available reports. I also discussed the differential diagnosis and all of the proposed management plans with the patient and individuals accompanying the patient to this visit. They had the opportunity to ask questions about the proposed management plans and to have those questions answered.      This patient has a high probability of sudden, clinically significant deterioration, which requires the highest level of physician preparedness to intervene urgently. I managed/supervised life or organ supporting interventions that required frequent physician assessment. I devoted my full attention to the direct care of this patient for the amount of time indicated below. Time I spent with the family or surrogate(s) is included only if the patient was incapable of providing the necessary information or participating in medical decisions  Time devoted to teaching and to any procedures I billed separately is not included.     CRITICAL CARE TIME:  40 minutes    Electronically signed by Chris Webb DO on 4/1/2021 at 10:52 AM

## 2021-04-01 NOTE — PROGRESS NOTES
Pharmacy Consultation Note  (Antibiotic Dosing and Monitoring)    Initial consult date: 3/26/21  Consulting physician: Dr. Sean Peters  Drug(s): Vancomycin  Indication: Empiric    Ht Readings from Last 1 Encounters:   21 5' (1.524 m)     Wt Readings from Last 1 Encounters:   21 154 lb (69.9 kg)     Age/  Gender IBW DW  Allergy Information   57 y.o.   female 45.5 kg 61.6 kg  Demerol, Peanut-containing drug products, and Toradol [ketorolac tromethamine]          Date  WBC BUN/CR HD Drug/Dose Time   Given Level(s)   (Time) Comments   3/26  (#1) 13.2 90/3.9 HD x 3.75h -- --     3/27  (#2) 7.3 50/2.4 HD x 2h No dose -- 22.1 mcg/mL @ 0511  15.3 mcg/mL @ 1623    3/28  (#3) 6.9 45/2.4 HD x 2h Vancomycin 500 mg IV x 1 1840     3/29  (#4) -- 34/2.2 No HD No dose --     3/30  (#5) 2.2 47/2.8 HD x 32 minutes Vancomycin 500 mg IV x 1 after HD 2100 Random level @ 0503 = 17.8 mcg/mL    3/31  (#6) 6 58/3.1 HD x 180 minutes No dose --       (#7) 6.1 52/2.7  No dose -- Random level @ 0633 = 18.4 mcg/mL      (#8)                                Estimated Creatinine Clearance: 19 mL/min (A) (based on SCr of 2.7 mg/dL (H)). UOP over the past 24 hours:       Intake/Output Summary (Last 24 hours) at 2021 1249  Last data filed at 2021 1127  Gross per 24 hour   Intake 3114.65 ml   Output 2050 ml   Net 1064.65 ml       Temp max: Temp (24hrs), Av.3 °F (35.7 °C), Min:95.4 °F (35.2 °C), Max:97.6 °F (36.4 °C)      Antibiotic Regimen:  Antibiotic Dose Date Initiated   Doxycycline 100 mg IV Q12H 3/26   Pip/tazo 3.375 g Q12H 3/23     Cultures:  available culture and sensitivity results were reviewed in Epic   Culture Date Result    Blood x 2 3/25 NGTD   Strep/legionella urine antigens 3/26 Negative     Assessment:  · Consulted by Dr. Sean Peters to dose/monitor vancomycin  · Goal trough level:  15-20 mcg/mL  · Pt is a 57 yof admitted to the hospital for acute respiratory failure 2/2 COVID-19 and CKD.  1600  was intubated on 3/25 and transferred to the ICU. A HD cath has been placed with plans to start dialysis today.   · Serum creatinine today: 2.7; CrCl < 20 mL/min; started dialysis on 3/26    Plan:  · Random AM level today = 18.4 mcg/mL  · Pt for HD catheter replacement today  · No dose today  · Check random level tomorrow AM and re-dose if needed  · Levels PRN  · Follow HD orders  · Pharmacist will follow and monitor/adjust dosing as necessary      Thank you for the consult,    Neetu Isabel, PharmD, BCPS 4/1/2021 12:49 PM   Ext: [phone]

## 2021-04-01 NOTE — CARE COORDINATION
4/1/21 Positive covid 3/9 prior to this admission. Cm transition of care: icu/vent/hd (new)/sedation/isolation. Pt for recannulation of her HD cath- wasn't working properly. Dr and nursing to speak to family about pts current severity of illness and her Ronda Emely prognosis\". Patient is limited code with meds only- Palliative medicine following. [facility] continues to follow. CM/SS following.   Electronically signed by Chuck Patel RN on 4/1/2021 at 11:52 AM

## 2021-04-02 NOTE — CARE COORDINATION
4/2/21 Cm received a phone call from [facility] pts daughter. She stated she spoke to Dr. Valentín Crews yesterday, and she spoke to \"a doctor\" today and Dr. Yonas Dave with Palliative care. [address] [place] [address] and her family are requesting transfer to [facility] where her other family members get care. Relayed to nursing to follow up with [place] once they speak to Dr. Delmy Ruiz- intensive care doctor on today.   Electronically signed by Nataly Lucas RN-BC on 4/2/2021 at 3:28 PM

## 2021-04-02 NOTE — PROGRESS NOTES
Palliative Care Department  [phone]  Palliative Care Progress Note  Provider Timoteo Solano  [id number]  Hospital Day: 15  Date of Initial Consult: 3/30/2021  Referring Provider: Gisella Seo DO  Palliative Medicine was consulted for assistance with: Sean Colindres, family support    HPI:   Little Mackay is a 57 y.o. with a medical history of CKD, COVID infection, type 2 DM, hypothyroid, HTN, HLD, GERD who was admitted on 3/21/2021 from home with a CHIEF COMPLAINT of worsening SOB and hypoxia, leg swelling. ASSESSMENT/PLAN:     Pertinent Hospital Diagnoses      COVID-19 pneumonia with ARDS and possible superimposed HCAP   GAVIN on CKD   Acute hypoxic respiratory failure   Pneumomediastinum   Anemia of CKD      Palliative Care Encounter / Counseling Regarding Goals of Care  Please see detailed goals of care discussion as below   At this time, Little Mackay, Does Not have capacity for medical decision-making. Capacity is time limited and situation/question specific   During encounter flower Tate was surrogate medical decision-maker   Outcome of goals of care meeting: l;eft message for daughter  [place] Code status Limited no to all but meds  o Over the past 40 years, despite advances in the protocol for Cardiopulmonary Resuscitation (CPR) outcomes for survival have not statistically changed. o The consensus is that 17% of all adult arrest patients survive to hospital discharge. Many factors lower a patients chance of survival, including advanced age, performance status, malignancy, and presence of multiple comorbidities. Allison Sy and colleagues examined medical data from 167,331 Medicare patients 72 and older who underwent in-hospital CPR. 5 Both older age and prior [facility] a [facility] were found to be associated with poorer survival rates.   o Patients 85 and older having only a 6% chance of surviving to hospital discharge.  o In general Cancer patients generally have Discussed patient and the plan of care with the other IDT members: Palliative Medicine IDT Team, Floor Nurse and Family    Time/Communication  Greater than 50% of time spent, total 25 minutes in counseling and coordination of care at the bedside regarding goals of care, diagnosis and prognosis and see above. Thank you for allowing Palliative Medicine to participate in the care of [place] .

## 2021-04-02 NOTE — PROGRESS NOTES
[facility] MD Perlita Castrejon, MD Jess Renteria, MD Julia Burkett, MD Can Jara, CNP   Peg Nascimento, RAZA  Progress Note  4/2/2021    SUBJECTIVE:  We are following this patient for CKD stage IV on hemodialysis. 3/29 : Transferred to ICU bed 9 currently in prone position because of COVID-19 mechanical ventilation  Today with [facility] ICU attending Dr. Willingham Ek assess to perform acute dialysis treatment patient be scheduled for MR venogram with no contrast.  Patient was evaluated and case discussed with the nurse currently will be needing alternating supine and prone positions every 12 hours she remains on mechanical ventilation with sedation. Last dialysis was done yesterday    3/30 : seen in ICU this am .   Mechanically ventialted . Fio2 90   On small dose levo   Poor UO   No reported edema   D/w ICU staff     3/31 : partial HD yesterday due to Line malfunctioning . Will try cathflow . If line does not work then plan on IR exchange over guidewire   She continue to be mechanically ventilated , on 60 % fio2 when seeing her . Off pressors       4/1 : mechanically ventilated . fio2 80 . No pressors . IR to replace HD line today     4/2 : mechainically ventilated , high 02 reuqirment . critiically sick . High amount of o2 .  It appear covid pneumonia is the main cultprit   D/w HD RN not able to do a lot of UF     PROBLEM LIST:    Patient Active Problem List   Diagnosis    Diabetes mellitus type 2, insulin dependent ([address]    Hypothyroid    Hypertension    Hyperlipidemia    Hypertensive urgency    Acute respiratory failure with hypoxia ([address]    COVID-19    Altered mental state    Goals of care, counseling/discussion    Palliative care by specialist        PAST MEDICAL HISTORY:    Past Medical History:   Diagnosis Date    Acid reflux     COVID-19     Hemodialysis patient ([address]     Hyperlipidemia     Hypertension     Hypothyroidism     S/P appendectomy     Type II or unspecified type diabetes mellitus without mention of complication, not stated as uncontrolled        MEDS (scheduled):   dexamethasone  4 mg Intravenous Q12H    fluconazole  400 mg Intravenous Q24H    polyvinyl alcohol  1 drop Both Eyes Q4H    atropine  0.4 mg Intravenous Once    Gabapentin  300 mg Oral Nightly    insulin glargine  9 Units Subcutaneous QAM    famotidine  10 mg Oral Daily    [Held by provider] carvedilol  3.125 mg Oral BID WC    insulin lispro  0-6 Units Subcutaneous Q6H    sodium bicarbonate  1,300 mg Per NG tube TID    heparin (porcine)  5,000 Units Subcutaneous 3 times per day    vancomycin (VANCOCIN) intermittent dosing (placeholder)   Other RX Placeholder    [Held by provider] amLODIPine  5 mg Oral BID    piperacillin-tazobactam  3,375 mg Intravenous Q12H    [Held by provider] pentoxifylline  400 mg Oral BID    ipratropium-albuterol  1 ampule Inhalation 4x daily    nystatin  5 mL Oral 4x Daily    aspirin  81 mg Oral Daily    atorvastatin  80 mg Oral Daily    clopidogrel  75 mg Oral Daily    levothyroxine  88 mcg Oral Daily    sodium chloride flush  10 mL Intravenous 2 times per day       MEDS (infusions):   sodium chloride      norepinephrine Stopped (04/01/21 0000)    propofol 40 mcg/kg/min (04/02/21 1344)    sodium chloride Stopped (04/02/21 1313)    dextrose Stopped (03/24/21 0626)       MEDS (prn):  midodrine, sodium chloride, [Held by provider] hydrOXYzine, [Held by provider] hydrALAZINE, glucose, dextrose, glucagon (rDNA), dextrose, acetaminophen, sodium chloride flush, promethazine **OR** ondansetron, polyethylene glycol, [DISCONTINUED] acetaminophen **OR** acetaminophen    DIET:    DIET TUBE FEED CONTINUOUS/CYCLIC NPO; Renal Formula; Nasogastric; 10; 50      PHYSICAL EXAM:      Patient Vitals for the past 24 hrs:   BP Temp Temp src Pulse Resp SpO2 Weight   04/02/21 1600 (!) 119/58 97.2 °F (36.2 °C) Axillary 80 (!) 33 94 %  04/02/21 1500 125/60   82 29 93 %    04/02/21 1400 (!) 120/56   76 28 93 %    04/02/21 1300 125/60   80 29 93 %    04/02/21 1221 125/64 96.9 °F (36.1 °C)  81 (!) 34 92 % 157 lb 13.6 oz (71.6 kg)   04/02/21 1215 103/65   79      04/02/21 1200 125/64 96.9 °F (36.1 °C) Axillary 77 (!) 32     04/02/21 1145 116/63   75      04/02/21 1130 (!) 135/56   77      04/02/21 1115 (!) 140/62   85      04/02/21 1100 119/76   76 30     04/02/21 1045 119/63   75      04/02/21 1023 (!) 115/57   77      04/02/21 1020 82/63   79      04/02/21 1015 90/62   78      04/02/21 1000 98/61   79 28 94 %    04/02/21 0945 (!) 93/58   81      04/02/21 0930 117/61   85      04/02/21 0915 (!) 135/58   83      04/02/21 0900 125/65   80 28 93 %    04/02/21 0852 (!) 148/68   71      04/02/21 0840 134/66 96.6 °F (35.9 °C)   (!) 33  159 lb 6.3 oz (72.3 kg)   04/02/21 0800 (!) 145/68 96.4 °F (35.8 °C) Axillary 71 28 (!) 87 %    04/02/21 0700 131/62   74 28 (!) 87 %    04/02/21 0600 131/62 95.1 °F (35.1 °C) Axillary 72 28 (!) 89 %    04/02/21 0500 136/65   65 28 90 %    04/02/21 0400 (!) 142/70 96.9 °F (36.1 °C) Axillary 63 28 90 %    04/02/21 0300 137/63   69 28 91 %    04/02/21 0200 (!) 154/70   70 28 94 %    04/02/21 0100 (!) 157/70   70 28 91 %    04/02/21 0000 (!) 162/70 97.4 °F (36.3 °C) Axillary 71 28 91 %    04/01/21 2300 (!) 204/77   63 28 96 %    04/01/21 2200 (!) 193/69   64 28 96 %    04/01/21 2100 (!) 199/72   67 28 97 %    04/01/21 2000 (!) 189/71 97.5 °F (36.4 °C) Axillary 68 28 91 %    04/01/21 1937    70  91 %    04/01/21 1900 (!) 173/72   74 28 92 %    04/01/21 1800 (!) 157/65   79 28 91 %    04/01/21 1700 (!) 154/69   77 28 91 %           Intake/Output Summary (Last 24 hours) at 4/2/2021 1606  Last data filed at 4/2/2021 1345  Gross per 24 hour   Intake 2668 ml   Output 1196 ml   Net 1472 ml       Wt Readings from Last 3 Encounters:   04/02/21 157 lb 13.6 oz (71.6 kg)   03/08/21 117 lb (53.1 kg)   01/10/21 130 lb (59 kg)       General:  in no acute distress  HEENT: NC/AT, EOMI, sclera and conjunctiva are clear and anicteric. Mucous membranes moist.  Cardiovascular: regular rate and rhythm, no murmurs, gallops, or rubs  Respiratory:  Clear, no rales, rhochi, or wheezes  Gastrointestinal: soft, nontender, nondistended, NABS  Ext: no cyanosis, clubbing, or edema bilateral lower extremities  Neuro: awake, alert, oriented x3. Moves all 4 extremities. Cranial nerves II through XII grossly intact. Skin: dry, no rash      DATA:      Recent Labs     03/31/21  0501 04/01/21  0633 04/02/21  0508   WBC 6.0 6.1 9.5   HGB 6.8* 9.2* 9.9*   HCT 20.8* 27.0* 29.4*   MCV 86.7 83.6 84.7   * 102* 124*     Recent Labs     03/31/21  0501 04/01/21  0633 04/02/21  0508    137 139   K 4.4 4.1 4.2   CL 97* 97* 97*   CO2 24 25 27   BUN 58* 52* 71*   CREATININE 3.1* 2.7* 3.4*       Iron studies:  Lab Results   Component Value Date    FERRITIN 613 03/16/2021     Bone disease:  Lab Results   Component Value Date    PTH 42 08/05/2020    MG 1.8 03/30/2021    PHOS 6.7 (H) 03/30/2021     Nutrition:No results found for: ALB    Microbiology      ASSESSMENT / RECOMMENDATIONS:     1. GAVIN on top of chronic disease stage IV with hemodialysis initiation     Continue HD support . IR to replace HD line today . Orders given to HD RN     2. Anemia:   Cont. epo and iv ferrlecit per orders    3. Secondary Hyperparathyroidism:   Cont vitamin d analog      4. Pneumomediastinum    5.   COVID-19 with ARDs which appear the main culprit for her Sx     6   Respiratory failure which appear to be mainly due to COVID 19 Pneumonia with ARDS           Jess Renteria MD  4/2/2021 Acute dialysis access

## 2021-04-02 NOTE — PLAN OF CARE
Problem: Airway Clearance - Ineffective  Goal: Achieve or maintain patent airway  Outcome: Met This Shift     Problem: Gas Exchange - Impaired  Goal: Promote optimal lung function  Outcome: Met This Shift     Problem:  Body Temperature -  Risk of, Imbalanced  Goal: Ability to maintain a body temperature within defined limits  Outcome: Met This Shift     Problem: Isolation Precautions - Risk of Spread of Infection  Goal: Prevent transmission of infection  Outcome: Met This Shift     Problem: Nutrition Deficits  Goal: Optimize nutritional status  Outcome: Met This Shift     Problem: Patient Education: Go to Patient Education Activity  Goal: Patient/Family Education  Outcome: Met This Shift     Problem: Falls - Risk of:  Goal: Will remain free from falls  Description: Will remain free from falls  Outcome: Met This Shift  Goal: Absence of physical injury  Description: Absence of physical injury  Outcome: Met This Shift     Problem: Skin Integrity:  Goal: Absence of new skin breakdown  Description: Absence of new skin breakdown  Outcome: Met This Shift  Goal: Status of oral mucous membranes will improve  Description: Status of oral mucous membranes will improve  Outcome: Met This Shift  Goal: Skin integrity will be maintained  Description: Skin integrity will be maintained  Outcome: Met This Shift     Problem: Non-Violent Restraints  Goal: No harm/injury to patient while restraints in use  Outcome: Met This Shift  Goal: Patient's dignity will be maintained  Outcome: Met This Shift     Problem: Cardiac:  Goal: Ability to maintain an adequate cardiac output will improve  Description: Ability to maintain an adequate cardiac output will improve  Outcome: Met This Shift  Goal: Hemodynamic stability will improve  Description: Hemodynamic stability will improve  Outcome: Met This Shift     Problem: Gas Exchange - Impaired  Goal: Absence of hypoxia  Outcome: Not Met This Shift     Problem: Breathing Pattern - Ineffective  Goal: Ability to achieve and maintain a regular respiratory rate  Outcome: Not Met This Shift     Problem:  Body Temperature -  Risk of, Imbalanced  Goal: Complications related to the disease process, condition or treatment will be avoided or minimized  Outcome: Not Met This Shift     Problem: Skin Integrity:  Goal: Will show no infection signs and symptoms  Description: Will show no infection signs and symptoms  Outcome: Not Met This Shift     Problem: Non-Violent Restraints  Goal: Removal from restraints as soon as assessed to be safe  Outcome: Not Met This Shift     Problem: Fluid Volume:  Goal: Will show no signs or symptoms of fluid imbalance  Description: Will show no signs or symptoms of fluid imbalance  Outcome: Not Met This Shift  Goal: Ability to achieve and maintain adequate urine output will improve  Description: Ability to achieve and maintain adequate urine output will improve  Outcome: Not Met This Shift     Problem: Physical Regulation:  Goal: Ability to maintain clinical measurements within normal limits will improve  Description: Ability to maintain clinical measurements within normal limits will improve  Outcome: Not Met This Shift  Goal: Complications related to the disease process, condition or treatment will be avoided or minimized  Description: Complications related to the disease process, condition or treatment will be avoided or minimized  Outcome: Not Met This Shift     Problem: Tissue Perfusion - Renal, Altered:  Goal: Ability to achieve a balanced intake and output will improve  Description: Ability to achieve a balanced intake and output will improve  Outcome: Not Met This Shift     Problem: Respiratory:  Goal: Respiratory status will improve  Description: Respiratory status will improve  Outcome: Not Met This Shift

## 2021-04-02 NOTE — FLOWSHEET NOTE
04/02/21 1221   Vital Signs   Pulse 81   Resp (!) 34   SpO2 92 %   Weight 157 lb 13.6 oz (71.6 kg)   Weight Method Bed scale   Percent Weight Change -0.97   Pain Assessment   Pain Assessment CPOT   Post-Hemodialysis Assessment   Post-Treatment Procedures Blood returned;Catheter capped, clamped and heparinized x 2 ports   Machine Disinfection Process Acid/Vinegar Clean;Exterior Machine Disinfection; Heat Disinfect   Dialyzer Clearance Lightly streaked   Duration of Treatment (minutes) 210 minutes   Hemodialysis Intake (ml) 300 ml   Hemodialysis Output (ml) 1136 ml   NET Removed (ml) 836 ml   Tolerated Treatment Fair   Patient Response to Treatment Tolerated U/F portion of tx poorly. Bp stabilized and sustained at baseline with suspension. Bilateral Breath Sounds Diminished   Edema Generalized;Right upper extremity; Left upper extremity;Right lower extremity   Edema Generalized +1   RUE Edema +1;Pitting   LUE Edema +1;Pitting   RLE Edema +1   LLE Edema +1

## 2021-04-02 NOTE — PROGRESS NOTES
Department of Internal Medicine  General Internal Medicine  Attending Progress Note  Chief Complaint   Patient presents with    Concern For COVID-19     positive covid, shortness of breath, EMS states 60% on room air. 94% on 15LPM NRB    Shortness of Breath     SUBJECTIVE:    Patient is currently intubated  And undergoing dialysis  3/29: did not tolerate proning. Mucous plug. Bagged. intensivist in room. D/w him.     OBJECTIVE      Medications    Current Facility-Administered Medications: midodrine (PROAMATINE) tablet 10 mg, 10 mg, Per NG tube, TID PRN  dexamethasone (DECADRON) injection 4 mg, 4 mg, Intravenous, Q12H  0.9 % sodium chloride infusion, 250 mL, Intravenous, PRN  fluconazole (DIFLUCAN) in 0.9 % sodium chloride IVPB 400 mg, 400 mg, Intravenous, Q24H  polyvinyl alcohol (LIQUIFILM TEARS) 1.4 % ophthalmic solution 1 drop, 1 drop, Both Eyes, Q4H  atropine injection 0.4 mg, 0.4 mg, Intravenous, Once  norepinephrine (LEVOPHED) 16 mg in dextrose 5 % 250 mL infusion, 2-100 mcg/min, Intravenous, Continuous  Gabapentin SOLN 300 mg, 300 mg, Oral, Nightly  insulin glargine (LANTUS) injection vial 9 Units, 9 Units, Subcutaneous, QAM  famotidine (PEPCID) tablet 10 mg, 10 mg, Oral, Daily  [Held by provider] carvedilol (COREG) tablet 3.125 mg, 3.125 mg, Oral, BID WC  insulin lispro (HUMALOG) injection vial 0-6 Units, 0-6 Units, Subcutaneous, Q6H  sodium bicarbonate tablet 1,300 mg, 1,300 mg, Per NG tube, TID  heparin (porcine) injection 5,000 Units, 5,000 Units, Subcutaneous, 3 times per day  vancomycin (VANCOCIN) intermittent dosing (placeholder), , Other, RX Placeholder  [Held by provider] hydrOXYzine (VISTARIL) capsule 25 mg, 25 mg, Oral, 4x Daily PRN  [Held by provider] hydrALAZINE (APRESOLINE) injection 10 mg, 10 mg, Intravenous, Q4H PRN  fentaNYL 5 mcg/ml in 0.9%  ml infusion, 12.5-200 mcg/hr, Intravenous, Continuous  propofol injection, 5-50 mcg/kg/min, Intravenous, Titrated  [Held by provider] amLODIPine (NORVASC) tablet 5 mg, 5 mg, Oral, BID  piperacillin-tazobactam (ZOSYN) 3,375 mg in dextrose 5 % 50 mL IVPB extended infusion (mini-bag), 3,375 mg, Intravenous, Q12H  0.9 % sodium chloride infusion, , Intravenous, Q12H  [Held by provider] pentoxifylline (TRENTAL) extended release tablet 400 mg, 400 mg, Oral, BID  ipratropium-albuterol (DUONEB) nebulizer solution 1 ampule, 1 ampule, Inhalation, 4x daily  nystatin (MYCOSTATIN) 168534 UNIT/ML suspension 500,000 Units, 5 mL, Oral, 4x Daily  glucose (GLUTOSE) 40 % oral gel 15 g, 15 g, Oral, PRN  dextrose 50 % IV solution, 12.5 g, Intravenous, PRN  glucagon (rDNA) injection 1 mg, 1 mg, Intramuscular, PRN  dextrose 5 % solution, 100 mL/hr, Intravenous, PRN  acetaminophen (TYLENOL) tablet 500 mg, 500 mg, Oral, 4x Daily PRN  aspirin chewable tablet 81 mg, 81 mg, Oral, Daily  atorvastatin (LIPITOR) tablet 80 mg, 80 mg, Oral, Daily  clopidogrel (PLAVIX) tablet 75 mg, 75 mg, Oral, Daily  levothyroxine (SYNTHROID) tablet 88 mcg, 88 mcg, Oral, Daily  sodium chloride flush 0.9 % injection 10 mL, 10 mL, Intravenous, 2 times per day  sodium chloride flush 0.9 % injection 10 mL, 10 mL, Intravenous, PRN  promethazine (PHENERGAN) tablet 12.5 mg, 12.5 mg, Oral, Q6H PRN **OR** ondansetron (ZOFRAN) injection 4 mg, 4 mg, Intravenous, Q6H PRN  polyethylene glycol (GLYCOLAX) packet 17 g, 17 g, Oral, Daily PRN  [DISCONTINUED] acetaminophen (TYLENOL) tablet 650 mg, 650 mg, Oral, Q6H PRN **OR** acetaminophen (TYLENOL) suppository 650 mg, 650 mg, Rectal, Q6H PRN  Physical    VITALS:  /76   Pulse 76   Temp 96.6 °F (35.9 °C)   Resp 28   Ht 5' (1.524 m)   Wt 159 lb 6.3 oz (72.3 kg)   SpO2 94%   BMI 31.13 kg/m²   Patient is sedated and ventilated  Normocephalic, without obvious abnormality, atraumatic, external ears without lesions,   Neck  cervical lymphadenopathy with limited exam for motion due to ET tube  Heart has a regular rate and rhythm no murmur  Lungs are clear to

## 2021-04-02 NOTE — PROGRESS NOTES
Pharmacy Consultation Note  (Antibiotic Dosing and Monitoring)    Initial consult date: 3/26/21  Consulting physician: Dr. Raymundo Clark  Drug(s): Vancomycin  Indication: Empiric    Ht Readings from Last 1 Encounters:   21 5' (1.524 m)     Wt Readings from Last 1 Encounters:   21 159 lb 6.3 oz (72.3 kg)     Age/  Gender IBW DW  Allergy Information   57 y.o.   female 45.5 kg 61.6 kg  Demerol, Peanut-containing drug products, and Toradol [ketorolac tromethamine]          Date  WBC BUN/CR HD Drug/Dose Time   Given Level(s)   (Time) Comments   3/26  (#1) 13.2 90/3.9 HD x 3.75h -- --     3/27  (#2) 7.3 50/2.4 HD x 2h No dose -- 22.1 mcg/mL @ 0511  15.3 mcg/mL @ 1623    3/28  (#3) 6.9 45/2.4 HD x 2h Vancomycin 500 mg IV x 1 1840     3/29  (#4) -- 34/2.2 No HD No dose --     3/30  (#5) 2.2 47/2.8 HD x 32 minutes Vancomycin 500 mg IV x 1 after HD 2100 Random level @ 0503 = 17.8 mcg/mL    3/31  (#6) 6 58/3.1 HD x 180 minutes No dose --       (#7) 6.1 52/2.7 No HD No dose -- Random level @ 0633 = 18.4 mcg/mL      (#8) 9.5 71/3.4 HD x 210 minutes Vancomycin 500 mg IV x 1 after HD <1300> Random level @ 0508 = 17.1 mcg/mL                          Estimated Creatinine Clearance: 15 mL/min (A) (based on SCr of 3.4 mg/dL (H)).   UOP over the past 24 hours:       Intake/Output Summary (Last 24 hours) at 2021 1157  Last data filed at 2021 4587  Gross per 24 hour   Intake 2281 ml   Output 70 ml   Net 2211 ml       Temp max: Temp (24hrs), Av.5 °F (36.4 °C), Min:95.1 °F (35.1 °C), Max:100.4 °F (38 °C)      Antibiotic Regimen:  Antibiotic Dose Date Initiated Date Stopped   Doxycycline 100 mg IV Q12H 3/26 4/2   Pip/tazo 3.375 g Q12H 3/23      Cultures:  available culture and sensitivity results were reviewed in Epic   Culture Date Result    Blood x 2 3/25 No growth   Strep/legionella urine antigens 3/26 Negative   Respiratory molecular panel 3/29 SARS-CoV-2 Detected     Assessment:  · Consulted by Dr. Raymundo Clark to dose/monitor vancomycin  · Goal trough level:  15-20 mcg/mL  · Pt is a 57 yof admitted to the hospital for acute respiratory failure 2/2 COVID-19 and CKD. Elizabeth Mcclellan was intubated on 3/25 and transferred to the ICU. A HD cath has been placed with plans to start dialysis today.   · Serum creatinine today: 3.4; CrCl < 20 mL/min; started dialysis on 3/26    Plan:  · Random AM level today = 17.1 mcg/mL (pre-dialysis)  · Re-dose vancomycin 500 mg IV x 1 after HD  · Levels PRN  · Follow HD orders  · Pharmacist will follow and monitor/adjust dosing as necessary      Thank you for the consult,    Ismael Lamb, PharmD, BCPS 4/2/2021 11:57 AM   Ext: [phone]

## 2021-04-02 NOTE — PROGRESS NOTES
[facility]  [facility]  Progress Note    Chief Complaint   Patient presents with    Concern For COVID-19     positive covid, shortness of breath, EMS states 60% on room air. 94% on 15LPM NRB    Shortness of Breath   f/u covid    SUBJECTIVE:  In icu   On vent sedated  On HD today  rij   Recent CT showed chronic lacunar infarcts   unable to prone     Review of systems:  Unable to obtain due to mentation/vent     OBJECTIVE:  /64   Pulse 81   Temp 96.9 °F (36.1 °C)   Resp (!) 34   Ht 5' (1.524 m)   Wt 157 lb 13.6 oz (71.6 kg)   SpO2 92%   BMI 30.83 kg/m²   Temp  Av.3 °F (36.3 °C)  Min: 95.1 °F (35.1 °C)  Max: 100.4 °F (38 °C)  Constitutional:  The patient is on vent sedated over allswollen  Skin:    Warm and dry   HEENT:     AT/NC intubated  Chest:     DEC BS ANT B some wheeze ac100  Cardiovascular:  S1 and S2 are rhythmic and regular. Abdomen:   Dec bs bowel sounds to auscultation. Benign to palpation. ngt  Extremities:   Overall    edema.    CNS    sedated  Lines: pIV  ij hd cath 3/26   Musa yellow    Radiology:  Laboratory and Tests Review:  Lab Results   Component Value Date    WBC 9.5 2021    WBC 6.1 2021    WBC 6.0 2021    HGB 9.9 (L) 2021    HCT 29.4 (L) 2021    MCV 84.7 2021     (L) 2021     No results found for: [facility]  Lab Results   Component Value Date    ALT 32 2021    AST 42 (H) 2021    ALKPHOS 182 (H) 2021    BILITOT 0.2 2021     Lab Results   Component Value Date     2021    K 4.2 2021    K 4.2 2021    CL 97 2021    CO2 27 2021    BUN 71 2021    CREATININE 3.4 2021    CREATININE 2.7 2021    CREATININE 3.1 2021    GFRAA 17 2021    LABGLOM 17 2021    GLUCOSE 116 2021    GLUCOSE 419 2011    PROT 4.8 2021    LABALBU 2.2 2021    LABALBU 4.1 2011    CALCIUM 7.9 2021    BILITOT 0.2 03/24/2021    ALKPHOS 182 03/24/2021    AST 42 03/24/2021    ALT 32 03/24/2021     Lab Results   Component Value Date    CRP 0.9 (H) 03/16/2021    CRP 10.8 (H) 03/11/2021     Lab Results   Component Value Date    SEDRATE 40 (H) 03/16/2021    SEDRATE 44 (H) 03/11/2021     Recent Labs     03/31/21  0501   TRIG 220*     Lab Results   Component Value Date    CHOL 168 08/05/2020    TRIG 220 03/31/2021    HDL 45 08/05/2020    LDLCALC 87 08/05/2020    LABVLDL 36 08/05/2020     Lab Results   Component Value Date/Time    VITD25 27 (L) 03/11/2021 06:45 AM       Microbiology:   No results for input(s): COVID19 in the last 72 hours.   Lab Results   Component Value Date    BC 5 Days no growth 03/25/2021    BC 5 Days no growth 03/08/2021    BLOODCULT2 5 Days no growth 03/25/2021    BLOODCULT2 5 Days no growth 03/08/2021        ASSESSMENT/PLAN:  respiratory failure/ards/s/p rx covid on rx for hcap   s/p toci/convalescent plasma +covid 3/29  CKD URINARY RETENTION HAS GIPSON on hemo   Chronic lacunar infarcts in right caudate head - prominence of bilateral superior ophthalimc veins - possible cavernous sinus thrombosis?      no change in rx    vancomycin (VANCOCIN) 500 mg in sodium chloride 0.9 % 100 mL IVPB (mini-bag), Once  dexamethasone (DECADRON) injection 4 mg, Q12H  0.9 % sodium chloride infusion, PRN  fluconazole (DIFLUCAN) in 0.9 % sodium chloride IVPB 400 mg, Q24H  norepinephrine (LEVOPHED) 16 mg in dextrose 5 % 250 mL infusion,   famotidine (PEPCID) tablet 10 mg, Daily  heparin (porcine) injection 5,000 Units, 3 times per day  vancomycin (VANCOCIN) intermittent dosing (placeholder), RX Placeholder  piperacillin-tazobactam (ZOSYN) 3,375 mg in dextrose 5 % 50 mL IVPB extended infusion (mini-bag), Q12H     Jamila Mooney MD  4/2/2021  12:50 PM

## 2021-04-02 NOTE — FLOWSHEET NOTE
04/02/21 1221   Vital Signs   /64   Temp 96.9 °F (36.1 °C)   Pulse 81   Resp (!) 34   SpO2 92 %   Weight 157 lb 13.6 oz (71.6 kg)   Weight Method Bed scale   Percent Weight Change -0.97   Pain Assessment   Pain Assessment CPOT   Post-Hemodialysis Assessment   Post-Treatment Procedures Blood returned;Catheter capped, clamped and heparinized x 2 ports   Machine Disinfection Process Acid/Vinegar Clean;Exterior Machine Disinfection; Heat Disinfect   Dialyzer Clearance Lightly streaked   Duration of Treatment (minutes) 210 minutes   Hemodialysis Intake (ml) 400 ml   Hemodialysis Output (ml) 1136 ml   NET Removed (ml) 736 ml   Tolerated Treatment Fair   Patient Response to Treatment Tolerated U/F portion of tx poorly. Bp stabilized and sustained at baseline with suspension. Bilateral Breath Sounds Diminished   Edema Generalized;Right upper extremity; Left upper extremity;Right lower extremity   Edema Generalized +1   RUE Edema +1;Pitting   LUE Edema +1;Pitting   RLE Edema +1   LLE Edema +1

## 2021-04-03 NOTE — PROGRESS NOTES
[address] [facility]  [facility]  Progress Note    Chief Complaint   Patient presents with    Concern For COVID-19     positive covid, shortness of breath, EMS states 60% on room air. 94% on 15LPM NRB    Shortness of Breath   f/u covid    SUBJECTIVE:  In icu   On vent sedated ac100% peep12  On HD today  rij   No change   Review of systems:  Unable to obtain due to mentation/vent     OBJECTIVE:  BP (!) 181/123   Pulse 94   Temp 97.7 °F (36.5 °C) (Axillary)   Resp 28   Ht 5' (1.524 m)   Wt 157 lb 13.6 oz (71.6 kg)   SpO2 (!) 88%   BMI 30.83 kg/m²   Temp  Av.6 °F (36.4 °C)  Min: 96.9 °F (36.1 °C)  Max: 98.3 °F (36.8 °C)  Constitutional:   vent sedatedgeneralized edema  Skin:    Warm and dry   HEENT:     AT/NC intubated  Chest:     DEC BS ANT B some wheeze ac100  Cardiovascular:  S1 and S2 90 are rhythmic and regular. Abdomen:   Dec bs bowel sounds to auscultation soft ngt  Extremities:   Overall    edema.    CNS    sedated  Lines: pIV  ij hd cath 3/26   Musa yellow    Radiology:  Laboratory and Tests Review:  Lab Results   Component Value Date    WBC 8.6 2021    WBC 9.5 2021    WBC 6.1 2021    HGB 9.5 (L) 2021    HCT 28.7 (L) 2021    MCV 85.9 2021     (L) 2021     No results found for: [facility]  Lab Results   Component Value Date    ALT 32 2021    AST 42 (H) 2021    ALKPHOS 182 (H) 2021    BILITOT 0.2 2021     Lab Results   Component Value Date     2021    K 3.9 2021    K 4.2 2021    CL 97 2021    CO2 28 2021    BUN 50 2021    CREATININE 2.3 2021    CREATININE 3.4 2021    CREATININE 2.7 2021    GFRAA 26 2021    LABGLOM 26 2021    GLUCOSE 130 2021    GLUCOSE 419 2011    PROT 4.8 2021    LABALBU 2.2 2021    LABALBU 4.1 2011    CALCIUM 7.7 2021    BILITOT 0.2 2021    ALKPHOS 182 2021    AST 42 03/24/2021    ALT 32 03/24/2021     Lab Results   Component Value Date    CRP 0.9 (H) 03/16/2021    CRP 10.8 (H) 03/11/2021     Lab Results   Component Value Date    SEDRATE 40 (H) 03/16/2021    SEDRATE 44 (H) 03/11/2021      Lab Results   Component Value Date    CHOL 168 08/05/2020    TRIG 220 03/31/2021    HDL 45 08/05/2020    LDLCALC 87 08/05/2020    LABVLDL 36 08/05/2020     Lab Results   Component Value Date/Time    VITD25 27 (L) 03/11/2021 06:45 AM       Microbiology:   No results for input(s): COVID19 in the last 72 hours. Lab Results   Component Value Date    BC 5 Days no growth 03/25/2021    BC 5 Days no growth 03/08/2021    BLOODCULT2 5 Days no growth 03/25/2021    BLOODCULT2 5 Days no growth 03/08/2021        ASSESSMENT/PLAN:  respiratory failure/ards/s/p rx covid on rx for hcap   s/p toci/convalescent plasma +covid 3/29  CKD URINARY RETENTION HAS GIPSON on hemo   Chronic lacunar infarcts in right caudate head - prominence of bilateral superior ophthalimc veins - possible cavernous sinus thrombosis?      Check resp cx       fluconazole (DIFLUCAN) in 0.9 % sodium chloride IVPB 400 mg, Q24H     vancomycin (VANCOCIN) intermittent dosing (placeholder), RX Placeholder     piperacillin-tazobactam (ZOSYN) 3,375 mg in dextrose 5 % 50 mL IVPB extended infusion (mini-bag), Q12H         Rubi Sales MD  4/3/2021  10:26 AM

## 2021-04-03 NOTE — PROGRESS NOTES
Pharmacy Consultation Note  (Antibiotic Dosing and Monitoring)    Initial consult date: 3/26/21  Consulting physician: Dr. Marta Brown  Drug(s): Vancomycin  Indication: Empiric    Ht Readings from Last 1 Encounters:   21 5' (1.524 m)     Wt Readings from Last 1 Encounters:   21 157 lb 13.6 oz (71.6 kg)     Age/  Gender IBW DW  Allergy Information   57 y.o.   female 45.5 kg 61.6 kg  Demerol, Peanut-containing drug products, and Toradol [ketorolac tromethamine]          Date  WBC HD Drug/Dose Time   Given Level(s)   (Time) Comments   3/26  (#1) 13.2 HD x 3.75h -- --     3/27  (#2) 7.3 HD x 2h No dose -- 22.1 mcg/mL @ 0511  15.3 mcg/mL @ 1623    3/28  (#3) 6.9 HD x 2h Vancomycin 500 mg IV x 1 1840     3/29  (#4) -- No HD No dose --     3/30  (#5) 2.2 HD x 32 minutes Vancomycin 500 mg IV x 1 after HD 2100 Random level @ 0503 = 17.8 mcg/mL    3/31  (#6) 6 HD x 180 minutes No dose --       (#7) 6.1 No HD No dose -- Random level @ 0633 = 18.4 mcg/mL      (#8) 9.5 HD x 210 minutes Vancomycin 500 mg IV x 1 after HD 1304 Random level @ 0508 = 17.1 mcg/mL    4/3  (#9) 8.6 HD x 3 hrs Vancomycin 500 mg IV x 1 <2230> Random @ 2042 = 11.5 mcg/mL               Estimated Creatinine Clearance: 22 mL/min (A) (based on SCr of 2.3 mg/dL (H)).   UOP over the past 24 hours:       Intake/Output Summary (Last 24 hours) at 4/3/2021 183  Last data filed at 4/3/2021 1725  Gross per 24 hour   Intake 2081 ml   Output 2320 ml   Net -239 ml       Temp max: Temp (24hrs), Av.8 °F (36.6 °C), Min:97.5 °F (36.4 °C), Max:98.3 °F (36.8 °C)      Antibiotic Regimen:  Antibiotic Dose Date Initiated Date Stopped   Doxycycline 100 mg IV Q12H 3/26 4/2   Pip/tazo 3.375 g Q12H 3/23      Cultures:  available culture and sensitivity results were reviewed in Epic   Culture Date Result    Blood x 2 3/25 No growth   Strep/legionella urine antigens 3/26 Negative   Respiratory molecular panel 3/29 SARS-CoV-2 Detected     Assessment:  · Consulted by Dr. Flowers Search to dose/monitor vancomycin  · Goal trough level:  15-20 mcg/mL  · Pt is a 57 yof admitted to the hospital for acute respiratory failure 2/2 COVID-19 and CKD. Jaison Brady was intubated on 3/25 and transferred to the ICU. A HD cath has been placed with plans to start dialysis today.   · ESRD; started dialysis on 3/26    Plan:  · S/p HD x 3 hrs  · Re-dose vancomycin 500 mg IV x 1  · Levels PRN  · Follow HD orders  · Pharmacist will follow and monitor/adjust dosing as necessary      Thank you for the consult,    Brandee Rico, PharmD 4/3/2021 6:45 PM   [phone]

## 2021-04-03 NOTE — PROGRESS NOTES
[facility] MD Brigette Arteaga MD Anniece Harrison, MD Sharyl Carney, MD Mickle Sams, NUNO Nascimento, RAZA  Progress Note  4/3/2021    SUBJECTIVE:  We are following this patient for CKD stage IV on hemodialysis. 3/29 : Transferred to ICU bed 9 currently in prone position because of COVID-19 mechanical ventilation  Today with [facility] ICU attending Dr. Paulina León assess to perform acute dialysis treatment patient be scheduled for MR venogram with no contrast.  Patient was evaluated and case discussed with the nurse currently will be needing alternating supine and prone positions every 12 hours she remains on mechanical ventilation with sedation. Last dialysis was done yesterday    3/30 : seen in ICU this am .   Mechanically ventialted . Fio2 90   On small dose levo   Poor UO   No reported edema   D/w ICU staff     3/31 : partial HD yesterday due to Line malfunctioning . Will try cathflow . If line does not work then plan on IR exchange over guidewire   She continue to be mechanically ventilated , on 60 % fio2 when seeing her . Off pressors       4/1 : mechanically ventilated . fio2 80 . No pressors . IR to replace HD line today     4/2 : mechainically ventilated , high 02 reuqirment . critiically sick . High amount of o2 . It appear covid pneumonia is the main cultprit   D/w HD RN not able to do a lot of UF     4/3 : seen in ICU , d/w ICU RT , RN . Continue to require Fo2 at 100 with pao2 in 46s . No pressors ,poor UO .        PROBLEM LIST:    Patient Active Problem List   Diagnosis    Diabetes mellitus type 2, insulin dependent ([address]    Hypothyroid    Hypertension    Hyperlipidemia    Hypertensive urgency    Acute respiratory failure with hypoxia ([address]    COVID-19    Altered mental state    Goals of care, counseling/discussion    Palliative care by specialist        PAST MEDICAL HISTORY:    Past Medical History:   Diagnosis Date    Acid reflux          04/03/21 1002 (!) 181/123   94 28 (!) 88 %    04/03/21 0800 (!) 176/76 97.7 °F (36.5 °C) Axillary 95 28 (!) 87 %    04/03/21 0700 (!) 165/94   101 28 (!) 89 %    04/03/21 0600 (!) 180/84   92 28 (!) 89 %    04/03/21 0500 (!) 164/83   91 28 (!) 89 %    04/03/21 0400 (!) 164/72 98 °F (36.7 °C) Axillary 91 28 90 %    04/03/21 0300 119/65   92 28 (!) 89 %    04/03/21 0200 (!) 166/68   94 28 (!) 89 %    04/03/21 0100 (!) 169/72   89 28 91 %    04/03/21 0000 (!) 153/76   91 28 90 %    04/02/21 2300 (!) 160/70   84 28 92 %    04/02/21 2200 (!) 162/76   87 28 90 %    04/02/21 2100 (!) 178/73 98.3 °F (36.8 °C) Axillary 100 28 (!) 86 %    04/02/21 2000 (!) 146/67 98.3 °F (36.8 °C) Axillary 83 28 93 %    04/02/21 1900 (!) 141/62   91 28 93 %    04/02/21 1800 139/61   80 28 94 %    04/02/21 1700 130/60   81 28 93 %    04/02/21 1600 (!) 119/58 97.2 °F (36.2 °C) Axillary 80 (!) 33 94 %    04/02/21 1500 125/60   82 29 93 %    04/02/21 1400 (!) 120/56   76 28 93 %    04/02/21 1300 125/60   80 29 93 %    04/02/21 1221 125/64 96.9 °F (36.1 °C)  81 (!) 34 92 % 157 lb 13.6 oz (71.6 kg)   04/02/21 1215 103/65   79      04/02/21 1200 125/64 96.9 °F (36.1 °C) Axillary 77 (!) 32     04/02/21 1145 116/63   75      04/02/21 1130 (!) 135/56   77      04/02/21 1115 (!) 140/62   85      04/02/21 1100 119/76   76 30            Intake/Output Summary (Last 24 hours) at 4/3/2021 1055  Last data filed at 4/3/2021 0800  Gross per 24 hour   Intake 2655 ml   Output 1161 ml   Net 1494 ml       Wt Readings from Last 3 Encounters:   04/02/21 157 lb 13.6 oz (71.6 kg)   03/08/21 117 lb (53.1 kg)   01/10/21 130 lb (59 kg)       General:  in no acute distress  HEENT: NC/AT, EOMI, sclera and conjunctiva are clear and anicteric.   Mucous membranes moist.  Cardiovascular: regular rate and rhythm, no murmurs, gallops, or rubs  Respiratory:  Clear, no rales, rhochi, or wheezes Gastrointestinal: soft, nontender, nondistended, NABS  Ext: no cyanosis, clubbing, or edema bilateral lower extremities  Neuro: awake, alert, oriented x3. Moves all 4 extremities. Cranial nerves II through XII grossly intact. Skin: dry, no rash      DATA:      Recent Labs     04/01/21  0633 04/02/21  0508 04/03/21  0521   WBC 6.1 9.5 8.6   HGB 9.2* 9.9* 9.5*   HCT 27.0* 29.4* 28.7*   MCV 83.6 84.7 85.9   * 124* 125*     Recent Labs     04/01/21  0633 04/02/21  0508 04/03/21  0521    139 138   K 4.1 4.2 3.9   CL 97* 97* 97*   CO2 25 27 28   BUN 52* 71* 50*   CREATININE 2.7* 3.4* 2.3*       Iron studies:  Lab Results   Component Value Date    FERRITIN 613 03/16/2021     Bone disease:  Lab Results   Component Value Date    PTH 42 08/05/2020    MG 1.8 03/30/2021    PHOS 6.7 (H) 03/30/2021     Nutrition:No results found for: ALB    Microbiology      ASSESSMENT / RECOMMENDATIONS:     1. GAVIN on top of chronic disease stage IV with hemodialysis initiation     Continue HD support . Plan HD today try to achieve Some UF . D/W HD RN orders given     2. Anemia:   Cont. epo and iv ferrlecit per orders    3. Secondary Hyperparathyroidism:   Cont vitamin d analog      4. Pneumomediastinum    5.   COVID-19 with ARDs which appear the main culprit for her Sx     6   Respiratory failure which appear to be mainly due to COVID 19 Pneumonia with ARDS           Landon Damon MD  4/3/2021 Acute dialysis access

## 2021-04-03 NOTE — PROGRESS NOTES
Assessment and Plan  Patient is a 57 y.o. female with the following medical Problems:   1. COVID-19 pneumonia  2. Severe ARDS  3. Acute kidney injury on CKD Stage IV requiring hemodialysis  4. Anemia of chronic kidney disease  5. Pneumomediastinum (improved)  6. Subcutaneous emphysema  7. Type 2 diabetes  8. Hypothyroidism  9. History of CVA and cavernous sinus thrombosis    Plan of care:  1. Continue supplemental oxygen to keep sat above 88%. Continue with low tidal volume ventilation. Patient is currently on 400 ml TV . It will be decreased to 380.    2.  ID is managing antibiotics  3. Continue with hemodialysis support  4. DVT prophylaxis  5. Goals of care discussion with the family  6. Tube feeding as tolerated  7.  GI prophylaxis  9. Palliative care consult. Overall prognosis is extremely poor. 10.  Patient will be given 1 dose of Veccuronium  10 mg due to persistent hypoxemia  11. Checks x-ray and ABG today. History of Present Illness:   Patient is a 69-year-old woman with above-mentioned medical problems who was admitted initially on March 8, 2021 and was discharged March 19, 2021 however she was readmitted on March 21 worsening respiratory symptoms. Patient was eventually intubated on March 25, 2021 and was initiated on hemodialysis. 04/02/2021:  Patient remains sedated, intubated, mechanically ventilated. Her oxygen requirement has been fluctuating. Had a discussion with her daughter today regarding goals of care. Her daughter has not made a decision regarding tracheostomy and PEG tube placement however she feels that her mom would not want to go through these procedures. She remains extremely ill requiring high FiO2 requirement and hemodialysis support. She is off pressors today. 04/03/2021:  Patient continues to do poorly. Her oxygen requirement has increased. Chest x-ray is worse.   [facility] was contacted by the hospitalist service to transfer the patient based on family request however the case was declined by [facility]. She is currently hypertensive and off pressors. She has no fever, chills, rigors.     Past Medical History:  Past Medical History:   Diagnosis Date    Acid reflux     COVID-19     [facility]od[facility] [address]     Hyperlipidemia     Hypertension     Hypothyroidism     S/P appendectomy     Type II or unspecified type diabetes mellitus without mention of complication, not stated as uncontrolled         Family History:   Family History   Problem Relation Age of Onset    Diabetes Mother     High Blood Pressure Mother     Diabetes Brother     Diabetes Sister        Allergies:         Demerol, Peanut-containing drug products, and Toradol [ketorolac tromethamine]    Social history:  Social History     Socioeconomic History    Marital status: Single     Spouse name: Not on file    Number of children: Not on file    Years of education: Not on file    Highest education level: Not on file   Occupational History    Not on file   Social Needs    Financial resource strain: Not on file    Food insecurity     Worry: Not on file     Inability: Not on file    Transportation needs     Medical: Not on file     Non-medical: Not on file   Tobacco Use    Smoking status: Never Smoker    Smokeless tobacco: Never Used   Substance and Sexual Activity    Alcohol use: No    Drug use: No    Sexual activity: Not Currently   Lifestyle    Physical activity     Days per week: Not on file     Minutes per session: Not on file    Stress: Not on file   Relationships    Social connections     Talks on phone: Not on file     Gets together: Not on file     Attends Rastafari service: Not on file     Active member of club or organization: Not on file     Attends meetings of clubs or organizations: Not on file     Relationship status: Not on file    Intimate partner violence     Fear of current or ex partner: Not on file     Emotionally abused: Not on file Physically abused: Not on file     Forced sexual activity: Not on file   Other Topics Concern    Not on file   Social History Narrative    Not on file       Current Medications:     Current Facility-Administered Medications:     hydrALAZINE (APRESOLINE) injection 10 mg, 10 mg, Intravenous, Q6H PRN, Cody Wilder MD, 10 mg at 04/03/21 1009    NONFORMULARY 10 mg, 10 mg, Intravenous, Once, Cody Wilder MD    dexamethasone (DECADRON) injection 4 mg, 4 mg, Intravenous, Q12H, Unk Screen, DO, 4 mg at 04/03/21 8177    0.9 % sodium chloride infusion, 250 mL, Intravenous, PRN, Unk Screen, DO    fluconazole (DIFLUCAN) in 0.9 % sodium chloride IVPB 400 mg, 400 mg, Intravenous, Q24H, Shamir Zeng MD, Last Rate: 100 mL/hr at 04/02/21 1650, 400 mg at 04/02/21 1650    polyvinyl alcohol (LIQUIFILM TEARS) 1.4 % ophthalmic solution 1 drop, 1 drop, Both Eyes, Q4H, Unk Screen, DO, 1 drop at 04/03/21 0400    Gabapentin SOLN 300 mg, 300 mg, Oral, Nightly, Ozella Saint, MD, 300 mg at 04/02/21 2202    insulin glargine (LANTUS) injection vial 9 Units, 9 Units, Subcutaneous, QAM, Lizz Washington MD, 9 Units at 04/02/21 0842    famotidine (PEPCID) tablet 10 mg, 10 mg, Oral, Daily, Lizz Washington MD, 10 mg at 04/03/21 0813    [Held by provider] carvedilol (COREG) tablet 3.125 mg, 3.125 mg, Oral, BID WC, Lizz Washington MD    insulin lispro (HUMALOG) injection vial 0-6 Units, 0-6 Units, Subcutaneous, Q6H, Lizz Washington MD, 1 Units at 04/02/21 1651    sodium bicarbonate tablet 1,300 mg, 1,300 mg, Per NG tube, TID, Lizz Washington MD, 1,300 mg at 04/03/21 0813    heparin (porcine) injection 5,000 Units, 5,000 Units, Subcutaneous, 3 times per day, Lizz Washington MD, 5,000 Units at 04/03/21 0526    vancomycin (VANCOCIN) intermittent dosing (placeholder), , Other, Imelda Bautista MD, Grabiel Drewsey at 03/26/21 5303   [Held by provider] hydrOXYzine (VISTARIL) capsule 25 mg, 25 mg, Oral, 4x Daily PRN, Owen Duckworth MD, 25 mg at 03/1960    propofol injection, 5-50 mcg/kg/min, Intravenous, Titrated, Danielle Arceo MD, Last Rate: 17 mL/hr at 04/03/21 1007, 50 mcg/kg/min at 04/03/21 1007    [Held by provider] amLODIPine (NORVASC) tablet 5 mg, 5 mg, Oral, BID, Owen Duckworth MD, 5 mg at 03/26/21 0824    piperacillin-tazobactam (ZOSYN) 3,375 mg in dextrose 5 % 50 mL IVPB extended infusion (mini-bag), 3,375 mg, Intravenous, Q12H, Noemi Del Toro MD, Stopped at 04/03/21 1003    0.9 % sodium chloride infusion, , Intravenous, Q12H, Noemi Del Toro MD, Stopped at 04/03/21 0122    [Held by provider] pentoxifylline (TRENTAL) extended release tablet 400 mg, 400 mg, Oral, BID, Starlet Mash, DO, 400 mg at 03/26/21 0824    ipratropium-albuterol (DUONEB) nebulizer solution 1 ampule, 1 ampule, Inhalation, 4x daily, Starlet Mash, DO, 1 ampule at 04/03/21 1024    nystatin (MYCOSTATIN) 346956 UNIT/ML suspension 500,000 Units, 5 mL, Oral, 4x Daily, Starlet Mash, DO, 500,000 Units at 04/03/21 0812    glucose (GLUTOSE) 40 % oral gel 15 g, 15 g, Oral, PRN, Starlet Mash, DO    dextrose 50 % IV solution, 12.5 g, Intravenous, PRN, Starlet Mash, DO, 12.5 g at 03/28/21 0734    glucagon (rDNA) injection 1 mg, 1 mg, Intramuscular, PRN, Starlet Mash, DO    dextrose 5 % solution, 100 mL/hr, Intravenous, PRN, Starlet Mash, DO, Stopped at 03/24/21 2893    acetaminophen (TYLENOL) tablet 500 mg, 500 mg, Oral, 4x Daily PRN, Owen Duckworth MD    aspirin chewable tablet 81 mg, 81 mg, Oral, Daily, Owen Duckworth MD, 81 mg at 04/03/21 6257    atorvastatin (LIPITOR) tablet 80 mg, 80 mg, Oral, Daily, Owen Duckworth MD, 80 mg at 04/03/21 0813    clopidogrel (PLAVIX) tablet 75 mg, 75 mg, Oral, Daily, Owen Duckworth MD, 75 mg at 04/03/21 6557    levothyroxine (SYNTHROID) tablet 88 mcg, 88 mcg, Oral, Daily, Nitish Dsouza MD, 88 mcg at 04/03/21 5461    sodium chloride flush 0.9 % injection 10 mL, 10 mL, Intravenous, 2 times per day, Harini Herrera MD, 10 mL at 04/03/21 0813    sodium chloride flush 0.9 % injection 10 mL, 10 mL, Intravenous, PRN, Harini Herrera MD, 10 mL at 03/29/21 1637    promethazine (PHENERGAN) tablet 12.5 mg, 12.5 mg, Oral, Q6H PRN **OR** ondansetron (ZOFRAN) injection 4 mg, 4 mg, Intravenous, Q6H PRN, Harini Herrera MD    polyethylene glycol (GLYCOLAX) packet 17 g, 17 g, Oral, Daily PRN, Harini Herrera MD    [DISCONTINUED] acetaminophen (TYLENOL) tablet 650 mg, 650 mg, Oral, Q6H PRN **OR** acetaminophen (TYLENOL) suppository 650 mg, 650 mg, Rectal, Q6H PRN, Harini Herrera MD    Review of Systems:   Unable to obtain due to medical condition    Physical Exam:   Vital Signs:  BP (!) 181/123   Pulse 94   Temp 97.7 °F (36.5 °C) (Axillary)   Resp 28   Ht 5' (1.524 m)   Wt 157 lb 13.6 oz (71.6 kg)   SpO2 (!) 88%   BMI 30.83 kg/m²     Input/Output:   In: 0458 [I.V.:597; NG/GT:884]  Out: 15     Oxygen requirements: Intubated    Ventilator Information:  Vent Information  $Ventilation: $Subsequent Day  Skin Assessment: Clean, dry, & intact  Equipment ID: [id number]  Vent Type: [phone]  Vent Mode: AC/VC  Vt Ordered: 400 mL  Pressure Ordered: 25  Rate Set: 28 bmp  Peak Flow: 65 L/min  Pressure Support: 0 cmH20  FiO2 : 100 %  SpO2: (!) 88 %  SpO2/FiO2 ratio: 88  Sensitivity: 2  PEEP/CPAP: 12  I Time/ I Time %: 0 s  Humidification Source: Heated wire  Humidification Temp: ([place])  Mask Type: Full face mask  Mask Size: Small  Bonnet size: Small    General appearance:  not in pain or distress, in no respiratory distress    HEENT: Intubated  Neck: Supple, no jugular venous distension, lymphadenopathy, thyromegaly or carotid bruits  Chest: Decreased breath sounds, no wheezing, +ve crackles and no tenderness over ribs   Cardiovascular: Normal S1 , S2, regular rate and rhythm, no murmur, rub or gallop Abdomen: Normal sounds present, soft, lax with no tenderness, no hepatosplenomegaly, and no masses  Extremities: No edema. Pulses are equally present. Skin: intact, no rashes   Neurologic: Sedated and mechanical ventilation . Does not follow commands of sedation. Investigations:  Labs, radiological imaging and cardiac work up were reviewed        ICU STAFF PHYSICIAN NOTE OF PERSONAL INVOLVEMENT IN CARE  As the attending physician, I certify that I personally reviewed the patient's history and personally examined the patient to confirm the physical findings described above, and that I reviewed the relevant imaging studies and available reports. I also discussed the differential diagnosis and all of the proposed management plans with the patient and individuals accompanying the patient to this visit. They had the opportunity to ask questions about the proposed management plans and to have those questions answered. This patient has a high probability of sudden, clinically significant deterioration, which requires the highest level of physician preparedness to intervene urgently. I managed/supervised life or organ supporting interventions that required frequent physician assessment. I devoted my full attention to the direct care of this patient for the amount of time indicated below. Time I spent with family or surrogate(s) is included only if the patient was incapable of providing the necessary information or participating in medical decision-making. Time devoted to teaching and to any procedures I billed separately is not included.   Critical Care Time: 35 minutes      Electronically signed by Marce Paulson MD on 4/3/2021 at 10:56 AM

## 2021-04-03 NOTE — PROGRESS NOTES
Department of Internal Medicine  General Internal Medicine  Attending Progress Note  Chief Complaint   Patient presents with    Concern For COVID-19     positive covid, shortness of breath, EMS states 60% on room air. 94% on 15LPM NRB    Shortness of Breath     SUBJECTIVE:    Patient is currently intubated  3/29: did not tolerate proning. Mucous plug. Bagged. intensivist in room. D/w him.     OBJECTIVE      Medications    Current Facility-Administered Medications: hydrALAZINE (APRESOLINE) injection 10 mg, 10 mg, Intravenous, Q6H PRN  vecuronium (NORCURON) injection 10 mg, 10 mg, Intravenous, Once  dexamethasone (DECADRON) injection 4 mg, 4 mg, Intravenous, Q12H  0.9 % sodium chloride infusion, 250 mL, Intravenous, PRN  fluconazole (DIFLUCAN) in 0.9 % sodium chloride IVPB 400 mg, 400 mg, Intravenous, Q24H  polyvinyl alcohol (LIQUIFILM TEARS) 1.4 % ophthalmic solution 1 drop, 1 drop, Both Eyes, Q4H  Gabapentin SOLN 300 mg, 300 mg, Oral, Nightly  insulin glargine (LANTUS) injection vial 9 Units, 9 Units, Subcutaneous, QAM  famotidine (PEPCID) tablet 10 mg, 10 mg, Oral, Daily  [Held by provider] carvedilol (COREG) tablet 3.125 mg, 3.125 mg, Oral, BID WC  insulin lispro (HUMALOG) injection vial 0-6 Units, 0-6 Units, Subcutaneous, Q6H  sodium bicarbonate tablet 1,300 mg, 1,300 mg, Per NG tube, TID  heparin (porcine) injection 5,000 Units, 5,000 Units, Subcutaneous, 3 times per day  vancomycin (VANCOCIN) intermittent dosing (placeholder), , Other, RX Placeholder  [Held by provider] hydrOXYzine (VISTARIL) capsule 25 mg, 25 mg, Oral, 4x Daily PRN  propofol injection, 5-50 mcg/kg/min, Intravenous, Titrated  [Held by provider] amLODIPine (NORVASC) tablet 5 mg, 5 mg, Oral, BID  piperacillin-tazobactam (ZOSYN) 3,375 mg in dextrose 5 % 50 mL IVPB extended infusion (mini-bag), 3,375 mg, Intravenous, Q12H  0.9 % sodium chloride infusion, , Intravenous, Q12H  [Held by provider] pentoxifylline (TRENTAL) extended release tablet 400 mg, 400 mg, Oral, BID  ipratropium-albuterol (DUONEB) nebulizer solution 1 ampule, 1 ampule, Inhalation, 4x daily  nystatin (MYCOSTATIN) 753765 UNIT/ML suspension 500,000 Units, 5 mL, Oral, 4x Daily  glucose (GLUTOSE) 40 % oral gel 15 g, 15 g, Oral, PRN  dextrose 50 % IV solution, 12.5 g, Intravenous, PRN  glucagon (rDNA) injection 1 mg, 1 mg, Intramuscular, PRN  dextrose 5 % solution, 100 mL/hr, Intravenous, PRN  acetaminophen (TYLENOL) tablet 500 mg, 500 mg, Oral, 4x Daily PRN  aspirin chewable tablet 81 mg, 81 mg, Oral, Daily  atorvastatin (LIPITOR) tablet 80 mg, 80 mg, Oral, Daily  clopidogrel (PLAVIX) tablet 75 mg, 75 mg, Oral, Daily  levothyroxine (SYNTHROID) tablet 88 mcg, 88 mcg, Oral, Daily  sodium chloride flush 0.9 % injection 10 mL, 10 mL, Intravenous, 2 times per day  sodium chloride flush 0.9 % injection 10 mL, 10 mL, Intravenous, PRN  promethazine (PHENERGAN) tablet 12.5 mg, 12.5 mg, Oral, Q6H PRN **OR** ondansetron (ZOFRAN) injection 4 mg, 4 mg, Intravenous, Q6H PRN  polyethylene glycol (GLYCOLAX) packet 17 g, 17 g, Oral, Daily PRN  [DISCONTINUED] acetaminophen (TYLENOL) tablet 650 mg, 650 mg, Oral, Q6H PRN **OR** acetaminophen (TYLENOL) suppository 650 mg, 650 mg, Rectal, Q6H PRN  Physical    VITALS:  BP (!) 181/123   Pulse 94   Temp 97.7 °F (36.5 °C) (Axillary)   Resp 28   Ht 5' (1.524 m)   Wt 157 lb 13.6 oz (71.6 kg)   SpO2 (!) 88%   BMI 30.83 kg/m²   Patient is sedated and ventilated  Normocephalic, without obvious abnormality, atraumatic, external ears without lesions,   Neck  cervical lymphadenopathy with limited exam for motion due to ET tube  Heart has a regular rate and rhythm no murmur  Lungs are clear to auscultation bilaterally with equal movements with the additional sounds of transmitted airway sounds from the ventilator. Abdomen is soft nontender nondistended no rebound or guarding. anasarca  No significant rashes or new skin lesions.   Affect and neuro exam limited since she is sedated on the ventilator      Data    CBC:   Lab Results   Component Value Date    WBC 8.6 04/03/2021    RBC 3.34 04/03/2021    HGB 9.5 04/03/2021    HCT 28.7 04/03/2021    MCV 85.9 04/03/2021    MCH 28.4 04/03/2021    MCHC 33.1 04/03/2021    RDW 15.8 04/03/2021     04/03/2021    MPV 11.9 04/03/2021     CMP:    Lab Results   Component Value Date     04/03/2021    K 3.9 04/03/2021    K 4.2 03/21/2021    CL 97 04/03/2021    CO2 28 04/03/2021    BUN 50 04/03/2021    CREATININE 2.3 04/03/2021    GFRAA 26 04/03/2021    LABGLOM 26 04/03/2021    GLUCOSE 130 04/03/2021    GLUCOSE 419 03/21/2011    PROT 4.8 03/24/2021    LABALBU 2.2 03/24/2021    LABALBU 4.1 03/21/2011    CALCIUM 7.7 04/03/2021    BILITOT 0.2 03/24/2021    ALKPHOS 182 03/24/2021    AST 42 03/24/2021    ALT 32 03/24/2021       ASSESSMENT AND PLAN      Brief Summary of Stay:  Presented with shortness of breath. This is a readmission after being admitted prior with covid. Throughout the stay her respiratory status continued to deteriorate. She progressed from BiPAP to mechanical ventilation . Her renal function is compromised and she is now on dialysis. 1.  Acute respiratory failure with hypoxia:  Intubated. Continue to monitor. 4/2 FiO2 increased to 100%. improved temperature instability    2. COVID-19 superimposed of care associated pneumonia:  Decadron 4 mg IV every 8 per ICU physician. Antibiotics per ID include Diflucan Zosyn and Vanco    3. GAVIN on CKD: Now requiring dialysis. Nephro following. Monitor    4. Air leak syndrome with Pneumomediastinum: mech vent     5. DM Type 2: usually in 100's. Recent spike 180's    6. Hypertension Essential: occasional sBP 80's: supportive care    7. Anemia due to CKD: monitor    8. Hypothyroidism: continue synthroid. 9.  Stroke CT 3/28 showed chronic lacunar infarct right caudate head.   This was with contrast.  CT noncontrast on 3/21 showed no skull fracture  10 dnrcca  11.  dispo:

## 2021-04-03 NOTE — PROGRESS NOTES
Spoke with Xochitl Condon regarding pt condition and updated her on family request for patient to transfer to [facility]. I infirmed her that [facility] had respectfully declined her d/t nothing they can due other than what is being done.

## 2021-04-03 NOTE — FLOWSHEET NOTE
Pt ran 3 hours; 3251 bath; 2 L removed; stable/tolerated well; denies c/o. HD CVC heparin locked per lumen fill volume, capped & clamped post Tx. good Access flow no issues achieving prescribed BFR.         04/03/21 1645   Vital Signs   BP (!) 141/70   Temp 97.5 °F (36.4 °C)   Pulse 90   Resp (!) 36   SpO2 97 %   Pain Assessment   Pain Assessment CPOT   Pain Level 0   Post-Hemodialysis Assessment   Post-Treatment Procedures Blood returned;Catheter capped, clamped and heparinized x 2 ports   Machine Disinfection Process Acid/Vinegar Clean;Bleach   Rinseback Volume (ml) 300 ml   Total Liters Processed (l/min) 42.5 l/min   Dialyzer Clearance Moderately streaked   Duration of Treatment (minutes) 180 minutes   Heparin amount administered during treatment (units) 0 units   Hemodialysis Intake (ml) 300 ml   Hemodialysis Output (ml) 2300 ml   NET Removed (ml) 2000 ml   Tolerated Treatment Good   Bilateral Breath Sounds Diminished   Edema Generalized   Edema Generalized +2   Physician Notified?  Yes

## 2021-04-03 NOTE — PROGRESS NOTES
Patient's SpO2 84 - 86%, patient sx'd scant amt secretions, Br/s diminished, Pplat is 32.     Patient currently on [facility] 28/32 400 100% Peep +8  Bp is 165/60    Peep increased to +12 SpO2 increased to 88-89%  RN notified

## 2021-04-04 NOTE — PROGRESS NOTES
Department of Internal Medicine  General Internal Medicine  Attending Progress Note  Chief Complaint   Patient presents with    Concern For COVID-19     positive covid, shortness of breath, EMS states 60% on room air. 94% on 15LPM NRB    Shortness of Breath     SUBJECTIVE:    Patient is currently intubated  3/29: did not tolerate proning. Mucous plug. Bagged. intensivist in room. D/w him.     OBJECTIVE      Medications    Current Facility-Administered Medications: hydrALAZINE (APRESOLINE) injection 10 mg, 10 mg, Intravenous, Q6H PRN  dexamethasone (DECADRON) injection 4 mg, 4 mg, Intravenous, Q12H  0.9 % sodium chloride infusion, 250 mL, Intravenous, PRN  fluconazole (DIFLUCAN) in 0.9 % sodium chloride IVPB 400 mg, 400 mg, Intravenous, Q24H  polyvinyl alcohol (LIQUIFILM TEARS) 1.4 % ophthalmic solution 1 drop, 1 drop, Both Eyes, Q4H  Gabapentin SOLN 300 mg, 300 mg, Oral, Nightly  insulin glargine (LANTUS) injection vial 9 Units, 9 Units, Subcutaneous, QAM  famotidine (PEPCID) tablet 10 mg, 10 mg, Oral, Daily  [Held by provider] carvedilol (COREG) tablet 3.125 mg, 3.125 mg, Oral, BID WC  insulin lispro (HUMALOG) injection vial 0-6 Units, 0-6 Units, Subcutaneous, Q6H  sodium bicarbonate tablet 1,300 mg, 1,300 mg, Per NG tube, TID  heparin (porcine) injection 5,000 Units, 5,000 Units, Subcutaneous, 3 times per day  vancomycin (VANCOCIN) intermittent dosing (placeholder), , Other, RX Placeholder  [Held by provider] hydrOXYzine (VISTARIL) capsule 25 mg, 25 mg, Oral, 4x Daily PRN  propofol injection, 5-50 mcg/kg/min, Intravenous, Titrated  [Held by provider] amLODIPine (NORVASC) tablet 5 mg, 5 mg, Oral, BID  piperacillin-tazobactam (ZOSYN) 3,375 mg in dextrose 5 % 50 mL IVPB extended infusion (mini-bag), 3,375 mg, Intravenous, Q12H  0.9 % sodium chloride infusion, , Intravenous, Q12H  [Held by provider] pentoxifylline (TRENTAL) extended release tablet 400 mg, 400 mg, Oral, BID  ipratropium-albuterol (DUONEB) nebulizer solution 1 ampule, 1 ampule, Inhalation, 4x daily  nystatin (MYCOSTATIN) [id number] UNIT/ML suspension 500,000 Units, 5 mL, Oral, 4x Daily  glucose (GLUTOSE) 40 % oral gel 15 g, 15 g, Oral, PRN  dextrose 50 % IV solution, 12.5 g, Intravenous, PRN  glucagon (rDNA) injection 1 mg, 1 mg, Intramuscular, PRN  dextrose 5 % solution, 100 mL/hr, Intravenous, PRN  acetaminophen (TYLENOL) tablet 500 mg, 500 mg, Oral, 4x Daily PRN  aspirin chewable tablet 81 mg, 81 mg, Oral, Daily  atorvastatin (LIPITOR) tablet 80 mg, 80 mg, Oral, Daily  clopidogrel (PLAVIX) tablet 75 mg, 75 mg, Oral, Daily  levothyroxine (SYNTHROID) tablet 88 mcg, 88 mcg, Oral, Daily  sodium chloride flush 0.9 % injection 10 mL, 10 mL, Intravenous, 2 times per day  sodium chloride flush 0.9 % injection 10 mL, 10 mL, Intravenous, PRN  promethazine (PHENERGAN) tablet 12.5 mg, 12.5 mg, Oral, Q6H PRN **OR** ondansetron (ZOFRAN) injection 4 mg, 4 mg, Intravenous, Q6H PRN  polyethylene glycol (GLYCOLAX) packet 17 g, 17 g, Oral, Daily PRN  [DISCONTINUED] acetaminophen (TYLENOL) tablet 650 mg, 650 mg, Oral, Q6H PRN **OR** acetaminophen (TYLENOL) suppository 650 mg, 650 mg, Rectal, Q6H PRN  Physical    VITALS:  BP (!) 141/68   Pulse 93   Temp 97 °F (36.1 °C) (Axillary)   Resp (!) 51   Ht 5' (1.524 m)   Wt 158 lb (71.7 kg)   SpO2 (!) 87%   BMI 30.86 kg/m²   Patient is sedated and ventilated  Normocephalic, without obvious abnormality, atraumatic, external ears without lesions,   Neck  cervical lymphadenopathy with limited exam for motion due to ET tube  Heart has a regular rate and rhythm no murmur  Lungs are clear to auscultation bilaterally with equal movements with the additional sounds of transmitted airway sounds from the ventilator. Abdomen is soft nontender nondistended no rebound or guarding. anasarca  No significant rashes or new skin lesions.   Affect and neuro exam limited since she is sedated on the ventilator      Data    CBC:   Lab Results Component Value Date    WBC 8.8 04/04/2021    RBC 3.10 04/04/2021    HGB 8.9 04/04/2021    HCT 27.0 04/04/2021    MCV 87.1 04/04/2021    MCH 28.7 04/04/2021    MCHC 33.0 04/04/2021    RDW 15.9 04/04/2021     04/04/2021    MPV 11.4 04/04/2021     CMP:    Lab Results   Component Value Date     04/04/2021    K 3.5 04/04/2021    K 4.2 03/21/2021    CL 98 04/04/2021    CO2 29 04/04/2021    BUN 40 04/04/2021    CREATININE 2.0 04/04/2021    GFRAA 31 04/04/2021    LABGLOM 31 04/04/2021    GLUCOSE 106 04/04/2021    GLUCOSE 419 03/21/2011    PROT 4.8 03/24/2021    LABALBU 2.2 03/24/2021    LABALBU 4.1 03/21/2011    CALCIUM 7.5 04/04/2021    BILITOT 0.2 03/24/2021    ALKPHOS 182 03/24/2021    AST 42 03/24/2021    ALT 32 03/24/2021       ASSESSMENT AND PLAN      Brief Summary of Stay:  Presented with shortness of breath. This is a readmission after being admitted prior with covid. Throughout the stay her respiratory status continued to deteriorate. She progressed from BiPAP to mechanical ventilation . Her renal function is compromised and she is now on dialysis. 1.  Acute respiratory failure with hypoxia:  Intubated. Continue to monitor. 4/2 FiO2 increased to 100% which has not been able to be decreased even by afternoon of 4/4    2.  COVID-19 superimposed of care associated pneumonia:  Decadron 4 mg IV every 12 per ICU physician. Antibiotics per ID include Diflucan Zosyn and Vanco    3. GAVIN on CKD: Now requiring dialysis. Nephro following. Monitor. Plan for ultrafiltration to remove 3 to 4 L today per nephrology on 4/4    4. Air leak syndrome with Pneumomediastinum: mech vent     5. DM Type 2: usually in 100's. Recent spike 180's. Lantus at 9 qd and ssc    6. Hypertension Essential: occasional sBP 80's: supportive care    7. Anemia due to CKD: monitor    8. Hypothyroidism: continue synthroid. 9.  Stroke CT 3/28 showed chronic lacunar infarct right caudate head.   This was with contrast.  CT noncontrast on 3/21 showed no skull fracture  10 dnrcca / limited    11.  dispo: palliative care will help multiple comorbidities poor prognosis  On 4/2 palliative care spoke to the family and then palliative care spoke to the intensivist who then passed word through RN to me to attempt to [facility] transfer. On 4/3 I was able to attempt transfer this to the [facility] clinic but they explained that they're not excepting Covid vented patients for a second opinion. It seemed like there was no other clinical indication to transfer to a tertiary care facility.   They still need continued support from palliative care and the rest of us to the family during this difficult time

## 2021-04-04 NOTE — PROGRESS NOTES
Pharmacy Consultation Note  (Antibiotic Dosing and Monitoring)    Initial consult date: 3/26/21  Consulting physician: Dr. Laya Mcfarland  Drug(s): Vancomycin  Indication: Empiric    Ht Readings from Last 1 Encounters:   21 5' (1.524 m)     Wt Readings from Last 1 Encounters:   21 158 lb (71.7 kg)     Age/  Gender IBW DW  Allergy Information   57 y.o.   female 45.5 kg 61.6 kg  Demerol, Peanut-containing drug products, and Toradol [ketorolac tromethamine]          Date  WBC HD Drug/Dose Time   Given Level(s)   (Time) Comments   3/26  (#1) 13.2 HD x 3.75h -- --     3/27  (#2) 7.3 HD x 2h No dose -- 22.1 mcg/mL @ 0511  15.3 mcg/mL @ 1623    3/28  (#3) 6.9 HD x 2h Vancomycin 500 mg IV x 1 1840     3/29  (#4) -- -- No dose --     3/30  (#5) 2.2 HD x 32 minutes Vancomycin 500 mg IV x 1 after HD 2100 Random level @ 0503 = 17.8 mcg/mL    3/31  (#6) 6 HD x 180 minutes No dose --       (#7) 6.1 -- No dose -- Random level @ 0633 = 18.4 mcg/mL      (#8) 9.5 HD x 210 minutes Vancomycin 500 mg IV x 1 after HD 1304 Random level @ 0508 = 17.1 mcg/mL    4/3  (#9) 8.6 HD x 3 hrs Vancomycin 500 mg IV x 1 2255 Random @ 2042 = 11.5 mcg/mL      (#10) 8.8 -- No dose --       Estimated Creatinine Clearance: 26 mL/min (A) (based on SCr of 2 mg/dL (H)).   UOP over the past 24 hours:       Intake/Output Summary (Last 24 hours) at 2021 1706  Last data filed at 2021 1600  Gross per 24 hour   Intake 1457.23 ml   Output 20 ml   Net 1437.23 ml       Temp max: Temp (24hrs), Av °F (36.1 °C), Min:96.9 °F (36.1 °C), Max:97.1 °F (36.2 °C)      Antibiotic Regimen:  Antibiotic Dose Date Initiated Date Stopped   Doxycycline 100 mg IV Q12H 3/26 4/2   Pip/tazo 3.375 g Q12H 3/23      Cultures:  available culture and sensitivity results were reviewed in Epic   Culture Date Result    Blood x 2 3/25 No growth   Strep/legionella urine antigens 3/26 Negative   Respiratory molecular panel 3/29 SARS-CoV-2 Detected     Assessment:  ·

## 2021-04-04 NOTE — PROGRESS NOTES
[facility] MD Perlita Castrejon, MD Jess Renteria, MD Julia Burkett, MD Can Jara, CNP   Peg Nascimento, RAZA  Progress Note  4/4/2021    SUBJECTIVE:  We are following this patient for CKD stage IV on hemodialysis. 3/29 : Transferred to ICU bed 9 currently in prone position because of COVID-19 mechanical ventilation  Today with anis[facility] ICU attending Dr. Willingham Ek assess to perform acute dialysis treatment patient be scheduled for MR venogram with no contrast.  Patient was evaluated and case discussed with the nurse currently will be needing alternating supine and prone positions every 12 hours she remains on mechanical ventilation with sedation. Last dialysis was done yesterday    3/30 : seen in ICU this am .   Mechanically ventialted . Fio2 90   On small dose levo   Poor UO   No reported edema   D/w ICU staff     3/31 : partial HD yesterday due to Line malfunctioning . Will try cathflow . If line does not work then plan on IR exchange over guidewire   She continue to be mechanically ventilated , on 60 % fio2 when seeing her . Off pressors       4/1 : mechanically ventilated . fio2 80 . No pressors . IR to replace HD line today     4/2 : mechainically ventilated , high 02 reuqirment . critiically sick . High amount of o2 . It appear covid pneumonia is the main cultprit   D/w HD RN not able to do a lot of UF     4/3 : seen in ICU , d/w ICU RT , RN . Continue to require Fo2 at 100 with pao2 in 46s . No pressors ,poor UO .       4/4 : continue to require fio2 of 100 . Critically ill .  D/w HD RN plan UF today as tolerated will aim for 3-4 L     PROBLEM LIST:    Patient Active Problem List   Diagnosis    Diabetes mellitus type 2, insulin dependent ([facility] [address]    Hypothyroid    Hypertension    Hyperlipidemia    Hypertensive urgency    Acute respiratory failure with hypoxia (HCC)    COVID-19    Altered mental state    Goals of care, counseling/discussion    hrs:   BP Temp Temp src Pulse Resp SpO2 Weight   04/04/21 0800 (!) 141/68 97 °F (36.1 °C) Axillary 93 (!) 51 (!) 87 %    04/04/21 0700     (!) 33     04/04/21 0605       158 lb (71.7 kg)   04/04/21 0600 (!) 146/68   81 (!) 33 90 %    04/04/21 0500 137/84   79 (!) 33 93 %    04/04/21 0400 (!) 158/73 96.9 °F (36.1 °C) Axillary 89 (!) 33 91 %    04/04/21 0300 (!) 140/59   72  95 %    04/04/21 0200 (!) 134/55   76  95 %    04/04/21 0100 127/63   78 (!) 33 95 %    04/04/21 0000 (!) 149/79 97 °F (36.1 °C) Axillary 82 (!) 33 91 %    04/03/21 2300 (!) 154/72   84  96 %    04/03/21 2200 (!) 143/62   83 (!) 33 91 %    04/03/21 2100 (!) 154/70   86  95 %    04/03/21 2000 (!) 112/56 97.1 °F (36.2 °C) Axillary 74 (!) 33 98 %    04/03/21 1900 (!) 109/55   76  98 %    04/03/21 1800 (!) 101/55   80 (!) 33 95 %    04/03/21 1725    93  92 %    04/03/21 1715 (!) 157/90   96  92 %    04/03/21 1700 (!) 142/70   90  96 %    04/03/21 1645 (!) 141/70 97.5 °F (36.4 °C)  90 (!) 36 97 %    04/03/21 1630 (!) 141/63   90  94 %    04/03/21 1615 124/65   90  94 %    04/03/21 1600 123/65 97.5 °F (36.4 °C) Axillary 86  92 %    04/03/21 1500 107/70   89  (!) 88 %    04/03/21 1400 (!) 178/77   87  92 %    04/03/21 1330  97.7 °F (36.5 °C)  90 (!) 33     04/03/21 1315 (!) 184/78 97.7 °F (36.5 °C)   (!) 33            Intake/Output Summary (Last 24 hours) at 4/4/2021 1314  Last data filed at 4/4/2021 0800  Gross per 24 hour   Intake 1757.23 ml   Output 2325 ml   Net -567.77 ml       Wt Readings from Last 3 Encounters:   04/04/21 158 lb (71.7 kg)   03/08/21 117 lb (53.1 kg)   01/10/21 130 lb (59 kg)       General:  in no acute distress  HEENT: NC/AT, EOMI, sclera and conjunctiva are clear and anicteric.   Mucous membranes moist.  Cardiovascular: regular rate and rhythm, no murmurs, gallops, or rubs  Respiratory:  Clear, no rales, rhochi, or wheezes Gastrointestinal: soft, nontender, nondistended, NABS  Ext: no cyanosis, clubbing, or edema bilateral lower extremities  Neuro: awake, alert, oriented x3. Moves all 4 extremities. Cranial nerves II through XII grossly intact. Skin: dry, no rash      DATA:      Recent Labs     04/02/21  0508 04/03/21  0521 04/04/21  0600   WBC 9.5 8.6 8.8   HGB 9.9* 9.5* 8.9*   HCT 29.4* 28.7* 27.0*   MCV 84.7 85.9 87.1   * 125* 120*     Recent Labs     04/02/21  0508 04/03/21  0521 04/04/21  0600    138 138   K 4.2 3.9 3.5   CL 97* 97* 98   CO2 27 28 29   BUN 71* 50* 40*   CREATININE 3.4* 2.3* 2.0*       Iron studies:  Lab Results   Component Value Date    FERRITIN 613 03/16/2021     Bone disease:  Lab Results   Component Value Date    PTH 42 08/05/2020    MG 1.8 03/30/2021    PHOS 6.7 (H) 03/30/2021     Nutrition:No results found for: ALB    Microbiology      ASSESSMENT / RECOMMENDATIONS:     1. GAVIN on top of chronic disease stage IV with hemodialysis initiation     Continue HD support . UF session today . Goal 3-4 L UF . D/w HD staff earlier today     2. Anemia:   Cont. epo and iv ferrlecit per orders    3. Secondary Hyperparathyroidism:   Cont vitamin d analog      4. Pneumomediastinum    5.   COVID-19 with ARDs which appear the main culprit for her Sx     6   Respiratory failure which appear to be mainly due to COVID 19 Pneumonia with ARDS       Prognosis appear guarded     Issa Hunt MD  4/4/2021 Acute dialysis access

## 2021-04-04 NOTE — PROGRESS NOTES
[address] [facility]  [facility]  Progress Note    Chief Complaint   Patient presents with    Concern For COVID-19     positive covid, shortness of breath, EMS states 60% on room air.   94% on 15LPM NRB    Shortness of Breath   f/u covid    SUBJECTIVE:  Pt not examined spoke with NURSE  Family to still decide on status  In icu   On vent sedated   On HD  Has  rij   No change   Review of systems:  Unable to obtain due to mentation/vent     OBJECTIVE:  BP (!) 165/74   Pulse 102   Temp 97 °F (36.1 °C) (Axillary)   Resp (!) 51   Ht 5' (1.524 m)   Wt 158 lb (71.7 kg)   SpO2 95%   BMI 30.86 kg/m²   Temp  Av °F (36.1 °C)  Min: 96.9 °F (36.1 °C)  Max: 97.1 °F (36.2 °C)  Constitutional:   vent sedated      Lines: pIV  ij hd cath 3/26   Musa yellow    Radiology:  Laboratory and Tests Review:  Lab Results   Component Value Date    WBC 8.8 2021    WBC 8.6 2021    WBC 9.5 2021    HGB 8.9 (L) 2021    HCT 27.0 (L) 2021    MCV 87.1 2021     (L) 2021     No results found for: [facility]  Lab Results   Component Value Date    ALT 32 2021    AST 42 (H) 2021    ALKPHOS 182 (H) 2021    BILITOT 0.2 2021     Lab Results   Component Value Date     2021    K 3.5 2021    K 4.2 2021    CL 98 2021    CO2 29 2021    BUN 40 2021    CREATININE 2.0 2021    CREATININE 2.3 2021    CREATININE 3.4 2021    GFRAA 31 2021    LABGLOM 31 2021    GLUCOSE 106 2021    GLUCOSE 419 2011    PROT 4.8 2021    LABALBU 2.2 2021    LABALBU 4.1 2011    CALCIUM 7.5 2021    BILITOT 0.2 2021    ALKPHOS 182 2021    AST 42 2021    ALT 32 2021     Lab Results   Component Value Date    CRP 0.9 (H) 2021    CRP 10.8 (H) 2021     Lab Results   Component Value Date    SEDRATE 40 (H) 2021    SEDRATE 44 (H) 2021      Lab

## 2021-04-04 NOTE — PROGRESS NOTES
Assessment and Plan  Patient is a 57 y.o. female with the following medical Problems:   1. COVID-19 pneumonia  2. Severe ARDS  3. Acute kidney injury on CKD Stage IV requiring hemodialysis  4. Anemia of chronic kidney disease  5. Pneumomediastinum (improved)  6. Subcutaneous emphysema  7. Type 2 diabetes  8. Hypothyroidism  9. History of CVA and cavernous sinus thrombosis    Plan of care:  1. Continue supplemental oxygen to keep sat above 88%. Continue with low tidal volume ventilation. Patient is currently on PC .   2.  ID is managing antibiotics  3. Continue with hemodialysis support  4. DVT prophylaxis  5. Goals of care discussion with the family  6. Tube feeding as tolerated  7.  GI prophylaxis  9. Palliative care consult. Overall prognosis is extremely poor. 10.   ABG today. History of Present Illness:   Patient is a 19-year-old woman with above-mentioned medical problems who was admitted initially on March 8, 2021 and was discharged March 19, 2021 however she was readmitted on March 21 worsening respiratory symptoms. Patient was eventually intubated on March 25, 2021 and was initiated on hemodialysis. 04/02/2021:  Patient remains sedated, intubated, mechanically ventilated. Her oxygen requirement has been fluctuating. Had a discussion with her daughter today regarding goals of care. Her daughter has not made a decision regarding tracheostomy and PEG tube placement however she feels that her mom would not want to go through these procedures. She remains extremely ill requiring high FiO2 requirement and hemodialysis support. She is off pressors today. 04/03/2021:  Patient continues to do poorly. Her oxygen requirement has increased. Chest x-ray is worse. [facility] was contacted by the hospitalist service to transfer the patient based on family request however the case was declined by [facility]. She is currently hypertensive and off pressors.   She has no fever, chills, rigors. 04/04/2021:  Patient remains lethargic and poorly responsive off sedation. She continues to require 100% oxygen and 12 of PEEP to maintain a saturation in the low 90s. Her overall prognosis is extremely poor. She had a few episodes of desaturation and bradycardia with suctioning overnight.     Past Medical History:  Past Medical History:   Diagnosis Date    Acid reflux     COVID-19     Hemodialysis patient ([facility] [address]     Hyperlipidemia     Hypertension     Hypothyroidism     S/P appendectomy     Type II or unspecified type diabetes mellitus without mention of complication, not stated as uncontrolled         Family History:   Family History   Problem Relation Age of Onset    Diabetes Mother     High Blood Pressure Mother     Diabetes Brother     Diabetes Sister        Allergies:         Demerol, Peanut-containing drug products, and Toradol [ketorolac tromethamine]    Social history:  Social History     Socioeconomic History    Marital status: Single     Spouse name: Not on file    Number of children: Not on file    Years of education: Not on file    Highest education level: Not on file   Occupational History    Not on file   Social Needs    Financial resource strain: Not on file    Food insecurity     Worry: Not on file     Inability: Not on file    Transportation needs     Medical: Not on file     Non-medical: Not on file   Tobacco Use    Smoking status: Never Smoker    Smokeless tobacco: Never Used   Substance and Sexual Activity    Alcohol use: No    Drug use: No    Sexual activity: Not Currently   Lifestyle    Physical activity     Days per week: Not on file     Minutes per session: Not on file    Stress: Not on file   Relationships    Social connections     Talks on phone: Not on file     Gets together: Not on file     Attends [employer] service: Not on file     Active member of club or organization: Not on file     Attends meetings of clubs or organizations: Not on file     Relationship status: Not on file    Intimate partner violence     Fear of current or ex partner: Not on file     Emotionally abused: Not on file     Physically abused: Not on file     Forced sexual activity: Not on file   Other Topics Concern    Not on file   Social History Narrative    Not on file       Current Medications:     Current Facility-Administered Medications:     hydrALAZINE (APRESOLINE) injection 10 mg, 10 mg, Intravenous, Q6H PRN, Marce Paulson MD, 10 mg at 04/03/21 1009    dexamethasone (DECADRON) injection 4 mg, 4 mg, Intravenous, Q12H, Emma Delgadillo DO, 4 mg at 04/04/21 0610    0.9 % sodium chloride infusion, 250 mL, Intravenous, PRN, Emma Delgadillo DO    fluconazole (DIFLUCAN) in 0.9 % sodium chloride IVPB 400 mg, 400 mg, Intravenous, Q24H, Chelsy Wynne MD, Last Rate: 100 mL/hr at 04/03/21 1708, 400 mg at 04/03/21 1708    polyvinyl alcohol (LIQUIFILM TEARS) 1.4 % ophthalmic solution 1 drop, 1 drop, Both Eyes, Q4H, Emma Delgadillo DO, 1 drop at 04/04/21 0831    Gabapentin SOLN 300 mg, 300 mg, Oral, Nightly, Medina Hutchinson MD, 300 mg at 04/03/21 2058    insulin glargine (LANTUS) injection vial 9 Units, 9 Units, Subcutaneous, QAM, Odalys Heredia MD, 9 Units at 04/04/21 0838    famotidine (PEPCID) tablet 10 mg, 10 mg, Oral, Daily, Odalys Heredia MD, 10 mg at 04/04/21 0829    [Held by provider] carvedilol (COREG) tablet 3.125 mg, 3.125 mg, Oral, BID WC, Odalys Heredia MD    insulin lispro (HUMALOG) injection vial 0-6 Units, 0-6 Units, Subcutaneous, Q6H, Odalys Heredia MD, 1 Units at 04/04/21 1311    sodium bicarbonate tablet 1,300 mg, 1,300 mg, Per NG tube, TID, Odalys Heredia MD, 1,300 mg at 04/04/21 1324    heparin (porcine) injection 5,000 Units, 5,000 Units, Subcutaneous, 3 times per day, Odalys Heredia MD, 5,000 Units at 04/04/21 0609    vancomycin (VANCOCIN) intermittent dosing (placeholder), , Other, Rosmery Dunn MD, Stopped at 03/26/21 2154    [Held by provider] hydrOXYzine (VISTARIL) capsule 25 mg, 25 mg, Oral, 4x Daily PRN, Anh Tatum MD, 25 mg at 03/1960    propofol injection, 5-50 mcg/kg/min, Intravenous, Titrated, Paola Grier MD, Last Rate: 17 mL/hr at 04/04/21 0956, 50 mcg/kg/min at 04/04/21 0956    [Held by provider] amLODIPine (NORVASC) tablet 5 mg, 5 mg, Oral, BID, Anh Tatum MD, 5 mg at 03/26/21 0824    piperacillin-tazobactam (ZOSYN) 3,375 mg in dextrose 5 % 50 mL IVPB extended infusion (mini-bag), 3,375 mg, Intravenous, Q12H, Genny Holloway MD, Stopped at 04/04/21 0959    0.9 % sodium chloride infusion, , Intravenous, Q12H, Genny Holloway MD, Stopped at 04/04/21 1200    [Held by provider] pentoxifylline (TRENTAL) extended release tablet 400 mg, 400 mg, Oral, BID, Lyla Her, DO, 400 mg at 03/26/21 0824    ipratropium-albuterol (DUONEB) nebulizer solution 1 ampule, 1 ampule, Inhalation, 4x daily, Lyla , DO, 1 ampule at 04/04/21 1310    nystatin (MYCOSTATIN) [id number] UNIT/ML suspension 500,000 Units, 5 mL, Oral, 4x Daily, Lyla Her, DO, 500,000 Units at 04/04/21 1323    glucose (GLUTOSE) 40 % oral gel 15 g, 15 g, Oral, PRN, Lyla , DO    dextrose 50 % IV solution, 12.5 g, Intravenous, PRN, Lyla , DO, 12.5 g at 04/04/21 0433    glucagon (rDNA) injection 1 mg, 1 mg, Intramuscular, PRN, Lyla , DO    dextrose 5 % solution, 100 mL/hr, Intravenous, PRN, Lyla Her DO, Stopped at 03/24/21 0681    acetaminophen (TYLENOL) tablet 500 mg, 500 mg, Oral, 4x Daily PRN, Anh Tatum MD    aspirin chewable tablet 81 mg, 81 mg, Oral, Daily, Anh Tatum MD, 81 mg at 04/04/21 0830    atorvastatin (LIPITOR) tablet 80 mg, 80 mg, Oral, Daily, Anh Tatum MD, 80 mg at 04/04/21 0829    clopidogrel (PLAVIX) tablet 75 mg, 75 mg, Oral, Daily, Anh Tatum MD, 75 mg at 04/04/21 0829    levothyroxine (SYNTHROID) tablet 88 mcg, 88 mcg, Oral, Daily, Timoteo Gerardo MD, 88 mcg at 04/04/21 0610    sodium chloride flush 0.9 % injection 10 mL, 10 mL, Intravenous, 2 times per day, John Mueller MD, 10 mL at 04/04/21 4313    sodium chloride flush 0.9 % injection 10 mL, 10 mL, Intravenous, PRN, John Mueller MD, 10 mL at 03/29/21 1637    promethazine (PHENERGAN) tablet 12.5 mg, 12.5 mg, Oral, Q6H PRN **OR** ondansetron (ZOFRAN) injection 4 mg, 4 mg, Intravenous, Q6H PRN, John Mueller MD    polyethylene glycol (GLYCOLAX) packet 17 g, 17 g, Oral, Daily PRN, John Mueller MD    [DISCONTINUED] acetaminophen (TYLENOL) tablet 650 mg, 650 mg, Oral, Q6H PRN **OR** acetaminophen (TYLENOL) suppository 650 mg, 650 mg, Rectal, Q6H PRN, John Mueller MD    Review of Systems:   Unable to obtain due to medical condition    Physical Exam:   Vital Signs:  BP (!) 141/68   Pulse 93   Temp 97 °F (36.1 °C) (Axillary)   Resp (!) 51   Ht 5' (1.524 m)   Wt 158 lb (71.7 kg)   SpO2 (!) 87%   BMI 30.86 kg/m²     Input/Output:   In: 1757.2 [I.V.:597.2; NG/GT:760]  Out: 2325     Oxygen requirements: Intubated    Ventilator Information:  Vent Information  $Ventilation: $Subsequent Day  Skin Assessment: Clean, dry, & intact  Equipment ID: [id number]  Vent Type: [id number]  Vent Mode: AC/VC  Vt Ordered: 0 mL  Pressure Ordered: 25  Rate Set: 33 bmp  Peak Flow: 0 L/min  Pressure Support: 0 cmH20  FiO2 : 100 %  SpO2: (!) 87 %  SpO2/FiO2 ratio: 87  Sensitivity: 2  PEEP/CPAP: 12  I Time/ I Time %: 0 s  Humidification Source: Heated wire  Humidification Temp: ([place])  Mask Type: Full face mask  Mask Size: Small  Bonnet size: Small    General appearance:  not in pain or distress, in no respiratory distress    HEENT: Intubated  Neck: Supple, no jugular venous distension, lymphadenopathy, thyromegaly or carotid bruits  Chest: Decreased breath sounds, no wheezing, +ve crackles and no tenderness over ribs Cardiovascular: Normal S1 , S2, regular rate and rhythm, no murmur, rub or gallop  Abdomen: Normal sounds present, soft, lax with no tenderness, no hepatosplenomegaly, and no masses  Extremities: No edema. Pulses are equally present. Skin: intact, no rashes   Neurologic: Sedated and mechanical ventilation . Does not follow commands of sedation. Investigations:  Labs, radiological imaging and cardiac work up were reviewed        ICU STAFF PHYSICIAN NOTE OF PERSONAL INVOLVEMENT IN CARE  As the attending physician, I certify that I personally reviewed the patient's history and personally examined the patient to confirm the physical findings described above, and that I reviewed the relevant imaging studies and available reports. I also discussed the differential diagnosis and all of the proposed management plans with the patient and individuals accompanying the patient to this visit. They had the opportunity to ask questions about the proposed management plans and to have those questions answered. This patient has a high probability of sudden, clinically significant deterioration, which requires the highest level of physician preparedness to intervene urgently. I managed/supervised life or organ supporting interventions that required frequent physician assessment. I devoted my full attention to the direct care of this patient for the amount of time indicated below. Time I spent with family or surrogate(s) is included only if the patient was incapable of providing the necessary information or participating in medical decision-making. Time devoted to teaching and to any procedures I billed separately is not included.   Critical Care Time: 35 minutes      Electronically signed by Dima Naranjo MD on 4/4/2021 at 2:30 PM

## 2021-04-05 NOTE — PROGRESS NOTES
Palliative Care Department  [phone]  Palliative Care Progress Note  Provider Mahmood Ambershire  [id number]  Hospital Day: 12  Date of Initial Consult: 3/30/2021  Referring Provider: Chavo Recinos DO  Palliative Medicine was consulted for assistance with: Xiao Dudley, family support    HPI:   Carola Bergeron is a 57 y.o. with a medical history of CKD, COVID infection, type 2 DM, hypothyroid, HTN, HLD, GERD who was admitted on 3/21/2021 from home with a CHIEF COMPLAINT of worsening SOB and hypoxia, leg swelling. ASSESSMENT/PLAN:     Pertinent Hospital Diagnoses      COVID-19 pneumonia with ARDS and possible superimposed HCAP   GAVIN on CKD   Acute hypoxic respiratory failure   Pneumomediastinum   Anemia of CKD      Palliative Care Encounter / Counseling Regarding Goals of Care  Please see detailed goals of care discussion as below   At this time, Carola Bergeron, Does Not have capacity for medical decision-making. Capacity is time limited and situation/question specific   During encounter daughter Carlos Coates was surrogate medical decision-maker   Outcome of goals of care meeting: family decided they want Trach/PEG but patient not able to tolerate at this time   Code status Limited no to all but meds  o Over the past 40 years, despite advances in the protocol for Cardiopulmonary Resuscitation (CPR) outcomes for survival have not statistically changed. o The consensus is that 17% of all adult arrest patients survive to hospital discharge. Many factors lower a patients chance of survival, including advanced age, performance status, malignancy, and presence of multiple comorbidities. Shirley Mckeon and colleagues examined medical data from [id number] Medicare patients 72 and older who underwent in-hospital CPR. 5 Both older age and prior residence [facility] a [facility] were found to be associated with poorer survival rates.   o Patients 85 and older having only a 6% chance of surviving to hospital discharge.  o In general Cancer patients generally have an overall survival rate of only 6.2%. o Cancer patients whose hospital course followed a path of gradual deterioration showed a 0% survival rate.  o The presence of hepatic insufficiency, acute stroke, immunodeficiency, renal failure, or dialysis were associated with lower survival rates.  Advanced Directives: no POA or living will in [facility]   Surrogate/Legal NOK:  o Daughter Warren Quick [phone]    Spiritual assessment: no spiritual distress identified  Bereavement and grief: to be determined  Referrals to: none today   SUBJECTIVE:     Current medical issues leading to Palliative Medicine involvement include   Active Hospital Problems    Diagnosis Date Noted    Goals of care, counseling/discussion [Z71.89]     Palliative care by specialist [Z51.5]     Altered mental state [R41.82] 03/29/2021    Acute respiratory failure with hypoxia ([facility] [address] [J96.01] 03/21/2021    COVID-19 [U07.1] 03/21/2021        Details of Conversation:  Spoke with ICU team, intensivist spoke with daughter this morning who after conferring with family want to proceed with trach/PEG. However, per general surgery not a candidate for trach/PEg due to high oxygen requirements/vent settings at this time.   Physical Function:  PPS: 10    OBJECTIVE:   Prognosis: Guarded    Physical Exam:  BP (!) 152/63   Pulse 103   Temp 98.8 °F (37.1 °C) (Axillary)   Resp (!) 33   Ht 5' (1.524 m)   Wt 154 lb (69.9 kg)   SpO2 100%   BMI 30.08 kg/m²   Patient observed from glass door  Constitutional:  NAD  Eyes: eyes closed  ENMT:  Normocephalic, atraumatic, ETT in place  Lungs:  Mechanical respirations  Heart[de-identified]  RRR on monitor  :  brasher  Ext:  Not seen moving extremities  Skin:  no rashes on visible skin  Psych: unable to assess  Neuro:  Intubated/sedated    Objective data reviewed: labs, images, records, medication use, vitals and chart    Discussed patient and the plan of care with the other IDT members: Palliative Medicine IDT Team, Floor Nurse and Family    Time/Communication  Greater than 50% of time spent, total 5 minutes in counseling and coordination of care at the bedside regarding goals of care, diagnosis and prognosis and see above. Thank you for allowing Palliative Medicine to participate in the care of [facility].

## 2021-04-05 NOTE — PROGRESS NOTES
[facility]  DESIREEIDA  Progress Note    FU: COVID/ respiratory failure/ARDS     Due to COVID19, did not examine patient. SUBJECTIVE:    Patient is in ICU   Awaiting family decision regarding code status and prognosis .   Prognosis poor/guarded   She is intubated/sedated/ on vent   On HD due to CKD - has right IJ  No changes at this time     ROS:  Unable to obtain due to mentation     OBJECTIVE:    Vitals:    04/05/21 1300 04/05/21 1345 04/05/21 1400 04/05/21 1430   BP: (!) 153/75 (!) 149/66 (!) 146/71 (!) 148/68   Pulse: 93 92 91 93   Resp: 30 30 30 30   Temp:       TempSrc:       SpO2: 100% 100% 100% 100%   Weight:       Height:           HEENT :         Vent, sedated   pIV  ij hd cath 3/26   Musa yellow                     Current Facility-Administered Medications   Medication Dose Route Frequency Provider Last Rate Last Admin    fluconazole (DIFLUCAN) in 0.9 % sodium chloride IVPB 200 mg  200 mg Intravenous Q24H Patrice Uriostegui MD        albumin human 25 % IV solution             hydrALAZINE (APRESOLINE) injection 10 mg  10 mg Intravenous Q6H PRN Jc Humphries MD   10 mg at 04/05/21 0115    dexamethasone (DECADRON) injection 4 mg  4 mg Intravenous Q12H Cherry Spillers, DO   4 mg at 04/05/21 0600    0.9 % sodium chloride infusion  250 mL Intravenous PRN Cherry Spillers, DO        polyvinyl alcohol (LIQUIFILM TEARS) 1.4 % ophthalmic solution 1 drop  1 drop Both Eyes Q4H Cherry Spillers, DO   1 drop at 04/05/21 1221    Gabapentin SOLN 300 mg  300 mg Oral Nightly Koby Ugalde MD   300 mg at 04/04/21 2124    insulin glargine (LANTUS) injection vial 9 Units  9 Units Subcutaneous QAM Tara Rowley MD   9 Units at 04/05/21 0941    famotidine (PEPCID) tablet 10 mg  10 mg Oral Daily Tara Rowley MD   10 mg at 04/05/21 0929    [Held by provider] carvedilol (COREG) tablet 3.125 mg  3.125 mg Oral BID  Tara Rowley MD Stopped at 03/24/21 0626    acetaminophen (TYLENOL) tablet 500 mg  500 mg Oral 4x Daily PRN Kori Santizo MD        aspirin chewable tablet 81 mg  81 mg Oral Daily Kori Santizo MD   81 mg at 04/05/21 0930    atorvastatin (LIPITOR) tablet 80 mg  80 mg Oral Daily Kori Santizo MD   80 mg at 04/05/21 0930    clopidogrel (PLAVIX) tablet 75 mg  75 mg Oral Daily Kori Santizo MD   75 mg at 04/05/21 0930    levothyroxine (SYNTHROID) tablet 88 mcg  88 mcg Oral Daily Kori Santizo MD   88 mcg at 04/05/21 0600    sodium chloride flush 0.9 % injection 10 mL  10 mL Intravenous 2 times per day Sly Barton MD   10 mL at 04/05/21 0931    sodium chloride flush 0.9 % injection 10 mL  10 mL Intravenous PRN Sly Barton MD   10 mL at 03/29/21 1637    promethazine (PHENERGAN) tablet 12.5 mg  12.5 mg Oral Q6H PRN Sly Barton MD        Or    ondansetron [facility]) injection 4 mg  4 mg Intravenous Q6H PRN Sly Barton MD        polyethylene glycol [facility]) packet 17 g  17 g Oral Daily PRN Sly Barton MD        acetaminophen (TYLENOL) suppository 650 mg  650 mg Rectal Q6H PRN Sly Barton MD            LABS    CBC:     WBC   Date Value Ref Range Status   04/05/2021 12.3 (H) 4.5 - 11.5 E9/L Final   04/04/2021 8.8 4.5 - 11.5 E9/L Final   04/03/2021 8.6 4.5 - 11.5 E9/L Final     Hematocrit   Date Value Ref Range Status   04/05/2021 27.0 (L) 34.0 - 48.0 % Final   04/04/2021 27.0 (L) 34.0 - 48.0 % Final   04/03/2021 28.7 (L) 34.0 - 48.0 % Final     Platelets   Date Value Ref Range Status   04/05/2021 194 130 - 450 E9/L Final   04/04/2021 120 (L) 130 - 450 E9/L Final   04/03/2021 125 (L) 130 - 450 E9/L Final         BMP:      Sodium   Date Value Ref Range Status   04/05/2021 139 132 - 146 mmol/L Final   04/04/2021 138 132 - 146 mmol/L Final   04/03/2021 138 132 - 146 mmol/L Final     Potassium   Date Value Ref Range Status   04/05/2021 3.9 3.5 - 5.0 mmol/L Final   04/04/2021 3.5 3.5 - 5.0 ophthalimc veins - possible cavernous sinus thrombosis? PLAN:  continue Diflucan, vancomycin, and Zosyn for now - can consider stopping in the next 24 hours or so   Monitor labs, follow cultures     INDRA Levin - NP  4/5/2021  2:46 PM     I have discussed the case, including pertinent history and physical  exam findings . I have seen and examined the patient and the key elements of the encounter have been performed by me. I agree with the assessment, plan and orders as documented.       Treatment plan as per my recommendation     Jessica Myles MD, FACP  4/5/2021  10:54 PM

## 2021-04-05 NOTE — PROGRESS NOTES
Pharmacy Consultation Note  (Antibiotic Dosing and Monitoring)    Initial consult date: 3/26/21  Consulting physician: Dr. Marta Brown  Drug(s): Vancomycin  Indication: Empiric    Ht Readings from Last 1 Encounters:   21 5' (1.524 m)     Wt Readings from Last 1 Encounters:   21 154 lb (69.9 kg)     Age/  Gender IBW DW  Allergy Information   57 y.o.   female 45.5 kg 61.6 kg  Demerol, Peanut-containing drug products, and Toradol [ketorolac tromethamine]          Date  WBC HD Drug/Dose Time   Given Level(s)   (Time) Comments   3/26  (#1) 13.2 HD x 3.75h -- --     3/27  (#2) 7.3 HD x 2h No dose -- 22.1 mcg/mL @ 0511  15.3 mcg/mL @ 1623    3/28  (#3) 6.9 HD x 2h Vancomycin 500 mg IV x 1 1840     3/29  (#4) -- -- No dose --     3/30  (#5) 2.2 HD x 32 minutes Vancomycin 500 mg IV x 1 after HD 2100 Random level @ 0503 = 17.8 mcg/mL    3/31  (#6) 6 HD x 180 minutes No dose --       (#7) 6.1 -- No dose -- Random level @ 0633 = 18.4 mcg/mL      (#8) 9.5 HD x 210 minutes Vancomycin 500 mg IV x 1 after HD 1304 Random level @ 0508 = 17.1 mcg/mL    4/3  (#9) 8.6 HD x 3 hrs Vancomycin 500 mg IV x 1 2255 Random @ 2042 = 11.5 mcg/mL      (#10) 8.8 -- No dose --       (#11) 12.3 HD x 3 hrs Vancomycin 500 mg IV x 1 <1600> Random @ 0544 = 19.8 mcg/mL                                 Estimated Creatinine Clearance: 19 mL/min (A) (based on SCr of 2.7 mg/dL (H)).   UOP over the past 24 hours:       Intake/Output Summary (Last 24 hours) at 2021 1450  Last data filed at 2021 1235  Gross per 24 hour   Intake 2245.28 ml   Output 5200 ml   Net -2954.72 ml       Temp max: Temp (24hrs), Av.1 °F (36.7 °C), Min:97 °F (36.1 °C), Max:99.1 °F (37.3 °C)      Antibiotic Regimen:  Antibiotic Dose Date Initiated Date Stopped   Doxycycline 100 mg IV Q12H 3/26 4/2   Pip/tazo 3.375 g Q12H 3/23      Cultures:  available culture and sensitivity results were reviewed in Epic   Culture Date Result    Blood x 2 3/25 No growth Strep/legionella urine antigens 3/26 Negative   Respiratory molecular panel 3/29 SARS-CoV-2 Detected     Assessment:  · Consulted by Dr. Russell Fearing to dose/monitor vancomycin  · Goal trough level:  15-20 mcg/mL  · Pt is a 57 yof admitted to the hospital for acute respiratory failure 2/2 COVID-19 and CKD. Mike Nelson was intubated on 3/25 and transferred to the ICU. A HD cath has been placed with plans to start dialysis today.   · ESRD; started dialysis on 3/26    Plan:  · Pre-dialysis level = 19.8 mcg/mL  · Pt had 3 hours HD this AM  · Re-dose vancomycin 500 mg IV x 1  · Consider de-escalation (day 11 of vancomycin)  · Follow HD orders  · Pharmacist will follow and monitor/adjust dosing as necessary      Thank you for the consult,    Lashaun Verde, PharmD, BCPS 4/5/2021 2:57 PM   Ext: [phone]

## 2021-04-05 NOTE — CARE COORDINATION
4-5-Cm note: ( covid positive 3-29) SS consult for LTAC. I called pt's daughter Gerson Ibarra went over the list of LTAC facilities in this area, she chose [facility]st choice,  [facility] 2nd choice, I called the referral to the liaison for [facility], will await acceptance, pt will require Precert . Tracheostomy is ordered, a PEG also ordered , and a Tunnelled HD catheter will be placed. CM/SS will follow .  Electronically signed by Toni Zimmerman RN on 4/5/2021 at 1:31 PM

## 2021-04-05 NOTE — PROGRESS NOTES
[facility] MD Markel Skelton, MD Salomón Perez, MD Sandy Brooks, MD Ramón Whalen, CNP   Peg Nascimento, RAZA  Progress Note  4/5/2021    SUBJECTIVE:  We are following this patient for CKD stage IV on hemodialysis. 3/29 : Transferred to ICU bed 9 currently in prone position because of COVID-19 mechanical ventilation  Today with [facility] ICU attending Dr. Sharmin Ritter assess to perform acute dialysis treatment patient be scheduled for MR venogram with no contrast.  Patient was evaluated and case discussed with the nurse currently will be needing alternating supine and prone positions every 12 hours she remains on mechanical ventilation with sedation. Last dialysis was done yesterday    3/30 : seen in ICU this am .   Mechanically ventialted . Fio2 90   On small dose levo   Poor UO   No reported edema   D/w ICU staff     3/31 : partial HD yesterday due to Line malfunctioning . Will try cathflow . If line does not work then plan on IR exchange over guidewire   She continue to be mechanically ventilated , on 60 % fio2 when seeing her . Off pressors       4/1 : mechanically ventilated . fio2 80 . No pressors . IR to replace HD line today     4/2 : mechainically ventilated , high 02 reuqirment . critiically sick . High amount of o2 . It appear covid pneumonia is the main cultprit   D/w HD RN not able to do a lot of UF     4/3 : seen in ICU , d/w ICU RT , RN . Continue to require Fo2 at 100 with pao2 in 46s . No pressors ,poor UO .       4/4 : continue to require fio2 of 100 . Critically ill . D/w HD RN plan UF today as tolerated will aim for 3-4 L   4/5: Patient was seen and case was discussed with the floor nurses and dialysis nurses while patient undergoing acute hemodialysis morning, patient had ultrafiltration session yesterday and hemodialysis morning. Vital signs were stable on all the pressors all the medications being given.     PROBLEM LIST:    Patient Active Problem List   Diagnosis    Diabetes mellitus type 2, insulin dependent ([facility]    Hypothyroid    Hypertension    Hyperlipidemia    Hypertensive urgency    Acute respiratory failure with hypoxia ([facility]    COVID-19    Altered mental state    Goals of care, counseling/discussion    Palliative care by specialist        PAST MEDICAL HISTORY:    Past Medical History:   Diagnosis Date    Acid reflux     COVID-19     Hemodialysis patient ([facility]     Hyperlipidemia     Hypertension     Hypothyroidism     S/P appendectomy     Type II or unspecified type diabetes mellitus without mention of complication, not stated as uncontrolled        MEDS (scheduled):   fluconazole  200 mg Intravenous Q24H    dexamethasone  4 mg Intravenous Q12H    polyvinyl alcohol  1 drop Both Eyes Q4H    Gabapentin  300 mg Oral Nightly    insulin glargine  9 Units Subcutaneous QAM    famotidine  10 mg Oral Daily    [Held by provider] carvedilol  3.125 mg Oral BID WC    insulin lispro  0-6 Units Subcutaneous Q6H    sodium bicarbonate  1,300 mg Per NG tube TID    heparin (porcine)  5,000 Units Subcutaneous 3 times per day    vancomycin (VANCOCIN) intermittent dosing (placeholder)   Other RX Placeholder    [Held by provider] amLODIPine  5 mg Oral BID    piperacillin-tazobactam  3,375 mg Intravenous Q12H    [Held by provider] pentoxifylline  400 mg Oral BID    ipratropium-albuterol  1 ampule Inhalation 4x daily    nystatin  5 mL Oral 4x Daily    aspirin  81 mg Oral Daily    atorvastatin  80 mg Oral Daily    clopidogrel  75 mg Oral Daily    levothyroxine  88 mcg Oral Daily    sodium chloride flush  10 mL Intravenous 2 times per day       MEDS (infusions):   sodium chloride      propofol 50 mcg/kg/min (04/05/21 0425)    sodium chloride Stopped (04/05/21 0130)    dextrose Stopped (03/24/21 0626)       MEDS (prn):  hydrALAZINE, sodium chloride, [Held by provider] hydrOXYzine, glucose, dextrose, glucagon (rDNA), dextrose, acetaminophen, sodium chloride flush, promethazine **OR** ondansetron, polyethylene glycol, [DISCONTINUED] acetaminophen **OR** acetaminophen    DIET:    DIET TUBE FEED CONTINUOUS/CYCLIC NPO; Renal Formula; Nasogastric; 10; 50      PHYSICAL EXAM:      Patient Vitals for the past 24 hrs:   BP Temp Temp src Pulse Resp SpO2 Weight   04/05/21 0920 130/62   81  100 %    04/05/21 0905  98.8 °F (37.1 °C)   30     04/05/21 0700 (!) 152/63   103  100 %    04/05/21 0630 (!) 130/57   102  98 %    04/05/21 0600       154 lb (69.9 kg)   04/05/21 0500 (!) 158/67   105  99 %    04/05/21 0400 (!) 164/67 98.8 °F (37.1 °C) Axillary 108 (!) 33 100 %    04/05/21 0300 (!) 165/66   109  100 %    04/05/21 0200 (!) 169/74   107  99 %    04/05/21 0100 (!) 168/75   108  99 %    04/05/21 0000 (!) 165/74 99.1 °F (37.3 °C) Axillary 104 (!) 33 99 %    04/04/21 2300 (!) 165/74   103  99 %    04/04/21 2200 (!) 159/70   100  98 %    04/04/21 2100 (!) 153/70   96 (!) 33 99 %    04/04/21 2045 (!) 140/72   97 26 99 % 155 lb 6.8 oz (70.5 kg)   04/04/21 2030 135/70   97      04/04/21 2015 129/68   98      04/04/21 2000 136/70 98.3 °F (36.8 °C) Axillary 98 (!) 33 94 %    04/04/21 1945 137/69   100      04/04/21 1930 (!) 144/68   101      04/04/21 1915 (!) 140/63   100      04/04/21 1902 (!) 151/60   101  93 %    04/04/21 1900 (!) 151/60   101      04/04/21 1845 (!) 162/52   102      04/04/21 1823    103  97 %    04/04/21 1815 (!) 174/70   106      04/04/21 1800 (!) 169/71   104  96 %    04/04/21 1745     (!) 34  160 lb 15 oz (73 kg)   04/04/21 1700 (!) 165/74   102  95 %    04/04/21 1600 (!) 159/78   100  95 %    04/04/21 1500 (!) 156/71   98  95 %    04/04/21 1400 (!) 156/76   98  93 %    04/04/21 1300 (!) 145/69   92  94 %    04/04/21 1200 (!) 145/69   90  94 %           Intake/Output Summary (Last 24 hours) at ultrafiltration  Prognosis appear guarded     Marleny Herrera MD  4/5/2021 Acute dialysis access

## 2021-04-05 NOTE — FLOWSHEET NOTE
04/04/21 2045   Vital Signs   BP (!) 140/72   Pulse 97   Resp 26   SpO2 99 %   Weight 155 lb 6.8 oz (70.5 kg)   Weight Method Bed scale   Percent Weight Change -3.42   Post-Hemodialysis Assessment   Post-Treatment Procedures Blood returned;Catheter capped, clamped and heparinized x 2 ports   Machine Disinfection Process Acid/Vinegar Clean;Machine Absence of Bleach Machine; Exterior Machine Disinfection; Heat Disinfect   Rinseback Volume (ml) 300 ml   Total Liters Processed (l/min) 0 l/min   Dialyzer Clearance Lightly streaked   Duration of Treatment (minutes) 180 minutes   Hemodialysis Intake (ml) 500 ml   Hemodialysis Output (ml) 3000 ml   NET Removed (ml) 2500 ml   Tolerated Treatment Good   Patient Response to Treatment tolerated uf only well with 2.5L removed in 3 hrs vss no issues with tx   RUE Edema +3;Pitting   LUE Edema Pitting;+3   RLE Edema +3;Pitting   LLE Edema +3;Pitting

## 2021-04-05 NOTE — PROGRESS NOTES
Department of Internal Medicine  General Internal Medicine  Attending Progress Note  Chief Complaint   Patient presents with    Concern For COVID-19     positive covid, shortness of breath, EMS states 60% on room air. 94% on 15LPM NRB    Shortness of Breath     SUBJECTIVE:    Patient is still intubated and sedated.      OBJECTIVE      Medications    Current Facility-Administered Medications: fluconazole (DIFLUCAN) in 0.9 % sodium chloride IVPB 200 mg, 200 mg, Intravenous, Q24H  albumin human 25 % IV solution, , ,   hydrALAZINE (APRESOLINE) injection 10 mg, 10 mg, Intravenous, Q6H PRN  dexamethasone (DECADRON) injection 4 mg, 4 mg, Intravenous, Q12H  0.9 % sodium chloride infusion, 250 mL, Intravenous, PRN  polyvinyl alcohol (LIQUIFILM TEARS) 1.4 % ophthalmic solution 1 drop, 1 drop, Both Eyes, Q4H  Gabapentin SOLN 300 mg, 300 mg, Oral, Nightly  insulin glargine (LANTUS) injection vial 9 Units, 9 Units, Subcutaneous, QAM  famotidine (PEPCID) tablet 10 mg, 10 mg, Oral, Daily  [Held by provider] carvedilol (COREG) tablet 3.125 mg, 3.125 mg, Oral, BID WC  insulin lispro (HUMALOG) injection vial 0-6 Units, 0-6 Units, Subcutaneous, Q6H  sodium bicarbonate tablet 1,300 mg, 1,300 mg, Per NG tube, TID  heparin (porcine) injection 5,000 Units, 5,000 Units, Subcutaneous, 3 times per day  vancomycin (VANCOCIN) intermittent dosing (placeholder), , Other, RX Placeholder  [Held by provider] hydrOXYzine (VISTARIL) capsule 25 mg, 25 mg, Oral, 4x Daily PRN  propofol injection, 5-50 mcg/kg/min, Intravenous, Titrated  [Held by provider] amLODIPine (NORVASC) tablet 5 mg, 5 mg, Oral, BID  piperacillin-tazobactam (ZOSYN) 3,375 mg in dextrose 5 % 50 mL IVPB extended infusion (mini-bag), 3,375 mg, Intravenous, Q12H  0.9 % sodium chloride infusion, , Intravenous, Q12H  [Held by provider] pentoxifylline (TRENTAL) extended release tablet 400 mg, 400 mg, Oral, BID  ipratropium-albuterol (DUONEB) nebulizer solution 1 ampule, 1 ampule, Inhalation, 4x daily  nystatin (MYCOSTATIN) 039926 UNIT/ML suspension 500,000 Units, 5 mL, Oral, 4x Daily  glucose (GLUTOSE) 40 % oral gel 15 g, 15 g, Oral, PRN  dextrose 50 % IV solution, 12.5 g, Intravenous, PRN  glucagon (rDNA) injection 1 mg, 1 mg, Intramuscular, PRN  dextrose 5 % solution, 100 mL/hr, Intravenous, PRN  acetaminophen (TYLENOL) tablet 500 mg, 500 mg, Oral, 4x Daily PRN  aspirin chewable tablet 81 mg, 81 mg, Oral, Daily  atorvastatin (LIPITOR) tablet 80 mg, 80 mg, Oral, Daily  clopidogrel (PLAVIX) tablet 75 mg, 75 mg, Oral, Daily  levothyroxine (SYNTHROID) tablet 88 mcg, 88 mcg, Oral, Daily  sodium chloride flush 0.9 % injection 10 mL, 10 mL, Intravenous, 2 times per day  sodium chloride flush 0.9 % injection 10 mL, 10 mL, Intravenous, PRN  promethazine (PHENERGAN) tablet 12.5 mg, 12.5 mg, Oral, Q6H PRN **OR** ondansetron (ZOFRAN) injection 4 mg, 4 mg, Intravenous, Q6H PRN  polyethylene glycol (GLYCOLAX) packet 17 g, 17 g, Oral, Daily PRN  [DISCONTINUED] acetaminophen (TYLENOL) tablet 650 mg, 650 mg, Oral, Q6H PRN **OR** acetaminophen (TYLENOL) suppository 650 mg, 650 mg, Rectal, Q6H PRN  Physical    VITALS:  BP (!) 153/75   Pulse 93   Temp 97 °F (36.1 °C)   Resp 30   Ht 5' (1.524 m)   Wt 154 lb (69.9 kg)   SpO2 100%   BMI 30.08 kg/m²   CONSTITUTIONAL:  intubated  ENT:  Normocephalic, without obvious abnormality, atraumatic, sinuses nontender on palpation, external ears without lesions, oral pharynx with moist mucus membranes, tonsils without erythema or exudates, gums normal and good dentition.   NECK:  Supple, symmetrical, trachea midline, no adenopathy, thyroid symmetric, not enlarged and no tenderness, skin normal  HEMATOLOGIC/LYMPHATICS:  no cervical lymphadenopathy  LUNGS:  Good air entry, Mild Expiratory wheezes  CARDIOVASCULAR:  Normal apical impulse, regular rate and rhythm, normal S1 and S2, no S3 or S4, and no murmur noted  ABDOMEN:  normal bowel sounds  MUSCULOSKELETAL:  Bilateral

## 2021-04-05 NOTE — CONSULTS
GENERAL SURGERY  CONSULT NOTE  4/5/2021    Physician Consulted: Dr. Marleny Monroe  Reason for Consult: Karolina and PEG  Referring Physician: Dr. Madyson Mcdowell is a 57 y.o. female who has been admitted since 3/25 with COVID 19 pneumonia. She has subsequently developed severe ARDS, GAVIN on CKD and is on hemodialysis. She is on 100% FiO2 and pressure control ventilation. Prognosis as per the intensivist is guarded. There is no family at bedside to provide further information. Past Medical History:   Diagnosis Date    Acid reflux     COVID-19     Hemod[facility] patient ([facility]     Hyperlipidemia     Hypertension     Hypothyroidism     S/P appendectomy     Type II or unspecified type diabetes mellitus without mention of complication, not stated as uncontrolled        Past Surgical History:   Procedure Laterality Date    APPENDECTOMY      HYSTERECTOMY         Medications Prior to Admission:    Prior to Admission medications    Medication Sig Start Date End Date Taking?  Authorizing Provider   levothyroxine (SYNTHROID) 88 MCG tablet Take 88 mcg by mouth Daily   Yes Historical Provider, MD   famotidine (PEPCID) 20 MG tablet Take 20 mg by mouth 2 times daily   Yes Historical Provider, MD   docusate sodium (COLACE) 100 MG capsule Take 100 mg by mouth nightly   Yes Historical Provider, MD   insulin lispro (HUMALOG) 100 UNIT/ML injection vial Inject 0-8 Units into the skin 3 times daily (before meals) *Per Sliding Scale*   Yes Historical Provider, MD   carvedilol (COREG) 25 MG tablet Take 1 tablet by mouth 2 times daily (with meals) 3/19/21  Yes Reji Hathaway MD   amLODIPine (NORVASC) 5 MG tablet Take 1 tablet by mouth 2 times daily 3/19/21  Yes Reji Hathaway MD   acetaminophen (TYLENOL) 500 MG tablet Take 1 tablet by mouth 4 times daily as needed for Pain 1/10/21  Yes Alexsander Apodaca MD   insulin glargine (LANTUS SOLOSTAR) 100 UNIT/ML injection pen Inject 18 Units into the skin every morning 11/17/20 exam:->fall Has a \"code stroke\" or \"stroke alert\" been called? ->No Decision Support Exception->Emergency Medical Condition (MA) FINDINGS: BRAIN/VENTRICLES: There is no acute intracranial hemorrhage, mass effect or midline shift. No abnormal extra-axial fluid collection. The gray-white differentiation is maintained without evidence of an acute infarct. There is no evidence of hydrocephalus. Calcification is seen along the left tentorium cerebelli. ORBITS: The visualized portion of the orbits demonstrate no acute abnormality. SINUSES: Mild mucosal thickening is seen in the paranasal sinuses. The mastoids are clear. SOFT TISSUES/SKULL:  No acute abnormality of the visualized skull or soft tissues. No skull fracture or acute intracranial abnormality. Ct Chest Wo Contrast    Result Date: 3/21/2021  EXAMINATION: CT OF THE CHEST WITHOUT CONTRAST 3/21/2021 5:42 pm TECHNIQUE: CT of the chest was performed without the administration of intravenous contrast. Multiplanar reformatted images are provided for review. Dose modulation, iterative reconstruction, and/or weight based adjustment of the mA/kV was utilized to reduce the radiation dose to as low as reasonably achievable. COMPARISON: None. HISTORY: ORDERING SYSTEM PROVIDED HISTORY: SOB TECHNOLOGIST PROVIDED HISTORY: Reason for exam:->SOB Decision Support Exception->Emergency Medical Condition (MA) FINDINGS: Mediastinum: There is mild calcified plaque throughout the aorta which is normal caliber and unchanged. The pulmonary vessels are engorged centrally and more prominent. There is no mediastinal mass or adenopathy. There is a small pericardial effusion. Lungs/pleura: There are extensive airspace opacities along the apices extending inferiorly into both upper lobes and perihilar regions and scattered along both lung bases with air bronchograms throughout. There is a tiny right pleural effusion posteriorly. No pulmonary nodule or mass is seen.   There is no perihilar and bibasilar opacities which is more apparent. No obvious effusion is seen. Mild cardiomegaly and mild pulmonary edema which is more prominent. Hazy perihilar and bibasilar opacities which have increased and could be due to pulmonary edema and/or pneumonia. Recommend follow-up. Resolving left upper lobe opacity. Us Dup Lower Extremities Bilateral Venous    Result Date: 3/21/2021  EXAMINATION: DUPLEX VENOUS ULTRASOUND OF THE BILATERAL LOWER EXTREMITIES, 3/21/2021 6:29 pm TECHNIQUE: Duplex ultrasound using B-mode/gray scaled imaging and Doppler spectral analysis and color flow was obtained of the bilateral lower extremities. COMPARISON: None. HISTORY: ORDERING SYSTEM PROVIDED HISTORY: dvt TECHNOLOGIST PROVIDED HISTORY: Reason for exam:->dvt What reading provider will be dictating this exam?->CRC FINDINGS: The visualized veins of the bilateral lower extremities are patent and free of echogenic thrombus. The veins demonstrate good compressibility with normal color flow study and spectral analysis. No evidence of DVT in either lower extremity.      ASSESSMENT:  57 y.o. female with COVID 19, severe ARDS, GAVIN on CKD 4 needing RRT    PLAN:  With current vent settings and oxygenation requirements, tracheostomy and PEG cannot be performed  Will follow, once vent settings are appropriate, will proceed with Meldon Pun and PEG   D/w Dr. Doni Claros    Electronically signed by Joan Salgado DO on 4/5/21 at 1:45 PM EDT    General Surgery Progress Note  [facility]    Patient's Name/Date of Birth: Kierra Senior / 1960    Date: April 7, 2021     Surgeon: Doni Claros MD    Chief Complaint: respiratory failure    Patient Active Problem List   Diagnosis    Diabetes mellitus type 2, insulin dependent ([address]    Hypothyroid    Hypertension    Hyperlipidemia    Hypertensive urgency    Acute respiratory failure with hypoxia ([address]    COVID-19    Altered mental state    Goals of care, counseling/discussion    Palliative care by specialist       Subjective: HPI as above. Seen and discussed with nursing. Persistent respiratory insufficiency with high vent settings    Objective:  BP (!) 102/52   Pulse 64   Temp 96.9 °F (36.1 °C) (Infrared)   Resp (!) 35   Ht 5' (1.524 m)   Wt 140 lb 14 oz (63.9 kg)   SpO2 100%   BMI 27.51 kg/m²   Labs:  Recent Labs     04/05/21  0544 04/06/21  0447 04/07/21  0436   WBC 12.3* 15.4* 11.8*   HGB 8.7* 8.1* 7.1*   HCT 27.0* 25.1* 22.5*     Lab Results   Component Value Date    CREATININE 2.0 (H) 04/06/2021    BUN 47 (H) 04/06/2021     04/06/2021    K 4.0 04/06/2021    CL 96 (L) 04/06/2021    CO2 28 04/06/2021     No results for input(s): LIPASE, AMYLASE in the last 72 hours.   CBC with Differential:    Lab Results   Component Value Date    WBC 11.8 04/07/2021    RBC 2.48 04/07/2021    HGB 7.1 04/07/2021    HCT 22.5 04/07/2021     04/07/2021    MCV 90.7 04/07/2021    MCH 28.6 04/07/2021    MCHC 31.6 04/07/2021    RDW 16.3 04/07/2021    NRBC 0.9 03/17/2021    SEGSPCT 59 02/27/2013    METASPCT 12.0 03/11/2021    LYMPHOPCT 4.3 03/24/2021    MONOPCT 1.4 03/24/2021    MYELOPCT 2.0 03/11/2021    BASOPCT 0.1 03/24/2021    MONOSABS 0.00 03/24/2021    LYMPHSABS 0.56 03/24/2021    EOSABS 0.00 03/24/2021    BASOSABS 0.00 03/24/2021     CMP:    Lab Results   Component Value Date     04/06/2021    K 4.0 04/06/2021    K 4.2 03/21/2021    CL 96 04/06/2021    CO2 28 04/06/2021    BUN 47 04/06/2021    CREATININE 2.0 04/06/2021    GFRAA 31 04/06/2021    LABGLOM 31 04/06/2021    GLUCOSE 219 04/06/2021    GLUCOSE 419 03/21/2011    PROT 4.8 03/24/2021    LABALBU 2.2 03/24/2021    LABALBU 4.1 03/21/2011    CALCIUM 8.1 04/06/2021    BILITOT 0.2 03/24/2021    ALKPHOS 182 03/24/2021    AST 42 03/24/2021    ALT 32 03/24/2021       General appearance:  NAD  Head: NCAT  Neck: no masses  Lungs: mechanically ventilated, equal chest rise bilateral  Heart: Reg rate  Abdomen: flat  Skin; no lesions  Extremities: extremities normal, atraumatic, no cyanosis or edema      Assessment/Plan:  Lindsey Oneal is a 57 y.o. female with covid pneumonia, respiratory failure on high vent settings    Overall poor prognosis  High vent setting inappropriate for tracheostomy at this time  Will proceed if/when improves    Rizwan Dong MD  4/7/2021  5:37 AM

## 2021-04-05 NOTE — PROGRESS NOTES
Assessment and Plan  Patient is a 57 y.o. female with the following medical Problems:   1. COVID-19 pneumonia  2. Severe ARDS  3. Acute kidney injury on CKD Stage IV requiring hemodialysis  4. Acute resp failure with hypoxia  5. Pneumomediastinum (improved)  6. Subcutaneous emphysema  7. Type 2 diabetes  8. Hypothyroidism  9. History of CVA and cavernous sinus thrombosis    Plan of care:  1. Continue supplemental oxygen to keep sat above 88%. Continue with low tidal volume ventilation. Patient is currently on PC .   2.  ID is managing antibiotics  3. Continue with hemodialysis support  4. DVT prophylaxis  5. Goals of care discussion with the family  6. Tube feeding as tolerated  7.  GI prophylaxis  9. Palliative care consult. Overall prognosis is extremely poor. 10.   ABG today. History of Present Illness:   Patient is a 62-year-old woman with above-mentioned medical problems who was admitted initially on March 8, 2021 and was discharged March 19, 2021 however she was readmitted on March 21 worsening respiratory symptoms. Patient was eventually intubated on March 25, 2021 and was initiated on hemodialysis. 04/02/2021:  Patient remains sedated, intubated, mechanically ventilated. Her oxygen requirement has been fluctuating. Had a discussion with her daughter today regarding goals of care. Her daughter has not made a decision regarding tracheostomy and PEG tube placement however she feels that her mom would not want to go through these procedures. She remains extremely ill requiring high FiO2 requirement and hemodialysis support. She is off pressors today. 04/03/2021:  Patient continues to do poorly. Her oxygen requirement has increased. Chest x-ray is worse. [facility] was contacted by the hospitalist service to transfer the patient based on family request however the case was declined by [facility]. She is currently hypertensive and off pressors.   She has no fever, Relationships    Social connections     Talks on phone: Not on file     Gets together: Not on file     Attends Baptism service: Not on file     Active member of club or organization: Not on file     Attends meetings of clubs or organizations: Not on file     Relationship status: Not on file    Intimate partner violence     Fear of current or ex partner: Not on file     Emotionally abused: Not on file     Physically abused: Not on file     Forced sexual activity: Not on file   Other Topics Concern    Not on file   Social History Narrative    Not on file       Current Medications:     Current Facility-Administered Medications:     fluconazole (DIFLUCAN) in 0.9 % sodium chloride IVPB 200 mg, 200 mg, Intravenous, Q24H, Patrice Uriostegui MD    albumin human 25 % IV solution 25 g, 25 g, Intravenous, Once, Renzo Higgins MD, Last Rate: 100 mL/hr at 04/05/21 1027, 25 g at 04/05/21 1027    albumin human 25 % IV solution, , , ,     hydrALAZINE (APRESOLINE) injection 10 mg, 10 mg, Intravenous, Q6H PRN, Adelia Bosworth, MD, 10 mg at 04/05/21 0115    dexamethasone (DECADRON) injection 4 mg, 4 mg, Intravenous, Q12H, Karin Paz, DO, 4 mg at 04/05/21 0600    0.9 % sodium chloride infusion, 250 mL, Intravenous, PRN, Karin Hatfielda, DO    polyvinyl alcohol (LIQUIFILM TEARS) 1.4 % ophthalmic solution 1 drop, 1 drop, Both Eyes, Q4H, Rolm Jackson, DO, 1 drop at 04/05/21 0700    Gabapentin SOLN 300 mg, 300 mg, Oral, Nightly, Aileen Nunes MD, 300 mg at 04/04/21 2124    insulin glargine (LANTUS) injection vial 9 Units, 9 Units, Subcutaneous, QAM, Crispin Douglas MD, 9 Units at 04/05/21 0941    famotidine (PEPCID) tablet 10 mg, 10 mg, Oral, Daily, Crispin Douglas MD, 10 mg at 04/05/21 0929    [Held by provider] carvedilol (COREG) tablet 3.125 mg, 3.125 mg, Oral, BID WC, Crispin Douglas MD    insulin lispro (HUMALOG) injection vial 0-6 Units, 0-6 Units, Subcutaneous, Jonathan Galvan MD, 3 Units at 04/05/21 0431    sodium bicarbonate tablet 1,300 mg, 1,300 mg, Per NG tube, TID, Paulino Banks MD, 1,300 mg at 04/05/21 0930    heparin (porcine) injection 5,000 Units, 5,000 Units, Subcutaneous, 3 times per day, Paulino Banks MD, 5,000 Units at 04/05/21 0556    vancomycin (VANCOCIN) intermittent dosing (placeholder), , Other, RX Placeholder, Jamila Mooney MD, Stopped at 03/26/21 2154    [Held by provider] hydrOXYzine (VISTARIL) capsule 25 mg, 25 mg, Oral, 4x Daily PRN, Chelita Vazquez MD, 25 mg at 03/1960    propofol injection, 5-50 mcg/kg/min, Intravenous, Titrated, Garrett De La Rosa MD, Last Rate: 17 mL/hr at 04/05/21 0425, 50 mcg/kg/min at 04/05/21 0425    [Held by provider] amLODIPine (NORVASC) tablet 5 mg, 5 mg, Oral, BID, Chelita Vazquez MD, 5 mg at 03/26/21 0824    piperacillin-tazobactam (ZOSYN) 3,375 mg in dextrose 5 % 50 mL IVPB extended infusion (mini-bag), 3,375 mg, Intravenous, Q12H, Jamila Mooney MD, Stopped at 04/05/21 1040    0.9 % sodium chloride infusion, , Intravenous, Q12H, Jamila Mooney MD, Stopped at 04/05/21 0130    [Held by provider] pentoxifylline (TRENTAL) extended release tablet 400 mg, 400 mg, Oral, BID, Marisol Parrish DO, 400 mg at 03/26/21 0824    ipratropium-albuterol (DUONEB) nebulizer solution 1 ampule, 1 ampule, Inhalation, 4x daily, Marisol Parrish DO, 1 ampule at 04/05/21 0906    nystatin (MYCOSTATIN) 658024 UNIT/ML suspension 500,000 Units, 5 mL, Oral, 4x Daily, Marisol Parrish DO, 500,000 Units at 04/05/21 0930    glucose (GLUTOSE) 40 % oral gel 15 g, 15 g, Oral, PRN, Marisol Parrish DO    dextrose 50 % IV solution, 12.5 g, Intravenous, PRN, Marisol Parrish DO, 12.5 g at 04/04/21 0433    glucagon (rDNA) injection 1 mg, 1 mg, Intramuscular, PRN, Marisol Parrish DO    dextrose 5 % solution, 100 mL/hr, Intravenous, PRN, Marisol Parrish DO, Stopped at 03/24/21 0626    acetaminophen (TYLENOL) tablet 500 mg, 500 mg, Oral, 4x Daily PRN, Ozella Saint, MD    aspirin chewable tablet 81 mg, 81 mg, Oral, Daily, Ozella Saint, MD, 81 mg at 04/05/21 0930    atorvastatin (LIPITOR) tablet 80 mg, 80 mg, Oral, Daily, Ozella Saint, MD, 80 mg at 04/05/21 0930    clopidogrel (PLAVIX) tablet 75 mg, 75 mg, Oral, Daily, Ozella Saint, MD, 75 mg at 04/05/21 0930    levothyroxine (SYNTHROID) tablet 88 mcg, 88 mcg, Oral, Daily, Ozella Saint, MD, 88 mcg at 04/05/21 0600    sodium chloride flush 0.9 % injection 10 mL, 10 mL, Intravenous, 2 times per day, Karin Son MD, 10 mL at 04/05/21 0931    sodium chloride flush 0.9 % injection 10 mL, 10 mL, Intravenous, PRN, Karin Son MD, 10 mL at 03/29/21 1637    promethazine (PHENERGAN) tablet 12.5 mg, 12.5 mg, Oral, Q6H PRN **OR** ondansetron (ZOFRAN) injection 4 mg, 4 mg, Intravenous, Q6H PRN, Karin Son MD    polyethylene glycol (GLYCOLAX) packet 17 g, 17 g, Oral, Daily PRN, Karin Son MD    [DISCONTINUED] acetaminophen (TYLENOL) tablet 650 mg, 650 mg, Oral, Q6H PRN **OR** acetaminophen (TYLENOL) suppository 650 mg, 650 mg, Rectal, Q6H PRN, Karin Son MD    Review of Systems:   Unable to obtain due to medical condition    Physical Exam:   Vital Signs:  BP (!) 166/74   Pulse 96   Temp 98.8 °F (37.1 °C)   Resp 30   Ht 5' (1.524 m)   Wt 154 lb (69.9 kg)   SpO2 100%   BMI 30.08 kg/m²     Input/Output:   In: 1845.3 [I.V.:408.3; NG/GT:887]  Out: 3000     Oxygen requirements: Intubated    Ventilator Information:  Vent Information  $Ventilation: $Subsequent Day  Skin Assessment: Clean, dry, & intact  Equipment ID: [id number]  Vent Type: 980  Vent Mode: AC/PC  Vt Ordered: 0 mL  Pressure Ordered: 25  Rate Set: 33 bmp  Peak Flow: 0 L/min  Pressure Support: 0 cmH20  FiO2 : 100 %  SpO2: 100 %  SpO2/FiO2 ratio: 100  Sensitivity: 2  PEEP/CPAP: 12  I Time/ I Time %: 0 s  Humidification Source: Heated wire  Humidification Temp: ([place])  Mask Type: Full face mask  Mask Size: Small  Bonnet size: Small    General appearance:  Vented minimally responsive  HEENT: Intubated  Neck: Supple, no  lymphadenopathy, thyromegaly or carotid bruits  Chest: Decreased breath sounds, no wheezing, +ve crackles and no tenderness over ribs   Cardiovascular: Normal S1 , S2, regular rate and rhythm, no murmur, rub or gallop  Abdomen: Normal sounds present, soft, lax with no tenderness, no hepatosplenomegaly, and no masses  Extremities: 1+ edema. Pulses are equally present. Skin: intact, no rashes   Neurologic: Sedated and mechanical ventilation . Does not follow commands of sedation. Investigations:  Labs, radiological imaging and cardiac work up were reviewed        ICU STAFF PHYSICIAN NOTE OF PERSONAL INVOLVEMENT IN CARE  As the attending physician, I certify that I personally reviewed the patient's history and personally examined the patient to confirm the physical findings described above, and that I reviewed the relevant imaging studies and available reports. I also discussed the differential diagnosis and all of the proposed management plans with the patient and individuals accompanying the patient to this visit. They had the opportunity to ask questions about the proposed management plans and to have those questions answered. This patient has a high probability of sudden, clinically significant deterioration, which requires the highest level of physician preparedness to intervene urgently. I managed/supervised life or organ supporting interventions that required frequent physician assessment. I devoted my full attention to the direct care of this patient for the amount of time indicated below. Time I spent with family or surrogate(s) is included only if the patient was incapable of providing the necessary information or participating in medical decision-making.   Time devoted to teaching and to any procedures I billed separately is not included.   Critical Care Time: 48 minutes      Electronically signed by Santo Flores MD on 4/5/2021 at 11:14 AM

## 2021-04-05 NOTE — FLOWSHEET NOTE
Pt ran 3 hours; 3251 bath; 1.8 L removed; stable/tolerated well; denies c/o. HD CVC heparin locked per lumen fill volume, capped & clamped post Tx. Good  Access flow no issues achieving prescribed BFR.          04/05/21 1235   Vital Signs   BP (!) 146/71   Temp 97 °F (36.1 °C)   Resp 30   Pain Assessment   Pain Assessment CPOT   Pain Level 0   Post-Hemodialysis Assessment   Post-Treatment Procedures Blood returned;Catheter capped, clamped and heparinized x 2 ports   Machine Disinfection Process Exterior Machine Disinfection   Rinseback Volume (ml) 300 ml   Total Liters Processed (l/min) 42 l/min   Dialyzer Clearance Lightly streaked   Duration of Treatment (minutes) 180 minutes   Heparin amount administered during treatment (units) 0 units   Hemodialysis Intake (ml) 400 ml   Hemodialysis Output (ml) 2200 ml   NET Removed (ml) 1800 ml   Tolerated Treatment Good   Bilateral Breath Sounds Diminished   Edema Generalized   Edema Generalized +2   Physician Notified?  Yes

## 2021-04-05 NOTE — PLAN OF CARE
Problem: Gas Exchange - Impaired  Goal: Absence of hypoxia  Outcome: Met This Shift  Goal: Promote optimal lung function  Outcome: Met This Shift     Problem: Isolation Precautions - Risk of Spread of Infection  Goal: Prevent transmission of infection  Outcome: Met This Shift

## 2021-04-06 NOTE — PROGRESS NOTES
Comprehensive Nutrition Assessment    Type and Reason for Visit:  Reassess    Nutrition Recommendations/Plan: Recommend to decrease TF rate exceeds estimated nutr'l needs. Renal TF(Nepro) @25ml/hr x24hrs w/1 protein modular to  provide:600mlTV/1364kcal w/propofol and protein modular/75gm pro w/protein modular /435ml FW Addtional water flushes per critical care. Nutrition Assessment:  Pt admit d/t CoVID-19 PNA, Pt cotninues w/ Hemodialysis d/t renal fxn w/PMH of DM, Hypothyroid, CKD, CHF. Will continue to monitor and recommend to modify TF    Malnutrition Assessment:  Malnutrition Status: At risk for malnutrition (Comment)    Context:  Acute Illness     Findings of the 6 clinical characteristics of malnutrition:  Energy Intake:  7 - 50% or less of estimated energy requirements for 5 or more days  Weight Loss:  Unable to assess(DAR weight changes d/t inaccurate information)     Body Fat Loss:  Unable to assess     Muscle Mass Loss:  Unable to assess    Fluid Accumulation:  No significant fluid accumulation     Strength:  Not Performed    Estimated Daily Nutrient Needs:  Energy (kcal):  1400-1500kcal/d; Weight Used for Energy Requirements:  Admission     Protein (g):  70-80gm pro/d(x1.2-1.4gm/kg( protein needs increased d/t hemodialysis));  Weight Used for Protein Requirements:  Admission        Fluid (ml/day):  per critical care; Method Used for Fluid Requirements:  Standard Renal      Nutrition Related Findings:  Intubated, Propofol @17ml/hr provides 449kcal/d, hypoactive BS, Abd distended,NGT: may receive trach/peg, +I/O +15.6L, hyperglycemia, Hemodialysis continues, BLE edema +2 pitting, BUE +3 pitting edema      Wounds:  None       Current Nutrition Therapies:    Current Tube Feeding (TF) Orders:  · Feeding Route: Nasogastric  · Formula: Renal  · Schedule: Continuous  · Additives/Modulars:    · Water Flushes: per critical care  · Current TF & Flush Orders Provides: 1200mlTV/2160kcal/97gm pro/871ml FW Anthropometric Measures:  · Height: 5' (152.4 cm)  · Current Body Weight: 126 lb (57.2 kg)(CBW 4/6/21: 150# likely r/t fluid accumulation)   · Admission Body Weight:      · Usual Body Weight: (DAR UBW d/t per EMR no wt methods: 117-130# range)     · Ideal Body Weight: 100 lbs; % Ideal Body Weight 126 %   · BMI: 24.6  · Adjusted Body Weight:  ; No Adjustment    · BMI Categories: Normal Weight (BMI 18.5-24. 9)       Nutrition Diagnosis:   ·  related to impaired respiratory function as evidenced by NPO or clear liquid status due to medical condition, nutrition support - enteral nutrition, intubation      Nutrition Interventions:   Food and/or Nutrient Delivery:  Continue NPO(See nutr. recs)  Nutrition Education/Counseling:  Education not indicated   Coordination of Nutrition Care:  Continue to monitor while inpatient    Goals:  EN Tolerance       Nutrition Monitoring and Evaluation:   Behavioral-Environmental Outcomes:  None Identified   Food/Nutrient Intake Outcomes:  Enteral Nutrition Intake/Tolerance  Physical Signs/Symptoms Outcomes:  Biochemical Data, Chewing or Swallowing, Fluid Status or Edema, Hemodynamic Status, Nutrition Focused Physical Findings, Skin, Weight     Discharge Planning:     Too soon to determine     Electronically signed by Dogu Frederick RD, LD on 4/6/21 at 3:03 PM EDT    Contact: [phone]

## 2021-04-06 NOTE — PROGRESS NOTES
Palliative Care Department  [phone]  Palliative Care Progress Note  Provider Timoteo Solano  [id number]  Hospital Day: 16  Date of Initial Consult: 3/30/2021  Referring Provider: Fatoumata Rueda DO  Palliative Medicine was consulted for assistance with: Wendie Razo, family support    HPI:   Oriana Acosta is a 57 y.o. with a medical history of CKD, COVID infection, type 2 DM, hypothyroid, HTN, HLD, GERD who was admitted on 3/21/2021 from home with a CHIEF COMPLAINT of worsening SOB and hypoxia, leg swelling. ASSESSMENT/PLAN:     Pertinent Hospital Diagnoses      COVID-19 pneumonia with ARDS and possible superimposed HCAP   GAVIN on CKD   Acute hypoxic respiratory failure   Pneumomediastinum   Anemia of CKD      Palliative Care Encounter / Counseling Regarding Goals of Care  Please see detailed goals of care discussion as below   At this time, Oriana Acosta, Does Not have capacity for medical decision-making. Capacity is time limited and situation/question specific   During encounter daughter Iker Rahman was surrogate medical decision-maker   Outcome of goals of care meeting: left message for Jaron Amaral Code status Limited no to all but meds  o Over the past 40 years, despite advances in the protocol for Cardiopulmonary Resuscitation (CPR) outcomes for survival have not statistically changed. o The consensus is that 17% of all adult arrest patients survive to hospital discharge. Many factors lower a patients chance of survival, including advanced age, performance status, malignancy, and presence of multiple comorbidities. Jesu Zacarias and colleagues examined medical data from [id number] Medicare patients 72 and older who underwent in-hospital CPR. 5 Both older age and [facility] were found to be associated with poorer survival rates.   o Patients 85 and older having only a 6% chance of surviving to hospital discharge.  o In general Cancer patients generally have an overall survival rate of only 6.2%. o Cancer patients whose hospital course followed a path of gradual deterioration showed a 0% survival rate.  o The presence of hepatic insufficiency, acute stroke, immunodeficiency, renal failure, or dialysis were associated with lower survival rates.  Advanced Directives: no POA or living will in [facility]   Surrogate/Legal NOK:  o Daughter Isabel Davila [phone]    Spiritual assessment: no spiritual distress identified  Bereavement and grief: to be determined  Referrals to: none today   SUBJECTIVE:     Current medical issues leading to Palliative Medicine involvement include   Active Hospital Problems    Diagnosis Date Noted    Goals of care, counseling/discussion [Z71.89]     Palliative care by specialist [Z51.5]     Altered mental state [R41.82] 03/29/2021    Acute respiratory failure with hypoxia ([facility] [address] [J96.01] 03/21/2021    COVID-19 [U07.1] 03/21/2021        Details of Conversation:  Patient still not candidate for trach/PEG due to high oxygen requirements/vent settings at this time. Spoke to daughter daughter Jovita Goltz, who states she has no idea what she will do if patient does not improve enough for trach/PEG. States she just came to  with the idea of a trach/PEG and is just not mentally prepared to even consider compassionate extubation. Katlin very upset, asking why anyone would ask her about trach if patient not ready for that to be done. Discussed that surgery takes time to get scheduled and families are often asked about wishes even when patient isn't yet weaned and ready for trach. Emotional support provided.       Physical Function:  PPS: 10    OBJECTIVE:   Prognosis: Guarded    Physical Exam:  BP (!) 153/73   Pulse 97   Temp 98 °F (36.7 °C) (Oral)   Resp (!) 34   Ht 5' (1.524 m)   Wt 150 lb 3.2 oz (68.1 kg)   SpO2 92%   BMI 29.33 kg/m²   Patient observed from glass door  Constitutional:  NAD  Eyes: eyes closed  ENMT:  Normocephalic, atraumatic, ETT in place  Lungs:  Mechanical respirations  Heart[de-identified]  RRR on monitor  :  brasher  Ext:  Not seen moving extremities  Skin:  no rashes on visible skin  Psych: unable to assess  Neuro:  Intubated/sedated    Objective data reviewed: labs, images, records, medication use, vitals and chart    Discussed patient and the plan of care with the other IDT members: Palliative Medicine IDT Team, Floor Nurse and Family    Time/Communication  Greater than 50% of time spent, total 15 minutes in counseling and coordination of care at the bedside regarding goals of care, diagnosis and prognosis and see above. Thank you for allowing Palliative Medicine to participate in the care of [place] .

## 2021-04-06 NOTE — PROGRESS NOTES
[facility] MD Cristo Ortega, MD Sanju Villarreal, MD Corrinne Lies, MD Elvira Galas, CNP   Peg Nascimento, RAZA  Progress Note  4/6/2021    SUBJECTIVE:  We are following this patient for CKD stage IV on hemodialysis. 3/29 : Transferred to ICU bed 9 currently in prone position because of COVID-19 mechanical ventilation  Today with [facility] ICU attending Dr. Travis Menendez assess to perform acute dialysis treatment patient be scheduled for MR venogram with no contrast.  Patient was evaluated and case discussed with the nurse currently will be needing alternating supine and prone positions every 12 hours she remains on mechanical ventilation with sedation. Last dialysis was done yesterday    3/30 : seen in ICU this am .   Mechanically ventialted . Fio2 90   On small dose levo   Poor UO   No reported edema   D/w ICU staff     3/31 : partial HD yesterday due to Line malfunctioning . Will try cathflow . If line does not work then plan on IR exchange over guidewire   She continue to be mechanically ventilated , on 60 % fio2 when seeing her . Off pressors       4/1 : mechanically ventilated . fio2 80 . No pressors . IR to replace HD line today     4/2 : mechainically ventilated , high 02 reuqirment . critiically sick . High amount of o2 . It appear covid pneumonia is the main cultprit   D/w HD RN not able to do a lot of UF     4/3 : seen in ICU , d/w ICU RT , RN . Continue to require Fo2 at 100 with pao2 in 46s . No pressors ,poor UO .       4/4 : continue to require fio2 of 100 . Critically ill . D/w HD RN plan UF today as tolerated will aim for 3-4 L   4/5: Patient was seen and case was discussed with the floor nurses and dialysis nurses while patient undergoing acute hemodialysis morning, patient had ultrafiltration session yesterday and hemodialysis morning. Vital signs were stable on all the pressors all the medications being given.       4/5 : seen in ICU , off pressors , still on high amount fio2 . fmaily leaning toward track and PEG . No UO . Noted .  Per ICU RN 1-2+ edema   PROBLEM LIST:    Patient Active Problem List   Diagnosis    Diabetes mellitus type 2, insulin dependent ([facility]    Hypothyroid    Hypertension    Hyperlipidemia    Hypertensive urgency    Acute respiratory failure with hypoxia ([facility]    COVID-19    Altered mental state    Goals of care, counseling/discussion    Palliative care by specialist        PAST MEDICAL HISTORY:    Past Medical History:   Diagnosis Date    Acid reflux     COVID-19     Hemodialysis patient ([facility]     Hyperlipidemia     Hypertension     Hypothyroidism     S/P appendectomy     Type II or unspecified type diabetes mellitus without mention of complication, not stated as uncontrolled        MEDS (scheduled):   fluconazole  200 mg Intravenous Q24H    dexamethasone  4 mg Intravenous Q12H    polyvinyl alcohol  1 drop Both Eyes Q4H    Gabapentin  300 mg Oral Nightly    insulin glargine  9 Units Subcutaneous QAM    famotidine  10 mg Oral Daily    [Held by provider] carvedilol  3.125 mg Oral BID WC    insulin lispro  0-6 Units Subcutaneous Q6H    sodium bicarbonate  1,300 mg Per NG tube TID    heparin (porcine)  5,000 Units Subcutaneous 3 times per day    vancomycin (VANCOCIN) intermittent dosing (placeholder)   Other RX Placeholder    [Held by provider] amLODIPine  5 mg Oral BID    piperacillin-tazobactam  3,375 mg Intravenous Q12H    [Held by provider] pentoxifylline  400 mg Oral BID    ipratropium-albuterol  1 ampule Inhalation 4x daily    nystatin  5 mL Oral 4x Daily    aspirin  81 mg Oral Daily    atorvastatin  80 mg Oral Daily    clopidogrel  75 mg Oral Daily    levothyroxine  88 mcg Oral Daily    sodium chloride flush  10 mL Intravenous 2 times per day       MEDS (infusions):   sodium chloride      propofol 50 mcg/kg/min (04/06/21 1445)    sodium chloride Stopped (04/06/21 1209)    dextrose Stopped (03/24/21 1195)       MEDS (prn):  hydrALAZINE, sodium chloride, [Held by provider] hydrOXYzine, glucose, dextrose, glucagon (rDNA), dextrose, acetaminophen, sodium chloride flush, promethazine **OR** ondansetron, polyethylene glycol, [DISCONTINUED] acetaminophen **OR** acetaminophen    DIET:    DIET TUBE FEED CONTINUOUS/CYCLIC NPO; Renal Formula; Nasogastric; 10; 50      PHYSICAL EXAM:      Patient Vitals for the past 24 hrs:   BP Temp Temp src Pulse Resp SpO2 Weight   04/06/21 1430 (!) 157/67   97      04/06/21 1415 (!) 161/67   97      04/06/21 1400 (!) 157/67   99 (!) 36 94 %    04/06/21 1330 (!) 170/73   99 (!) 35 96 %    04/06/21 1300 (!) 171/76 98.2 °F (36.8 °C)  93 (!) 33 98 % 150 lb 2.1 oz (68.1 kg)   04/06/21 1230 (!) 165/75   91 (!) 34 96 %    04/06/21 1205     (!) 34     04/06/21 1200 (!) 164/70 98.2 °F (36.8 °C) Oral 92 (!) 34 95 %    04/06/21 1056    97      04/06/21 1030 (!) 153/73   95 (!) 34 92 %    04/06/21 1017 (!) 183/87         04/06/21 1000 (!) 183/87   96 (!) 34 92 %    04/06/21 0930 (!) 174/85   96 (!) 34 92 %    04/06/21 0900 (!) 186/94   92 (!) 35 92 %    04/06/21 0830 (!) 173/86   98 (!) 35 93 %    04/06/21 0800 (!) 163/80 98 °F (36.7 °C) Oral 90 (!) 34 95 %    04/06/21 0700 (!) 154/71   92 (!) 34 93 %    04/06/21 0630 (!) 152/74   93  91 %    04/06/21 0600 (!) 156/63   96 (!) 33 90 %    04/06/21 0530 (!) 157/80   97  92 %    04/06/21 0500 (!) 168/93   90 (!) 35 95 %    04/06/21 0430 (!) 156/72   89  94 %    04/06/21 0415 (!) 173/80         04/06/21 0400 (!) 185/88 97.5 °F (36.4 °C) Axillary 90  97 %    04/06/21 0330 (!) 156/71   86  97 %    04/06/21 0306       150 lb 3.2 oz (68.1 kg)   04/06/21 0300 125/65   73 (!) 35 99 %    04/06/21 0230 127/63   75  98 %    04/06/21 0200 (!) 143/75   82 (!) 34 98 %    04/06/21 0130 (!) 157/77   86  96 %    04/06/21 0100 (!) 160/87   91 (!) 40 97 %    04/06/21 0030 (!) 156/72   88  96 %    04/06/21 0000 (!) 149/75 96.8 °F (36 °C) Axillary 85  97 %    04/05/21 2330 (!) 155/73   88  98 %    04/05/21 2300 (!) 157/71   87  97 %    04/05/21 2230 (!) 145/80   85  97 %    04/05/21 2200 (!) 158/80   85 (!) 36 96 %    04/05/21 2130 (!) 145/75   87  95 %    04/05/21 2100 (!) 161/75   86 (!) 34 96 %    04/05/21 2030 (!) 148/85   86  97 %    04/05/21 2000 123/62 96 °F (35.6 °C) Axillary 72 (!) 33 99 %    04/05/21 1930 115/60   72  100 %    04/05/21 1900 112/62   74 30 100 %    04/05/21 1830 135/75   83 30 100 %    04/05/21 1800 117/62   78 30 99 %    04/05/21 1730 128/61   82 30 99 %    04/05/21 1700 138/62   87 30 99 %    04/05/21 1630 131/66   88 30 99 %    04/05/21 1600 132/64 96.9 °F (36.1 °C) Oral 90 30 97 %           Intake/Output Summary (Last 24 hours) at 4/6/2021 1446  Last data filed at 4/6/2021 1400  Gross per 24 hour   Intake 1951 ml   Output 25 ml   Net 1926 ml       Wt Readings from Last 3 Encounters:   04/06/21 150 lb 2.1 oz (68.1 kg)   03/08/21 117 lb (53.1 kg)   01/10/21 130 lb (59 kg)       General:  in no acute distress  HEENT: NC/AT, EOMI, sclera and conjunctiva are clear and anicteric. Mucous membranes moist.  Cardiovascular: regular rate and rhythm, no murmurs, gallops, or rubs  Respiratory:  Clear, no rales, rhochi, or wheezes  Gastrointestinal: soft, nontender, nondistended, NABS  Ext: no cyanosis, clubbing, or edema bilateral lower extremities  Neuro: awake, alert, oriented x3. Moves all 4 extremities. Cranial nerves II through XII grossly intact.   Skin: dry, no rash      DATA:      Recent Labs     04/04/21  0600 04/05/21  0544 04/06/21  0447   WBC 8.8 12.3* 15.4*   HGB 8.9* 8.7* 8.1*   HCT 27.0* 27.0* 25.1*   MCV 87.1 88.5 88.1   * 194 242     Recent Labs     04/04/21  0600 04/05/21  0544 04/06/21  0447    139 138   K 3.5 3.9 4.0   CL 98 99 96*   CO2 29 27 28   BUN 40* 60* 47*   CREATININE 2.0* 2.7* 2.0*       Iron studies:  Lab Results   Component Value Date    FERRITIN 613 03/16/2021     Bone disease:  Lab Results   Component Value Date    PTH 42 08/05/2020    MG 1.8 03/30/2021    PHOS 6.7 (H) 03/30/2021     Nutrition:No results found for: ALB    Microbiology      ASSESSMENT / RECOMMENDATIONS:     1. GAVIN on top of chronic disease stage IV with hemodialysis initiation     Continue HD support . UF session today goal 4-5 L UF   Next HD treatmet in am     2. Anemia:   Cont. epo and iv ferrlecit per orders    3. Secondary Hyperparathyroidism:   Cont vitamin d analog      4. Pneumomediastinum    5.   COVID-19 with ARDs which appear the main culprit for her Sx     6   Respiratory failure which appear to be mainly due to COVID 19 Pneumonia with ARDS         Render MD Lexx  4/6/2021 Acute dialysis access

## 2021-04-06 NOTE — PROGRESS NOTES
Pharmacy Consultation Note  (Antibiotic Dosing and Monitoring)    Initial consult date: 3/26/21  Consulting physician: Dr. Jose Antonio Hollins  Drug(s): Vancomycin  Indication: Empiric    Ht Readings from Last 1 Encounters:   21 5' (1.524 m)     Wt Readings from Last 1 Encounters:   21 150 lb 3.2 oz (68.1 kg)     Age/  Gender IBW DW  Allergy Information   57 y.o.   female 45.5 kg 61.6 kg  Demerol, Peanut-containing drug products, and Toradol [ketorolac tromethamine]          Date  WBC HD Drug/Dose Time   Given Level(s)   (Time) Comments   3/26  (#1) 13.2 HD x 3.75h -- --     3/27  (#2) 7.3 HD x 2h No dose -- 22.1 mcg/mL @ 0511  15.3 mcg/mL @ 1623    3/28  (#3) 6.9 HD x 2h Vancomycin 500 mg IV x 1 1840     3/29  (#4) -- -- No dose --     3/30  (#5) 2.2 HD x 32 minutes Vancomycin 500 mg IV x 1 after HD 2100 Random level @ 0503 = 17.8 mcg/mL    3/31  (#6) 6 HD x 180 minutes No dose --       (#7) 6.1 -- No dose -- Random level @ 0633 = 18.4 mcg/mL      (#8) 9.5 HD x 210 minutes Vancomycin 500 mg IV x 1 after HD 1304 Random level @ 0508 = 17.1 mcg/mL    4/3  (#9) 8.6 HD x 3 hrs Vancomycin 500 mg IV x 1 2255 Random @ 2042 = 11.5 mcg/mL      (#10) 8.8 -- No dose --       (#11) 12.3 HD x 3 hrs Vancomycin 500 mg IV x 1 1559 Random @ 0544 = 19.8 mcg/mL      (#12) 15.4 UF  No dose --       (#13)     Random level @ <0600> =                Estimated Creatinine Clearance: 25 mL/min (A) (based on SCr of 2 mg/dL (H)).   UOP over the past 24 hours:       Intake/Output Summary (Last 24 hours) at 2021 1053  Last data filed at 2021 0800  Gross per 24 hour   Intake 1993 ml   Output 2225 ml   Net -232 ml       Temp max: Temp (24hrs), Av °F (36.1 °C), Min:96 °F (35.6 °C), Max:98 °F (36.7 °C)      Antibiotic Regimen:  Antibiotic Dose Date Initiated Date Stopped   Doxycycline 100 mg IV Q12H 3/26 4/2   Pip/tazo 3.375 g Q12H 3/23      Cultures:  available culture and sensitivity results were reviewed in Epic Culture Date Result    Blood x 2 3/25 No growth   Strep/legionella urine antigens 3/26 Negative   Respiratory molecular panel 3/29 SARS-CoV-2 Detected     Assessment:  · Consulted by Dr. Raymundo Clark to dose/monitor vancomycin  · Goal trough level:  15-20 mcg/mL  · Pt is a 57 yof admitted to the hospital for acute respiratory failure 2/2 COVID-19 and CKD. Marilynn Puri was intubated on 3/25 and transferred to the ICU. A HD cath has been placed with plans to start dialysis today.   · ESRD; started dialysis on 3/26    Plan:  · No dose today  · Random level tomorrow AM  · Consider de-escalation (day 12 of vancomycin)  · Follow HD orders  · Pharmacist will follow and monitor/adjust dosing as necessary      Thank you for the consult,    Neetu Isabel, PharmD, BCPS 4/6/2021 10:53 AM   Ext: [phone]

## 2021-04-06 NOTE — PROGRESS NOTES
Department of Internal Medicine  General Internal Medicine  Attending Progress Note  Chief Complaint   Patient presents with    Concern For COVID-19     positive covid, shortness of breath, EMS states 60% on room air. 94% on 15LPM NRB    Shortness of Breath     SUBJECTIVE:    Still intubated and sedated.     OBJECTIVE      Medications    Current Facility-Administered Medications: fluconazole (DIFLUCAN) in 0.9 % sodium chloride IVPB 200 mg, 200 mg, Intravenous, Q24H  hydrALAZINE (APRESOLINE) injection 10 mg, 10 mg, Intravenous, Q6H PRN  dexamethasone (DECADRON) injection 4 mg, 4 mg, Intravenous, Q12H  0.9 % sodium chloride infusion, 250 mL, Intravenous, PRN  polyvinyl alcohol (LIQUIFILM TEARS) 1.4 % ophthalmic solution 1 drop, 1 drop, Both Eyes, Q4H  Gabapentin SOLN 300 mg, 300 mg, Oral, Nightly  insulin glargine (LANTUS) injection vial 9 Units, 9 Units, Subcutaneous, QAM  famotidine (PEPCID) tablet 10 mg, 10 mg, Oral, Daily  [Held by provider] carvedilol (COREG) tablet 3.125 mg, 3.125 mg, Oral, BID WC  insulin lispro (HUMALOG) injection vial 0-6 Units, 0-6 Units, Subcutaneous, Q6H  sodium bicarbonate tablet 1,300 mg, 1,300 mg, Per NG tube, TID  heparin (porcine) injection 5,000 Units, 5,000 Units, Subcutaneous, 3 times per day  vancomycin (VANCOCIN) intermittent dosing (placeholder), , Other, RX Placeholder  [Held by provider] hydrOXYzine (VISTARIL) capsule 25 mg, 25 mg, Oral, 4x Daily PRN  propofol injection, 5-50 mcg/kg/min, Intravenous, Titrated  [Held by provider] amLODIPine (NORVASC) tablet 5 mg, 5 mg, Oral, BID  piperacillin-tazobactam (ZOSYN) 3,375 mg in dextrose 5 % 50 mL IVPB extended infusion (mini-bag), 3,375 mg, Intravenous, Q12H  0.9 % sodium chloride infusion, , Intravenous, Q12H  [Held by provider] pentoxifylline (TRENTAL) extended release tablet 400 mg, 400 mg, Oral, BID  ipratropium-albuterol (DUONEB) nebulizer solution 1 ampule, 1 ampule, Inhalation, 4x daily  nystatin (MYCOSTATIN) 100373 UNIT/ML suspension 500,000 Units, 5 mL, Oral, 4x Daily  glucose (GLUTOSE) 40 % oral gel 15 g, 15 g, Oral, PRN  dextrose 50 % IV solution, 12.5 g, Intravenous, PRN  glucagon (rDNA) injection 1 mg, 1 mg, Intramuscular, PRN  dextrose 5 % solution, 100 mL/hr, Intravenous, PRN  acetaminophen (TYLENOL) tablet 500 mg, 500 mg, Oral, 4x Daily PRN  aspirin chewable tablet 81 mg, 81 mg, Oral, Daily  atorvastatin (LIPITOR) tablet 80 mg, 80 mg, Oral, Daily  clopidogrel (PLAVIX) tablet 75 mg, 75 mg, Oral, Daily  levothyroxine (SYNTHROID) tablet 88 mcg, 88 mcg, Oral, Daily  sodium chloride flush 0.9 % injection 10 mL, 10 mL, Intravenous, 2 times per day  sodium chloride flush 0.9 % injection 10 mL, 10 mL, Intravenous, PRN  promethazine (PHENERGAN) tablet 12.5 mg, 12.5 mg, Oral, Q6H PRN **OR** ondansetron (ZOFRAN) injection 4 mg, 4 mg, Intravenous, Q6H PRN  polyethylene glycol (GLYCOLAX) packet 17 g, 17 g, Oral, Daily PRN  [DISCONTINUED] acetaminophen (TYLENOL) tablet 650 mg, 650 mg, Oral, Q6H PRN **OR** acetaminophen (TYLENOL) suppository 650 mg, 650 mg, Rectal, Q6H PRN  Physical    VITALS:  BP (!) 170/73   Pulse 99   Temp 98.2 °F (36.8 °C) (Oral)   Resp (!) 35   Ht 5' (1.524 m)   Wt 150 lb 3.2 oz (68.1 kg)   SpO2 96%   BMI 29.33 kg/m²   CONSTITUTIONAL:  Intubated and sedated  EYES:  Lids and lashes normal, pupils equal, round and reactive to light, extra ocular muscles intact, sclera clear, conjunctiva normal  ENT:  normocepalic, without obvious abnormality  NECK:  Supple, symmetrical, trachea midline, no adenopathy, thyroid symmetric, not enlarged and no tenderness, skin normal  HEMATOLOGIC/LYMPHATICS:  no cervical lymphadenopathy  LUNGS:  No increased work of breathing, good air exchange, clear to auscultation bilaterally, no crackles or wheezing  CARDIOVASCULAR:  Normal apical impulse, regular rate and rhythm, normal S1 and S2, no S3 or S4, and no murmur noted  ABDOMEN:  No scars, normal bowel sounds, soft, non-distended, non-tender, no masses palpated, no hepatosplenomegally  MUSCULOSKELETAL:  there is no redness, warmth, or swelling of the joints  NEUROLOGIC:  Unable to evaluate  SKIN:  No over rash, minor blister on right thigh  Data    CBC:   Lab Results   Component Value Date    WBC 15.4 04/06/2021    RBC 2.85 04/06/2021    HGB 8.1 04/06/2021    HCT 25.1 04/06/2021    MCV 88.1 04/06/2021    MCH 28.4 04/06/2021    MCHC 32.3 04/06/2021    RDW 16.4 04/06/2021     04/06/2021    MPV 11.5 04/06/2021     CMP:    Lab Results   Component Value Date     04/06/2021    K 4.0 04/06/2021    K 4.2 03/21/2021    CL 96 04/06/2021    CO2 28 04/06/2021    BUN 47 04/06/2021    CREATININE 2.0 04/06/2021    GFRAA 31 04/06/2021    LABGLOM 31 04/06/2021    GLUCOSE 219 04/06/2021    GLUCOSE 419 03/21/2011    PROT 4.8 03/24/2021    LABALBU 2.2 03/24/2021    LABALBU 4.1 03/21/2011    CALCIUM 8.1 04/06/2021    BILITOT 0.2 03/24/2021    ALKPHOS 182 03/24/2021    AST 42 03/24/2021    ALT 32 03/24/2021       ASSESSMENT AND PLAN      1. Altered mental state:     2. Acute respiratory failure with hypoxia:  Still on vent  Possible trach if able to tolerate    3. COVID-19 viral infection:  Prolonged hospital stay  Continue to monitor    4. Goals of care, counseling/discussion    5. Palliative care by specialist    6. CKD on Dialysis:    7. DM type 2:  Continue insulin    8. Malnutrition and poor oral intake: Currently on tube feeding    9. Hypertension Essential:    Plans:  Continue vent management  Continue to monitor BGL and adjust insulin as needed  Continue dialysis per nephro.

## 2021-04-06 NOTE — CARE COORDINATION
4/6/21 Positive covid 3/29/21. CM transition of care: icu/vent @ 100% fio2/sedation. Pt for trache/peg and tunneled hd cath- however, due to high oxygen needs of vent at 100% these are on hold at this time. EEG completed- pending results. Palliative care following. Pt is limited code status meds only. New HD this visit. [facility]. Chinyere [facility] with [facility] following. CM/SS following.  Electronically signed by SIERRA Ornelas on 4/6/2021 at 11:45 AM

## 2021-04-06 NOTE — PROGRESS NOTES
has no fever, chills, rigors. 04/04/2021:  Patient remains lethargic and poorly responsive off sedation. She continues to require 100% oxygen and 12 of PEEP to maintain a saturation in the low 90s. Her overall prognosis is extremely poor. She had a few episodes of desaturation and bradycardia with suctioning overnight. 04/05/2021:  Patient remains lethargic and poorly responsive off sedation. She continues to require 100% oxygen and 12 of PEEP to maintain a saturation in the low 90s. Her overall prognosis is extremely poor. Sean Colindres discussion with family    04/06/2021:  Patient remains lethargic and poorly responsive off sedation. She continues to require 100% oxygen and 12 of PEEP to maintain a saturation in the low 90s. Her overall prognosis is extremely poor. Sean Colindres discussion with family, they want trach but not possible with current vent settings per surgery.  EEG done today    Past Medical History:  Past Medical History:   Diagnosis Date    Acid reflux     COVID-19     Hemodialysis patient ([facility] [address]     Hyperlipidemia     Hypertension     Hypothyroidism     S/P appendectomy     Type II or unspecified type diabetes mellitus without mention of complication, not stated as uncontrolled         Family History:   Family History   Problem Relation Age of Onset    Diabetes Mother     High Blood Pressure Mother     Diabetes Brother     Diabetes Sister        Allergies:         Demerol, Peanut-containing drug products, and Toradol [ketorolac tromethamine]    Social history:  Social History     Socioeconomic History    Marital status: Single     Spouse name: Not on file    Number of children: Not on file    Years of education: Not on file    Highest education level: Not on file   Occupational History    Not on file   Social Needs    Financial resource strain: Not on file    Food insecurity     Worry: Not on file     Inability: Not on file    Transportation needs     Medical: Not on file Non-medical: Not on file   Tobacco Use    Smoking status: Never Smoker    Smokeless tobacco: Never Used   Substance and Sexual Activity    Alcohol use: No    Drug use: No    Sexual activity: Not Currently   Lifestyle    Physical activity     Days per week: Not on file     Minutes per session: Not on file    Stress: Not on file   Relationships    Social connections     Talks on phone: Not on file     Gets together: Not on file     Attends Latter day service: Not on file     Active member of club or organization: Not on file     Attends meetings of clubs or organizations: Not on file     Relationship status: Not on file    Intimate partner violence     Fear of current or ex partner: Not on file     Emotionally abused: Not on file     Physically abused: Not on file     Forced sexual activity: Not on file   Other Topics Concern    Not on file   Social History Narrative    Not on file       Current Medications:     Current Facility-Administered Medications:     fluconazole (DIFLUCAN) in 0.9 % sodium chloride IVPB 200 mg, 200 mg, Intravenous, Q24H, Patrice Uriostegui MD, Last Rate: 100 mL/hr at 04/05/21 1602, 200 mg at 04/05/21 1602    hydrALAZINE (APRESOLINE) injection 10 mg, 10 mg, Intravenous, Q6H PRN, Anjelica Sawyer MD, 10 mg at 04/06/21 1017    dexamethasone (DECADRON) injection 4 mg, 4 mg, Intravenous, Q12H, Marisol Parrish DO, 4 mg at 04/06/21 0600    0.9 % sodium chloride infusion, 250 mL, Intravenous, PRN, Marisol Parrish DO    polyvinyl alcohol (LIQUIFILM TEARS) 1.4 % ophthalmic solution 1 drop, 1 drop, Both Eyes, Q4H, Marisol Parrish DO, 1 drop at 04/06/21 0657    Gabapentin SOLN 300 mg, 300 mg, Oral, Nightly, Chelita Vazquez MD, 300 mg at 04/05/21 2006    insulin glargine (LANTUS) injection vial 9 Units, 9 Units, Subcutaneous, QAM, Paulino Banks MD, 9 Units at 04/06/21 1008    famotidine (PEPCID) tablet 10 mg, 10 mg, Oral, Daily, Paulino Banks MD, 10 mg at 04/06/21 1007    [Held by provider] carvedilol (COREG) tablet 3.125 mg, 3.125 mg, Oral, BID WC, Anastasiya Lyles MD    insulin lispro (HUMALOG) injection vial 0-6 Units, 0-6 Units, Subcutaneous, Q6H, Anastasiya Lyles MD, 2 Units at 04/06/21 0500    sodium bicarbonate tablet 1,300 mg, 1,300 mg, Per NG tube, TID, Anastasiya Lyles MD, 1,300 mg at 04/06/21 1008    heparin (porcine) injection 5,000 Units, 5,000 Units, Subcutaneous, 3 times per day, Anastasiya Lyles MD, 5,000 Units at 04/06/21 0545    vancomycin (VANCOCIN) intermittent dosing (placeholder), , Other, RX Placeholder, Viridiana Jason MD, Stopped at 03/26/21 2154    [Held by provider] hydrOXYzine (VISTARIL) capsule 25 mg, 25 mg, Oral, 4x Daily PRN, Jessica Roach MD, 25 mg at 03/1960    propofol injection, 5-50 mcg/kg/min, Intravenous, Titrated, Nav López MD, Last Rate: 17 mL/hr at 04/06/21 1007, 50 mcg/kg/min at 04/06/21 1007    [Held by provider] amLODIPine (NORVASC) tablet 5 mg, 5 mg, Oral, BID, Jessica Roach MD, 5 mg at 03/26/21 0824    piperacillin-tazobactam (ZOSYN) 3,375 mg in dextrose 5 % 50 mL IVPB extended infusion (mini-bag), 3,375 mg, Intravenous, Q12H, Viridiana Jason MD, Stopped at 04/06/21 1012    0.9 % sodium chloride infusion, , Intravenous, Q12H, Viridiana Jason MD, Last Rate: 12.5 mL/hr at 04/06/21 1012, New Bag at 04/06/21 1012    [Held by provider] pentoxifylline (TRENTAL) extended release tablet 400 mg, 400 mg, Oral, BID, Fito Mcqueen, , 400 mg at 03/26/21 0824    ipratropium-albuterol (DUONEB) nebulizer solution 1 ampule, 1 ampule, Inhalation, 4x daily, Fito Mcquene DO, 1 ampule at 04/06/21 0905    nystatin (MYCOSTATIN) 863692 UNIT/ML suspension 500,000 Units, 5 mL, Oral, 4x Daily, Fito Mcqueen DO, 500,000 Units at 04/06/21 1011    glucose (GLUTOSE) 40 % oral gel 15 g, 15 g, Oral, PRN, Fito Mcqueen DO    dextrose 50 % IV solution, 12.5 g, Intravenous, PRN, Unk Screen, DO, 12.5 g at 04/04/21 0433    glucagon (rDNA) injection 1 mg, 1 mg, Intramuscular, PRN, Unk Screen, DO    dextrose 5 % solution, 100 mL/hr, Intravenous, PRN, Unk Screen, DO, Stopped at 03/24/21 1224    acetaminophen (TYLENOL) tablet 500 mg, 500 mg, Oral, 4x Daily PRN, Ozella Saint, MD    aspirin chewable tablet 81 mg, 81 mg, Oral, Daily, Ozella Saint, MD, 81 mg at 04/06/21 1008    atorvastatin (LIPITOR) tablet 80 mg, 80 mg, Oral, Daily, Ozella Saint, MD, 80 mg at 04/06/21 1008    clopidogrel (PLAVIX) tablet 75 mg, 75 mg, Oral, Daily, Ozella Saint, MD, 75 mg at 04/06/21 1011    levothyroxine (SYNTHROID) tablet 88 mcg, 88 mcg, Oral, Daily, Ozella Saint, MD, 88 mcg at 04/06/21 0518    sodium chloride flush 0.9 % injection 10 mL, 10 mL, Intravenous, 2 times per day, Karin Son MD, 10 mL at 04/06/21 1018    sodium chloride flush 0.9 % injection 10 mL, 10 mL, Intravenous, PRN, Karin Son MD, 10 mL at 03/29/21 1637    promethazine (PHENERGAN) tablet 12.5 mg, 12.5 mg, Oral, Q6H PRN **OR** ondansetron (ZOFRAN) injection 4 mg, 4 mg, Intravenous, Q6H PRN, Karin Son MD    polyethylene glycol (GLYCOLAX) packet 17 g, 17 g, Oral, Daily PRN, Karin Son MD    [DISCONTINUED] acetaminophen (TYLENOL) tablet 650 mg, 650 mg, Oral, Q6H PRN **OR** acetaminophen (TYLENOL) suppository 650 mg, 650 mg, Rectal, Q6H PRN, Karin Son MD    Review of Systems:   Unable to obtain due to medical condition    Physical Exam:   Vital Signs:  BP (!) 183/87   Pulse 92   Temp 97.5 °F (36.4 °C) (Axillary)   Resp (!) 34   Ht 5' (1.524 m)   Wt 150 lb 3.2 oz (68.1 kg)   SpO2 93%   BMI 29.33 kg/m²     Input/Output:   In: 9388 [I.V.:241; NG/GT:1031]  Out: 20     Oxygen requirements: Intubated    Ventilator Information:  Vent Information  $Ventilation: $Subsequent Day  Skin Assessment: Clean, dry, & intact  Equipment ID: [id number]  Vent Type: [phone]  Vent Mode: AC/PC  Vt indicated below. Time I spent with family or surrogate(s) is included only if the patient was incapable of providing the necessary information or participating in medical decision-making. Time devoted to teaching and to any procedures I billed separately is not included.   Critical Care Time: 49 minutes      Electronically signed by Bossman Driver MD on 4/6/2021 at 10:34 AM

## 2021-04-06 NOTE — PLAN OF CARE
Problem: Airway Clearance - Ineffective  Goal: Achieve or maintain patent airway  4/6/2021 0138 by Alyssa Martinez RN  Outcome: Met This Shift     Problem:  Body Temperature -  Risk of, Imbalanced  Goal: Ability to maintain a body temperature within defined limits  4/6/2021 0138 by Alyssa Martinez RN  Outcome: Met This Shift     Problem: Falls - Risk of:  Goal: Will remain free from falls  Description: Will remain free from falls  4/6/2021 0138 by Alyssa Martinez RN  Outcome: Met This Shift     Problem: Falls - Risk of:  Goal: Absence of physical injury  Description: Absence of physical injury  4/6/2021 0138 by Alyssa Martinez RN  Outcome: Met This Shift

## 2021-04-06 NOTE — PROGRESS NOTES
[address] [facility]  CHERI  Progress Note    FU: COVID/ respiratory failure/ARDS     Due to COVID19, did not examine patient. SUBJECTIVE:    Patient is in ICU   Awaiting family decision regarding code status and prognosis .   She is intubated/sedated/ on vent   On HD due to CKD - has right IJ    ROS:  Unable to obtain due to mentation     OBJECTIVE:    Vitals:    04/06/21 1630 04/06/21 1645 04/06/21 1700 04/06/21 1715   BP: (!) 104/54 (!) 85/49 (!) 94/52 (!) 99/55   Pulse: 85 83 80 78   Resp:   24    Temp:       TempSrc:       SpO2:   92%    Weight:       Height:           HEENT :         Vent, sedated   pIV  ij hd cath 3/26   Musa yellow                   LABS    CBC:     WBC   Date Value Ref Range Status   04/06/2021 15.4 (H) 4.5 - 11.5 E9/L Final   04/05/2021 12.3 (H) 4.5 - 11.5 E9/L Final   04/04/2021 8.8 4.5 - 11.5 E9/L Final     Hematocrit   Date Value Ref Range Status   04/06/2021 25.1 (L) 34.0 - 48.0 % Final   04/05/2021 27.0 (L) 34.0 - 48.0 % Final   04/04/2021 27.0 (L) 34.0 - 48.0 % Final     Platelets   Date Value Ref Range Status   04/06/2021 242 130 - 450 E9/L Final   04/05/2021 194 130 - 450 E9/L Final   04/04/2021 120 (L) 130 - 450 E9/L Final         BMP:      Sodium   Date Value Ref Range Status   04/06/2021 138 132 - 146 mmol/L Final   04/05/2021 139 132 - 146 mmol/L Final   04/04/2021 138 132 - 146 mmol/L Final     Potassium   Date Value Ref Range Status   04/06/2021 4.0 3.5 - 5.0 mmol/L Final   04/05/2021 3.9 3.5 - 5.0 mmol/L Final   04/04/2021 3.5 3.5 - 5.0 mmol/L Final     Chloride   Date Value Ref Range Status   04/06/2021 96 (L) 98 - 107 mmol/L Final   04/05/2021 99 98 - 107 mmol/L Final   04/04/2021 98 98 - 107 mmol/L Final     CO2   Date Value Ref Range Status   04/06/2021 28 22 - 29 mmol/L Final   04/05/2021 27 22 - 29 mmol/L Final   04/04/2021 29 22 - 29 mmol/L Final     BUN   Date Value Ref Range Status   04/06/2021 47 (H) 8 - 23 mg/dL Final 04/05/2021 60 (H) 8 - 23 mg/dL Final   04/04/2021 40 (H) 8 - 23 mg/dL Final     CREATININE   Date Value Ref Range Status   04/06/2021 2.0 (H) 0.5 - 1.0 mg/dL Final   04/05/2021 2.7 (H) 0.5 - 1.0 mg/dL Final   04/04/2021 2.0 (H) 0.5 - 1.0 mg/dL Final     Glucose   Date Value Ref Range Status   04/06/2021 219 (H) 74 - 99 mg/dL Final   04/05/2021 183 (H) 74 - 99 mg/dL Final   04/04/2021 106 (H) 74 - 99 mg/dL Final         Hepatic Function Panel:    Lab Results   Component Value Date    ALKPHOS 182 03/24/2021    ALT 32 03/24/2021    AST 42 03/24/2021    PROT 4.8 03/24/2021    BILITOT 0.2 03/24/2021    BILIDIR 0.1 02/27/2013    LABALBU 2.2 03/24/2021    LABALBU 4.1 03/21/2011        Lab Results   Component Value Date/Time    SEDRATE 40 (H) 03/16/2021 10:14 AM       Lab Results   Component Value Date/Time    CRP 0.9 (H) 03/16/2021 10:14 AM       Microbiology :  Blood Culture, Routine   Date Value Ref Range Status   03/25/2021 5 Days no growth  Final     Culture, Blood 2   Date Value Ref Range Status   03/25/2021 5 Days no growth  Final     Urine Culture, Routine   Date Value Ref Range Status   03/08/2021 <10,000 CFU/mL  Mixed gram positive organisms    Final     No results found for: CULTRESP  No results found for: [facility]        Radiology :  Reviewed     ASSESSMENT:   respiratory failure/ards/s/p rx covid on rx for hcap   s/p toci/convalescent plasma +covid 3/29  CKD URINARY RETENTION HAS GIPSON on hemo   Chronic lacunar infarcts in right caudate head - prominence of bilateral superior ophthalimc veins - possible cavernous sinus thrombosis? PLAN:  DC Diflucan, vancomycin, and Zosyn  Follow off ATB   For Trach and PEG   Poor prognosis  Monitor labs, follow cultures     INDRA Daniels - CUONG  4/6/2021  5:22 PM     Patient examined along with the nurse practitioner  Patient's chart reviewed in detail  Agree with above  We will follow pt.  Off     I have discussed the case, including pertinent history and physical exam findings . I have seen and examined the patient and the key elements of the encounter have been performed by me. I agree with the assessment, plan and orders as documented.       Treatment plan as per my recommendation     Willi Mercado MD, FACP  4/6/2021  5:34 PM

## 2021-04-06 NOTE — FLOWSHEET NOTE
04/06/21 1900   Vital Signs   /63   Temp 97.8 °F (36.6 °C)   Pulse 86   Resp (!) 35   SpO2 99 %   Weight 140 lb 14 oz (63.9 kg)   Weight Method Bed scale   Percent Weight Change -6.17   Pain Assessment   Pain Assessment CPOT   Pain Level 0   Post-Hemodialysis Assessment   Post-Treatment Procedures Blood returned;Catheter capped, clamped and heparinized x 2 ports   Machine Disinfection Process Acid/Vinegar Clean;Heat Disinfect; Exterior Machine Disinfection   Rinseback Volume (ml) 300 ml   Total Liters Processed (l/min) 0 l/min   Dialyzer Clearance Lightly streaked   Duration of Treatment (minutes) 300 minutes   Heparin amount administered during treatment (units) 0 units   Hemodialysis Intake (ml) 500 ml   Hemodialysis Output (ml) 4800 ml   NET Removed (ml) 4300 ml   Tolerated Treatment Good   Patient Response to Treatment Tolerated well; hypotensive at one point with A profile on critline; cut back UF to 4300; VSS post treatment; A-B most of treatment with B profile at end with refill noted; catheter closed with heparin per [facility] policy; report given to Citlalli Gale RN   Bilateral Breath Sounds Diminished   Edema Generalized;Right upper extremity; Left upper extremity;Right lower extremity; Left lower extremity   Edema Generalized +2   RUE Edema +3   LUE Edema +3   RLE Edema +2   LLE Edema +2   Physician Notified?  No

## 2021-04-07 NOTE — PROGRESS NOTES
A friend of the patient, Scarlet Grimes called asking for update. Asked that he call the pt's family for an update since he is not listed to receive information.     Sahil Otero  4/7/2021

## 2021-04-07 NOTE — PLAN OF CARE
Problem: Airway Clearance - Ineffective  Goal: Achieve or maintain patent airway  4/7/2021 1807 by Haja Nava RN  Outcome: Met This Shift  4/7/2021 0603 by Feroz Jones RN  Outcome: Met This Shift     Problem: Gas Exchange - Impaired  Goal: Absence of hypoxia  4/7/2021 1807 by Haja Nava RN  Outcome: Met This Shift  4/7/2021 0603 by Feroz Jones RN  Outcome: Met This Shift  Goal: Promote optimal lung function  4/7/2021 1807 by Haja Nava RN  Outcome: Met This Shift  4/7/2021 0603 by Freoz Jones RN  Outcome: Met This Shift     Problem: Isolation Precautions - Risk of Spread of Infection  Goal: Prevent transmission of infection  4/7/2021 1807 by Haja Nava RN  Outcome: Met This Shift  4/7/2021 0603 by Feroz Jones RN  Outcome: Met This Shift     Problem: Falls - Risk of:  Goal: Will remain free from falls  Description: Will remain free from falls  4/7/2021 1807 by Haja Nava RN  Outcome: Met This Shift  4/7/2021 0603 by Feroz Jones RN  Outcome: Met This Shift  Goal: Absence of physical injury  Description: Absence of physical injury  4/7/2021 1807 by Haja Nava RN  Outcome: Met This Shift  4/7/2021 0603 by Feroz Jones RN  Outcome: Met This Shift     Problem: Skin Integrity:  Goal: Will show no infection signs and symptoms  Description: Will show no infection signs and symptoms  4/7/2021 0603 by Feroz Jones RN  Outcome: Met This Shift  Goal: Absence of new skin breakdown  Description: Absence of new skin breakdown  4/7/2021 0603 by Feroz Jones RN  Outcome: Met This Shift  Goal: Status of oral mucous membranes will improve  Description: Status of oral mucous membranes will improve  4/7/2021 0603 by Feroz Jones RN  Outcome: Met This Shift  Goal: Skin integrity will be maintained  Description: Skin integrity will be maintained  4/7/2021 0603 by Feroz Jones RN  Outcome: Met This Shift

## 2021-04-07 NOTE — PROGRESS NOTES
overall survival rate of only 6.2%. o Cancer patients whose hospital course followed a path of gradual deterioration showed a 0% survival rate.  o The presence of hepatic insufficiency, acute stroke, immunodeficiency, renal failure, or dialysis were associated with lower survival rates.  Advanced Directives: no POA or living will in [facility]   Surrogate/Legal NOK:  o Román Nguyen [phone]    Spiritual assessment: no spiritual distress identified  Bereavement and grief: to be determined  Referrals to: none today   SUBJECTIVE:     Current medical issues leading to Palliative Medicine involvement include   Active Hospital Problems    Diagnosis Date Noted    Goals of care, counseling/discussion [Z71.89]     Palliative care by specialist [Z51.5]     Altered mental state [R41.82] 03/29/2021    Acute respiratory failure with hypoxia ([facility] [address] [J96.01] 03/21/2021    COVID-19 [U07.1] 03/21/2021        Details of Conversation:  Patient remains on same high vent settings. Spoke with intensivist who will call daughter today and again re-iterate poor overall prognosis and unlikelihood of recovery.       Physical Function:  PPS: 10    OBJECTIVE:   Prognosis: Guarded    Physical Exam:  BP (!) 89/55   Pulse 69   Temp 96.4 °F (35.8 °C) (Oral)   Resp (!) 35   Ht 5' (1.524 m)   Wt 139 lb 5.3 oz (63.2 kg)   SpO2 98%   BMI 27.21 kg/m²   Patient observed from glass door  Constitutional:  NAD  Eyes: eyes closed  ENMT:  Normocephalic, atraumatic, ETT in place  Lungs:  Mechanical respirations  Heart[de-identified]  RRR on monitor  :  brasher  Ext:  Not seen moving extremities  Skin:  no rashes on visible skin  Psych: unable to assess  Neuro:  Intubated/sedated    Objective data reviewed: labs, images, records, medication use, vitals and chart    Discussed patient and the plan of care with the other IDT members: Palliative Medicine IDT Team, Floor Nurse and Family    Time/Communication  Greater than 50% of time spent, total 15 minutes in counseling and coordination of care at the bedside regarding goals of care, diagnosis and prognosis and see above. Thank you for allowing Palliative Medicine to participate in the care of [facility].

## 2021-04-07 NOTE — PLAN OF CARE
Problem: Airway Clearance - Ineffective  Goal: Achieve or maintain patent airway  4/6/2021 2022 by Ji West RN  Outcome: Met This Shift  4/6/2021 1326 by Kimberly Calderón RN  Outcome: Met This Shift     Problem: Gas Exchange - Impaired  Goal: Absence of hypoxia  4/6/2021 2022 by Ji West RN  Outcome: Met This Shift  4/6/2021 1326 by Kimberly Calderón RN  Outcome: Met This Shift  Goal: Promote optimal lung function  4/6/2021 2022 by Ji West RN  Outcome: Met This Shift  4/6/2021 1326 by Kimberly Calderón RN  Outcome: Not Met This Shift     Problem: Breathing Pattern - Ineffective  Goal: Ability to achieve and maintain a regular respiratory rate  4/6/2021 2022 by Ji West RN  Outcome: Met This Shift  4/6/2021 1326 by Kimberly Calderón RN  Outcome: Not Met This Shift     Problem:  Body Temperature -  Risk of, Imbalanced  Goal: Ability to maintain a body temperature within defined limits  4/6/2021 1718 by Ji West RN  Outcome: Completed  4/6/2021 1326 by Kimberly Calderón RN  Outcome: Met This Shift  Goal: Complications related to the disease process, condition or treatment will be avoided or minimized  4/6/2021 2022 by Ji West RN  Outcome: Completed  4/6/2021 1326 by Kimberly Calderón RN  Outcome: Not Met This Shift     Problem: Falls - Risk of:  Goal: Will remain free from falls  Description: Will remain free from falls  4/6/2021 2022 by Ji West RN  Outcome: Met This Shift  4/6/2021 1326 by Kimberly Calderón RN  Outcome: Met This Shift  Goal: Absence of physical injury  Description: Absence of physical injury  4/6/2021 2022 by Ji West RN  Outcome: Met This Shift  4/6/2021 1326 by Kimberly Calderón RN  Outcome: Met This Shift     Problem: Skin Integrity:  Goal: Will show no infection signs and symptoms  Description: Will show no infection signs and symptoms  4/6/2021 2022 by Ji West RN  Outcome: Met This Shift  4/6/2021 1326 by Kimberly Calderón RN Outcome: Not Met This Shift  Goal: Absence of new skin breakdown  Description: Absence of new skin breakdown  4/6/2021 2022 by Noé Solorio RN  Outcome: Met This Shift  4/6/2021 1326 by Cande Rios RN  Outcome: Met This Shift  Goal: Status of oral mucous membranes will improve  Description: Status of oral mucous membranes will improve  4/6/2021 2022 by Noé Solorio RN  Outcome: Met This Shift  4/6/2021 1326 by Cande Rios RN  Outcome: Met This Shift  Goal: Skin integrity will be maintained  Description: Skin integrity will be maintained  4/6/2021 2022 by Noé Solorio RN  Outcome: Met This Shift  4/6/2021 1326 by Cande Rios RN  Outcome: Met This Shift     Problem: Fluid Volume:  Goal: Will show no signs or symptoms of fluid imbalance  Description: Will show no signs or symptoms of fluid imbalance  4/6/2021 1718 by Noé Solorio RN  Outcome: Completed  4/6/2021 1326 by Cande Rios RN  Outcome: Not Met This Shift  Goal: Ability to achieve and maintain adequate urine output will improve  Description: Ability to achieve and maintain adequate urine output will improve  4/6/2021 1718 by Noé Solorio RN  Outcome: Completed  4/6/2021 1326 by Cande Rios RN  Outcome: Not Met This Shift     Problem: Physical Regulation:  Goal: Ability to maintain clinical measurements within normal limits will improve  Description: Ability to maintain clinical measurements within normal limits will improve  4/6/2021 1718 by Noé Solorio RN  Outcome: Completed  4/6/2021 1326 by Cande Rios RN  Outcome: Met This Shift  Goal: Complications related to the disease process, condition or treatment will be avoided or minimized  Description: Complications related to the disease process, condition or treatment will be avoided or minimized  4/6/2021 1718 by Noé Solorio RN  Outcome: Completed  4/6/2021 1326 by Cande Rios RN  Outcome: Not Met This Shift     Problem: Tissue Perfusion - Renal, Altered:  Goal: Electrolytes within specified parameters  Description: Electrolytes within specified parameters  4/6/2021 2022 by Brayden Gutiérrez RN  Outcome: Met This Shift  4/6/2021 1326 by Isaura Smith RN  Outcome: Not Met This Shift  Goal: Serum creatinine will be within specified parameters  Description: Serum creatinine will be within specified parameters  4/6/2021 2022 by Brayden Gutiérrez RN  Outcome: Not Met This Shift  4/6/2021 1326 by Isaura Smith RN  Outcome: Not Met This Shift  Goal: Ability to achieve a balanced intake and output will improve  Description: Ability to achieve a balanced intake and output will improve  4/6/2021 2022 by Brayden Gutiérrez RN  Outcome: Met This Shift  4/6/2021 1326 by Isaura Smith RN  Outcome: Not Met This Shift     Problem: Cardiac:  Goal: Ability to maintain an adequate cardiac output will improve  Description: Ability to maintain an adequate cardiac output will improve  4/6/2021 2022 by Brayden Gutiérrez RN  Outcome: Completed  4/6/2021 1326 by Isaura Smith RN  Outcome: Met This Shift  Goal: Hemodynamic stability will improve  Description: Hemodynamic stability will improve  4/6/2021 2022 by Brayden Gutiérrez RN  Outcome: Completed  4/6/2021 1326 by Isaura Smith RN  Outcome: Met This Shift     Problem: Respiratory:  Goal: Respiratory status will improve  Description: Respiratory status will improve  4/6/2021 2022 by Brayden Gutiérrez RN  Outcome: Met This Shift  4/6/2021 1326 by Isaura Smith RN  Outcome: Not Met This Shift

## 2021-04-07 NOTE — CARE COORDINATION
4/7/21 Positive covid 3/29/21. CM transition of care: pt in icu/vent at 100% peep at 12- plans for trache/peg however on hold until respiratory stability with less peep maintains appropriate saturations. [facility] 1st choice,  [facility] 2nd choice. Patient will require Precert for LTACH . Tracheostomy is ordered, a PEG also ordered , and a Tunnelled HD catheter will be placed. CM/SS following.  Electronically signed by Theresa Room RN-BC on 4/7/2021 at 10:53 AM

## 2021-04-07 NOTE — DISCHARGE INSTR - COC
Continuity of Care Form    Patient Name: Pepe Mode   :  1960  MRN:  [id number]    Admit date:  3/21/2021  Discharge date:  ***    Code Status Order: Limited   Advance Directives:   [facility] Documentation     Date/Time Healthcare Directive Type of Healthcare Directive Copy in [address] Agent's Name Healthcare Agent's Phone Number    21 1720  No, patient does not have an advance directive for healthcare treatment -- -- -- -- --          Admitting Physician:  Jerri Willson MD  PCP: Sada Horowitz MD    Discharging Nurse: [facility] Unit/Room#: IC09/IC09-01  Discharging Unit Phone Number: ***    Emergency Contact:   Extended Emergency Contact Information  Primary Emergency Contact: [facility]  Address: [address] Phone: [phone]  Mobile Phone: [phone]  Relation: Child    Past Surgical History:  Past Surgical History:   Procedure Laterality Date    APPENDECTOMY      HYSTERECTOMY         Immunization History: There is no immunization history on file for this patient.     Active Problems:  Patient Active Problem List   Diagnosis Code    Diabetes mellitus type 2, insulin dependent ([facility] [address] E11.9, Z79.4    Hypothyroid E03.9    Hypertension I10    Hyperlipidemia E78.5    Hypertensive urgency I16.0    Acute respiratory failure with hypoxia (HCC) J96.01    COVID-19 U07.1    Altered mental state R41.82    Goals of care, counseling/discussion Z71.89    Palliative care by specialist Z51.5       Isolation/Infection:   Isolation          Droplet Plus        Patient Infection Status     Infection Onset Added Last Indicated Last Indicated By Review Planned Expiration Resolved Resolved By    COVID-19 21 Respiratory Panel, Molecular, with COVID-19 (Restricted: peds pts or suitable admitted adults) 21      Resolved    C-diff Rule Impairments/Disabilities:796355790}    Nutrition Therapy:  Current Nutrition Therapy:   [address] NUSRAT Diet List:216136284}    Routes of Feeding: {CHP DME Other Feedings:243783511}  Liquids: {Slp liquid thickness:55306}  Daily Fluid Restriction: {CHP DME Yes amt example:451298905}  Last Modified Barium Swallow with Video (Video Swallowing Test): {Done Not Done IYGW:419039515}    Treatments at the Time of Hospital Discharge:   Respiratory Treatments: ***  Oxygen Therapy:  {Therapy; copd oxygen:63016}  Ventilator:    { CC Vent JYJK:092868940}    Rehab Therapies: {THERAPEUTIC INTERVENTION:3951904149}  Weight Bearing Status/Restrictions: [address] CC Weight Bearin}  Other Medical Equipment (for information only, NOT a DME order):  {EQUIPMENT:281163246}  Other Treatments: ***    Patient's personal belongings (please select all that are sent with patient):  {P DME Belongings:165913012}    RN SIGNATURE:  {Esignature:493717644}    CASE MANAGEMENT/SOCIAL WORK SECTION    Inpatient Status Date: 3/21/21    Readmission Risk Assessment Score:  Readmission Risk              Risk of Unplanned Readmission:        38           Discharging to Facility/ Agency   · Name:  [facility]   [place]  [facility]  · Phone:  [phone]    Dialysis Facility (if applicable)   · Name:              [address] [place] [address] being planned  · Address:  · Dialysis Schedule:  · Phone:  · Fax:    / signature:  Electronically signed by Mariel Salgado RN-BC on 2021 at 11:01 AM        PHYSICIAN SECTION    Prognosis: {Prognosis:6814791793}    Condition at Discharge: [address] Patient Condition:053026848}    Rehab Potential (if transferring to Rehab): {Prognosis:2233944219}    Recommended Labs or Other Treatments After Discharge: ***    Physician Certification: I certify the above information and transfer of Rheba Sales  is necessary for the continuing treatment of the diagnosis listed and that she requires {Admit to Appropriate Level of Care:37415} for {GREATER/LESS:533539515} 30 days.      Update Admission H&P: {CHP DME Changes in [facility]:356216810}    PHYSICIAN SIGNATURE:  {Esignature:582153901}

## 2021-04-07 NOTE — PLAN OF CARE
Problem: Airway Clearance - Ineffective  Goal: Achieve or maintain patent airway  4/7/2021 0603 by Kavin Mcnamara RN  Outcome: Met This Shift  4/6/2021 2022 by Nidia Morales RN  Outcome: Met This Shift     Problem: Gas Exchange - Impaired  Goal: Absence of hypoxia  4/7/2021 0603 by Kavin Mcnamara RN  Outcome: Met This Shift  4/6/2021 2022 by Nidia Morales RN  Outcome: Met This Shift  Goal: Promote optimal lung function  4/7/2021 0603 by Kavin Mcnamara RN  Outcome: Met This Shift  4/6/2021 2022 by Nidia Morales RN  Outcome: Met This Shift     Problem: Breathing Pattern - Ineffective  Goal: Ability to achieve and maintain a regular respiratory rate  4/7/2021 0603 by Kavin Mcnamara RN  Outcome: Met This Shift  4/6/2021 2022 by Nidia Morales RN  Outcome: Met This Shift     Problem:  Body Temperature -  Risk of, Imbalanced  Goal: Ability to maintain a body temperature within defined limits  4/6/2021 1718 by Nidia Morales RN  Outcome: Completed  Goal: Complications related to the disease process, condition or treatment will be avoided or minimized  4/6/2021 2022 by Nidia Morales RN  Outcome: Completed     Problem: Isolation Precautions - Risk of Spread of Infection  Goal: Prevent transmission of infection  Outcome: Met This Shift     Problem: Nutrition Deficits  Goal: Optimize nutritional status  Outcome: Met This Shift       Problem: Falls - Risk of:  Goal: Will remain free from falls  Description: Will remain free from falls  4/7/2021 0603 by Kavin Mcnamara RN  Outcome: Met This Shift  4/6/2021 2022 by Nidia Morales RN  Outcome: Met This Shift  Goal: Absence of physical injury  Description: Absence of physical injury  4/7/2021 0603 by Kavin Mcnamara RN  Outcome: Met This Shift  4/6/2021 2022 by Nidia Morales RN  Outcome: Met This Shift     Problem: Skin Integrity:  Goal: Will show no infection signs and symptoms  Description: Will show no infection signs and symptoms 4/7/2021 0603 by Mirtha Arboleda RN  Outcome: Met This Shift  4/6/2021 2022 by Steve Adams RN  Outcome: Met This Shift  Goal: Absence of new skin breakdown  Description: Absence of new skin breakdown  4/7/2021 0603 by Mirtha Arboleda RN  Outcome: Met This Shift  4/6/2021 2022 by Steve Adams RN  Outcome: Met This Shift  Goal: Status of oral mucous membranes will improve  Description: Status of oral mucous membranes will improve  4/7/2021 0603 by Mirtha Arboleda RN  Outcome: Met This Shift  4/6/2021 2022 by Steve Adams RN  Outcome: Met This Shift  Goal: Skin integrity will be maintained  Description: Skin integrity will be maintained  4/7/2021 0603 by Mirtha Arboleda RN  Outcome: Met This Shift  4/6/2021 2022 by Steve Adams RN  Outcome: Met This Shift

## 2021-04-07 NOTE — PROGRESS NOTES
[facility]  [facility]  Progress Note    FU: COVID/ respiratory failure/ARDS   Face to face encounter     SUBJECTIVE:    Patient is in ICU   She is intubated/sedated/ on vent - sedated and paralyzed   On HD due to CKD - has right IJ  In bed- on HD- no fevers   Updated by nurse     ROS:  Unable to obtain due to mentation     OBJECTIVE:    Vitals:    04/07/21 0900 04/07/21 0912 04/07/21 0925 04/07/21 1000   BP: 131/66  (!) 143/76 (!) 91/54   Pulse: 58 57  58   Resp: (!) 35  (!) 35 (!) 35   Temp:   96.2 °F (35.7 °C)    TempSrc:       SpO2: 100%  (!) 3% 99%   Weight:       Height:           HEENT :         Vent, sedated - 100% FIO2  Intubated   Heart: RRR  Lungs:  diminished poor exchange   Abdomen soft  Rij hd cath 3/26   Musa - minimal urine output   PIV X2 to right arm  ++ edema                    LABS    CBC:     WBC   Date Value Ref Range Status   04/07/2021 11.8 (H) 4.5 - 11.5 E9/L Final   04/06/2021 15.4 (H) 4.5 - 11.5 E9/L Final   04/05/2021 12.3 (H) 4.5 - 11.5 E9/L Final     Hematocrit   Date Value Ref Range Status   04/07/2021 22.5 (L) 34.0 - 48.0 % Final   04/06/2021 25.1 (L) 34.0 - 48.0 % Final   04/05/2021 27.0 (L) 34.0 - 48.0 % Final     Platelets   Date Value Ref Range Status   04/07/2021 269 130 - 450 E9/L Final   04/06/2021 242 130 - 450 E9/L Final   04/05/2021 194 130 - 450 E9/L Final     BMP:      Sodium   Date Value Ref Range Status   04/07/2021 137 132 - 146 mmol/L Final   04/06/2021 138 132 - 146 mmol/L Final   04/05/2021 139 132 - 146 mmol/L Final     Potassium   Date Value Ref Range Status   04/07/2021 4.0 3.5 - 5.0 mmol/L Final   04/06/2021 4.0 3.5 - 5.0 mmol/L Final   04/05/2021 3.9 3.5 - 5.0 mmol/L Final     Chloride   Date Value Ref Range Status   04/07/2021 97 (L) 98 - 107 mmol/L Final   04/06/2021 96 (L) 98 - 107 mmol/L Final   04/05/2021 99 98 - 107 mmol/L Final     CO2   Date Value Ref Range Status   04/07/2021 29 22 - 29 mmol/L Final   04/06/2021 28 22 - 29 mmol/L Final   04/05/2021 27 22 - 29 mmol/L Final     BUN   Date Value Ref Range Status   04/07/2021 64 (H) 8 - 23 mg/dL Final   04/06/2021 47 (H) 8 - 23 mg/dL Final   04/05/2021 60 (H) 8 - 23 mg/dL Final     CREATININE   Date Value Ref Range Status   04/07/2021 2.4 (H) 0.5 - 1.0 mg/dL Final   04/06/2021 2.0 (H) 0.5 - 1.0 mg/dL Final   04/05/2021 2.7 (H) 0.5 - 1.0 mg/dL Final     Glucose   Date Value Ref Range Status   04/07/2021 170 (H) 74 - 99 mg/dL Final   04/06/2021 219 (H) 74 - 99 mg/dL Final   04/05/2021 183 (H) 74 - 99 mg/dL Final         Hepatic Function Panel:    Lab Results   Component Value Date    ALKPHOS 182 03/24/2021    ALT 32 03/24/2021    AST 42 03/24/2021    PROT 4.8 03/24/2021    BILITOT 0.2 03/24/2021    BILIDIR 0.1 02/27/2013    LABALBU 2.2 03/24/2021    LABALBU 4.1 03/21/2011        Lab Results   Component Value Date/Time    SEDRATE 40 (H) 03/16/2021 10:14 AM       Lab Results   Component Value Date/Time    CRP 0.9 (H) 03/16/2021 10:14 AM       Microbiology :  Blood Culture, Routine   Date Value Ref Range Status   03/25/2021 5 Days no growth  Final     Culture, Blood 2   Date Value Ref Range Status   03/25/2021 5 Days no growth  Final     Urine Culture, Routine   Date Value Ref Range Status   03/08/2021 <10,000 CFU/mL  Mixed gram positive organisms    Final     Radiology :  Reviewed     ASSESSMENT:   respiratory failure/ards/s/p rx covid on rx for hcap   s/p toci/convalescent plasma +covid 3/29  CKD URINARY RETENTION HAS GIPSON on hemo   Chronic lacunar infarcts in right caudate head - prominence of bilateral superior ophthalimc veins - possible cavernous sinus thrombosis?      PLAN:  · OFF Diflucan, vancomycin, and Zosyn since 4/6/2021   · On dexamethasone   · Follow off ATB   · For Trach and PEG soon   · Poor prognosis  · Monitor labs, follow cultures - WBC- 11.8     INDRA Jones - CNS  4/7/2021  11:09 AM     Patient examined along with the nurse practitioner  Agree with above  No significant change in the patient's status  WBC count coming down  Awaiting trach and a PEG tube placement  Prognosis poor  We will follow the patient off antibiotics    I have discussed the case, including pertinent history and physical  exam findings . I have seen and examined the patient and the key elements of the encounter have been performed by me. I agree with the assessment, plan and orders as documented.       Treatment plan as per my recommendation     Jeanette Nelson MD, FACP  4/7/2021  6:34 PM

## 2021-04-07 NOTE — PROGRESS NOTES
[facility] MD Eros Wu, MD Joann Escamilla, MD [place] Darnell, MD Juany Guadalupe, CNP   Peg Nascimento, RAZA  Progress Note  4/7/2021    SUBJECTIVE:  We are following this patient for CKD stage IV on hemodialysis. 3/29 : Transferred to ICU bed 9 currently in prone position because of COVID-19 mechanical ventilation  Today with [facility] ICU attending Dr. Too Castro assess to perform acute dialysis treatment patient be scheduled for MR venogram with no contrast.  Patient was evaluated and case discussed with the nurse currently will be needing alternating supine and prone positions every 12 hours she remains on mechanical ventilation with sedation. Last dialysis was done yesterday    3/30 : seen in ICU this am .   Mechanically ventialted . Fio2 90   On small dose levo   Poor UO   No reported edema   D/w ICU staff     3/31 : partial HD yesterday due to Line malfunctioning . Will try cathflow . If line does not work then plan on IR exchange over guidewire   She continue to be mechanically ventilated , on 60 % fio2 when seeing her . Off pressors       4/1 : mechanically ventilated . fio2 80 . No pressors . IR to replace HD line today     4/2 : mechainically ventilated , high 02 reuqirment . critiically sick . High amount of o2 . It appear covid pneumonia is the main cultprit   D/w HD RN not able to do a lot of UF     4/3 : seen in ICU , d/w ICU RT , RN . Continue to require Fo2 at 100 with pao2 in 46s . No pressors ,poor UO .       4/4 : continue to require fio2 of 100 . Critically ill . D/w HD RN plan UF today as tolerated will aim for 3-4 L   4/5: Patient was seen and case was discussed with the floor nurses and dialysis nurses while patient undergoing acute hemodialysis morning, patient had ultrafiltration session yesterday and hemodialysis morning. Vital signs were stable on all the pressors all the medications being given.       4/5 : seen in ICU , off pressors , still on high amount fio2 . fmaily leaning toward track and PEG . No UO . Noted . Per ICU RN 1-2+ edema       4/6 : seen on HD . Tolerating UF . No pressors. Sedated , paralyzed .    PROBLEM LIST:    Patient Active Problem List   Diagnosis    Diabetes mellitus type 2, insulin dependent ([facility]    Hypothyroid    Hypertension    Hyperlipidemia    Hypertensive urgency    Acute respiratory failure with hypoxia ([facility]    COVID-19    Altered mental state    Goals of care, counseling/discussion    Palliative care by specialist        PAST MEDICAL HISTORY:    Past Medical History:   Diagnosis Date    Acid reflux     COVID-19     Hemodialysis patient ([facility]     Hyperlipidemia     Hypertension     Hypothyroidism     S/P appendectomy     Type II or unspecified type diabetes mellitus without mention of complication, not stated as uncontrolled        MEDS (scheduled):   dexamethasone  4 mg Intravenous Q12H    polyvinyl alcohol  1 drop Both Eyes Q4H    Gabapentin  300 mg Oral Nightly    insulin glargine  9 Units Subcutaneous QAM    famotidine  10 mg Oral Daily    [Held by provider] carvedilol  3.125 mg Oral BID WC    insulin lispro  0-6 Units Subcutaneous Q6H    sodium bicarbonate  1,300 mg Per NG tube TID    heparin (porcine)  5,000 Units Subcutaneous 3 times per day    [Held by provider] amLODIPine  5 mg Oral BID    [Held by provider] pentoxifylline  400 mg Oral BID    ipratropium-albuterol  1 ampule Inhalation 4x daily    nystatin  5 mL Oral 4x Daily    aspirin  81 mg Oral Daily    atorvastatin  80 mg Oral Daily    clopidogrel  75 mg Oral Daily    levothyroxine  88 mcg Oral Daily    sodium chloride flush  10 mL Intravenous 2 times per day       MEDS (infusions):   cisatracurium (NIMBEX) infusion 4 mcg/kg/min (04/07/21 0241)    fentaNYL 5 mcg/ml in 0.9%  ml infusion 100 mcg/hr (04/07/21 0826)    sodium chloride      propofol 40 mcg/kg/min (04/07/21 1146)    dextrose Stopped (03/24/21 3734)       MEDS (prn):  albumin human, hydrALAZINE, sodium chloride, [Held by provider] hydrOXYzine, glucose, dextrose, glucagon (rDNA), dextrose, acetaminophen, sodium chloride flush, promethazine **OR** ondansetron, polyethylene glycol, [DISCONTINUED] acetaminophen **OR** acetaminophen    DIET:    DIET TUBE FEED CONTINUOUS/CYCLIC NPO; Renal Formula; Nasogastric; 10; 50      PHYSICAL EXAM:      Patient Vitals for the past 24 hrs:   BP Temp Temp src Pulse Resp SpO2 Weight   04/07/21 1349  96.2 °F (35.7 °C)   (!) 35  139 lb 5.3 oz (63.2 kg)   04/07/21 1200 (!) 84/48 96.4 °F (35.8 °C) Oral 63 (!) 36 100 %    04/07/21 1100 (!) 89/52   62 26 100 %    04/07/21 1000 (!) 91/54   58 (!) 35 99 %    04/07/21 0925 (!) 143/76 96.2 °F (35.7 °C)   (!) 35 (!) 3 %    04/07/21 0912    57      04/07/21 0900 131/66   58 (!) 35 100 %    04/07/21 0800 (!) 140/66 96.2 °F (35.7 °C) Oral 54 (!) 35 100 %    04/07/21 0730 138/64   55 (!) 35 100 %    04/07/21 0700 128/67   56 (!) 35 100 %    04/07/21 0630 126/63   57 (!) 35 100 %    04/07/21 0600 (!) 116/57   61 (!) 35 100 %    04/07/21 0530 129/65   59  100 %    04/07/21 0500 (!) 107/54   61 (!) 35 100 %    04/07/21 0430 (!) 101/54   62  100 %    04/07/21 0400 (!) 102/52 96.9 °F (36.1 °C) Infrared 64 (!) 35 100 %    04/07/21 0330 (!) 98/50   66 (!) 35 100 %    04/07/21 0300 (!) 99/52   72 (!) 35 100 %    04/07/21 0220 (!) 93/50   74 (!) 35 99 %    04/07/21 0200 (!) 93/49   74 (!) 35 99 %    04/07/21 0100 (!) 102/50   71  100 %    04/07/21 0000 (!) 104/56 96.4 °F (35.8 °C) Axillary 72 (!) 35 100 %    04/06/21 2300 (!) 108/56   74 25 96 %    04/06/21 2200 121/62   81 (!) 35 96 %    04/06/21 2100 124/62   82 (!) 35 94 %    04/06/21 2000 (!) 112/58   80 (!) 35 95 %    04/06/21 1900 124/63 97.8 °F (36.6 °C)  86 (!) 35 99 % 140 lb 14 oz (63.9 kg)   04/06/21 1830 (!) 123/59   83      04/06/21 1815 (!) 125/59   79      04/06/21 1800 114/62   80 (!) 35 96 %    04/06/21 1745 (!) 104/57   77      04/06/21 1730 (!) 100/55   78      04/06/21 1715 (!) 99/55   78      04/06/21 1700 (!) 94/52   80 24 92 %    04/06/21 1645 (!) 85/49   83             Intake/Output Summary (Last 24 hours) at 4/7/2021 1635  Last data filed at 4/7/2021 1349  Gross per 24 hour   Intake 2257 ml   Output 9260 ml   Net -7003 ml       Wt Readings from Last 3 Encounters:   04/07/21 139 lb 5.3 oz (63.2 kg)   03/08/21 117 lb (53.1 kg)   01/10/21 130 lb (59 kg)       General:  in no acute distress  HEENT: NC/AT, EOMI, sclera and conjunctiva are clear and anicteric. Mucous membranes moist.  Cardiovascular: regular rate and rhythm, no murmurs, gallops, or rubs  Respiratory:  Clear, no rales, rhochi, or wheezes  Gastrointestinal: soft, nontender, nondistended, NABS  Ext: no cyanosis, clubbing, or edema bilateral lower extremities  Neuro: awake, alert, oriented x3. Moves all 4 extremities. Cranial nerves II through XII grossly intact. Skin: dry, no rash      DATA:      Recent Labs     04/05/21  0544 04/06/21 0447 04/07/21  0436   WBC 12.3* 15.4* 11.8*   HGB 8.7* 8.1* 7.1*   HCT 27.0* 25.1* 22.5*   MCV 88.5 88.1 90.7    242 269     Recent Labs     04/05/21  0544 04/06/21  0447 04/07/21  0436    138 137   K 3.9 4.0 4.0   CL 99 96* 97*   CO2 27 28 29   BUN 60* 47* 64*   CREATININE 2.7* 2.0* 2.4*       Iron studies:  Lab Results   Component Value Date    FERRITIN 613 03/16/2021     Bone disease:  Lab Results   Component Value Date    PTH 42 08/05/2020    MG 1.8 03/30/2021    PHOS 6.7 (H) 03/30/2021     Nutrition:No results found for: ALB    Microbiology      ASSESSMENT / RECOMMENDATIONS:     1. GAVIN on top of chronic disease stage IV with hemodialysis initiation     Continue HD support ,seen on HD today tolerating UF   Next treatmet in am goal 4-5 L UF    2. Anemia:   Cont. epo and iv ferrlecit per orders    3.  Secondary

## 2021-04-07 NOTE — PROGRESS NOTES
Spoke with the hospitalist regarding the patients low blood pressure, instructed to go down on the propofol and increase the fentanyl, if the pressure stays low to start levophed.

## 2021-04-07 NOTE — PROGRESS NOTES
PULMONARY MEDICINE FOLLOW UP       57year old woman with PMH of dibetes mellitus, hypothyroidism, hyperlipidemia, hypertension, overweight, and as described below admitted to hospital for management of acute hypoxemic respiratory failure due to COVID-19 pneumonia and ARDS, GAVIN on CKD. --Remains severely hypoxemic with PCV 30/ PEEP 12/ FiO2 100%; Pplat is 41 (!!!), Crs is 5 (!!), she is on neuromuscular paralysis with cisatracurium infusion as she had dyssinchrony with ventilator. --Receiving dialysis   --Shock has resolved  --Worsening normocytic anemia  --I updated the patient's daughter on her critical condition. I worry she will have another episode of air leak due to high pressures. Unfortunately her airway compliance is extremely reduced and ACV generates even higher pressures.        A/P:  Acute hypoxemic respiratory failure secondary to severe COVID-19 pneumonia (positive test on 3/9) and HCAP (MRSA vs gram negative MDROs)  --Mechanical ventilation with lung protective strategy  PCV PIP 33/PEEP 12/FiO2 100%  --Prone position if worsening  --Antimicrobial regimen: Completed  --Antiviral regimen: Dexamethasone 4 mg BID  --Cultures reviewed  --Inflammatory markers:  Reviewed  --Anticoagulation: heparin SQ    Air leak syndrome with pneumomediastinum  --Resolved  --She may have another episode due to high plateau pressures    Hypothyroidism  --Management with thyroid hormone supplements    Hypertension  --Continue hydralazine    Diabetes mellitus  --Management with insulin administration     Hyperlipidemia  --Continue statin      CKD   --Avoid nephrotoxins and dose medications according GFR  --Dialysis as per nephrology    Normocytic anemia  --Transfuse if Hb <7  --Check Fe panel     /66   Pulse 57   Temp 96.2 °F (35.7 °C) (Oral)   Resp (!) 35   Ht 5' (1.524 m)   Wt 140 lb 14 oz (63.9 kg)   SpO2 100%   BMI 27.51 kg/m²   General:Comatose, sedated, ventilating, with neuromuscular paralysis HEENT: Face with edema, anisocoria, lower lip with ulceration, no nasal lesions, no cervical adenopathy palpated  Respiratory: Lungs with very diminished breath sounds bilaterally, no adventitious sounds auscultated, no accessory muscle use  CV: Regular rate, no murmurs, no JVD, no leg edema  Abdomen: Soft, non tender, + bowel sounds, no lesions  Skin: Hydrated, adequate turgor, no rash, capillary refill <2 seconds  Extremities: Flaccid extremities, distal pulses present  Neurology: Comatose, neck is supple, no meningitic signs present. SARS-COV-2 biomarkers  No results for input(s): CRP, PROCAL, FERRITIN, LDH, TROPONINI, DDIMER, FIBRINOGEN, INR, PROTIME, AST, ALT, TRIG in the last 72 hours.   Lab Results   Component Value Date    CHOL 168 08/05/2020    TRIG 220 03/31/2021    HDL 45 08/05/2020    LDLCALC 87 08/05/2020    LABVLDL 36 08/05/2020     Lab Results   Component Value Date/Time    VITD25 27 (L) 03/11/2021 06:45 AM     MEDICATIONS:   dexamethasone  4 mg Intravenous Q12H    polyvinyl alcohol  1 drop Both Eyes Q4H    Gabapentin  300 mg Oral Nightly    insulin glargine  9 Units Subcutaneous QAM    famotidine  10 mg Oral Daily    [Held by provider] carvedilol  3.125 mg Oral BID WC    insulin lispro  0-6 Units Subcutaneous Q6H    sodium bicarbonate  1,300 mg Per NG tube TID    heparin (porcine)  5,000 Units Subcutaneous 3 times per day    [Held by provider] amLODIPine  5 mg Oral BID    [Held by provider] pentoxifylline  400 mg Oral BID    ipratropium-albuterol  1 ampule Inhalation 4x daily    nystatin  5 mL Oral 4x Daily    aspirin  81 mg Oral Daily    atorvastatin  80 mg Oral Daily    clopidogrel  75 mg Oral Daily    levothyroxine  88 mcg Oral Daily    sodium chloride flush  10 mL Intravenous 2 times per day      norepinephrine Stopped (04/07/21 0408)    cisatracurium (NIMBEX) infusion 4 mcg/kg/min (04/07/21 0241)    fentaNYL 5 mcg/ml in 0.9%  ml infusion 100 mcg/hr (04/07/21 6090)  sodium chloride      propofol 40 mcg/kg/min (04/07/21 0819)    dextrose Stopped (03/24/21 0626)     hydrALAZINE, sodium chloride, [Held by provider] hydrOXYzine, glucose, dextrose, glucagon (rDNA), dextrose, acetaminophen, sodium chloride flush, promethazine **OR** ondansetron, polyethylene glycol, [DISCONTINUED] acetaminophen **OR** acetaminophen    OBJECTIVE:  Vitals:    04/07/21 0912   BP:    Pulse: 57   Resp:    Temp:    SpO2:      FiO2 : 100 %  O2 Flow Rate (L/min): 50 L/min  O2 Device: Ventilator    LABS:  WBC   Date Value Ref Range Status   04/07/2021 11.8 (H) 4.5 - 11.5 E9/L Final   04/06/2021 15.4 (H) 4.5 - 11.5 E9/L Final   04/05/2021 12.3 (H) 4.5 - 11.5 E9/L Final     Hemoglobin   Date Value Ref Range Status   04/07/2021 7.1 (L) 11.5 - 15.5 g/dL Final   04/06/2021 8.1 (L) 11.5 - 15.5 g/dL Final   04/05/2021 8.7 (L) 11.5 - 15.5 g/dL Final     Hematocrit   Date Value Ref Range Status   04/07/2021 22.5 (L) 34.0 - 48.0 % Final   04/06/2021 25.1 (L) 34.0 - 48.0 % Final   04/05/2021 27.0 (L) 34.0 - 48.0 % Final     MCV   Date Value Ref Range Status   04/07/2021 90.7 80.0 - 99.9 fL Final   04/06/2021 88.1 80.0 - 99.9 fL Final   04/05/2021 88.5 80.0 - 99.9 fL Final     Platelets   Date Value Ref Range Status   04/07/2021 269 130 - 450 E9/L Final   04/06/2021 242 130 - 450 E9/L Final   04/05/2021 194 130 - 450 E9/L Final     Sodium   Date Value Ref Range Status   04/07/2021 137 132 - 146 mmol/L Final   04/06/2021 138 132 - 146 mmol/L Final   04/05/2021 139 132 - 146 mmol/L Final     Potassium   Date Value Ref Range Status   04/07/2021 4.0 3.5 - 5.0 mmol/L Final   04/06/2021 4.0 3.5 - 5.0 mmol/L Final   04/05/2021 3.9 3.5 - 5.0 mmol/L Final     Potassium reflex Magnesium   Date Value Ref Range Status   03/21/2021 4.2 3.5 - 5.0 mmol/L Final   03/09/2021 4.2 3.5 - 5.0 mmol/L Final     Chloride   Date Value Ref Range Status   04/07/2021 97 (L) 98 - 107 mmol/L Final   04/06/2021 96 (L) 98 - 107 mmol/L Final 04/05/2021 99 98 - 107 mmol/L Final     CO2   Date Value Ref Range Status   04/07/2021 29 22 - 29 mmol/L Final   04/06/2021 28 22 - 29 mmol/L Final   04/05/2021 27 22 - 29 mmol/L Final     BUN   Date Value Ref Range Status   04/07/2021 64 (H) 8 - 23 mg/dL Final   04/06/2021 47 (H) 8 - 23 mg/dL Final   04/05/2021 60 (H) 8 - 23 mg/dL Final     CREATININE   Date Value Ref Range Status   04/07/2021 2.4 (H) 0.5 - 1.0 mg/dL Final   04/06/2021 2.0 (H) 0.5 - 1.0 mg/dL Final   04/05/2021 2.7 (H) 0.5 - 1.0 mg/dL Final     Glucose   Date Value Ref Range Status   04/07/2021 170 (H) 74 - 99 mg/dL Final   04/06/2021 219 (H) 74 - 99 mg/dL Final   04/05/2021 183 (H) 74 - 99 mg/dL Final   03/21/2011 419 (H) 70 - 110 mg/dL Final     Calcium   Date Value Ref Range Status   04/07/2021 8.0 (L) 8.6 - 10.2 mg/dL Final   04/06/2021 8.1 (L) 8.6 - 10.2 mg/dL Final   04/05/2021 8.2 (L) 8.6 - 10.2 mg/dL Final     Total Protein   Date Value Ref Range Status   03/24/2021 4.8 (L) 6.4 - 8.3 g/dL Final   03/21/2021 5.1 (L) 6.4 - 8.3 g/dL Final   03/15/2021 4.9 (L) 6.4 - 8.3 g/dL Final     Albumin   Date Value Ref Range Status   03/24/2021 2.2 (L) 3.5 - 5.2 g/dL Final   03/21/2021 2.5 (L) 3.5 - 5.2 g/dL Final   03/15/2021 2.4 (L) 3.5 - 5.2 g/dL Final   03/21/2011 4.1 3.2 - 4.8 g/dL Final     Total Bilirubin   Date Value Ref Range Status   03/24/2021 0.2 0.0 - 1.2 mg/dL Final   03/21/2021 0.3 0.0 - 1.2 mg/dL Final   03/15/2021 <0.2 0.0 - 1.2 mg/dL Final     Alkaline Phosphatase   Date Value Ref Range Status   03/24/2021 182 (H) 35 - 104 U/L Final   03/21/2021 89 35 - 104 U/L Final   03/15/2021 66 35 - 104 U/L Final     AST   Date Value Ref Range Status   03/24/2021 42 (H) 0 - 31 U/L Final   03/21/2021 50 (H) 0 - 31 U/L Final   03/15/2021 48 (H) 0 - 31 U/L Final     ALT   Date Value Ref Range Status   03/24/2021 32 0 - 32 U/L Final   03/21/2021 71 (H) 0 - 32 U/L Final   03/15/2021 30 0 - 32 U/L Final     GFR Non-   Date Value Ref Range Status   04/07/2021 25 >=60 mL/min/1.73 Final     Comment:     Chronic Kidney Disease: less than 60 ml/min/1.73 sq.m. Kidney Failure: less than 15 ml/min/1.73 sq.m. Results valid for patients 18 years and older. 04/06/2021 31 >=60 mL/min/1.73 Final     Comment:     Chronic Kidney Disease: less than 60 ml/min/1.73 sq.m. Kidney Failure: less than 15 ml/min/1.73 sq.m. Results valid for patients 18 years and older. 04/05/2021 22 >=60 mL/min/1.73 Final     Comment:     Chronic Kidney Disease: less than 60 ml/min/1.73 sq.m. Kidney Failure: less than 15 ml/min/1.73 sq.m. Results valid for patients 18 years and older. GFR    Date Value Ref Range Status   04/07/2021 25  Final   04/06/2021 31  Final   04/05/2021 22  Final     Magnesium   Date Value Ref Range Status   03/30/2021 1.8 1.6 - 2.6 mg/dL Final   03/26/2021 2.8 (H) 1.6 - 2.6 mg/dL Final   03/11/2021 2.2 1.6 - 2.6 mg/dL Final     Phosphorus   Date Value Ref Range Status   03/30/2021 6.7 (H) 2.5 - 4.5 mg/dL Final   03/26/2021 6.3 (H) 2.5 - 4.5 mg/dL Final   03/11/2021 7.9 (H) 2.5 - 4.5 mg/dL Final     Recent Labs     04/07/21  0449   PH 7.367   PO2 122.2*   PCO2 48.4*   HCO3 27.2*   BE 1.5   O2SAT 97.6       RADIOLOGY:  XR CHEST PORTABLE   Final Result   There are extensive bilateral diffuse pulmonary airspace opacities, which   appear mildly improved compared to the prior study         XR CHEST PORTABLE   Final Result   1. Upper borderline position for the endotracheal tube to be at the level of   the arch of the aorta can be advanced about 1.5 cm.      2.  Persistent extensive bilateral parenchymal infiltrates as above discussed. 3.  No significant change since the April 2nd study. XR CHEST PORTABLE   Final Result   No interval change in the extensive multifocal bilateral airspace disease.          XR CHEST PORTABLE   Final Result   No significant interval change in severe bilateral airspace disease. No   change in life support. Subcutaneous emphysema is slightly improved. XR CHEST PORTABLE   Final Result   Extensive bilateral infiltrates         XR CHEST PORTABLE   Final Result   No interval change in extensive multifocal bilateral airspace disease. XR CHEST PORTABLE   Final Result   Stable abnormal chest with diffuse bilateral infiltrates/consolidation which   may be due to diffuse pneumonia and or edema. MRV HEAD WO CONTRAST   Final Result   1. Significant limitation to this examination due to motion. 2.  No evidence of intracranial ischemia, gross edema, or hemorrhage. 3.  Limited MRA head due to motion. No evidence of intracranial arterial   occlusion. Subtle stenosis cannot be excluded. Follow-up CTA head or repeat   MRA head may be helpful for further evaluation. 4.  Limited MRV due to motion, however no findings to suggest dural venous   sinus thrombosis. 5.  Acute maxillary and sphenoid sinusitis as well as findings to suggest   bilateral mastoiditis. MRI BRAIN WO CONTRAST   Final Result   1. Significant limitation to this examination due to motion. 2.  No evidence of intracranial ischemia, gross edema, or hemorrhage. 3.  Limited MRA head due to motion. No evidence of intracranial arterial   occlusion. Subtle stenosis cannot be excluded. Follow-up CTA head or repeat   MRA head may be helpful for further evaluation. 4.  Limited MRV due to motion, however no findings to suggest dural venous   sinus thrombosis. 5.  Acute maxillary and sphenoid sinusitis as well as findings to suggest   bilateral mastoiditis. MRA HEAD WO CONTRAST   Final Result   1. Significant limitation to this examination due to motion. 2.  No evidence of intracranial ischemia, gross edema, or hemorrhage. 3.  Limited MRA head due to motion. No evidence of intracranial arterial   occlusion.   Subtle stenosis cannot be excluded. Follow-up CTA head or repeat   MRA head may be helpful for further evaluation. 4.  Limited MRV due to motion, however no findings to suggest dural venous   sinus thrombosis. 5.  Acute maxillary and sphenoid sinusitis as well as findings to suggest   bilateral mastoiditis. XR CHEST PORTABLE   Final Result   Decreasing pneumo mediastinum      Remainder unchanged. CT HEAD W WO CONTRAST   Final Result   1. No acute intracranial abnormality. 2. Chronic lacunar infarct in the right caudate head. 3. Prominence of bilateral superior ophthalmic veins, more conspicuous since   the previous exam.  If there is concern for a cavernous carotid fistula,   catheter angiography could be performed for further evaluation. XR CHEST PORTABLE   Final Result   Slightly worsened pneumomediastinum and cervical emphysema. Invasive lines and tubes appear satifactory unchanged. Unchanged diffuse pulmonary consolidation, suspect ARDS/pneumonia. XR CHEST PORTABLE   Final Result   Stable pattern of pneumomediastinum and emphysema in the lower neck. Supportive tubing is in normal position. Stable pattern of extensive bilateral airspace disease, pneumonia or ARDS. XR CHEST PORTABLE   Final Result   Unchanged extensive infiltrates and pneumomediastinum. XR CHEST PORTABLE   Final Result   Right internal jugular line tip in the proximal SVC. No sizable pneumothorax. Stable diffuse and bilateral airspace opacities. Subcutaneous air involving both sides of the neck right greater than left. Probable pneumomediastinum. XR CHEST PORTABLE   Final Result   Right IJ catheter in satisfactory position, no complicating pneumothorax. Diffuse parenchymal edema unchanged         XR CHEST PORTABLE   Final Result   Extensive bilateral lung infiltrates are similar to subtly improved. Continued follow-up recommended.          XR CHEST PORTABLE   Final have increased and could be due   to pulmonary edema and/or pneumonia. Recommend follow-up. Resolving left upper lobe opacity. IR Interventional Radiology Procedure Request    (Results Pending)     PROBLEM LIST:  Principal Problem:    Altered mental state  Active Problems:    Acute respiratory failure with hypoxia ([address]    COVID-19    Goals of care, counseling/discussion    Palliative care by specialist  Resolved Problems:    * No resolved hospital problems.  *    ATTESTATION:  ICU Staff Physician note of personal involvement in Care  As the attending physician, I certify that I personally reviewed the patients history and personnally examined the patient to confirm the physical findings described above,  And that I reviewed the relevant imaging studies and available reports.  I also discussed the differential diagnosis and all of the proposed management plans with the patient and individuals accompanying the patient to this visit.  They had the opportunity to ask questions about the proposed management plans and to have those questions answered.     This patient has a high probability of sudden, clinically significant deterioration, which requires the highest level of physician preparedness to intervene urgently.  I managed/supervised life or organ supporting interventions that required frequent physician assessment.   I devoted my full attention to the direct care of this patient for the amount of time indicated below.  Time I spent with the family or surrogate(s) is included only if the patient was incapable of providing the necessary information or participating in medical decisions  Time devoted to teaching and to any procedures I billed separately is not included.     CRITICAL CARE TIME:  30 minutes    Sushma Patel MD  Pulmonary and Critical Care Medicine

## 2021-04-07 NOTE — PROGRESS NOTES
Pharmacy Consultation Note  (Antibiotic Dosing and Monitoring)    Vancomycin has been discontinued; pharmacy will sign-off. Please reconsult if needed.     Thank you,  Marie Liu, PharmD, BCPS 4/7/2021 8:28 AM

## 2021-04-07 NOTE — PROGRESS NOTES
Pt ran 4 hours; 3251 bath; 4050 removed; stable/tolerated well, but hypotensive, thus Albumin 25 GM IV given x 2 during tx; no SS of distress;sedated and paralyzed. HD CVC heplocked per lumen fill volume post tx. Next tx 4/8/21 to have UF. Pos refill at end nof tx as BV change from --8.7 to -7.0.       BP (!) 89/52   Pulse 62   Temp 96.2 °F (35.7 °C)   Resp (!) 35   Ht 5' (1.524 m)   Wt 139 lb 5.3 oz (63.2 kg)   SpO2 100%   BMI 27.21 kg/m²

## 2021-04-07 NOTE — PROGRESS NOTES
CRITICAL CARE PROGRESS NOTE    I called the patient's daughter to notify that her heart rate is dropping to 30's, I think she will pass in the next several hours.     Jocelyne León MD  Pulmonary and Critical Care Medicine

## 2021-04-08 NOTE — PROGRESS NOTES
CRITICAL CARE PROGRESS NOTE    I called the patient's daughter to notify of bradycardia, hypotension and inability to feel pulses, the patietn most likely will pass today. Mrs Jose De Jesus Taylor is receiving atropine and epinephrine injections. Dopamine infusion started.     Fátima Chen MD  Pulmonary and Critical Care Medicine

## 2021-04-08 NOTE — PROGRESS NOTES
Range Status   03/08/2021 <10,000 CFU/mL  Mixed gram positive organisms    Final     Radiology :  Reviewed     ASSESSMENT:   Respiratory failure/ards/s/p rx covid s/p rx for hcap   s/p toci/convalescent plasma +covid 3/29  CKD URINARY RETENTION HAS GIPSON on hemo   Chronic lacunar infarcts in right caudate head - prominence of bilateral superior ophthalimc veins - possible cavernous sinus thrombosis?           PLAN:  · OFF Diflucan, vancomycin, and Zosyn since 4/6/2021   · On dexamethasone   · Follow off ATBx  · Poor prognosis  ·    Will see prn     George Zuniga MD  4/8/2021  8:47 AM

## 2021-04-08 NOTE — PROGRESS NOTES
Family called and chose [place] [id number]  home. Family already notified them.  Also told family that a pair of earring will be sent with the patient

## 2021-04-08 NOTE — PLAN OF CARE
Problem: Airway Clearance - Ineffective  Goal: Achieve or maintain patent airway  4/8/2021 0014 by Eric Najera RN  Outcome: Met This Shift  4/7/2021 1807 by Trish Cee RN  Outcome: Met This Shift     Problem: Gas Exchange - Impaired  Goal: Promote optimal lung function  4/8/2021 0014 by Eric Najera RN  Outcome: Met This Shift  4/7/2021 1807 by Trish Cee RN  Outcome: Met This Shift     Problem: Isolation Precautions - Risk of Spread of Infection  Goal: Prevent transmission of infection  4/8/2021 0014 by Eric Najera RN  Outcome: Met This Shift  4/7/2021 1807 by Trish Cee RN  Outcome: Met This Shift     Problem: Nutrition Deficits  Goal: Optimize nutritional status  4/8/2021 0014 by Eric Najera RN  Outcome: Met This Shift  4/7/2021 1807 by Trish Cee RN  Outcome: Not Met This Shift     Problem: Falls - Risk of:  Goal: Will remain free from falls  Description: Will remain free from falls  4/8/2021 0014 by Eric Najera RN  Outcome: Met This Shift  4/7/2021 1807 by Trish Cee RN  Outcome: Met This Shift  Goal: Absence of physical injury  Description: Absence of physical injury  4/8/2021 0014 by Eric Najera RN  Outcome: Met This Shift  4/7/2021 1807 by Trish Cee RN  Outcome: Met This Shift     Problem: Skin Integrity:  Goal: Absence of new skin breakdown  Description: Absence of new skin breakdown  Outcome: Met This Shift  Goal: Status of oral mucous membranes will improve  Description: Status of oral mucous membranes will improve  Outcome: Met This Shift  Goal: Skin integrity will be maintained  Description: Skin integrity will be maintained  Outcome: Met This Shift     Problem: Gas Exchange - Impaired  Goal: Absence of hypoxia  4/8/2021 0014 by Eric Najera RN  Outcome: Not Met This Shift  4/7/2021 1807 by Trish Cee RN  Outcome: Met This Shift     Problem: Breathing Pattern - Ineffective  Goal: Ability to achieve and maintain a regular respiratory rate 4/8/2021 0014 by Cristhian Plaza, RN  Outcome: Not Met This Shift  4/7/2021 1807 by Rupa Metzger, RN  Outcome: Not Met This Shift     Problem: Skin Integrity:  Goal: Will show no infection signs and symptoms  Description: Will show no infection signs and symptoms  Outcome: Not Met This Shift     Problem: Tissue Perfusion - Renal, Altered:  Goal: Electrolytes within specified parameters  Description: Electrolytes within specified parameters  Outcome: Not Met This Shift  Goal: Serum creatinine will be within specified parameters  Description: Serum creatinine will be within specified parameters  Outcome: Not Met This Shift  Goal: Ability to achieve a balanced intake and output will improve  Description: Ability to achieve a balanced intake and output will improve  Outcome: Not Met This Shift     Problem: Respiratory:  Goal: Respiratory status will improve  Description: Respiratory status will improve  Outcome: Not Met This Shift

## 2021-04-08 NOTE — DISCHARGE SUMMARY
Physician Discharge Summary     Patient ID:  Pepe Molina  [id number]  39 y.o.  1960    Admit date: 3/21/2021    Discharge date and time: 2021 11:37 AM     Admitting Physician: Jerri Willson MD     Discharge Physician: Wes pastorvik    Admission Diagnoses: Acute respiratory failure with hypoxia ([address] [J96.01]  COVID-19 [U07.1]    Discharge Diagnoses:   1. Acute respiratory failure due to COVID-19.  2.  Congestive heart failure  3. Hypertension essential  4. Acute acute on chronic kidney disease requiring dialysis  5. Anemia of chronic disease  6. Covid viral infection  7. Diabetes mellitus type 2    Admission Condition: poor    Discharged Condition:  time of death is 1137 on 2021    Indication for Admission:       Hospital Course:   Patient is a readmit. She was initially treated for Covid viral infection. Did well and was discharged. However she came back due to worsening of symptoms. She has symptoms to a turn for the worse and she ended up in the ICU intubated. She was started on dialysis. However despite all the efforts stabilized patient continued to decline. She was intubated for very long. Of time in the hospital.  She was unable to be off of vent. Unfortunately patient  today. I was not able to evaluate the patient before she . Consults: none    Significant Diagnostic Studies: labs: Outstanding Order Results     Date and Time Order Name Status Description    2021 0617 EEG cerebral determination Preliminary     2021 1714 COVID-19 In process         Treatments: antibiotics:     Discharge Exam:  Patient is already     Disposition: home    Patient Instructions:   [unfilled]  Activity:   Diet:   Wound Care: none needed    Follow-up with  in  .     Signed:  Jose MICHAEL  2021  3:37 PM

## 2021-04-08 NOTE — CARE COORDINATION
4/8/21 Positive covid 3/29/21. Cm transition of care: Covid with ARDS.  icu/vent/fentanyl. Pt currently getting hemodialysis in room. Pt is limited code meds only- with palliative medicine on consult. [facility] first choice and [facility] discharge. Patient still waiting on stable vent settings for the placement of a trache and peg tube. CM/SS following. Electronically signed by SIERRA Hong on 4/8/2021 at 10:11 AM     Addendum: informed Twila with [facility]- of pts passing.  Electronically signed by SIERRA Hong on 4/8/2021 at 1:44 PM

## 2021-04-08 NOTE — PROGRESS NOTES
CRITICAL CARE PROGRESS NOTE    Mrs Jung Mills' daughter came to the bedside, I explained her that her mother's heart rate continues to slow down and we have been administering medications to increase it. Mrs Jung Mills is dying. Her daughter requests no more medications to be administered if her heart rate drops. She states the patient would not like to live on machines but would be OK dying on the ventilator.       Billy Olivera MD  Pulmonary and Critical Care Medicine

## 2021-04-08 NOTE — PROGRESS NOTES
Family bedside while patients vitals decline. Patient asystole, with no doppler pulse, dr Demian Tompkins notified. Time of death 66 [phone] on 4/8/21    All patient belongings given to daughter. [facility] notified with [facility], referral number [id number]. Patient is not a canidate and may be released.       notified    Dr Mendenhall Backers notified, call out to dr lebron (awaiting callback)

## 2021-04-08 NOTE — PROGRESS NOTES
DEATH NOTE    Patient's name: Anjana Villatoro  Date and time of death: 4/8/2021 at 11:37  Cause of death: COVID-19 pneumonia and ARDS, GAVIN on CKD, septic shock  Attending physician notified: Yes  Code status: DNR-CCA    The patient is immobile, flaccid, with no respiratory effort, cardiac sounds absent.       EKG strip with asystole    Augie Clemente MD  Pulmonary and Critical Care Medicine

## 2021-04-08 NOTE — PROGRESS NOTES
PULMONARY MEDICINE FOLLOW UP       57year old woman with PMH of dibetes mellitus, hypothyroidism, hyperlipidemia, hypertension, overweight, and as described below admitted to hospital for management of acute hypoxemic respiratory failure due to COVID-19 pneumonia and ARDS, GAVIN on CKD. --Has intermittent episodes of bradycardia  --Actively dying, her daughter requested no more medication to increase heart rate.       A/P:  Acute hypoxemic respiratory failure secondary to severe COVID-19 pneumonia (positive test on 3/9) and HCAP (MRSA vs gram negative MDROs)  --Mechanical ventilation with lung protective strategy  PCV PIP 33/PEEP 12/FiO2 100%  --Antimicrobial regimen: Completed  --Antiviral regimen: Dexamethasone 4 mg BID  --Cultures reviewed  --Inflammatory markers:  Reviewed  --Anticoagulation: heparin SQ    Bradycardia  --Received multiple doses of atropine and epinephrine, stopped dopamine as per daughter's request    Air leak syndrome with pneumomediastinum  --She may have another episode due to high plateau pressures    Hypothyroidism  --Management with thyroid hormone supplements    Hypertension  --Continue hydralazine    Diabetes mellitus  --Management with insulin administration     Hyperlipidemia  --Continue statin      CKD   --Avoid nephrotoxins and dose medications according GFR  --Dialysis as per nephrology    Normocytic anemia  --No transfusions    BP (!) 191/107   Pulse 109   Temp 99.3 °F (37.4 °C) (Esophageal)   Resp (!) 45   Ht 5' (1.524 m)   Wt 146 lb 13.2 oz (66.6 kg)   SpO2 (!) 88%   BMI 28.68 kg/m²   General:Comatose, flaccid extremities, ventilated  HEENT: Face edema, anisocoria, mouth without lesions, no nasal lesions, no cervical adenopathy palpated  Respiratory: Lungs with diminished breath sounds bilaterally, no adventitious sounds auscultated, no accessory muscle use  CV: Irregularr rate, no murmurs, no JVD, no leg edema  Abdomen: Soft, aparently non tender  Skin: Dehydrated, capillary refill 3 seconds  Extremities:Flaccid extremities, distal pulses present  Neurology: Comatose, neck is supple, no meningitic signs present. SARS-COV-2 biomarkers  No results for input(s): CRP, PROCAL, FERRITIN, LDH, TROPONINI, DDIMER, FIBRINOGEN, INR, PROTIME, AST, ALT, TRIG in the last 72 hours.   Lab Results   Component Value Date    CHOL 168 08/05/2020    TRIG 220 03/31/2021    HDL 45 08/05/2020    LDLCALC 87 08/05/2020    LABVLDL 36 08/05/2020     Lab Results   Component Value Date/Time    VITD25 27 (L) 03/11/2021 06:45 AM     MEDICATIONS:   atropine        EPINEPHrine        atropine        DOPamine        dexamethasone  4 mg Intravenous Q12H    polyvinyl alcohol  1 drop Both Eyes Q4H    Gabapentin  300 mg Oral Nightly    insulin glargine  9 Units Subcutaneous QAM    famotidine  10 mg Oral Daily    [Held by provider] carvedilol  3.125 mg Oral BID WC    insulin lispro  0-6 Units Subcutaneous Q6H    sodium bicarbonate  1,300 mg Per NG tube TID    heparin (porcine)  5,000 Units Subcutaneous 3 times per day    [Held by provider] amLODIPine  5 mg Oral BID    [Held by provider] pentoxifylline  400 mg Oral BID    ipratropium-albuterol  1 ampule Inhalation 4x daily    nystatin  5 mL Oral 4x Daily    aspirin  81 mg Oral Daily    atorvastatin  80 mg Oral Daily    clopidogrel  75 mg Oral Daily    levothyroxine  88 mcg Oral Daily    sodium chloride flush  10 mL Intravenous 2 times per day      DOPamine      norepinephrine Stopped (04/08/21 0105)    cisatracurium (NIMBEX) infusion 1.5 mcg/kg/min (04/08/21 1000)    fentaNYL 5 mcg/ml in 0.9%  ml infusion 75 mcg/hr (04/08/21 0846)    propofol Stopped (04/07/21 2152)    dextrose Stopped (03/24/21 0626)     albumin human, hydrALAZINE, [Held by provider] hydrOXYzine, glucose, dextrose, glucagon (rDNA), dextrose, acetaminophen, sodium chloride flush, promethazine **OR** ondansetron, polyethylene glycol, [DISCONTINUED] acetaminophen **OR** acetaminophen    OBJECTIVE:  Vitals:    04/08/21 0900   BP: (!) 191/107   Pulse: 109   Resp: (!) 45   Temp: 99.3 °F (37.4 °C)   SpO2: (!) 88%     FiO2 : 100 %  O2 Flow Rate (L/min): 50 L/min  O2 Device: Ventilator    LABS:  WBC   Date Value Ref Range Status   04/08/2021 12.4 (H) 4.5 - 11.5 E9/L Final   04/07/2021 11.8 (H) 4.5 - 11.5 E9/L Final   04/06/2021 15.4 (H) 4.5 - 11.5 E9/L Final     Hemoglobin   Date Value Ref Range Status   04/08/2021 7.7 (L) 11.5 - 15.5 g/dL Final   04/07/2021 7.1 (L) 11.5 - 15.5 g/dL Final   04/06/2021 8.1 (L) 11.5 - 15.5 g/dL Final     Hematocrit   Date Value Ref Range Status   04/08/2021 24.4 (L) 34.0 - 48.0 % Final   04/07/2021 22.5 (L) 34.0 - 48.0 % Final   04/06/2021 25.1 (L) 34.0 - 48.0 % Final     MCV   Date Value Ref Range Status   04/08/2021 90.0 80.0 - 99.9 fL Final   04/07/2021 90.7 80.0 - 99.9 fL Final   04/06/2021 88.1 80.0 - 99.9 fL Final     Platelets   Date Value Ref Range Status   04/08/2021 394 130 - 450 E9/L Final   04/07/2021 269 130 - 450 E9/L Final   04/06/2021 242 130 - 450 E9/L Final     Sodium   Date Value Ref Range Status   04/08/2021 139 132 - 146 mmol/L Final   04/07/2021 137 132 - 146 mmol/L Final   04/06/2021 138 132 - 146 mmol/L Final     Potassium   Date Value Ref Range Status   04/08/2021 4.1 3.5 - 5.0 mmol/L Final   04/07/2021 4.0 3.5 - 5.0 mmol/L Final   04/06/2021 4.0 3.5 - 5.0 mmol/L Final     Potassium reflex Magnesium   Date Value Ref Range Status   03/21/2021 4.2 3.5 - 5.0 mmol/L Final   03/09/2021 4.2 3.5 - 5.0 mmol/L Final     Chloride   Date Value Ref Range Status   04/08/2021 96 (L) 98 - 107 mmol/L Final   04/07/2021 97 (L) 98 - 107 mmol/L Final   04/06/2021 96 (L) 98 - 107 mmol/L Final     CO2   Date Value Ref Range Status   04/08/2021 28 22 - 29 mmol/L Final   04/07/2021 29 22 - 29 mmol/L Final   04/06/2021 28 22 - 29 mmol/L Final     BUN   Date Value Ref Range Status   04/08/2021 41 (H) 8 - 23 mg/dL Final   04/07/2021 64 (H) 8 - 23 Kidney Disease: less than 60 ml/min/1.73 sq.m. Kidney Failure: less than 15 ml/min/1.73 sq.m. Results valid for patients 18 years and older. 04/06/2021 31 >=60 mL/min/1.73 Final     Comment:     Chronic Kidney Disease: less than 60 ml/min/1.73 sq.m. Kidney Failure: less than 15 ml/min/1.73 sq.m. Results valid for patients 18 years and older. GFR    Date Value Ref Range Status   04/08/2021 39  Final   04/07/2021 25  Final   04/06/2021 31  Final     Magnesium   Date Value Ref Range Status   03/30/2021 1.8 1.6 - 2.6 mg/dL Final   03/26/2021 2.8 (H) 1.6 - 2.6 mg/dL Final   03/11/2021 2.2 1.6 - 2.6 mg/dL Final     Phosphorus   Date Value Ref Range Status   03/30/2021 6.7 (H) 2.5 - 4.5 mg/dL Final   03/26/2021 6.3 (H) 2.5 - 4.5 mg/dL Final   03/11/2021 7.9 (H) 2.5 - 4.5 mg/dL Final     Recent Labs     04/08/21  0536   PH 7.433   PO2 53.9*   PCO2 35.5   HCO3 23.2   BE -0.8   O2SAT 89.4*       RADIOLOGY:  XR CHEST PORTABLE   Final Result   There are extensive bilateral diffuse pulmonary airspace opacities, which   appear mildly improved compared to the prior study         XR CHEST PORTABLE   Final Result   1. Upper borderline position for the endotracheal tube to be at the level of   the arch of the aorta can be advanced about 1.5 cm.      2.  Persistent extensive bilateral parenchymal infiltrates as above discussed. 3.  No significant change since the April 2nd study. XR CHEST PORTABLE   Final Result   No interval change in the extensive multifocal bilateral airspace disease. XR CHEST PORTABLE   Final Result   No significant interval change in severe bilateral airspace disease. No   change in life support. Subcutaneous emphysema is slightly improved. XR CHEST PORTABLE   Final Result   Extensive bilateral infiltrates         XR CHEST PORTABLE   Final Result   No interval change in extensive multifocal bilateral airspace disease.          XR CHEST PORTABLE   Final Result   Stable abnormal chest with diffuse bilateral infiltrates/consolidation which   may be due to diffuse pneumonia and or edema. MRV HEAD WO CONTRAST   Final Result   1. Significant limitation to this examination due to motion. 2.  No evidence of intracranial ischemia, gross edema, or hemorrhage. 3.  Limited MRA head due to motion. No evidence of intracranial arterial   occlusion. Subtle stenosis cannot be excluded. Follow-up CTA head or repeat   MRA head may be helpful for further evaluation. 4.  Limited MRV due to motion, however no findings to suggest dural venous   sinus thrombosis. 5.  Acute maxillary and sphenoid sinusitis as well as findings to suggest   bilateral mastoiditis. MRI BRAIN WO CONTRAST   Final Result   1. Significant limitation to this examination due to motion. 2.  No evidence of intracranial ischemia, gross edema, or hemorrhage. 3.  Limited MRA head due to motion. No evidence of intracranial arterial   occlusion. Subtle stenosis cannot be excluded. Follow-up CTA head or repeat   MRA head may be helpful for further evaluation. 4.  Limited MRV due to motion, however no findings to suggest dural venous   sinus thrombosis. 5.  Acute maxillary and sphenoid sinusitis as well as findings to suggest   bilateral mastoiditis. MRA HEAD WO CONTRAST   Final Result   1. Significant limitation to this examination due to motion. 2.  No evidence of intracranial ischemia, gross edema, or hemorrhage. 3.  Limited MRA head due to motion. No evidence of intracranial arterial   occlusion. Subtle stenosis cannot be excluded. Follow-up CTA head or repeat   MRA head may be helpful for further evaluation. 4.  Limited MRV due to motion, however no findings to suggest dural venous   sinus thrombosis. 5.  Acute maxillary and sphenoid sinusitis as well as findings to suggest   bilateral mastoiditis. XR CHEST PORTABLE   Final Result   Decreasing pneumo mediastinum      Remainder unchanged. CT HEAD W WO CONTRAST   Final Result   1. No acute intracranial abnormality. 2. Chronic lacunar infarct in the right caudate head. 3. Prominence of bilateral superior ophthalmic veins, more conspicuous since   the previous exam.  If there is concern for a cavernous carotid fistula,   catheter angiography could be performed for further evaluation. XR CHEST PORTABLE   Final Result   Slightly worsened pneumomediastinum and cervical emphysema. Invasive lines and tubes appear satifactory unchanged. Unchanged diffuse pulmonary consolidation, suspect ARDS/pneumonia. XR CHEST PORTABLE   Final Result   Stable pattern of pneumomediastinum and emphysema in the lower neck. Supportive tubing is in normal position. Stable pattern of extensive bilateral airspace disease, pneumonia or ARDS. XR CHEST PORTABLE   Final Result   Unchanged extensive infiltrates and pneumomediastinum. XR CHEST PORTABLE   Final Result   Right internal jugular line tip in the proximal SVC. No sizable pneumothorax. Stable diffuse and bilateral airspace opacities. Subcutaneous air involving both sides of the neck right greater than left. Probable pneumomediastinum. XR CHEST PORTABLE   Final Result   Right IJ catheter in satisfactory position, no complicating pneumothorax. Diffuse parenchymal edema unchanged         XR CHEST PORTABLE   Final Result   Extensive bilateral lung infiltrates are similar to subtly improved. Continued follow-up recommended. XR CHEST PORTABLE   Final Result   Endotracheal tube terminates approximately 1 cm above the sylvia and should   be retracted approximately 2 cm for more optimal positioning. Extensive bilateral diffuse lung infiltrates are similar to previous and may   represent severe edema, pneumonia or ARDS.          XR CHEST PORTABLE   Final Result   ET tube tip at the entrance of the right mainstem bronchus and should be   retracted 2.0 cm. Diffuse bilateral infiltrates. Enteric tube tip in the distal body of the stomach. XR CHEST PORTABLE   Final Result   Stable diffuse and bilateral infiltrates. ET tube tip is at the entrance of the right mainstem bronchus and should be   retracted at least 2.0 cm. XR CHEST PORTABLE   Final Result   Diffuse and bilateral airspace opacities. Findings consistent with extensive   pulmonary edema versus pneumonia. NM LUNG SCAN PERFUSION ONLY   Final Result   Low probability for pulmonary embolism. Small perfusion defects correspond   to the opacities in the mid lungs bilaterally. US DUP LOWER EXTREMITIES BILATERAL VENOUS   Final Result   No evidence of DVT in either lower extremity. CT Head WO Contrast   Final Result   No skull fracture or acute intracranial abnormality. CT CHEST WO CONTRAST   Final Result   Extensive airspace opacities throughout both lungs which is most prominent   along the upper lobes and perihilar regions and could pulmonary edema and/or   pneumonia vs pulmonary hemorrhage. Recommend short-term follow-up. Prominent central pulmonary vessels suggestive of pulmonary congestion or   pulmonary artery hypertension. Small pericardial effusion with no mediastinal mass or adenopathy. Tiny right pleural effusion. Questionable mild ascites along the upper abdomen         XR CHEST PORTABLE   Final Result   Mild cardiomegaly and mild pulmonary edema which is more prominent. Hazy perihilar and bibasilar opacities which have increased and could be due   to pulmonary edema and/or pneumonia. Recommend follow-up. Resolving left upper lobe opacity.          IR Interventional Radiology Procedure Request    (Results Pending)   XR CHEST PORTABLE    (Results Pending)     PROBLEM LIST:  Principal Problem: Altered mental state  Active Problems:    Acute respiratory failure with hypoxia (HCC)    COVID-19    Goals of care, counseling/discussion    Palliative care by specialist  Resolved Problems:    * No resolved hospital problems.  *    ATTESTATION:  ICU Staff Physician note of personal involvement in Care  As the attending physician, I certify that I personally reviewed the patients history and personnally examined the patient to confirm the physical findings described above,  And that I reviewed the relevant imaging studies and available reports.  I also discussed the differential diagnosis and all of the proposed management plans with the patient and individuals accompanying the patient to this visit.  They had the opportunity to ask questions about the proposed management plans and to have those questions answered.     This patient has a high probability of sudden, clinically significant deterioration, which requires the highest level of physician preparedness to intervene urgently.  I managed/supervised life or organ supporting interventions that required frequent physician assessment.   I devoted my full attention to the direct care of this patient for the amount of time indicated below.  Time I spent with the family or surrogate(s) is included only if the patient was incapable of providing the necessary information or participating in medical decisions  Time devoted to teaching and to any procedures I billed separately is not included.     CRITICAL CARE TIME:  30 minutes    Josue Recinos MD  Pulmonary and Critical Care Medicine

## 2021-04-10 LAB
SARS-COV-2: DETECTED
SOURCE: ABNORMAL

## 2022-01-01 NOTE — PROGRESS NOTES
Assessment and Plan  Patient is a 57 y.o. female with the following medical Problems:   1. COVID-19 pneumonia  2. Severe ARDS  3. Acute kidney injury on CKD Stage IV requiring hemodialysis  4. Anemia of chronic kidney disease  5. Pneumomediastinum (improved)  6. Subcutaneous emphysema  7. Type 2 diabetes  8. Hypothyroidism  9. History of CVA and cavernous sinus thrombosis    Plan of care:  1. Continue supplemental oxygen to keep sat above 88%. Continue with low tidal volume ventilation. Patient is currently on 400 and low tidal volume. 2.  ID is managing antibiotics  3. Continue with hemodialysis support  4. DVT prophylaxis  5. Goals of care discussion with the family  6. Tube feeding as tolerated  7.  GI prophylaxis      History of Present Illness:   Patient is a 69-year-old woman with above-mentioned medical problems who was admitted initially on March 8, 2021 and was discharged March 19, 2021 however she was readmitted on March 21 worsening respiratory symptoms. Patient was eventually intubated on March 25, 2021 and was initiated on hemodialysis. 04/02/2021:  Patient remains sedated, intubated, mechanically ventilated. Her oxygen requirement has been fluctuating. Had a discussion with her daughter today regarding goals of care. Her daughter has not made a decision regarding tracheostomy and PEG tube placement however she feels that her mom would not want to go through these procedures. She remains extremely ill requiring high FiO2 requirement and hemodialysis support. She is off pressors today.     Past Medical History:  Past Medical History:   Diagnosis Date    Acid reflux     COVID-19     Hemodialysis patient ([facility] [address]     Hyperlipidemia     Hypertension     Hypothyroidism     S/P appendectomy     Type II or unspecified type diabetes mellitus without mention of complication, not stated as uncontrolled         Family History:   Family History   Problem Relation Age of Onset    at 04/02/21 0618    0.9 % sodium chloride infusion, 250 mL, Intravenous, PRN, Shagufta Gibson DO    fluconazole (DIFLUCAN) in 0.9 % sodium chloride IVPB 400 mg, 400 mg, Intravenous, Q24H, Helen Melara MD, Last Rate: 100 mL/hr at 04/01/21 1725, 400 mg at 04/01/21 1725    polyvinyl alcohol (LIQUIFILM TEARS) 1.4 % ophthalmic solution 1 drop, 1 drop, Both Eyes, Q4H, Shagufta Gibson DO, 1 drop at 04/02/21 1108    atropine injection 0.4 mg, 0.4 mg, Intravenous, Once, Shagufta Gibson DO    norepinephrine (LEVOPHED) 16 mg in dextrose 5 % 250 mL infusion, 2-100 mcg/min, Intravenous, Continuous, Shagufta Gibson DO, Stopped at 04/01/21 0000    Gabapentin SOLN 300 mg, 300 mg, Oral, Nightly, Ena Pepe MD, 300 mg at 04/01/21 2013    insulin glargine (LANTUS) injection vial 9 Units, 9 Units, Subcutaneous, QAM, Achilles Skiff, MD, 9 Units at 04/02/21 0842    famotidine (PEPCID) tablet 10 mg, 10 mg, Oral, Daily, Achilles Skiff, MD, 10 mg at 04/02/21 0840    [Held by provider] carvedilol (COREG) tablet 3.125 mg, 3.125 mg, Oral, BID WC, Achilles Skiff, MD    insulin lispro (HUMALOG) injection vial 0-6 Units, 0-6 Units, Subcutaneous, Q6H, Achilles Skiff, MD, 1 Units at 04/02/21 1107    sodium bicarbonate tablet 1,300 mg, 1,300 mg, Per NG tube, TID, Achilles Skiff, MD, 1,300 mg at 04/02/21 0840    heparin (porcine) injection 5,000 Units, 5,000 Units, Subcutaneous, 3 times per day, Achilles Skiff, MD, 5,000 Units at 04/02/21 0517    vancomycin (VANCOCIN) intermittent dosing (placeholder), , Other, RX Placeholder, Helen Melara MD, Stopped at 03/26/21 2154    [Held by provider] hydrOXYzine (VISTARIL) capsule 25 mg, 25 mg, Oral, 4x Daily PRN, Ena Pepe MD, 25 mg at 03/1960    [Held by provider] hydrALAZINE (APRESOLINE) injection 10 mg, 10 mg, Intravenous, Q4H PRN, Chintan Pedroza MD    fentaNYL 5 mcg/ml in 0.9%  ml infusion, 12.5-200 mcg/hr, Intravenous, Continuous, Garrett De La Rosa MD, Last Rate: 20 mL/hr at 04/02/21 1106, 100 mcg/hr at 04/02/21 1106    propofol injection, 5-50 mcg/kg/min, Intravenous, Titrated, Garrett De La Rosa MD, Last Rate: 11.9 mL/hr at 04/02/21 1113, 35 mcg/kg/min at 04/02/21 1113    [Held by provider] amLODIPine (NORVASC) tablet 5 mg, 5 mg, Oral, BID, Chelita Vazquez MD, 5 mg at 03/26/21 0824    piperacillin-tazobactam (ZOSYN) 3,375 mg in dextrose 5 % 50 mL IVPB extended infusion (mini-bag), 3,375 mg, Intravenous, Q12H, Jamila Mooney MD, Stopped at 04/02/21 1038    0.9 % sodium chloride infusion, , Intravenous, Q12H, Jamila Mooney MD, Last Rate: 12.5 mL/hr at 04/02/21 1107, New Bag at 04/02/21 1107    [Held by provider] pentoxifylline (TRENTAL) extended release tablet 400 mg, 400 mg, Oral, BID, Marisol Parrish DO, 400 mg at 03/26/21 0824    ipratropium-albuterol (DUONEB) nebulizer solution 1 ampule, 1 ampule, Inhalation, 4x daily, Marisol Parrish DO, 1 ampule at 04/02/21 1225    nystatin (MYCOSTATIN) 768319 UNIT/ML suspension 500,000 Units, 5 mL, Oral, 4x Daily, Marisol Parrish DO, 500,000 Units at 04/02/21 0840    glucose (GLUTOSE) 40 % oral gel 15 g, 15 g, Oral, PRN, Marisol Parrish DO    dextrose 50 % IV solution, 12.5 g, Intravenous, PRN, Marisol Parrish DO, 12.5 g at 03/28/21 0734    glucagon (rDNA) injection 1 mg, 1 mg, Intramuscular, PRN, Marisol Parrish DO    dextrose 5 % solution, 100 mL/hr, Intravenous, PRN, Marisol Parrish DO, Stopped at 03/24/21 1130    acetaminophen (TYLENOL) tablet 500 mg, 500 mg, Oral, 4x Daily PRN, Chelita Vazquez MD    aspirin chewable tablet 81 mg, 81 mg, Oral, Daily, Chelita Vazquez MD, 81 mg at 04/02/21 0840    atorvastatin (LIPITOR) tablet 80 mg, 80 mg, Oral, Daily, Chelita Vazquez MD, 80 mg at 04/02/21 1106    clopidogrel (PLAVIX) tablet 75 mg, 75 mg, Oral, Daily, Chelita Vazquez MD, 75 mg at 04/02/21 0839    levothyroxine (SYNTHROID) tablet 88 mcg, 88 mcg, Oral, Daily, Jacob Villeda MD, 88 mcg at 04/02/21 0547    sodium chloride flush 0.9 % injection 10 mL, 10 mL, Intravenous, 2 times per day, Gabby Herrmann MD, 10 mL at 04/02/21 0845    sodium chloride flush 0.9 % injection 10 mL, 10 mL, Intravenous, PRN, Gabby Herrmann MD, 10 mL at 03/29/21 1637    promethazine (PHENERGAN) tablet 12.5 mg, 12.5 mg, Oral, Q6H PRN **OR** ondansetron (ZOFRAN) injection 4 mg, 4 mg, Intravenous, Q6H PRN, Gabby Herrmann MD    polyethylene glycol (GLYCOLAX) packet 17 g, 17 g, Oral, Daily PRN, Gabby Herrmann MD    [DISCONTINUED] acetaminophen (TYLENOL) tablet 650 mg, 650 mg, Oral, Q6H PRN **OR** acetaminophen (TYLENOL) suppository 650 mg, 650 mg, Rectal, Q6H PRN, Gabby Herrmann MD    Review of Systems:   Unable to obtain due to medical condition    Physical Exam:   Vital Signs:  /65   Pulse 81   Temp 96.9 °F (36.1 °C) (Axillary)   Resp (!) 34   Ht 5' (1.524 m)   Wt 157 lb 13.6 oz (71.6 kg)   SpO2 92%   BMI 30.83 kg/m²     Input/Output:   In: 36 [I.V.:597; NG/GT:747]  Out: 1186     Oxygen requirements: Intubated    Ventilator Information:  Vent Information  $Ventilation: $Subsequent Day  Skin Assessment: Clean, dry, & intact  Equipment ID: [id number]  Vent Type: [id number]  Vent Mode: AC/VC  Vt Ordered: 400 mL  Pressure Ordered: 25  Rate Set: 28 bmp  Peak Flow: 65 L/min  Pressure Support: 0 cmH20  FiO2 : 100 %  SpO2: 92 %  SpO2/FiO2 ratio: 94  Sensitivity: 2  PEEP/CPAP: 8  I Time/ I Time %: 0 s  Humidification Source: Heated wire  Humidification Temp: ([place])  Mask Type: Full face mask  Mask Size: Small  Bonnet size: Small    General appearance:  not in pain or distress, in no respiratory distress    HEENT: Intubated  Neck: Supple, no jugular venous distension, lymphadenopathy, thyromegaly or carotid bruits  Chest: Decreased breath sounds, no wheezing, +ve crackles and no tenderness over ribs   Cardiovascular: Normal S1 , S2, regular rate and rhythm, no murmur, rub or gallop  Abdomen: Normal sounds present, soft, lax with no tenderness, no hepatosplenomegaly, and no masses  Extremities: No edema. Pulses are equally present. Skin: intact, no rashes   Neurologic: Sedated and mechanical ventilation      Investigations:  Labs, radiological imaging and cardiac work up were reviewed        ICU STAFF PHYSICIAN NOTE OF PERSONAL INVOLVEMENT IN CARE  As the attending physician, I certify that I personally reviewed the patient's history and personally examined the patient to confirm the physical findings described above, and that I reviewed the relevant imaging studies and available reports. I also discussed the differential diagnosis and all of the proposed management plans with the patient and individuals accompanying the patient to this visit. They had the opportunity to ask questions about the proposed management plans and to have those questions answered. This patient has a high probability of sudden, clinically significant deterioration, which requires the highest level of physician preparedness to intervene urgently. I managed/supervised life or organ supporting interventions that required frequent physician assessment. I devoted my full attention to the direct care of this patient for the amount of time indicated below. Time I spent with family or surrogate(s) is included only if the patient was incapable of providing the necessary information or participating in medical decision-making. Time devoted to teaching and to any procedures I billed separately is not included.   Critical Care Time: 35 minutes      Electronically signed by Anjelica Sawyer MD on 4/2/2021 at 12:31 PM Lumbar Puncture

## 2022-09-20 NOTE — CONSULTS
Pulmonary/Critical Care Consult Note    CHIEF COMPLAINT: Acute shortness of breath with Covid    HISTORY OF PRESENT ILLNESS: 68-year-old female with a history of type 2 diabetes, hypertension presented to emergency room per request of her nephrologist with decline in renal function over several months is believed. Patient's baseline creatinine is 2 on her ER labs was 3.2. Patient had elevated blood pressure as well receiving some labetalol for hypertensive urgency in the emergency room. Patient states that she was swabbed for Covid on her admission and was surprised that this was positive. She feels this though she is rapidly deteriorated since being told this news has become hypoxic prompting this consultation. Additionally patient feels as though she is run over by a [employer] truck. No history of chronic lung disease chronic bronchitis or smoking. No exposure to known Covid. Chest x-rays from admission showed a faint left upper lobe infiltrate peripherally today's x-ray shows florid diffuse bilateral infiltrates. Past medical history positive for hypertension, hyperlipidemia, chronic renal failure, hypothyroidism, and GERD. ALLERGY:  Demerol and Toradol [ketorolac tromethamine]    FAMILY HISTORY:  Family History   Problem Relation Age of Onset    Diabetes Mother     High Blood Pressure Mother     Diabetes Brother     Diabetes Sister        SOCIAL HISTORY: Patient is  not  non-smoker does not abuse drugs.   Social History     Socioeconomic History    Marital status: Single     Spouse name: Not on file    Number of children: Not on file    Years of education: Not on file    Highest education level: Not on file   Occupational History    Not on file   Social Needs    Financial resource strain: Not on file    Food insecurity     Worry: Not on file     Inability: Not on file    Transportation needs     Medical: Not on file     Non-medical: Not on file   Tobacco Use    Smoking status: Never acetaminophen, EPINEPHrine, methylPREDNISolone, glucose, dextrose, glucagon (rDNA), dextrose, sodium chloride flush, promethazine **OR** ondansetron, polyethylene glycol, labetalol    Review of Systems   Constitutional: Positive for fatigue. HENT: Negative. Eyes: Negative. Respiratory: Positive for cough and shortness of breath. Cardiovascular: Negative. Genitourinary: Negative. Musculoskeletal: Positive for arthralgias and myalgias. Skin: Negative. Allergic/Immunologic: Negative. Neurological: Positive for weakness. Hematological: Negative. Psychiatric/Behavioral: Negative. PHYSICAL EXAM:  Vitals:    03/10/21 1600   BP: (!) 171/80   Pulse: 80   Resp: 20   Temp: 98.1 °F (36.7 °C)   SpO2: 95%        O2 Flow Rate (L/min): 7 L/min(decreased to 5L)  O2 Device: Nasal cannula    Constitutional: Patient appears equal stated age appears slightly fatigued in the eye. Skin: No skin breakdown no cyanosis or icterus normocephalic neck is supple pharynx is clear  HEENT: No thrush. PERRLA EOMI external nose grossly lesion  Neck: No JVD no thyroid enlargement carotid upstrokes are +2/4 bilaterally no bruits. Cardiovascular: Rate and rhythm are regular no gallop no murmur  Respiratory: Diminished to bases few crackles no consolidation  Gastrointestinal: Soft bowel sounds are present no peritoneal signs  Genitourinary: Breast rectal pelvic exam was deferred  Extremities: +2/4 pulses no signs of DVT no red or swollen joints  Neurological: Patient is alert and oriented cranial nerves appear intact gross sensorimotor intact. Nonfocal.  Gait was not observed  Psychological: Appropriate but upset with her Covid diagnosis.     LABS:  WBC   Date Value Ref Range Status   03/10/2021 9.2 4.5 - 11.5 E9/L Final   03/09/2021 3.5 (L) 4.5 - 11.5 E9/L Final   03/08/2021 4.7 4.5 - 11.5 E9/L Final     Hemoglobin   Date Value Ref Range Status   03/10/2021 8.2 (L) 11.5 - 15.5 g/dL Final   03/09/2021 8.0 (L) 11.5 - 15.5 g/dL Final   03/08/2021 9.3 (L) 11.5 - 15.5 g/dL Final     Hematocrit   Date Value Ref Range Status   03/10/2021 26.2 (L) 34.0 - 48.0 % Final   03/09/2021 25.4 (L) 34.0 - 48.0 % Final   03/08/2021 29.0 (L) 34.0 - 48.0 % Final     MCV   Date Value Ref Range Status   03/10/2021 87.6 80.0 - 99.9 fL Final   03/09/2021 85.8 80.0 - 99.9 fL Final   03/08/2021 84.5 80.0 - 99.9 fL Final     Platelets   Date Value Ref Range Status   03/10/2021 250 130 - 450 E9/L Final   03/09/2021 291 130 - 450 E9/L Final   03/08/2021 348 130 - 450 E9/L Final     Sodium   Date Value Ref Range Status   03/10/2021 139 132 - 146 mmol/L Final   03/10/2021 138 132 - 146 mmol/L Final   03/09/2021 138 132 - 146 mmol/L Final     Potassium   Date Value Ref Range Status   03/10/2021 3.9 3.5 - 5.0 mmol/L Final   03/10/2021 3.8 3.5 - 5.0 mmol/L Final   03/08/2021 3.8 3.5 - 5.0 mmol/L Final     Potassium reflex Magnesium   Date Value Ref Range Status   03/09/2021 4.2 3.5 - 5.0 mmol/L Final     Chloride   Date Value Ref Range Status   03/10/2021 109 (H) 98 - 107 mmol/L Final   03/10/2021 109 (H) 98 - 107 mmol/L Final   03/09/2021 106 98 - 107 mmol/L Final     CO2   Date Value Ref Range Status   03/10/2021 19 (L) 22 - 29 mmol/L Final   03/10/2021 19 (L) 22 - 29 mmol/L Final   03/09/2021 20 (L) 22 - 29 mmol/L Final     BUN   Date Value Ref Range Status   03/10/2021 49 (H) 8 - 23 mg/dL Final   03/10/2021 47 (H) 8 - 23 mg/dL Final   03/09/2021 46 (H) 8 - 23 mg/dL Final     CREATININE   Date Value Ref Range Status   03/10/2021 3.6 (H) 0.5 - 1.0 mg/dL Final   03/10/2021 3.5 (H) 0.5 - 1.0 mg/dL Final   03/09/2021 3.5 (H) 0.5 - 1.0 mg/dL Final     Glucose   Date Value Ref Range Status   03/10/2021 157 (H) 74 - 99 mg/dL Final   03/10/2021 111 (H) 74 - 99 mg/dL Final   03/09/2021 226 (H) 74 - 99 mg/dL Final   03/21/2011 419 (H) 70 - 110 mg/dL Final     Calcium   Date Value Ref Range Status   03/10/2021 7.8 (L) 8.6 - 10.2 mg/dL Final   03/10/2021 8.0 (L) 8.6 - 10.2 mg/dL Final   03/09/2021 8.6 8.6 - 10.2 mg/dL Final     Total Protein   Date Value Ref Range Status   03/09/2021 5.7 (L) 6.4 - 8.3 g/dL Final   03/13/2019 7.5 6.4 - 8.3 g/dL Final   04/11/2016 7.6 6.4 - 8.3 g/dL Final     Albumin   Date Value Ref Range Status   03/09/2021 2.9 (L) 3.5 - 5.2 g/dL Final   03/06/2021 3.3 (L) 3.5 - 5.2 g/dL Final   08/05/2020 3.8 3.5 - 5.2 g/dL Final   03/21/2011 4.1 3.2 - 4.8 g/dL Final     Total Bilirubin   Date Value Ref Range Status   03/09/2021 <0.2 0.0 - 1.2 mg/dL Final   03/13/2019 <0.2 0.0 - 1.2 mg/dL Final   04/11/2016 <0.2 0.0 - 1.2 mg/dL Final     Alkaline Phosphatase   Date Value Ref Range Status   03/09/2021 84 35 - 104 U/L Final   03/13/2019 106 (H) 35 - 104 U/L Final   04/11/2016 103 35 - 104 U/L Final     AST   Date Value Ref Range Status   03/09/2021 18 0 - 31 U/L Final   03/13/2019 13 0 - 31 U/L Final   04/11/2016 15 0 - 31 U/L Final     ALT   Date Value Ref Range Status   03/09/2021 12 0 - 32 U/L Final   03/13/2019 8 0 - 32 U/L Final   04/11/2016 12 0 - 32 U/L Final     GFR Non-   Date Value Ref Range Status   03/10/2021 15 >=60 mL/min/1.73 Final     Comment:     Chronic Kidney Disease: less than 60 ml/min/1.73 sq.m. Kidney Failure: less than 15 ml/min/1.73 sq.m. Results valid for patients 18 years and older. 03/10/2021 16 >=60 mL/min/1.73 Final     Comment:     Chronic Kidney Disease: less than 60 ml/min/1.73 sq.m. Kidney Failure: less than 15 ml/min/1.73 sq.m. Results valid for patients 18 years and older. 03/09/2021 16 >=60 mL/min/1.73 Final     Comment:     Chronic Kidney Disease: less than 60 ml/min/1.73 sq.m. Kidney Failure: less than 15 ml/min/1.73 sq.m. Results valid for patients 18 years and older.        GFR    Date Value Ref Range Status   03/10/2021 15  Final   03/10/2021 16  Final   03/09/2021 16  Final     No results found for: MG  Phosphorus   Date Value Ref Range Status 03/06/2021 4.5 2.5 - 4.5 mg/dL Final   08/05/2020 3.8 2.5 - 4.5 mg/dL Final     Recent Labs     03/10/21  1230   PH 7.235*   PO2 44.9*   PCO2 39.0   HCO3 16.2*   BE -10.6*   O2SAT 76.9*       RADIOLOGY:  XR CHEST PORTABLE   Final Result   1. Rapid development of widespread bilateral parenchymal consolidation   predominant from the hilar regions to the periphery of the lung that   superimposes to previous left upper lobe peripheral focal consolidation. 2.  The differential diagnosis include acute pulmonary edema, pulmonary   hemorrhage, diffuse alveolar damage, bilateral pneumonia. Please correlate   clinically. XR CHEST PORTABLE   Final Result   Left upper lobe opacity worrisome for pneumonia. US RETROPERITONEAL LIMITED   Final Result   Mildly atrophic and echogenic kidneys, compatible with mild chronic renal   parenchymal disease. Unremarkable ultrasound of the bladder. IMPRESSION:  1. Multi lobar Covid19 pneumonia  2. Hypoxic respiratory failure associated with Covid pneumonia  3. Acute on chronic kidney disease  4. Positive D-dimer  5. Positive BNP in the face of Covid with chronic renal failure    PLAN:  1. Repeat echocardiogram  2. Venous duplex of legs  3. Pulse Lovenox 1 mg/kg as you have done with reevaluation  4. Agree with Actemra, dexamethasone, patient's not a candidate for remdesivir with renal function. 5. Consultation with Dr. Jet Clarke  6. Add Trental for mediation of lung injury in conjunction with the above  7. Considerations for convalescent plasma transfusion  8. High flow oxygen and noninvasive vent support as necessary  9. Glycemic control  10.  Modifications of patient's Lovenox dosing after review of duplex and echo    ATTESTATION:  ICU Staff Physician note of personal involvement in Care  As the attending physician, I certify that I personally reviewed the patients history and personnally examined the patient to confirm the physical findings described above,  And that I reviewed the relevant imaging studies and available reports. I also discussed the differential diagnosis and all of the proposed management plans with the patient and individuals accompanying the patient to this visit. They had the opportunity to ask questions about the proposed management plans and to have those questions answered. This patient has a high probability of sudden, clinically significant deterioration, which requires the highest level of physician preparedness to intervene urgently. I managed/supervised life or organ supporting interventions that required frequent physician assessment. I devoted my full attention to the direct care of this patient for the amount of time indicated below. Time I spent with the family or surrogate(s) is included only if the patient was incapable of providing the necessary information or participating in medical decisions  Time devoted to teaching and to any procedures I billed separately is not included.     CRITICAL CARE TIME:  40 minutes    Electronically signed by Lanette Mcgovern DO on 3/10/2021 at 6:40 PM Alert-The patient is alert, awake and responds to voice. The patient is oriented to time, place, and person. The triage nurse is able to obtain subjective information.